# Patient Record
Sex: MALE | Race: WHITE | NOT HISPANIC OR LATINO | Employment: OTHER | URBAN - METROPOLITAN AREA
[De-identification: names, ages, dates, MRNs, and addresses within clinical notes are randomized per-mention and may not be internally consistent; named-entity substitution may affect disease eponyms.]

---

## 2017-02-10 ENCOUNTER — COMMUNICATION - HEALTHEAST (OUTPATIENT)
Dept: FAMILY MEDICINE | Facility: CLINIC | Age: 67
End: 2017-02-10

## 2017-02-10 DIAGNOSIS — I10 HYPERTENSION: ICD-10-CM

## 2017-02-10 DIAGNOSIS — I10 ESSENTIAL HYPERTENSION, MALIGNANT: ICD-10-CM

## 2017-02-16 ENCOUNTER — RECORDS - HEALTHEAST (OUTPATIENT)
Dept: ADMINISTRATIVE | Facility: OTHER | Age: 67
End: 2017-02-16

## 2017-02-17 ENCOUNTER — RECORDS - HEALTHEAST (OUTPATIENT)
Dept: ADMINISTRATIVE | Facility: OTHER | Age: 67
End: 2017-02-17

## 2017-05-18 ENCOUNTER — OFFICE VISIT - HEALTHEAST (OUTPATIENT)
Dept: FAMILY MEDICINE | Facility: CLINIC | Age: 67
End: 2017-05-18

## 2017-05-18 DIAGNOSIS — R41.3 MEMORY DIFFICULTIES: ICD-10-CM

## 2017-05-18 DIAGNOSIS — K75.81 STEATOHEPATITIS: ICD-10-CM

## 2017-05-18 DIAGNOSIS — I10 ESSENTIAL HYPERTENSION: ICD-10-CM

## 2017-05-18 DIAGNOSIS — J30.9 ALLERGIC RHINITIS: ICD-10-CM

## 2017-05-18 DIAGNOSIS — I10 ESSENTIAL HYPERTENSION WITH GOAL BLOOD PRESSURE LESS THAN 140/90: ICD-10-CM

## 2017-05-18 DIAGNOSIS — I10 ESSENTIAL HYPERTENSION, MALIGNANT: ICD-10-CM

## 2017-05-18 DIAGNOSIS — E78.00 PURE HYPERCHOLESTEROLEMIA: ICD-10-CM

## 2017-05-18 DIAGNOSIS — M54.9 BACKACHE: ICD-10-CM

## 2017-05-18 LAB
HBA1C MFR BLD: 5.7 % (ref 3.5–6)
LDLC SERPL CALC-MCNC: 87 MG/DL

## 2017-05-18 ASSESSMENT — MIFFLIN-ST. JEOR: SCORE: 1802.82

## 2017-05-19 ENCOUNTER — COMMUNICATION - HEALTHEAST (OUTPATIENT)
Dept: FAMILY MEDICINE | Facility: CLINIC | Age: 67
End: 2017-05-19

## 2017-06-06 DIAGNOSIS — L40.8 SEBOPSORIASIS: ICD-10-CM

## 2017-06-06 RX ORDER — KETOCONAZOLE 20 MG/ML
SHAMPOO TOPICAL
Qty: 240 ML | Refills: 11 | Status: SHIPPED | OUTPATIENT
Start: 2017-06-06 | End: 2019-01-22

## 2017-06-06 NOTE — TELEPHONE ENCOUNTER
Last seen 5/9/17.      1. Sebopsoriasis. Doing well with current treatment regimen.  Plan to continue regimen.      Desonide ointment to eyebrows.     Ketoconazole shampoo three times weekly.      Clobetasol solution to scalp and elbow as needed.     Follow-up in 1 year, earlier for new or changing lesions.

## 2017-06-29 ENCOUNTER — COMMUNICATION - HEALTHEAST (OUTPATIENT)
Dept: FAMILY MEDICINE | Facility: CLINIC | Age: 67
End: 2017-06-29

## 2017-06-29 ENCOUNTER — AMBULATORY - HEALTHEAST (OUTPATIENT)
Dept: FAMILY MEDICINE | Facility: CLINIC | Age: 67
End: 2017-06-29

## 2017-06-29 DIAGNOSIS — E78.00 PURE HYPERCHOLESTEROLEMIA: ICD-10-CM

## 2017-07-06 ENCOUNTER — AMBULATORY - HEALTHEAST (OUTPATIENT)
Dept: FAMILY MEDICINE | Facility: CLINIC | Age: 67
End: 2017-07-06

## 2017-07-06 ENCOUNTER — COMMUNICATION - HEALTHEAST (OUTPATIENT)
Dept: FAMILY MEDICINE | Facility: CLINIC | Age: 67
End: 2017-07-06

## 2017-07-06 ENCOUNTER — AMBULATORY - HEALTHEAST (OUTPATIENT)
Dept: LAB | Facility: CLINIC | Age: 67
End: 2017-07-06

## 2017-07-06 DIAGNOSIS — E78.00 PURE HYPERCHOLESTEROLEMIA: ICD-10-CM

## 2017-08-07 ENCOUNTER — OFFICE VISIT - HEALTHEAST (OUTPATIENT)
Dept: FAMILY MEDICINE | Facility: CLINIC | Age: 67
End: 2017-08-07

## 2017-08-07 ENCOUNTER — COMMUNICATION - HEALTHEAST (OUTPATIENT)
Dept: FAMILY MEDICINE | Facility: CLINIC | Age: 67
End: 2017-08-07

## 2017-08-07 DIAGNOSIS — M54.9 BACKACHE: ICD-10-CM

## 2017-08-07 DIAGNOSIS — E78.00 PURE HYPERCHOLESTEROLEMIA: ICD-10-CM

## 2017-08-07 DIAGNOSIS — R41.3 MEMORY DIFFICULTIES: ICD-10-CM

## 2017-08-07 LAB — LDLC SERPL CALC-MCNC: 86 MG/DL

## 2017-08-09 ENCOUNTER — COMMUNICATION - HEALTHEAST (OUTPATIENT)
Dept: FAMILY MEDICINE | Facility: CLINIC | Age: 67
End: 2017-08-09

## 2017-08-09 ENCOUNTER — HOSPITAL ENCOUNTER (OUTPATIENT)
Dept: CT IMAGING | Facility: CLINIC | Age: 67
Discharge: HOME | End: 2017-08-09
Attending: FAMILY MEDICINE

## 2017-08-09 DIAGNOSIS — R41.3 MEMORY DIFFICULTIES: ICD-10-CM

## 2017-09-09 ENCOUNTER — COMMUNICATION - HEALTHEAST (OUTPATIENT)
Dept: FAMILY MEDICINE | Facility: CLINIC | Age: 67
End: 2017-09-09

## 2017-10-09 ENCOUNTER — TELEPHONE (OUTPATIENT)
Dept: UROLOGY | Facility: CLINIC | Age: 67
End: 2017-10-09

## 2017-10-09 ENCOUNTER — COMMUNICATION - HEALTHEAST (OUTPATIENT)
Dept: FAMILY MEDICINE | Facility: CLINIC | Age: 67
End: 2017-10-09

## 2017-10-09 NOTE — TELEPHONE ENCOUNTER
----- Message from Leah Lewis sent at 10/9/2017 12:31 PM CDT -----  Regarding: Pt wanting sooner or consolidated appts  Contact: [phone]  Pts wife Leia trying to coordinate appts or get a sooner one. Pt needs to see Dr. Cooley annually just to get his viagra rx refilled. Pts wife was asking if they could be fit in on 11/15 as they will be at the clinic that day anyway.  If not, she is just asking that he be seen sooner than January, which was Dr. Cooley's first available.  Leia would like a call back and can be reached at [phone] with her options.    Thank you!  ~Leah    Please DO NOT send this message and/or reply back to sender. Call Center Representatives DO NOT respond to messages.

## 2017-10-09 NOTE — TELEPHONE ENCOUNTER
Not needed dr teran states for him to follow up with his PMD for refills in the future. Kathryn Castellano LPN Staff Nurse

## 2017-10-30 ENCOUNTER — COMMUNICATION - HEALTHEAST (OUTPATIENT)
Dept: FAMILY MEDICINE | Facility: CLINIC | Age: 67
End: 2017-10-30

## 2017-10-30 DIAGNOSIS — I10 HYPERTENSION: ICD-10-CM

## 2017-11-15 ENCOUNTER — COMMUNICATION - HEALTHEAST (OUTPATIENT)
Dept: FAMILY MEDICINE | Facility: CLINIC | Age: 67
End: 2017-11-15

## 2018-01-04 ENCOUNTER — COMMUNICATION - HEALTHEAST (OUTPATIENT)
Dept: FAMILY MEDICINE | Facility: CLINIC | Age: 68
End: 2018-01-04

## 2018-01-04 ENCOUNTER — OFFICE VISIT - HEALTHEAST (OUTPATIENT)
Dept: FAMILY MEDICINE | Facility: CLINIC | Age: 68
End: 2018-01-04

## 2018-01-04 DIAGNOSIS — N40.0 BPH (BENIGN PROSTATIC HYPERPLASIA): ICD-10-CM

## 2018-01-04 DIAGNOSIS — E78.00 PURE HYPERCHOLESTEROLEMIA: ICD-10-CM

## 2018-01-04 DIAGNOSIS — I10 ESSENTIAL HYPERTENSION: ICD-10-CM

## 2018-01-04 DIAGNOSIS — L21.9 SEBORRHEA: ICD-10-CM

## 2018-01-04 DIAGNOSIS — Z00.00 ROUTINE GENERAL MEDICAL EXAMINATION AT A HEALTH CARE FACILITY: ICD-10-CM

## 2018-01-04 DIAGNOSIS — R41.3 MEMORY DEFICIT: ICD-10-CM

## 2018-01-04 DIAGNOSIS — L30.9 ECZEMA: ICD-10-CM

## 2018-01-04 LAB
CK SERPL-CCNC: 214 U/L (ref 30–190)
LDLC SERPL CALC-MCNC: 104 MG/DL
PSA SERPL-MCNC: 2.2 NG/ML (ref 0–4.5)

## 2018-01-04 ASSESSMENT — MIFFLIN-ST. JEOR: SCORE: 1808.49

## 2018-01-11 ENCOUNTER — COMMUNICATION - HEALTHEAST (OUTPATIENT)
Dept: FAMILY MEDICINE | Facility: CLINIC | Age: 68
End: 2018-01-11

## 2018-01-30 ENCOUNTER — TELEPHONE (OUTPATIENT)
Dept: UROLOGY | Facility: CLINIC | Age: 68
End: 2018-01-30

## 2018-01-30 NOTE — TELEPHONE ENCOUNTER
Patient requesting refill on flomax needs appointment to get more refills. Kathryn Castellano LPN Staff Nurse

## 2018-01-31 ENCOUNTER — COMMUNICATION - HEALTHEAST (OUTPATIENT)
Dept: FAMILY MEDICINE | Facility: CLINIC | Age: 68
End: 2018-01-31

## 2018-03-19 ENCOUNTER — AMBULATORY - HEALTHEAST (OUTPATIENT)
Dept: NEUROLOGY | Facility: CLINIC | Age: 68
End: 2018-03-19

## 2018-03-19 ENCOUNTER — HOSPITAL ENCOUNTER (OUTPATIENT)
Dept: NEUROLOGY | Facility: CLINIC | Age: 68
Setting detail: THERAPIES SERIES
Discharge: STILL ADMITTED OR ADMINISTRATIVE | End: 2018-03-19
Attending: PSYCHIATRY & NEUROLOGY

## 2018-03-19 ENCOUNTER — HOSPITAL ENCOUNTER (OUTPATIENT)
Dept: NEUROLOGY | Facility: CLINIC | Age: 68
Setting detail: THERAPIES SERIES
Discharge: STILL ADMITTED OR ADMINISTRATIVE | End: 2018-03-19
Attending: SOCIAL WORKER

## 2018-03-19 ENCOUNTER — TRANSFERRED RECORDS (OUTPATIENT)
Dept: HEALTH INFORMATION MANAGEMENT | Facility: CLINIC | Age: 68
End: 2018-03-19

## 2018-03-19 DIAGNOSIS — F03.90 MAJOR NEUROCOGNITIVE DISORDER (H): ICD-10-CM

## 2018-03-19 DIAGNOSIS — G31.84 MCI (MILD COGNITIVE IMPAIRMENT): ICD-10-CM

## 2018-03-24 ENCOUNTER — COMMUNICATION - HEALTHEAST (OUTPATIENT)
Dept: FAMILY MEDICINE | Facility: CLINIC | Age: 68
End: 2018-03-24

## 2018-04-03 ENCOUNTER — HOSPITAL ENCOUNTER (OUTPATIENT)
Dept: MRI IMAGING | Facility: CLINIC | Age: 68
Discharge: HOME | End: 2018-04-03
Attending: PSYCHIATRY & NEUROLOGY

## 2018-04-03 ENCOUNTER — RECORDS - HEALTHEAST (OUTPATIENT)
Dept: GENERAL RADIOLOGY | Facility: CLINIC | Age: 68
End: 2018-04-03

## 2018-04-03 ENCOUNTER — TRANSFERRED RECORDS (OUTPATIENT)
Dept: HEALTH INFORMATION MANAGEMENT | Facility: CLINIC | Age: 68
End: 2018-04-03

## 2018-04-03 ENCOUNTER — OFFICE VISIT - HEALTHEAST (OUTPATIENT)
Dept: FAMILY MEDICINE | Facility: CLINIC | Age: 68
End: 2018-04-03

## 2018-04-03 DIAGNOSIS — G31.84 MCI (MILD COGNITIVE IMPAIRMENT): ICD-10-CM

## 2018-04-03 DIAGNOSIS — M54.50 LOW BACK PAIN: ICD-10-CM

## 2018-04-03 DIAGNOSIS — M54.50 ACUTE LEFT-SIDED LOW BACK PAIN WITHOUT SCIATICA: ICD-10-CM

## 2018-04-07 ENCOUNTER — COMMUNICATION - HEALTHEAST (OUTPATIENT)
Dept: NEUROLOGY | Facility: CLINIC | Age: 68
End: 2018-04-07

## 2018-04-09 ENCOUNTER — COMMUNICATION - HEALTHEAST (OUTPATIENT)
Dept: FAMILY MEDICINE | Facility: CLINIC | Age: 68
End: 2018-04-09

## 2018-04-09 ENCOUNTER — AMBULATORY - HEALTHEAST (OUTPATIENT)
Dept: NEUROLOGY | Facility: CLINIC | Age: 68
End: 2018-04-09

## 2018-04-09 DIAGNOSIS — R93.0 ABNORMAL FINDINGS ON DIAGNOSTIC IMAGING OF SKULL AND HEAD, NOT ELSEWHERE CLASSIFIED (CODE): ICD-10-CM

## 2018-04-09 DIAGNOSIS — R41.89 COGNITIVE IMPAIRMENT: ICD-10-CM

## 2018-04-17 ENCOUNTER — HOSPITAL ENCOUNTER (OUTPATIENT)
Dept: MRI IMAGING | Facility: CLINIC | Age: 68
Discharge: HOME | End: 2018-04-17
Attending: PSYCHIATRY & NEUROLOGY

## 2018-04-17 ENCOUNTER — TRANSFERRED RECORDS (OUTPATIENT)
Dept: HEALTH INFORMATION MANAGEMENT | Facility: CLINIC | Age: 68
End: 2018-04-17

## 2018-04-17 DIAGNOSIS — R41.89 COGNITIVE IMPAIRMENT: ICD-10-CM

## 2018-04-17 DIAGNOSIS — R93.0 ABNORMAL FINDINGS ON DIAGNOSTIC IMAGING OF SKULL AND HEAD, NOT ELSEWHERE CLASSIFIED (CODE): ICD-10-CM

## 2018-04-25 ENCOUNTER — COMMUNICATION - HEALTHEAST (OUTPATIENT)
Dept: NEUROLOGY | Facility: CLINIC | Age: 68
End: 2018-04-25

## 2018-04-25 ENCOUNTER — COMMUNICATION - HEALTHEAST (OUTPATIENT)
Dept: FAMILY MEDICINE | Facility: CLINIC | Age: 68
End: 2018-04-25

## 2018-04-25 DIAGNOSIS — L30.9 ECZEMA: ICD-10-CM

## 2018-04-27 ENCOUNTER — MEDICAL CORRESPONDENCE (OUTPATIENT)
Dept: HEALTH INFORMATION MANAGEMENT | Facility: CLINIC | Age: 68
End: 2018-04-27

## 2018-04-27 ENCOUNTER — AMBULATORY - HEALTHEAST (OUTPATIENT)
Dept: NEUROLOGY | Facility: CLINIC | Age: 68
End: 2018-04-27

## 2018-04-27 ENCOUNTER — TRANSFERRED RECORDS (OUTPATIENT)
Dept: HEALTH INFORMATION MANAGEMENT | Facility: CLINIC | Age: 68
End: 2018-04-27

## 2018-04-27 DIAGNOSIS — I72.9 ANEURYSM (H): ICD-10-CM

## 2018-04-30 ENCOUNTER — HOSPITAL ENCOUNTER (OUTPATIENT)
Dept: NEUROLOGY | Facility: CLINIC | Age: 68
Setting detail: THERAPIES SERIES
Discharge: STILL ADMITTED OR ADMINISTRATIVE | End: 2018-04-30
Attending: PSYCHIATRY & NEUROLOGY

## 2018-04-30 DIAGNOSIS — I67.1 CEREBRAL ANEURYSM, NONRUPTURED: ICD-10-CM

## 2018-04-30 DIAGNOSIS — F03.90 MAJOR NEUROCOGNITIVE DISORDER (H): ICD-10-CM

## 2018-04-30 DIAGNOSIS — N48.6 PEYRONIE'S DISEASE: ICD-10-CM

## 2018-04-30 DIAGNOSIS — E78.00 HIGH CHOLESTEROL: ICD-10-CM

## 2018-04-30 DIAGNOSIS — I10 ESSENTIAL HYPERTENSION: ICD-10-CM

## 2018-04-30 DIAGNOSIS — I10 HYPERTENSION: ICD-10-CM

## 2018-05-15 ENCOUNTER — HOSPITAL ENCOUNTER (OUTPATIENT)
Dept: NEUROLOGY | Facility: CLINIC | Age: 68
Setting detail: THERAPIES SERIES
Discharge: STILL ADMITTED OR ADMINISTRATIVE | End: 2018-05-15
Attending: PSYCHIATRY & NEUROLOGY

## 2018-05-15 DIAGNOSIS — M54.9 BACKACHE: ICD-10-CM

## 2018-05-15 DIAGNOSIS — K75.81 STEATOHEPATITIS: ICD-10-CM

## 2018-05-15 DIAGNOSIS — R74.8 ABNORMAL LEVELS OF OTHER SERUM ENZYMES: ICD-10-CM

## 2018-05-15 DIAGNOSIS — L30.9 ECZEMA: ICD-10-CM

## 2018-05-15 DIAGNOSIS — F03.90 DEMENTIA WITHOUT BEHAVIORAL DISTURBANCE (H): ICD-10-CM

## 2018-05-15 DIAGNOSIS — I10 ESSENTIAL HYPERTENSION: ICD-10-CM

## 2018-05-15 DIAGNOSIS — L21.9 SEBORRHEA: ICD-10-CM

## 2018-05-15 LAB
ALBUMIN SERPL-MCNC: 4.1 G/DL (ref 3.5–5)
ALP SERPL-CCNC: 45 U/L (ref 45–120)
ALT SERPL W P-5'-P-CCNC: 38 U/L (ref 0–45)
ANION GAP SERPL CALCULATED.3IONS-SCNC: 12 MMOL/L (ref 5–18)
AST SERPL W P-5'-P-CCNC: 36 U/L (ref 0–40)
BASOPHILS # BLD AUTO: 0.1 THOU/UL (ref 0–0.2)
BASOPHILS NFR BLD AUTO: 1 % (ref 0–2)
BILIRUB SERPL-MCNC: 0.9 MG/DL (ref 0–1)
BUN SERPL-MCNC: 22 MG/DL (ref 8–22)
CALCIUM SERPL-MCNC: 10 MG/DL (ref 8.5–10.5)
CHLORIDE BLD-SCNC: 102 MMOL/L (ref 98–107)
CO2 SERPL-SCNC: 24 MMOL/L (ref 22–31)
CREAT SERPL-MCNC: 1.05 MG/DL (ref 0.7–1.3)
EOSINOPHIL # BLD AUTO: 0.4 THOU/UL (ref 0–0.4)
EOSINOPHIL NFR BLD AUTO: 5 % (ref 0–6)
ERYTHROCYTE [DISTWIDTH] IN BLOOD BY AUTOMATED COUNT: 12.4 % (ref 11–14.5)
GFR SERPL CREATININE-BSD FRML MDRD: >60 ML/MIN/1.73M2
GLUCOSE BLD-MCNC: 104 MG/DL (ref 70–125)
HCT VFR BLD AUTO: 42 % (ref 40–54)
HGB BLD-MCNC: 14.8 G/DL (ref 14–18)
LYMPHOCYTES # BLD AUTO: 1.4 THOU/UL (ref 0.8–4.4)
LYMPHOCYTES NFR BLD AUTO: 17 % (ref 20–40)
MCH RBC QN AUTO: 32 PG (ref 27–34)
MCHC RBC AUTO-ENTMCNC: 35.2 G/DL (ref 32–36)
MCV RBC AUTO: 91 FL (ref 80–100)
MONOCYTES # BLD AUTO: 0.7 THOU/UL (ref 0–0.9)
MONOCYTES NFR BLD AUTO: 9 % (ref 2–10)
NEUTROPHILS # BLD AUTO: 5.5 THOU/UL (ref 2–7.7)
NEUTROPHILS NFR BLD AUTO: 69 % (ref 50–70)
PLATELET # BLD AUTO: 334 THOU/UL (ref 140–440)
PMV BLD AUTO: 8.9 FL (ref 8.5–12.5)
POTASSIUM BLD-SCNC: 4.3 MMOL/L (ref 3.5–5)
PROT SERPL-MCNC: 7.9 G/DL (ref 6–8)
RBC # BLD AUTO: 4.63 MILL/UL (ref 4.4–6.2)
SODIUM SERPL-SCNC: 138 MMOL/L (ref 136–145)
TSH SERPL DL<=0.005 MIU/L-ACNC: 1.08 UIU/ML (ref 0.3–5)
VIT B12 SERPL-MCNC: 793 PG/ML (ref 213–816)
WBC: 8 THOU/UL (ref 4–11)

## 2018-05-17 ENCOUNTER — HOSPITAL ENCOUNTER (OUTPATIENT)
Dept: PET IMAGING | Facility: HOSPITAL | Age: 68
Discharge: HOME | End: 2018-05-17
Attending: PSYCHIATRY & NEUROLOGY

## 2018-05-17 DIAGNOSIS — F03.90 DEMENTIA WITHOUT BEHAVIORAL DISTURBANCE (H): ICD-10-CM

## 2018-05-17 LAB — GLUCOSE BLDC GLUCOMTR-MCNC: 93 MG/DL

## 2018-05-17 ASSESSMENT — MIFFLIN-ST. JEOR: SCORE: 1777.3

## 2018-05-22 ENCOUNTER — OFFICE VISIT (OUTPATIENT)
Dept: NEUROSURGERY | Facility: CLINIC | Age: 68
End: 2018-05-22
Payer: MEDICARE

## 2018-05-22 ENCOUNTER — RECORDS - HEALTHEAST (OUTPATIENT)
Dept: ADMINISTRATIVE | Facility: OTHER | Age: 68
End: 2018-05-22

## 2018-05-22 VITALS
BODY MASS INDEX: 26.62 KG/M2 | HEART RATE: 65 BPM | DIASTOLIC BLOOD PRESSURE: 78 MMHG | WEIGHT: 213 LBS | SYSTOLIC BLOOD PRESSURE: 151 MMHG

## 2018-05-22 DIAGNOSIS — I67.1 CEREBRAL ANEURYSM, NONRUPTURED: Primary | ICD-10-CM

## 2018-05-22 ASSESSMENT — ENCOUNTER SYMPTOMS
DECREASED CONCENTRATION: 1
FEVER: 0
NUMBNESS: 0
POLYPHAGIA: 0
SEIZURES: 0
DECREASED APPETITE: 0
ALTERED TEMPERATURE REGULATION: 0
WHEEZING: 0
DYSPNEA ON EXERTION: 0
HALLUCINATIONS: 0
JOINT SWELLING: 0
INCREASED ENERGY: 1
WEAKNESS: 0
DIZZINESS: 0
COUGH: 1
COUGH DISTURBING SLEEP: 0
MUSCLE CRAMPS: 1
PARALYSIS: 0
SNORES LOUDLY: 1
NERVOUS/ANXIOUS: 1
MEMORY LOSS: 1
MUSCLE WEAKNESS: 0
WEIGHT GAIN: 0
SHORTNESS OF BREATH: 0
POLYDIPSIA: 0
SPUTUM PRODUCTION: 1
SPEECH CHANGE: 0
BACK PAIN: 1
WEIGHT LOSS: 0
TINGLING: 0
ARTHRALGIAS: 1
POSTURAL DYSPNEA: 0
INSOMNIA: 0
HEMOPTYSIS: 0
NIGHT SWEATS: 1
HEADACHES: 0
MYALGIAS: 1
FATIGUE: 0
PANIC: 0
TREMORS: 0
STIFFNESS: 1
NECK PAIN: 0
DISTURBANCES IN COORDINATION: 0
DEPRESSION: 1
CHILLS: 1
LOSS OF CONSCIOUSNESS: 0

## 2018-05-22 ASSESSMENT — PAIN SCALES - GENERAL: PAINLEVEL: NO PAIN (0)

## 2018-05-22 NOTE — NURSING NOTE
Informed patient and wife, Leia: Scheduling will contact you to make arrangements for your angiogram. You will need a  home and someone that can stay with you through the night. Do not eat for 8 hours prior to your procedure. You may drink clear liquids (includes water, Jell-O, clear broth, apple juice or any non-carbonated beverage that you can see through) until 2 hours prior to your procedure. You may take your medications with a sip of water. You will check in at the Gold waiting room at the [facility] 1.5 hours prior to your procedure. You will receive written instructions and a map to the hospital after your procedure has been scheduled.

## 2018-05-22 NOTE — NURSING NOTE
Informed patient: Scheduling will contact you to make arrangements for your angiogram. You will need a  home and someone that can stay with you through the night. Do not eat for 8 hours prior to your procedure. You may drink clear liquids (includes water, Jell-O, clear broth, apple juice or any non-carbonated beverage that you can see through) until 2 hours prior to your procedure. You may take your medications with a sip of water. You will check in at the Gold waiting room at the [facility] 1.5 hours prior to your procedure. You will receive written instructions and a map to the hospital after your procedure has been scheduled.

## 2018-05-22 NOTE — PROGRESS NOTES
Service Date: 2018      I had the pleasure to see Mr. Coronado and his wife today in the Neurosurgery Clinic.  This is a 68-year-old gentleman with a history of hypertension, hypercholesterolemia, on aspirin, who was being worked up by Dr. Edouard for short-term memory loss over the past year.  An MRA head was obtained which revealed an incidental small ACOM aneurysm. He was referred here for surgical consultation.  The patient otherwise denies any new weakness, numbness, tingling or other deficits.      On examination, the patient is awake, alert and oriented x3.  His cranial nerves are grossly intact.  He has 5/5 in upper and lower extremities bilaterally.  Sensation is intact to light touch.  His walking is stable.     Imaging of the brain (MRA 2018) was reviewed.  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        Overall, I spent about 35 minutes with the patient and his wife, with the majority of the time spent in consultation and performing his treatment plan.  I explained the natural history of aneurysms and the options of management including observation versus treatment (coiling or clipping).  I discussed that to have a better understanding of the anatomy of the aneurysm it would be reasonable to obtain a cerebral angiogram.  We discussed risks and benefits of the angiogram.  These risks include injury to the femoral artery, strokes, bleeding, and infection.  The angiogram would allow us also to understand what would be the best way to tackle this aneurysm if we were to treat it.  The patient and his wife agreed to proceed.  We will schedule this in the next few weeks.         AHMET REECE MD       As dictated by AGUSTIN ESPINO MD            D: 2018   T: 2018   MT: JOSÉ      Name:     POLLO CORONADO   MRN:      [id number]        Account:      [id number]   :      1950           Service Date: 2018      Document: [id number]

## 2018-05-22 NOTE — NURSING NOTE
Chief Complaint   Patient presents with     Consult     aneurysm     Weight 96.6 kg (213 lb).    Yonatan Villagomez LPN

## 2018-05-22 NOTE — PATIENT INSTRUCTIONS
Scheduling will contact you to make arrangements for your angiogram. You will need a  home and someone that can stay with you through the night. Do not eat for 8 hours prior to your procedure. You may drink clear liquids (includes water, Jell-O, clear broth, apple juice or any non-carbonated beverage that you can see through) until 2 hours prior to your procedure. You may take your medications with a sip of water. You will check in at the [facility] waiting room at the [facility] 1.5 hours prior to your procedure. You will receive written instructions and a map to the hospital after your procedure has been scheduled.      Directions for a Cerebral Angiogram     What to Expect:    You will be scheduled for an angiogram, which is a procedure that uses x-rays to view the arteries (blood vessels that carry blood from the heart out to the body).     After you arrive, the nursing staff will take a short history and assessment before the procedure begins. Your physician will explain the procedure to you and your family, including the possible risks and side effects.  Be sure to ask questions and address any concerns you may have. You will then be asked to sign a consent form for the procedure.     During the procedure a small tube or catheter will be placed into one of your arteries (usually the artery at the top of the leg; less commonly the artery on the inside of your upper arm) and maneuvered to the specific artery being studied.  Contrast medium (x-ray dye) will be injected into the blood vessel and x-rays will be taken of the area.    Once the procedure is completed the catheter will be removed and pressure held at the puncture site for 20 to 30 minutes to make sure the puncture site seals.    During the exam, you are routinely monitored by a heart monitor and an oxygen saturation monitor that lets us know how well you are breathing.  A blood pressure cuff will be on your arm.  An IV is started  to provide you with medications and IV fluids during the procedure.    After the exam you will need to be on complete bed rest for 4 to 6 hours.  The nurse will monitor your vital signs, puncture site, pulses and temperature of the area or leg that was punctured.     After you are discharged do not drive until the next morning and an adult must be with you until then. You may resume your normal activities the day after the procedure.  Do not do any strenuous exercise or lifting for 48 hours after the procedure.       Preparation:   Laboratory tests will be obtained by your doctor on the day of the exam (Basic Metabolic Panel, CBCP, PTT and INR).  Someone will need to drive you to and from the hospital.  Be sure to have someone who can stay with you through the night.   If you are allergic to x-ray contrast or iodine, contact your doctor or the nurse coordinator prior to the exam day for instructions.  If you are or may be pregnant contact your doctor or nurse coordinator prior to the day of the exam.  If you have diabetes, check with your doctor or the nurse coordinator to see if your insulin should be adjusted for the exam.  If you are taking a medication called Glucophage, Glucovance, or Metformin; these medications need to be held the day of the exam and for approximately 48 hours following the procedure.   If you are taking Coumadin (to thin your blood) please contact the nurse coordinator at least 7 days before the exam for special instructions.  You should not have received barium (x-ray contrast) within 48 hours of this procedure.   Do not smoke for 24 hours prior to the procedure.  Do not eat for 8 hours prior to the procedure. You may drink clear liquids (water, ginger ale, etc) until 2 hours prior to the procedure.  You may brush your teeth and take your medications as directed with a sip of water.    If you have any questions please contact me at [phone], option 3.    Amaury Church RN, CNRN  Stroke &  Endovascular Care Coordinator

## 2018-05-22 NOTE — MR AVS SNAPSHOT
After Visit Summary   5/22/2018    Catalino Coronado    MRN: [id number]           Patient Information     Date Of Birth          1950        Visit Information        Provider Department      5/22/2018 10:00 AM Chevy Duncan MD [facility] Neurosurgery        Today's Diagnoses     Cerebral aneurysm, nonruptured    -  1      Care Instructions    Scheduling will contact you to make arrangements for your angiogram. You will need a  home and someone that can stay with you through the night. Do not eat for 8 hours prior to your procedure. You may drink clear liquids (includes water, Jell-O, clear broth, apple juice or any non-carbonated beverage that you can see through) until 2 hours prior to your procedure. You may take your medications with a sip of water. You will check in at the Gold waiting room at the [facility] 1.5 hours prior to your procedure. You will receive written instructions and a map to the hospital after your procedure has been scheduled.      Directions for a Cerebral Angiogram     What to Expect:    You will be scheduled for an angiogram, which is a procedure that uses x-rays to view the arteries (blood vessels that carry blood from the heart out to the body).     After you arrive, the nursing staff will take a short history and assessment before the procedure begins. Your physician will explain the procedure to you and your family, including the possible risks and side effects.  Be sure to ask questions and address any concerns you may have. You will then be asked to sign a consent form for the procedure.     During the procedure a small tube or catheter will be placed into one of your arteries (usually the artery at the top of the leg; less commonly the artery on the inside of your upper arm) and maneuvered to the specific artery being studied.  Contrast medium (x-ray dye) will be injected into the blood vessel and x-rays will be taken of the  area.    Once the procedure is completed the catheter will be removed and pressure held at the puncture site for 20 to 30 minutes to make sure the puncture site seals.    During the exam, you are routinely monitored by a heart monitor and an oxygen saturation monitor that lets us know how well you are breathing.  A blood pressure cuff will be on your arm.  An IV is started to provide you with medications and IV fluids during the procedure.    After the exam you will need to be on complete bed rest for 4 to 6 hours.  The nurse will monitor your vital signs, puncture site, pulses and temperature of the area or leg that was punctured.     After you are discharged do not drive until the next morning and an adult must be with you until then. You may resume your normal activities the day after the procedure.  Do not do any strenuous exercise or lifting for 48 hours after the procedure.       Preparation:   Laboratory tests will be obtained by your doctor on the day of the exam (Basic Metabolic Panel, CBCP, PTT and INR).  Someone will need to drive you to and from the hospital.  Be sure to have someone who can stay with you through the night.   If you are allergic to x-ray contrast or iodine, contact your doctor or the nurse coordinator prior to the exam day for instructions.  If you are or may be pregnant contact your doctor or nurse coordinator prior to the day of the exam.  If you have diabetes, check with your doctor or the nurse coordinator to see if your insulin should be adjusted for the exam.  If you are taking a medication called Glucophage, Glucovance, or Metformin; these medications need to be held the day of the exam and for approximately 48 hours following the procedure.   If you are taking Coumadin (to thin your blood) please contact the nurse coordinator at least 7 days before the exam for special instructions.  You should not have received barium (x-ray contrast) within 48 hours of this procedure.   Do not  smoke for 24 hours prior to the procedure.  Do not eat for 8 hours prior to the procedure. You may drink clear liquids (water, ginger ale, etc) until 2 hours prior to the procedure.  You may brush your teeth and take your medications as directed with a sip of water.    If you have any questions please contact me at [phone], option 3.    Amaury Church RN, CNRN  Stroke & Endovascular Care Coordinator                Follow-ups after your visit        Future tests that were ordered for you today     Open Future Orders        Priority Expected Expires Ordered    IR Carotid Cerebral Angiogram Bilateral Routine  2019            Who to contact     Please call your clinic at [phone] to:    Ask questions about your health    Make or cancel appointments    Discuss your medicines    Learn about your test results    Speak to your doctor            Additional Information About Your Visit        [employer] Information     [employer] is an electronic gateway that provides easy, online access to your medical records. With [employer], you can request a clinic appointment, read your test results, renew a prescription or communicate with your care team.     To sign up for [employer] visit the website at [email or web address]   You will be asked to enter the access code listed below, as well as some personal information. Please follow the directions to create your username and password.     Your access code is: [email or web address]  Expires: 2018  6:30 AM     Your access code will  in 90 days. If you need help or a new code, please contact your [facility] or call [phone] for assistance.        Care EveryWhere ID     This is your Care EveryWhere ID. This could be used by other organizations to access your [facility] medical records  [id number]        Your Vitals Were     Pulse BMI (Body Mass Index)                65 26.62 kg/m2           Blood Pressure from Last 3 Encounters:    05/22/18 151/78   01/21/16 141/81   12/01/14 137/67    Weight from Last 3 Encounters:   05/22/18 96.6 kg (213 lb)   01/21/16 99.1 kg (218 lb 8 oz)   12/01/14 100.3 kg (221 lb 3.2 oz)              We Performed the Following     Carmen-Operative Worksheet, Single Procedure        Primary Care Provider Office Phone # Fax #    Jamari Sloan [phone]       [address] [address]        Equal Access to Services     [facility]: Hadii aad ku hadasho Soomaali, waaxda luqadaha, qaybta kaalmada adeirenayaheather, lukas chin . So [facility] [phone].    ATENCIÓN: Si habla español, tiene a johns disposición servicios gratuitos de asistencia lingüística. [facility] [phone].    We comply with applicable federal civil rights laws and [place] laws. We do not discriminate on the basis of race, color, national origin, age, disability, sex, sexual orientation, or gender identity.            Thank you!     Thank you for choosing [facility]  for your care. Our goal is always to provide you with excellent care. Hearing back from our patients is one way we can continue to improve our services. Please take a few minutes to complete the written survey that you may receive in the mail after your visit with us. Thank you!             Your Updated Medication List - Protect others around you: Learn how to safely use, store and throw away your medicines at www.disposemymeds.org.          This list is accurate as of 5/22/18 11:01 AM.  Always use your most recent med list.                   Brand Name Dispense Instructions for use Diagnosis    ASPIRIN      daily. 81 mg        ATENOLOL      50 mg daily.        clobetasol 0.05 % external solution    TEMOVATE    60 mL    Apply topically 2 times daily To scaly and itchy areas on scalp until no rash nor itch. Hold until patient requests.    Sebopsoriasis       desonide 0.05 % ointment    DESOWEN    60 g    Apply topically 2  times daily To rash at eyebrows and on face as needed until clear. Hold until patient requests.    Sebopsoriasis       ketoconazole 2 % shampoo    NIZORAL    240 mL    To entire wet scalp and then wash off after 5 minutes three times a week. Hold until patient requests.    Sebopsoriasis       LIPITOR PO      Take 40 mg by mouth daily. PRN        sildenafil 50 MG tablet    VIAGRA    30 tablet    Take 1 tablet (50 mg) by mouth as needed for erectile dysfunction    Erectile dysfunction due to arterial insufficiency       tamsulosin 0.4 MG capsule    FLOMAX    90 capsule    Take 1 capsule (0.4 mg) by mouth daily    Benign non-nodular prostatic hyperplasia with lower urinary tract symptoms

## 2018-06-07 ENCOUNTER — COMMUNICATION - HEALTHEAST (OUTPATIENT)
Dept: FAMILY MEDICINE | Facility: CLINIC | Age: 68
End: 2018-06-07

## 2018-06-07 DIAGNOSIS — I10 ESSENTIAL HYPERTENSION, MALIGNANT: ICD-10-CM

## 2018-06-08 ENCOUNTER — AMBULATORY - HEALTHEAST (OUTPATIENT)
Dept: NEUROLOGY | Facility: CLINIC | Age: 68
End: 2018-06-08

## 2018-06-08 ENCOUNTER — HOSPITAL ENCOUNTER (OUTPATIENT)
Dept: NEUROLOGY | Facility: CLINIC | Age: 68
Setting detail: THERAPIES SERIES
Discharge: STILL ADMITTED OR ADMINISTRATIVE | End: 2018-06-08
Attending: PSYCHIATRY & NEUROLOGY

## 2018-06-08 DIAGNOSIS — F02.80 ALZHEIMER DISEASE (H): ICD-10-CM

## 2018-06-08 DIAGNOSIS — G30.9 ALZHEIMER DISEASE (H): ICD-10-CM

## 2018-06-19 ENCOUNTER — HOSPITAL ENCOUNTER (OUTPATIENT)
Dept: OCCUPATIONAL THERAPY | Age: 68
Setting detail: THERAPIES SERIES
Discharge: STILL ADMITTED OR ADMINISTRATIVE | End: 2018-06-19
Attending: PSYCHIATRY & NEUROLOGY

## 2018-06-19 DIAGNOSIS — R41.89 COGNITIVE IMPAIRMENT: ICD-10-CM

## 2018-06-27 ENCOUNTER — CARE COORDINATION (OUTPATIENT)
Dept: NEUROLOGY | Facility: CLINIC | Age: 68
End: 2018-06-27

## 2018-06-27 NOTE — PROGRESS NOTES
Patient scheduled for a cerebral angiogram on 7/6/18 at 11:00 AM. Informed patient 5/22: Someone will need to drive you home from the hospital. Be sure to have someone that can stay with you through the night. Do not eat after 3:00 AM; you may drink clear liquids (includes water, Jell-O, clear broth, apple juice or any non-carbonated beverage that you can see through) until 9:00 AM. You may take your medications with a sip of water the morning of the procedure. Please go to the Gold waiting area at the [facility] to check in at 9:30 AM. Patient verbalized understanding. Map and instructions sent to patient.

## 2018-06-27 NOTE — PATIENT INSTRUCTIONS
You are scheduled for a cerebral angiogram on 7/6/18 at 11:00 AM. Someone will need to drive you home from the hospital. Be sure to have someone that can stay with you through the night. Do not eat after 3:00 AM; you may drink clear liquids (includes water, Jell-O, clear broth, apple juice or any non-carbonated beverage that you can see through) until 9:00 AM. You may take your medications with a sip of water the morning of the procedure. Please go to the [facility] waiting area at the [facility] to check in at 9:30 AM.    If you have questions regarding your procedure, please contact me at [phone], option 3.    If you need to cancel, reschedule or have procedure scheduling related questions, please call [phone].    Thank you,  Amaury Church, RN, CNRN  Stroke & Endovascular Care Coordinator

## 2018-07-02 ENCOUNTER — COMMUNICATION - HEALTHEAST (OUTPATIENT)
Dept: FAMILY MEDICINE | Facility: CLINIC | Age: 68
End: 2018-07-02

## 2018-07-02 ENCOUNTER — HOSPITAL ENCOUNTER (OUTPATIENT)
Dept: NEUROLOGY | Facility: CLINIC | Age: 68
Setting detail: THERAPIES SERIES
Discharge: STILL ADMITTED OR ADMINISTRATIVE | End: 2018-07-02
Attending: PSYCHIATRY & NEUROLOGY

## 2018-07-02 DIAGNOSIS — E78.00 PURE HYPERCHOLESTEROLEMIA: ICD-10-CM

## 2018-07-02 DIAGNOSIS — G30.9 ALZHEIMER DISEASE (H): ICD-10-CM

## 2018-07-02 DIAGNOSIS — F02.80 ALZHEIMER DISEASE (H): ICD-10-CM

## 2018-07-03 ENCOUNTER — TELEPHONE (OUTPATIENT)
Dept: NEUROLOGY | Facility: CLINIC | Age: 68
End: 2018-07-03

## 2018-07-03 DIAGNOSIS — I67.1 CEREBRAL ANEURYSM, NONRUPTURED: Primary | ICD-10-CM

## 2018-07-03 NOTE — TELEPHONE ENCOUNTER
Patient has diagnosis of Lewy body dementia and alzheimers. Patients wife, Leia, is very concerned about using fentanyl and versed with these diagnoses for angio on Friday. She states she has been doing research and these medications in particular can cause full blown episodes of confusion. May do procedure without sedation. Leia states patient will need some sedation as he is apprehensive. Suggested PAC visit to evaluate and advise. Patient scheduled for 7/5/18 at 10:00 AM. Leia is aware and in agreement with this plan.

## 2018-07-03 NOTE — TELEPHONE ENCOUNTER
----- Message from [facility] sent at 7/3/2018 10:40 AM CDT -----  Regarding: Needs call back re: meds for Fri  ( Tues [id number]) Leia called on behalf of Vahid regarding what kind of sedation will be used for angio on Fri as he is unable to take certain kinds. Pls call asap

## 2018-07-05 ENCOUNTER — APPOINTMENT (OUTPATIENT)
Dept: SURGERY | Facility: CLINIC | Age: 68
End: 2018-07-05
Payer: MEDICARE

## 2018-07-05 ENCOUNTER — OFFICE VISIT (OUTPATIENT)
Dept: SURGERY | Facility: CLINIC | Age: 68
End: 2018-07-05
Payer: MEDICARE

## 2018-07-05 VITALS
HEIGHT: 75 IN | SYSTOLIC BLOOD PRESSURE: 156 MMHG | DIASTOLIC BLOOD PRESSURE: 78 MMHG | OXYGEN SATURATION: 96 % | HEART RATE: 65 BPM | TEMPERATURE: 98.1 F | BODY MASS INDEX: 26.24 KG/M2 | WEIGHT: 211 LBS

## 2018-07-05 DIAGNOSIS — Z01.818 PREOP EXAMINATION: Primary | ICD-10-CM

## 2018-07-05 RX ORDER — HYDROCHLOROTHIAZIDE 12.5 MG/1
CAPSULE ORAL
COMMUNITY
Start: 2018-06-07 | End: 2021-12-16

## 2018-07-05 RX ORDER — DONEPEZIL HYDROCHLORIDE 10 MG/1
5 TABLET, FILM COATED ORAL 2 TIMES DAILY PRN
COMMUNITY
Start: 2018-07-02 | End: 2021-05-14 | Stop reason: SINTOL

## 2018-07-05 RX ORDER — SIMVASTATIN 40 MG
TABLET ORAL
COMMUNITY
Start: 2018-07-03 | End: 2021-10-26

## 2018-07-05 NOTE — H&P
Pre-Operative H & P     CC:  Preoperative exam to assess for increased cardiopulmonary risk while undergoing surgery and anesthesia.    Date of Encounter: 7/5/2018  Primary Care Physician:  Jamari Sloan  Reason for visit: Cerebral aneurysm, nonruptured [I67.1]  - Primary   HPI  Catalino Coronado is a 68 year old male who presents for pre-operative H & P in preparation for IR angiogram with Dr. Duncan on 7/6/18 at [facility]. History is obtained from the patient and family.    Patient who was being evaluated by Dr. Edouard for short term memory loss over the past year. An MRA of the head was obtained which revealed an incidental, small ACOM aneurysm. He was referred to Dr. Duncan and counseled for above procedure with conscious sedation.   His history is otherwise significant for HLD and HTN. Due to his recent issues with memory, the patient and family are very concerned about sedative medications and requested consultation with anesthesia.           Past Medical History  Past Medical History:   Diagnosis Date     BPH (benign prostatic hyperplasia)      Cerebral aneurysm, nonruptured      Dementia      Hypercholesterolemia      Hypertension      Hypertension        Past Surgical History  Past Surgical History:   Procedure Laterality Date     APPENDECTOMY       BIOPSY OF SKIN LESION       EXCISE MASS BACK  2/8/2013    Procedure: EXCISE MASS BACK;  Excision of Sebaceous Back Cyst ;  Surgeon: Sean Randhawa MD;  Location: [facility] OR     pilonidal cyst removal          Hx of Blood transfusions/reactions: Denies.      Hx of abnormal bleeding or anti-platelet use: ASA 81 mg daily    Menstrual history: No LMP for male patient.    Steroid use in the last year: Denies.     Personal or FH with difficulty with Anesthesia:  Denies.     Prior to Admission Medications  Current Outpatient Prescriptions   Medication Sig Dispense Refill     ASPIRIN daily. 81 mg       ATENOLOL 50 mg daily.        Atorvastatin Calcium (LIPITOR PO) Take 40 mg by mouth daily. PRN       clobetasol (TEMOVATE) 0.05 % external solution Apply topically 2 times daily To scaly and itchy areas on scalp until no rash nor itch. Hold until patient requests. 60 mL 11     desonide (DESOWEN) 0.05 % ointment Apply topically 2 times daily To rash at eyebrows and on face as needed until clear. Hold until patient requests. 60 g 11     ketoconazole (NIZORAL) 2 % shampoo To entire wet scalp and then wash off after 5 minutes three times a week. Hold until patient requests. 240 mL 11     sildenafil (VIAGRA) 50 MG tablet Take 1 tablet (50 mg) by mouth as needed for erectile dysfunction 30 tablet 3     tamsulosin (FLOMAX) 0.4 MG 24 hr capsule Take 1 capsule (0.4 mg) by mouth daily 90 capsule 3       Allergies  No Known Allergies    Social History  Social History     Social History     Marital status:      Spouse name: N/A     Number of children: N/A     Years of education: N/A     Occupational History     Not on file.     Social History Main Topics     Smoking status: Former Smoker     Types: Cigarettes     Quit date: 1/1/2011     Smokeless tobacco: Never Used     Alcohol use Yes      Comment: socially     Drug use: No     Sexual activity: Not on file     Other Topics Concern     Not on file     Social History Narrative       Family History  Family History   Problem Relation Age of Onset     HEART DISEASE Father      Dementia Mother      Cancer No family hx of      no skin cancer       Review of Systems  ROS/MED HX  The complete review of systems is negative other than noted in the HPI or here.   ENT/Pulmonary:     (+)tobacco use, Past use , . .    Neurologic:     (+)dementia, other neuro AOCM aneurysm    Cardiovascular:     (+) Dyslipidemia, hypertension----. Taking blood thinners : . . . :. .       METS/Exercise Tolerance:  4 - Raking leaves, gardening   Hematologic:  - neg hematologic  ROS       Musculoskeletal:  - neg musculoskeletal  ROS       GI/Hepatic:  - neg GI/hepatic ROS       Renal/Genitourinary:  - ROS Renal section negative       Endo:  - neg endo ROS       Psychiatric:  - neg psychiatric ROS       Infectious Disease:  - neg infectious disease ROS       Malignancy:      - no malignancy   Other:    (+) No chance of pregnancy C-spine cleared: N/A, no H/O Chronic Pain,no other significant disability      Physical Exam      Airway   Mallampati: II  TM distance: >3 FB  Neck ROM: full                          0 lbs 0 oz  Data Unavailable   There is no height or weight on file to calculate BMI.       Physical Exam  Constitutional: Awake, alert, cooperative, no apparent distress, and appears stated age. Accompanied by wife.   Eyes: Pupils equal, round and reactive to light, extra ocular muscles intact, sclera clear, conjunctiva normal.  HENT: Normocephalic, oral pharynx with moist mucus membranes, good dentition. No goiter appreciated.   Respiratory: Clear to auscultation bilaterally, no crackles or wheezing. No cough or obvious dyspnea.  Cardiovascular: Regular rate and rhythm, normal S1 and S2, and no murmur noted.  Carotids +2, no bruits. No edema. Palpable pulses to radial  DP and PT arteries.   GI: Normal bowel sounds, soft, non-distended, non-tender, no masses palpated, no hepatosplenomegaly.  Surgical scars: ***  Lymph/Hematologic: No cervical lymphadenopathy and no supraclavicular lymphadenopathy.  Genitourinary:  Deferred.  Skin: Warm and dry.  No rashes at anticipated surgical site.   Musculoskeletal: Full ROM of neck. There is no redness, warmth, or swelling of the joints. Gross motor strength is normal.    Neurologic: Awake, alert, oriented to name, place and time. Cranial nerves II-XII are grossly intact. Gait is normal.   Neuropsychiatric: Calm, cooperative. Normal affect.     Labs: (personally reviewed)  EKG: Personally reviewed but formal cardiology read pending: ***  Cardiac echo: ***  Stress test: ***  PFT's: ***  NM PET  Metabolic Brain 5/17/18   CONCLUSION:  Pattern of temporoparietal brain hypometabolism is most suggestive of dementia with Lewy bodies given relative sparing of the posterior cingulate, although atypical Alzheimer dementia remains a consideration. No evidence of a frontal type dementia.  MRA Head 4/17/18  CONCLUSION:  1.  Anterior communicating artery aneurysm, as detailed above.  2.  No additional aneurysm.  Imaging reviewed by this provider      Outside records reviewed from: Care Everywhere    ASSESSMENT and PLAN  Catalino Coronado is a 68 year old male scheduled to undergo IR angiogram with Dr. Duncan on 7/6/18. He has the following specific operative considerations:   - RCRI : No serious cardiac risks.   - Anesthesia considerations:  Refer to PAC assessment in anesthesia records  - VTE risk: 1.8%  - RICCO # of risks 3/8 = Intermediate risk  - Risk of PONV score = 2.  If > 2, anti-emetic intervention recommended.     --AOCM aneurysm incidentally found during work up for short term memory loss. Above procedure now recommended by Dr. Duncan with conscious sedation in IR.   --Patient/family concern for delirium and possible worsening dementia with sedation and have requested to speak to anesthesia. Was counseled by Dr. Hightower today.    --HLD. Atorvastatin. HYPERTENSION. Will take Atenolol on DOS. ASA 81? Testing activity   --Former smoker. ? Denies pulmonary symptoms. ?ricco    ?confirmed instructions by IR?        Patient was discussed with Dr Hightower.    FADIA Kim CNS  Preoperative Assessment Center  [facility]  Clinic and [facility] Center  Phone: [phone]  Fax: [phone]

## 2018-07-06 ENCOUNTER — APPOINTMENT (OUTPATIENT)
Dept: MEDSURG UNIT | Facility: CLINIC | Age: 68
End: 2018-07-06
Attending: NEUROLOGICAL SURGERY
Payer: MEDICARE

## 2018-07-06 ENCOUNTER — APPOINTMENT (OUTPATIENT)
Dept: INTERVENTIONAL RADIOLOGY/VASCULAR | Facility: CLINIC | Age: 68
End: 2018-07-06
Attending: NEUROLOGICAL SURGERY
Payer: MEDICARE

## 2018-07-06 ENCOUNTER — HOSPITAL ENCOUNTER (OUTPATIENT)
Facility: CLINIC | Age: 68
Discharge: HOME | End: 2018-07-06
Attending: NEUROLOGICAL SURGERY | Admitting: NEUROLOGICAL SURGERY
Payer: MEDICARE

## 2018-07-06 VITALS
SYSTOLIC BLOOD PRESSURE: 127 MMHG | DIASTOLIC BLOOD PRESSURE: 65 MMHG | RESPIRATION RATE: 14 BRPM | OXYGEN SATURATION: 95 % | HEART RATE: 65 BPM | TEMPERATURE: 97.8 F

## 2018-07-06 DIAGNOSIS — I67.1 CEREBRAL ANEURYSM, NONRUPTURED: ICD-10-CM

## 2018-07-06 LAB
APTT PPP: 30 SEC (ref 22–37)
CREAT SERPL-MCNC: 1.03 MG/DL (ref 0.66–1.25)
ERYTHROCYTE [DISTWIDTH] IN BLOOD BY AUTOMATED COUNT: 12.8 % (ref 10–15)
GFR SERPL CREATININE-BSD FRML MDRD: 72 ML/MIN/1.7M2
HCT VFR BLD AUTO: 42.9 % (ref 40–53)
HGB BLD-MCNC: 15.1 G/DL (ref 13.3–17.7)
INR PPP: 1.08 (ref 0.86–1.14)
MCH RBC QN AUTO: 32.5 PG (ref 26.5–33)
MCHC RBC AUTO-ENTMCNC: 35.2 G/DL (ref 31.5–36.5)
MCV RBC AUTO: 93 FL (ref 78–100)
PLATELET # BLD AUTO: 246 10E9/L (ref 150–450)
RBC # BLD AUTO: 4.64 10E12/L (ref 4.4–5.9)
WBC # BLD AUTO: 7.6 10E9/L (ref 4–11)

## 2018-07-06 PROCEDURE — C1760 CLOSURE DEV, VASC: HCPCS

## 2018-07-06 PROCEDURE — 99152 MOD SED SAME PHYS/QHP 5/>YRS: CPT

## 2018-07-06 PROCEDURE — 27210905 ZZH KIT CR7

## 2018-07-06 PROCEDURE — 25000128 H RX IP 250 OP 636: Performed by: NEUROLOGICAL SURGERY

## 2018-07-06 PROCEDURE — 27210732 ZZH ACCESSORY CR1

## 2018-07-06 PROCEDURE — 85610 PROTHROMBIN TIME: CPT | Performed by: STUDENT IN AN ORGANIZED HEALTH CARE EDUCATION/TRAINING PROGRAM

## 2018-07-06 PROCEDURE — 85730 THROMBOPLASTIN TIME PARTIAL: CPT | Performed by: STUDENT IN AN ORGANIZED HEALTH CARE EDUCATION/TRAINING PROGRAM

## 2018-07-06 PROCEDURE — 82565 ASSAY OF CREATININE: CPT | Performed by: STUDENT IN AN ORGANIZED HEALTH CARE EDUCATION/TRAINING PROGRAM

## 2018-07-06 PROCEDURE — 27210802 ZZH SHEATH CR1

## 2018-07-06 PROCEDURE — 25000125 ZZHC RX 250: Performed by: NEUROLOGICAL SURGERY

## 2018-07-06 PROCEDURE — 36224 PLACE CATH CAROTD ART: CPT

## 2018-07-06 PROCEDURE — 27210889 ZZH ACCESSORY CR8

## 2018-07-06 PROCEDURE — 25000128 H RX IP 250 OP 636: Performed by: STUDENT IN AN ORGANIZED HEALTH CARE EDUCATION/TRAINING PROGRAM

## 2018-07-06 PROCEDURE — C1887 CATHETER, GUIDING: HCPCS

## 2018-07-06 PROCEDURE — 40000167 ZZH STATISTIC PP CARE STAGE 2

## 2018-07-06 PROCEDURE — 85027 COMPLETE CBC AUTOMATED: CPT | Performed by: STUDENT IN AN ORGANIZED HEALTH CARE EDUCATION/TRAINING PROGRAM

## 2018-07-06 PROCEDURE — 27210908 ZZH NEEDLE CR4

## 2018-07-06 PROCEDURE — 27210888 ZZH ACCESSORY CR7

## 2018-07-06 PROCEDURE — C1769 GUIDE WIRE: HCPCS

## 2018-07-06 RX ORDER — SODIUM CHLORIDE 9 MG/ML
INJECTION, SOLUTION INTRAVENOUS CONTINUOUS
Status: DISCONTINUED | OUTPATIENT
Start: 2018-07-06 | End: 2018-07-06 | Stop reason: HOSPADM

## 2018-07-06 RX ORDER — LIDOCAINE 40 MG/G
CREAM TOPICAL
Status: DISCONTINUED | OUTPATIENT
Start: 2018-07-06 | End: 2018-07-06 | Stop reason: HOSPADM

## 2018-07-06 RX ORDER — ONDANSETRON 2 MG/ML
4 INJECTION INTRAMUSCULAR; INTRAVENOUS EVERY 6 HOURS PRN
Status: DISCONTINUED | OUTPATIENT
Start: 2018-07-06 | End: 2018-07-06 | Stop reason: HOSPADM

## 2018-07-06 RX ORDER — IODIXANOL 320 MG/ML
150 INJECTION, SOLUTION INTRAVASCULAR ONCE
Status: COMPLETED | OUTPATIENT
Start: 2018-07-06 | End: 2018-07-06

## 2018-07-06 RX ORDER — ONDANSETRON 4 MG/1
4 TABLET, ORALLY DISINTEGRATING ORAL EVERY 6 HOURS PRN
Status: DISCONTINUED | OUTPATIENT
Start: 2018-07-06 | End: 2018-07-06 | Stop reason: HOSPADM

## 2018-07-06 RX ORDER — NICOTINE POLACRILEX 4 MG
15-30 LOZENGE BUCCAL
Status: DISCONTINUED | OUTPATIENT
Start: 2018-07-06 | End: 2018-07-06 | Stop reason: HOSPADM

## 2018-07-06 RX ORDER — NALOXONE HYDROCHLORIDE 0.4 MG/ML
.1-.4 INJECTION, SOLUTION INTRAMUSCULAR; INTRAVENOUS; SUBCUTANEOUS
Status: DISCONTINUED | OUTPATIENT
Start: 2018-07-06 | End: 2018-07-06 | Stop reason: HOSPADM

## 2018-07-06 RX ORDER — MULTIVITAMIN WITH IRON
1 TABLET ORAL AT BEDTIME
COMMUNITY

## 2018-07-06 RX ORDER — FENTANYL CITRATE 50 UG/ML
25-50 INJECTION, SOLUTION INTRAMUSCULAR; INTRAVENOUS EVERY 5 MIN PRN
Status: DISCONTINUED | OUTPATIENT
Start: 2018-07-06 | End: 2018-07-06 | Stop reason: HOSPADM

## 2018-07-06 RX ORDER — DEXTROSE MONOHYDRATE 25 G/50ML
25-50 INJECTION, SOLUTION INTRAVENOUS
Status: DISCONTINUED | OUTPATIENT
Start: 2018-07-06 | End: 2018-07-06 | Stop reason: HOSPADM

## 2018-07-06 RX ADMIN — FENTANYL CITRATE 100 MCG: 50 INJECTION, SOLUTION INTRAMUSCULAR; INTRAVENOUS at 12:39

## 2018-07-06 RX ADMIN — HEPARIN SODIUM 5000 UNITS: 1000 INJECTION, SOLUTION INTRAVENOUS; SUBCUTANEOUS at 12:40

## 2018-07-06 RX ADMIN — SODIUM CHLORIDE: 9 INJECTION, SOLUTION INTRAVENOUS at 10:04

## 2018-07-06 RX ADMIN — IODIXANOL 50 ML: 320 INJECTION, SOLUTION INTRAVASCULAR at 12:41

## 2018-07-06 NOTE — PROGRESS NOTES
1010 Pt on 2a prepped and ready for cerebral angiogram. PIV placed and labs sent. Family at bedside. Pt ready for consent.

## 2018-07-06 NOTE — PROCEDURES
[facility], [place]     Endovascular Surgical Neuroradiology Post-Procedure Note    Pre-Procedure Diagnosis:  Anterior communicating artery aneurysm   Post-Procedure Diagnosis:  Anterior communicating artery aneurysm     Procedure(s):   Diagnostic cervicocerebral angiography    Findings:  Anterior communicating artery aneurysm well visualized 4.7mmx3.6mm    Plan:  Will discuss management with colleagues re: intervention of this aneurysm. Patient to f/u in clinic on 8/1/2018 to discuss merit of intervention.       Primary Surgeon:  Dr. Chevy Duncan  Fellow:  Lenard  Additional Assistants:      Prior to the start of the procedure and with procedural staff participation, I verbally confirmed: the patient s identity using two indicators, relevant allergies, that the procedure was appropriate and matched the consent or emergent situation, and that the correct equipment/implants were available. Immediately prior to starting the procedure I conducted the Time Out with the procedural staff and re-confirmed the patient s name, procedure, and site/side. (The Joint Commission universal protocol was followed.)  Yes    PRU value: Not applicable    Anesthesia:  Conscious Sedation  Medications:  Fentanyl  Puncture site:  Right Femoral Artery    Fluoroscopy time (minutes):  8.2  Radiation dose (mGy):  3.14    Contrast amount (mL):  50     Estimated blood loss (mL):  minimal    Closure:  Device - Angioseal     Disposition:  Home after recovery.        Sedation Post-Procedure Summary    Sedatives: Fentanyl    Vital signs and pulse oximetry were monitored and remained stable throughout the procedure, and sedation was maintained until the procedure was complete.  The patient was monitored by staff until sedation discharge criteria were met.    Patient tolerance:  Patient tolerated the procedure well with no immediate complications.    Time of sedation in minutes:  60 minutes from beginning to end of  physician one to one monitoring.    Jackson Weinberg  Pager:  [phone]

## 2018-07-06 NOTE — PROGRESS NOTES
1255 Pt arrived on 2a post cerebral angiogram. VSS RA. Neuros unchanged. Right groin f/d/i. No pain. 1300 Pt eating and drinking. Family at bedside. 1430 Pt tolerated oral intake. Pt tolerated ambulation. Voided. Right groin remains f/d/i. DC instructions reviewed, copy given to pt. PIv dc'd.. 1500 Pt dc'd home accompanied by wife.

## 2018-07-06 NOTE — DISCHARGE INSTRUCTIONS
[facility]         Interventional Radiology  Discharge Instructions Post Angiogram (NEURO)    AFTER YOU GO HOME  ? DO NOT drive or operate machinery at home or at work for at least 24 hours  ? If you were given sedation; we recommend that an adult stay with you for the first 24 hours  ? DO relax and take it easy for 24 hours  ? DO drink plenty of fluids  ? DO resume your regular diet, unless otherwise instructed by your Primary Physician  ? DO NOT SMOKE FOR AT LEAST 24 HOURS, if you have been given any medications that were to help you relax or sedate you during your procedure  ? DO NOT drink alcoholic beverages the day of your procedure  ? DO NOT do any strenuous exercise or lifting for at least 2 days following your procedure  ? DO NOT take a bath or shower for at least 12 hours following your procedure  ? DO NOT scrub the procedure site vigorously for 5 days  ? DO NOT make any important or legal decisions for 24 hours following your procedure if you were given sedation    CALL THE PHYSICIAN IF:  ? You start bleeding from the procedure site.  If you do start to bleed from that site, lie down flat and hold pressure on the site.  Your physician will tell you if you need to return to the hospital.  It is common to have a small bruise or lump at the site  ? You have numbness, coolness or change in color of the right leg  ? You experience changes in your vision, hearing, balance, coordination, speech, thinking or memory  ? You experience weakness in one or more extremity  ? You experience pain or redness at the puncture site  ? You develop nausea or vomiting  ? You develop hives or a rash or unexplained itching  ? You develop a temperature of 101 degrees F or greater    ADDITIONAL INSTRUCTIONS  ? Support the puncture site for coughing, sneezing, or moving your bowels for the first 48 hours  ? No tub bath, hot tubs, or swimming for 5 days  ? No lotion or powder to the puncture site for 3  days  ? Instruction booklet given: Angioseal Device    [facility] INTERVENTIONAL RADIOLOGY DEPARTMENT  Procedure Physician:         Dr. Duncan/Dr Weinberg Date of procedure: July 6, 2018  Telephone Numbers: [phone] Monday-Friday 8:00 am to 4:30 pm  [phone] After 4:30 pm Monday-Friday, Weekends & Holidays.   Ask for the Neuro-Radiologist on call.  Someone is on call 24 hrs/day  [facility] toll free number: [phone] Monday-Friday 8:00 am to 4:30 pm  [facility] Emergency Dept: [phone]

## 2018-07-06 NOTE — PROGRESS NOTES
Patient Name: Catalino Coronado  Medical Record Number: [id number]  Today's Date: 7/6/2018    Procedure: diagnostic cerebral angiogram wit possible femoral artery closure device  Proceduralist: Jason Duncan and Lenard    Sedation start time: 1149  Sedation end time: 1240  Sedation medications administered: fentanyl 100 mcg.  Total sedation time: 51 minutes    Procedure start time: 1157  Puncture time: 1200  Procedure end time: 1240    Report given to: Michelle Garcia Notes: Pt arrived to IR room 3  from  . Pt denies any questions or concerns regarding procedure. Pt positioned  Supine  and monitored per protocol. Pt tolerated procedure without any noted complications. Pt transferred back to 2A.

## 2018-07-06 NOTE — IP AVS SNAPSHOT
[address] [address]                                       After Visit Summary   7/6/2018    Catalino Coronado    MRN: [id number]           After Visit Summary Signature Page     I have received my discharge instructions, and my questions have been answered. I have discussed any challenges I see with this plan with the nurse or doctor.    ..........................................................................................................................................  Patient/Patient Representative Signature      ..........................................................................................................................................  Patient Representative Print Name and Relationship to Patient    ..................................................               ................................................  Date                                            Time    ..........................................................................................................................................  Reviewed by Signature/Title    ...................................................              ..............................................  Date                                                            Time

## 2018-07-06 NOTE — IP AVS SNAPSHOT
MRN:[id number]                      After Visit Summary   7/6/2018    Catalino Coronado    MRN: [id number]           Visit Information        Department      7/6/2018  9:19 AM [address] [facility]          Review of your medicines      UNREVIEWED medicines. Ask your doctor about these medicines        Dose / Directions    ASPIRIN        Dose:  81 mg   Take 81 mg by mouth daily   Refills:  0       ATENOLOL        Dose:  50 mg   Take 50 mg by mouth daily   Refills:  0       clobetasol 0.05 % external solution   Commonly known as:  TEMOVATE   Used for:  Sebopsoriasis        Apply topically 2 times daily To scaly and itchy areas on scalp until no rash nor itch. Hold until patient requests.   Quantity:  60 mL   Refills:  11       desonide 0.05 % ointment   Commonly known as:  DESOWEN   Used for:  Sebopsoriasis        Apply topically 2 times daily To rash at eyebrows and on face as needed until clear. Hold until patient requests.   Quantity:  60 g   Refills:  11       donepezil 10 MG tablet   Commonly known as:  ARICEPT        Dose:  10 mg   Take 10 mg by mouth daily   Refills:  0       hydrochlorothiazide 12.5 MG capsule   Commonly known as:  MICROZIDE        TAKE ONE CAPSULE BY MOUTH EVERY MORNING.   Refills:  0       ketoconazole 2 % shampoo   Commonly known as:  NIZORAL   Used for:  Sebopsoriasis        To entire wet scalp and then wash off after 5 minutes three times a week. Hold until patient requests.   Quantity:  240 mL   Refills:  11       magnesium 250 MG tablet        Dose:  1 tablet   Take 1 tablet by mouth daily   Refills:  0       sildenafil 50 MG tablet   Commonly known as:  VIAGRA   Used for:  Erectile dysfunction due to arterial insufficiency        Dose:  50 mg   Take 1 tablet (50 mg) by mouth as needed for erectile dysfunction   Quantity:  30 tablet   Refills:  3       simvastatin 40 MG tablet   Commonly known as:  ZOCOR        TAKE 1 TABLET BY MOUTH NIGHTLY AT BEDTIME.   Refills:  0        tamsulosin 0.4 MG capsule   Commonly known as:  FLOMAX   Used for:  Benign non-nodular prostatic hyperplasia with lower urinary tract symptoms        Dose:  0.4 mg   Take 1 capsule (0.4 mg) by mouth daily   Quantity:  90 capsule   Refills:  3                Protect others around you: Learn how to safely use, store and throw away your medicines at www.disposemymeds.org.         Follow-ups after your visit        Your next 10 appointments already scheduled     Jul 23, 2018  1:45 PM CDT   New Patient Visit with Luther Augustin MD   [facility]UR[facility] ([facility])    [address]  [address]  [place] [address]   [phone]               Care Instructions        Further instructions from your care team       [facility]         Interventional Radiology  Discharge Instructions Post Angiogram (NEURO)    AFTER YOU GO HOME  ? DO NOT drive or operate machinery at home or at work for at least 24 hours  ? If you were given sedation; we recommend that an adult stay with you for the first 24 hours  ? DO relax and take it easy for 24 hours  ? DO drink plenty of fluids  ? DO resume your regular diet, unless otherwise instructed by your Primary Physician  ? DO NOT SMOKE FOR AT LEAST 24 HOURS, if you have been given any medications that were to help you relax or sedate you during your procedure  ? DO NOT drink alcoholic beverages the day of your procedure  ? DO NOT do any strenuous exercise or lifting for at least 2 days following your procedure  ? DO NOT take a bath or shower for at least 12 hours following your procedure  ? DO NOT scrub the procedure site vigorously for 5 days  ? DO NOT make any important or legal decisions for 24 hours following your procedure if you were given sedation    CALL THE PHYSICIAN IF:  ? You start bleeding from the procedure site.  If you do start to bleed from that site, lie down flat and hold pressure on the site.  Your physician will tell  you if you need to return to the hospital.  It is common to have a small bruise or lump at the site  ? You have numbness, coolness or change in color of the right leg  ? You experience changes in your vision, hearing, balance, coordination, speech, thinking or memory  ? You experience weakness in one or more extremity  ? You experience pain or redness at the puncture site  ? You develop nausea or vomiting  ? You develop hives or a rash or unexplained itching  ? You develop a temperature of 101 degrees F or greater    ADDITIONAL INSTRUCTIONS  ? Support the puncture site for coughing, sneezing, or moving your bowels for the first 48 hours  ? No tub bath, hot tubs, or swimming for 5 days  ? No lotion or powder to the puncture site for 3 days  ? Instruction booklet given: Angioseal Device    [facility] INTERVENTIONAL RADIOLOGY DEPARTMENT  Procedure Physician:         Dr. Duncan/Dr Weinberg Date of procedure: July 6, 2018  Telephone Numbers: [phone] Monday-Friday 8:00 am to 4:30 pm  [phone] After 4:30 pm Monday-Friday, Weekends & Holidays.   Ask for the Neuro-Radiologist on call.  Someone is on call 24 hrs/day  [facility] toll free number: [phone] Monday-Friday 8:00 am to 4:30 pm  [facility] Emergency Dept: [phone]         Additional Information About Your Visit        Care EveryWhere ID     This is your Care EveryWhere ID. This could be used by other organizations to access your [facility] medical records  [id number]        Your Vitals Were     Blood Pressure Pulse Temperature Respirations Pulse Oximetry       133/81 61 97.8  F (36.6  C) (Oral) 12 95%        Primary Care Provider Office Phone # Fax #    Jamari Sloan [phone]      Equal Access to Services     [facility] AH: Hadii ralph jj [place], waaxda luqadaha, qaybta kaalmada adedanielada, lukas garcia. So [facility] [phone].    ATENCIÓN: Si habla español, tiene a johns disposición servicios gratuitos de asistencia  lingüísticaKevin High al [phone].    We comply with applicable federal civil rights laws and [place] laws. We do not discriminate on the basis of race, color, national origin, age, disability, sex, sexual orientation, or gender identity.            Thank you!     Thank you for choosing [facility] for your care. Our goal is always to provide you with excellent care. Hearing back from our patients is one way we can continue to improve our services. Please take a few minutes to complete the written survey that you may receive in the mail after you visit with us. Thank you!             Medication List: This is a list of all your medications and when to take them. Check marks below indicate your daily home schedule. Keep this list as a reference.      Medications           Morning Afternoon Evening Bedtime As Needed    ASPIRIN   Take 81 mg by mouth daily                                ATENOLOL   Take 50 mg by mouth daily                                clobetasol 0.05 % external solution   Commonly known as:  TEMOVATE   Apply topically 2 times daily To scaly and itchy areas on scalp until no rash nor itch. Hold until patient requests.                                desonide 0.05 % ointment   Commonly known as:  DESOWEN   Apply topically 2 times daily To rash at eyebrows and on face as needed until clear. Hold until patient requests.                                donepezil 10 MG tablet   Commonly known as:  ARICEPT   Take 10 mg by mouth daily                                hydrochlorothiazide 12.5 MG capsule   Commonly known as:  MICROZIDE   TAKE ONE CAPSULE BY MOUTH EVERY MORNING.                                ketoconazole 2 % shampoo   Commonly known as:  NIZORAL   To entire wet scalp and then wash off after 5 minutes three times a week. Hold until patient requests.                                magnesium 250 MG tablet   Take 1 tablet by mouth daily                                sildenafil 50 MG tablet   Commonly  known as:  VIAGRA   Take 1 tablet (50 mg) by mouth as needed for erectile dysfunction                                simvastatin 40 MG tablet   Commonly known as:  ZOCOR   TAKE 1 TABLET BY MOUTH NIGHTLY AT BEDTIME.                                tamsulosin 0.4 MG capsule   Commonly known as:  FLOMAX   Take 1 capsule (0.4 mg) by mouth daily

## 2018-07-09 ENCOUNTER — CARE COORDINATION (OUTPATIENT)
Dept: NEUROLOGY | Facility: CLINIC | Age: 68
End: 2018-07-09

## 2018-07-09 NOTE — PROGRESS NOTES
Neuro-Interventional Discharge Coordination Note     Responsible Attending physician: Dr. Duncan     Operation performed: Diagnostic cerebral angiogram to assess anterior communicating artery aneurysm      Findings:  Anterior communicating artery aneurysm well visualized 4.7mmx3.6mm     Date of Discharge: 7/6/18    Discharge to: Home    Current Contact number: [phone]    Current Status: MM for patient to return call x 2. Received call from patients wife, Leia, stating patient is doing well. Denies bleeding, drainage, pain, swelling, redness at groin puncture site. No concerns. Leia is aware of appointment with Dr. Duncan and plans to attend. Leia has my contact information and was encouraged to call with questions/concerns.     Plan: Will discuss management with colleagues re: intervention of this aneurysm. Patient to f/u in clinic on 8/1/2018 to discuss merit of intervention. Message sent to scheduling to contact patient to make appointment.     Appointment with Dr. Duncan 8/1/18 at 2:20.

## 2018-07-10 ENCOUNTER — COMMUNICATION - HEALTHEAST (OUTPATIENT)
Dept: FAMILY MEDICINE | Facility: CLINIC | Age: 68
End: 2018-07-10

## 2018-07-10 DIAGNOSIS — I10 ESSENTIAL HYPERTENSION: ICD-10-CM

## 2018-07-10 DIAGNOSIS — L40.8 SEBOPSORIASIS: ICD-10-CM

## 2018-07-13 RX ORDER — KETOCONAZOLE 20 MG/ML
SHAMPOO TOPICAL
Qty: 240 ML | Refills: 4 | OUTPATIENT
Start: 2018-07-13

## 2018-07-13 NOTE — TELEPHONE ENCOUNTER
ketoconazole (NIZORAL) 2 % shampoo  Last Written Prescription Date:  6/6/17  Last Fill Quantity: 240ml,   # refills: 11  Last Office Visit : 5/9/16  Future Office visit:  None      Past due for annual appt. Refused.

## 2018-07-16 NOTE — ADDENDUM NOTE
Encounter addended by: Chevy Duncan MD on: 7/16/2018  7:56 AM<BR>     Actions taken: Cosign clinical note with attestation

## 2018-07-23 ENCOUNTER — OFFICE VISIT (OUTPATIENT)
Dept: NEUROLOGY | Facility: CLINIC | Age: 68
End: 2018-07-23
Payer: MEDICARE

## 2018-07-23 ENCOUNTER — RECORDS - HEALTHEAST (OUTPATIENT)
Dept: ADMINISTRATIVE | Facility: OTHER | Age: 68
End: 2018-07-23

## 2018-07-23 VITALS
WEIGHT: 210.2 LBS | BODY MASS INDEX: 26.14 KG/M2 | DIASTOLIC BLOOD PRESSURE: 72 MMHG | TEMPERATURE: 96.7 F | HEART RATE: 70 BPM | SYSTOLIC BLOOD PRESSURE: 132 MMHG | HEIGHT: 75 IN

## 2018-07-23 DIAGNOSIS — G30.1 LATE ONSET ALZHEIMER'S DISEASE WITHOUT BEHAVIORAL DISTURBANCE (H): Primary | ICD-10-CM

## 2018-07-23 DIAGNOSIS — F02.80 LATE ONSET ALZHEIMER'S DISEASE WITHOUT BEHAVIORAL DISTURBANCE (H): Primary | ICD-10-CM

## 2018-07-23 RX ORDER — MULTIVIT WITH MINERALS/LUTEIN
1000 TABLET ORAL AT BEDTIME
COMMUNITY
End: 2022-01-01

## 2018-07-23 ASSESSMENT — PAIN SCALES - GENERAL: PAINLEVEL: NO PAIN (0)

## 2018-07-23 NOTE — PATIENT INSTRUCTIONS
Preventive Care:    Colorectal Cancer Screening: During our visit today, we discussed that it is recommended you receive colorectal cancer screening. Please call or make an appointment with your primary care provider to discuss this. You may also call the [facility] scheduling line ([phone]) to set up a colonoscopy appointment.

## 2018-07-23 NOTE — PROGRESS NOTES
"Service Date: 07/23/2018      REASON FOR VISIT:  This patient is a 68-year-old left-handed man seen on self-referral for a second opinion concerning cognitive impairment and dementia.        HISTORY OF PRESENT ILLNESS:  He is here with his wife, Leia.  They report that his symptoms began a couple of years ago.  He always does the work in the house including plumbing and electrical.  His wife [place] a [place] having a difficult time figuring out about a ceiling fan.  This was not like him.  About a year ago, the patient himself became aware that he was having trouble getting his words out.  He used to work as an .  He retired 2 years ago.  There were no concerns on his job about his cognitive status.  He has short-term memory loss.  He repeats conversations.  He cannot keep track of the schedule.  He will keep asking her questions like, \"What are we doing this week?\" or \"What are we doing today?\".  He has a difficult time telling time on a clock.  He typically gets up at 6:00 a.m.  He is on the treadmill about 3 times a week.  He does not exercise on a regular basis otherwise.  They have a lake home in [place] and they spend half their time there.  He is trying to refinish the garage so it has a work space.  She does all the driving.  Dr. Edouard at [place] told him he should stop driving.  He has not been getting lost while driving.  She handles the money and always has.  He does not play cards or games.  His mood is good.  He does not have hallucinations.  He has no problem with headache, vision, speech or swallowing.  He may have some impairment of hearing.  He has no problem with bowel or bladder control.      PAST MEDICAL HISTORY:  Significant for high blood pressure and elevated cholesterol.  He has not had pertinent surgery or trauma to the head or neck.  He does have a recently discovered aneurysm and is currently being evaluated by Dr. Duncan at " the [facility] for this.  His sleep is good.  His diet is adequate.  He does not really drink and he does not smoke.      SOCIAL HISTORY:  He has 3 grown children.      FAMILY HISTORY:  Positive for his mother having had dementia.      PHYSICAL EXAMINATION:   GENERAL:  The patient is cooperative and in no distress.   VITAL SIGNS:  His blood pressure is 132/72.     There are no carotid bruits.  Auscultation of the heart shows S1, S2, without murmur, rub or gallop.  Chest is clear to auscultation.     NEUROLOGIC:   The patient is alert, oriented and lucid.  He scored a 29/33 on a bedside cognitive evaluation.  He had problems spelling the word world backwards.  He could recall 2 of 3 words at 3 minutes and later on could recall all 3.  His reproduction of a clock face was unsatisfactory.  Cranial nerve testing shows full visual fields to confrontation.  Funduscopic exam was difficult because he has small pupils and it appears that he has cataracts bilaterally.  Eye movements are complete and conjugate without nystagmus.  Pupils react to light.  Facies are symmetric, and tongue protrudes in the midline.  Motor evaluation shows no pronator drift, normal finger tapping, finger-nose-finger and heel-knee-shin.  He has good strength in the arms, hands and legs.  Muscle stretch reflexes are absent throughout.  Toes are downgoing.  Sensory exam shows preserved vibration and temperature in the hands with absent vibration in the toes.  Romberg sign is absent.  He can walk on his heels and toes and tandem forwards but is clumsy performing tandem backwards.      He did have an MRI scan of the brain performed in April.  It showed moderate atrophy.  He had an MR angiogram of the head performed and there is an anterior communicating artery aneurysm that is 5.1 mm in maximum dimension.  He then had a cerebral angiogram.  He had a PET scan which showed a temporal parietal brain hypometabolism suggestive of dementia with Lewy bodies.   A vitamin B12 level was 700 and TSH normal.  He also had neuropsychometric testing performed.  It showed a mixed pattern.  There was a question of whether or not he may has also had a coexisting delirium because he did not seem to be able to keep track of things going on in the test.  There was a variability in his profile.  There was no evidence of dementia.  The question was raised whether or not the profile would fit with Lewy body dementia.  The patient has not had any symptoms to suggest Parkinsonism.      ASSESSMENT:   1.  Dementia, likely of the Alzheimer type.   2.  Asymptomatic anterior communicating artery aneurysm.      DISCUSSION:  The patient is seen with the above problem list.  I had a long talk with the patient and his wife about his presenting symptoms and also about his testing results.  The overall picture is most suggestive of dementia of the Alzheimer type.  I see no evidence of delirium on his exam today.  He is on donepezil.  He thinks it is helping a little bit, but it is upsetting his stomach.  I told him that is a choice that he would have to make about continuing the medicine.  Stomach upset is one of the side effects.  I would not propose any additional testing or treatment at this time.  His neurologic status should be monitored.  He had questions about the driving.  I advised him that first he should see if he has cataracts that need to be treated.  Once that problem is resolved, then he could consider a formal driving assessment.  He will be considering the matter.  He is going to follow up in this clinic on an as-needed basis.        MD DANNY Michel MD             D: 2018   T: 2018   MT: BOBBY      Name:     POLLO MILTON   MRN:      [id number]        Account:      [id number]   :      1950           Service Date: 2018      Document: [id number]

## 2018-07-23 NOTE — LETTER
"7/23/2018       RE: Catalino Coronado  [address] [place] [address]     Dear Colleague,    Thank you for referring your patient, Catalino Coronado, to the [facility] NEUROSPECIALTIES at [facility]. Please see a copy of my visit note below.    Service Date: 07/23/2018      REASON FOR VISIT:  This patient is a 68-year-old left-handed man seen on self-referral for a second opinion concerning cognitive impairment and dementia.        HISTORY OF PRESENT ILLNESS:  He is here with his wife, Leia.  They report that his symptoms began a couple of years ago.  He always does the work in the house including plumbing and electrical.  His wife noticed a couple [place] was having a difficult time figuring out about a ceiling fan.  This was not like him.  About a year ago, the patient himself became aware that he was having trouble getting his words out.  He used to work as an .  He retired 2 years ago.  There were no concerns on his job about his cognitive status.  He has short-term memory loss.  He repeats conversations.  He cannot keep track of the schedule.  He will keep asking her questions like, \"What are we doing this week?\" or \"What are we doing today?\".  He has a difficult time telling time on a clock.  He typically gets up at 6:00 a.m.  He is on the treadmill about 3 times a week.  He does not exercise on a regular basis otherwise.  They have a [place] home in [place] and they spend half their time there.  He is trying to refinish the garage so it has a work space.  She does all the driving.  Dr. Edouard at [place] told him he should stop driving.  He has not been getting lost while driving.  She handles the money and always has.  He does not play cards or games.  His mood is good.  He does not have hallucinations.  He has no problem with headache, vision, speech or swallowing.  He may have some impairment of hearing.  He has no problem " with bowel or bladder control.      PAST MEDICAL HISTORY:  Significant for high blood pressure and elevated cholesterol.  He has not had pertinent surgery or trauma to the head or neck.  He does have a recently discovered aneurysm and is currently being evaluated by Dr. Duncan at the [facility] for this.  His sleep is good.  His diet is adequate.  He does not really drink and he does not smoke.      SOCIAL HISTORY:  He has 3 grown children.      FAMILY HISTORY:  Positive for his mother having had dementia.      PHYSICAL EXAMINATION:   GENERAL:  The patient is cooperative and in no distress.   VITAL SIGNS:  His blood pressure is 132/72.     There are no carotid bruits.  Auscultation of the heart shows S1, S2, without murmur, rub or gallop.  Chest is clear to auscultation.     NEUROLOGIC:   The patient is alert, oriented and lucid.  He scored a 29/33 on a bedside cognitive evaluation.  He had problems spelling the word world backwards.  He could recall 2 of 3 words at 3 minutes and later on could recall all 3.  His reproduction of a clock face was unsatisfactory.  Cranial nerve testing shows full visual fields to confrontation.  Funduscopic exam was difficult because he has small pupils and it appears that he has cataracts bilaterally.  Eye movements are complete and conjugate without nystagmus.  Pupils react to light.  Facies are symmetric, and tongue protrudes in the midline.  Motor evaluation shows no pronator drift, normal finger tapping, finger-nose-finger and heel-knee-shin.  He has good strength in the arms, hands and legs.  Muscle stretch reflexes are absent throughout.  Toes are downgoing.  Sensory exam shows preserved vibration and temperature in the hands with absent vibration in the toes.  Romberg sign is absent.  He can walk on his heels and toes and tandem forwards but is clumsy performing tandem backwards.      He did have an MRI scan of the brain performed in April.  It showed moderate atrophy.  He had  an MR angiogram of the head performed and there is an anterior communicating artery aneurysm that is 5.1 mm in maximum dimension.  He then had a cerebral angiogram.  He had a PET scan which showed a temporal parietal brain hypometabolism suggestive of dementia with Lewy bodies.  A vitamin B12 level was 700 and TSH normal.  He also had neuropsychometric testing performed.  It showed a mixed pattern.  There was a question of whether or not he may has also had a coexisting delirium because he did not seem to be able to keep track of things going on in the test.  There was a variability in his profile.  There was no evidence of dementia.  The question was raised whether or not the profile would fit with Lewy body dementia.  The patient has not had any symptoms to suggest Parkinsonism.      ASSESSMENT:   1.  Dementia, likely of the Alzheimer type.   2.  Asymptomatic anterior communicating artery aneurysm.      DISCUSSION:  The patient is seen with the above problem list.  I had a long talk with the patient and his wife about his presenting symptoms and also about his testing results.  The overall picture is most suggestive of dementia of the Alzheimer type.  I see no evidence of delirium on his exam today.  He is on donepezil.  He thinks it is helping a little bit, but it is upsetting his stomach.  I told him that is a choice that he would have to make about continuing the medicine.  Stomach upset is one of the side effects.  I would not propose any additional testing or treatment at this time.  His neurologic status should be monitored.  He had questions about the driving.  I advised him that first he should see if he has cataracts that need to be treated.  Once that problem is resolved, then he could consider a formal driving assessment.  He will be considering the matter.  He is going to follow up in this clinic on an as-needed basis.        Luther Augustin MD       D: 07/23/2018   T: 07/23/2018   MT: AKA      Name:      POLLO MILTON   MRN:      [id number]        Account:      [id number]   :      1950           Service Date: 2018      Document: [id number]

## 2018-07-23 NOTE — MR AVS SNAPSHOT
"              After Visit Summary   7/23/2018    Catalino Coronado    MRN: [id number]           Patient Information     Date Of Birth          1950        Visit Information        Provider Department      7/23/2018 1:45 PM Luther Augustin MD [facility] NEUROSPECIALTIES        Today's Diagnoses     Late onset Alzheimer's disease without behavioral disturbance    -  1      Care Instructions    Preventive Care:    Colorectal Cancer Screening: During our visit today, we discussed that it is recommended you receive colorectal cancer screening. Please call or make an appointment with your primary care provider to discuss this. You may also call the [facility] scheduling line ([phone]) to set up a colonoscopy appointment.            Follow-ups after your visit        Follow-up notes from your care team     Return if symptoms worsen or fail to improve.      Your next 10 appointments already scheduled     Aug 01, 2018  2:20 PM CDT   (Arrive by 2:05 PM)   Return Visit with Chevy Duncan MD   [facility] Neurosurgery ([facility])    [address] [place] [id number] [phone]              Who to contact     Please call your clinic at [phone] to:    Ask questions about your health    Make or cancel appointments    Discuss your medicines    Learn about your test results    Speak to your doctor            Additional Information About Your Visit        Care EveryWhere ID     This is your Care EveryWhere ID. This could be used by other organizations to access your [place] medical records  [id number]        Your Vitals Were     Pulse Temperature Height BMI (Body Mass Index)          70 96.7  F (35.9  C) (Temporal) 6' 3\" (190.5 cm) 26.27 kg/m2         Blood Pressure from Last 3 Encounters:   07/23/18 132/72   07/06/18 (P) 131/62   07/05/18 156/78    Weight from Last 3 Encounters:   07/23/18 210 lb 3.2 oz (95.3 kg)   07/05/18 211 lb (95.7 kg)   05/22/18 213 lb " (96.6 kg)              Today, you had the following     No orders found for display       Primary Care Provider Office Phone # Fax #    Jamari Sloan -[phone]       [place] [address]        Equal Access to Services     NICOLE RANCHO : Hadii aad ku hadbebeo Soomaali, waaxda luqadaha, qaybta kaalmada adeegyada, lukas roca haysvenn osmar young lapattiyeiim garcia. So [facility] [phone].    ATENCIÓN: Si habla español, tiene a johns disposición servicios gratuitos de asistencia lingüística. Llame al [phone].    We comply with applicable federal civil rights laws and [place] laws. We do not discriminate on the basis of race, color, national origin, age, disability, sex, sexual orientation, or gender identity.            Thank you!     Thank you for choosing [facility] NEUROSPECIALTIES  for your care. Our goal is always to provide you with excellent care. Hearing back from our patients is one way we can continue to improve our services. Please take a few minutes to complete the written survey that you may receive in the mail after your visit with us. Thank you!             Your Updated Medication List - Protect others around you: Learn how to safely use, store and throw away your medicines at www.disposemymeds.org.          This list is accurate as of 7/23/18  2:28 PM.  Always use your most recent med list.                   Brand Name Dispense Instructions for use Diagnosis    ASPIRIN      Take 81 mg by mouth daily        ATENOLOL      Take 50 mg by mouth daily        clobetasol 0.05 % external solution    TEMOVATE    60 mL    Apply topically 2 times daily To scaly and itchy areas on scalp until no rash nor itch. Hold until patient requests.    Sebopsoriasis       desonide 0.05 % ointment    DESOWEN    60 g    Apply topically 2 times daily To rash at eyebrows and on face as needed until clear. Hold until patient requests.    Sebopsoriasis       donepezil 10 MG tablet    ARICEPT     Take 10  mg by mouth daily        hydrochlorothiazide 12.5 MG capsule    MICROZIDE     TAKE ONE CAPSULE BY MOUTH EVERY MORNING.        ketoconazole 2 % shampoo    NIZORAL    240 mL    To entire wet scalp and then wash off after 5 minutes three times a week. Hold until patient requests.    Sebopsoriasis       magnesium 250 MG tablet      Take 1 tablet by mouth daily        sildenafil 50 MG tablet    VIAGRA    30 tablet    Take 1 tablet (50 mg) by mouth as needed for erectile dysfunction    Erectile dysfunction due to arterial insufficiency       simvastatin 40 MG tablet    ZOCOR     TAKE 1 TABLET BY MOUTH NIGHTLY AT BEDTIME.        tamsulosin 0.4 MG capsule    FLOMAX    90 capsule    Take 1 capsule (0.4 mg) by mouth daily    Benign non-nodular prostatic hyperplasia with lower urinary tract symptoms       vitamin E 1000 UNIT capsule    TOCOPHEROL     Take 1,000 Units by mouth daily

## 2018-08-01 ENCOUNTER — OFFICE VISIT (OUTPATIENT)
Dept: NEUROSURGERY | Facility: CLINIC | Age: 68
End: 2018-08-01
Payer: MEDICARE

## 2018-08-01 ENCOUNTER — RECORDS - HEALTHEAST (OUTPATIENT)
Dept: ADMINISTRATIVE | Facility: OTHER | Age: 68
End: 2018-08-01

## 2018-08-01 VITALS
HEIGHT: 75 IN | HEART RATE: 60 BPM | SYSTOLIC BLOOD PRESSURE: 142 MMHG | BODY MASS INDEX: 26.36 KG/M2 | DIASTOLIC BLOOD PRESSURE: 73 MMHG | WEIGHT: 212 LBS

## 2018-08-01 DIAGNOSIS — I67.1 CEREBRAL ANEURYSM, NONRUPTURED: Primary | ICD-10-CM

## 2018-08-01 NOTE — MR AVS SNAPSHOT
After Visit Summary   8/1/2018    Catalino Coronado    MRN: [id number]           Patient Information     Date Of Birth          1950        Visit Information        Provider Department      8/1/2018 2:20 PM Chevy Duncan MD [facility] Neurosurgery        Today's Diagnoses     Cerebral aneurysm, nonruptured    -  1      Care Instructions    1.  MRA Brain with and without contrast in 6 months.  2. F/U with Dr. Duncan in 6 months.  3.  Call [facility] Care Coordinator for questions/concerns  [phone].    Thank you for using [facility] for your healthcare needs.              Follow-ups after your visit        Follow-up notes from your care team     Return in about 6 months (around 2/1/2019).      Your next 10 appointments already scheduled     Aug 07, 2018  8:15 AM CDT   NEW GENERAL with Elio Manzo MD   Eye Clinic ([facility])    [address]  [facility] [address]   [phone]            Feb 11, 2019  1:45 PM CST   MRA BRAIN ([place]) WWO CONTRAST with [facility] MRI ([facility])    [address] [address] [phone]           Take your medicines as usual, unless your doctor tells you not to. Bring a list of your current medicines to your exam (including vitamins, minerals and over-the-counter drugs).  You may or may not receive intravenous (IV) contrast for this exam pending the discretion of the Radiologist.  You do not need to do anything special to prepare.  The MRI machine uses a strong magnet. Please wear clothes without metal (snaps, zippers). A sweatsuit works well, or we may give you a hospital gown.  Please remove any body piercings and hair extensions before you arrive. You will also remove watches, jewelry, hairpins, wallets, dentures, partial dental plates and hearing aids. You may wear contact lenses, and you may be able  to wear your rings. We have a safe place to keep your personal items, but it is safer to leave them at home.  **IMPORTANT** THE INSTRUCTIONS BELOW ARE ONLY FOR THOSE PATIENTS WHO HAVE BEEN PRESCRIBED SEDATION OR GENERAL ANESTHESIA DURING THEIR MRI PROCEDURE:  IF YOUR DOCTOR PRESCRIBED ORAL SEDATION (take medicine to help you relax during your exam):   You must get the medicine from your doctor (oral medication) before you arrive. Bring the medicine to the exam. Do not take it at home. You ll be told when to take it upon arriving for your exam.   Arrive one hour early. Bring someone who can take you home after the test. Your medicine will make you sleepy. After the exam, you may not drive, take a bus or take a taxi by yourself.  IF YOUR DOCTOR PRESCRIBED IV SEDATION:   Arrive one hour early. Bring someone who can take you home after the test. Your medicine will make you sleepy. After the exam, you may not drive, take a bus or take a taxi by yourself.   No eating 6 hours before your exam. You may have clear liquids up until 4 hours before your exam. (Clear liquids include water, clear tea, black coffee and fruit juice without pulp.)  IF YOUR DOCTOR PRESCRIBED ANESTHESIA (be asleep for your exam):   Arrive 1 1/2 hours early. Bring someone who can take you home after the test. You may not drive, take a bus or take a taxi by yourself.   No eating 8 hours before your exam. You may have clear liquids up until 4 hours before your exam. (Clear liquids include water, clear tea, black coffee and fruit juice without pulp.)   You will spend four to five hours in the recovery room.  Please call the Imaging Department at your exam site with any questions.            Feb 11, 2019  3:00 PM CST   (Arrive by 2:45 PM)   Return Visit with Chevy Duncan MD   [facility] Neurosurgery ([facility] [place])    [address] [address] [phone]              Future tests that were  "ordered for you today     Open Future Orders        Priority Expected Expires Ordered    MRA Brain ([place]) wo & w Contrast Routine  8/1/2019 8/1/2018            Who to contact     Please call your clinic at [phone] to:    Ask questions about your health    Make or cancel appointments    Discuss your medicines    Learn about your test results    Speak to your doctor            Additional Information About Your Visit        Care EveryWhere ID     This is your Care EveryWhere ID. This could be used by other organizations to access your [place] medical records  [id number]        Your Vitals Were     Pulse Height BMI (Body Mass Index)             60 1.905 m (6' 3\") 26.5 kg/m2          Blood Pressure from Last 3 Encounters:   08/01/18 142/73   07/23/18 132/72   07/06/18 (P) 131/62    Weight from Last 3 Encounters:   08/01/18 96.2 kg (212 lb)   07/23/18 95.3 kg (210 lb 3.2 oz)   07/05/18 95.7 kg (211 lb)               Primary Care Provider Office Phone # Fax #    Jamari Sloan [phone]       [place] [address]        Equal Access to Services     [facility] : Hadii ralph gonzalez hadasho Socarlyleali, waaxda luqadaha, qaybta kaalmada quocda, lukas garcia. So [facility] [phone].    ATENCIÓN: Si habla español, tiene a johns disposición servicios gratuitos de asistencia lingüística. Llame al [phone].    We comply with applicable federal civil rights laws and [place] laws. We do not discriminate on the basis of race, color, national origin, age, disability, sex, sexual orientation, or gender identity.            Thank you!     Thank you for choosing [facility] NEUROSURGERY  for your care. Our goal is always to provide you with excellent care. Hearing back from our patients is one way we can continue to improve our services. Please take a few minutes to complete the written survey that you may receive in the mail after your visit with us. " Thank you!             Your Updated Medication List - Protect others around you: Learn how to safely use, store and throw away your medicines at www.disposemymeds.org.          This list is accurate as of 8/1/18  3:51 PM.  Always use your most recent med list.                   Brand Name Dispense Instructions for use Diagnosis    ASPIRIN      Take 81 mg by mouth daily        ATENOLOL      Take 50 mg by mouth daily        clobetasol 0.05 % external solution    TEMOVATE    60 mL    Apply topically 2 times daily To scaly and itchy areas on scalp until no rash nor itch. Hold until patient requests.    Sebopsoriasis       desonide 0.05 % ointment    DESOWEN    60 g    Apply topically 2 times daily To rash at eyebrows and on face as needed until clear. Hold until patient requests.    Sebopsoriasis       donepezil 10 MG tablet    ARICEPT     Take 10 mg by mouth daily        hydrochlorothiazide 12.5 MG capsule    MICROZIDE     TAKE ONE CAPSULE BY MOUTH EVERY MORNING.        ketoconazole 2 % shampoo    NIZORAL    240 mL    To entire wet scalp and then wash off after 5 minutes three times a week. Hold until patient requests.    Sebopsoriasis       magnesium 250 MG tablet      Take 1 tablet by mouth daily        sildenafil 50 MG tablet    VIAGRA    30 tablet    Take 1 tablet (50 mg) by mouth as needed for erectile dysfunction    Erectile dysfunction due to arterial insufficiency       simvastatin 40 MG tablet    ZOCOR     TAKE 1 TABLET BY MOUTH NIGHTLY AT BEDTIME.        tamsulosin 0.4 MG capsule    FLOMAX    90 capsule    Take 1 capsule (0.4 mg) by mouth daily    Benign non-nodular prostatic hyperplasia with lower urinary tract symptoms       vitamin E 1000 UNIT capsule    TOCOPHEROL     Take 1,000 Units by mouth daily

## 2018-08-01 NOTE — LETTER
8/1/2018     RE: Catalino Coronado  [address] [place] [address]     Dear Colleague,    Thank you for referring your patient, Catalino Coronado, to the [facility] NEUROSURGERY at [facility]. Please see a copy of my visit note below.    Service Date: 08/01/2018      Jamari Sloan MD   [address] [address]      RE:  Catalino Linnlin      Dear Dr. Sloan:      I had the pleasure to see Vahid today in my Neurosurgical Clinic for followup evaluation of an ACom aneurysm.      Briefly, Vahid is a 68-year-old gentleman who does have a new diagnosis of mild dementia who had gotten an MRI that incidentally showed ACom aneurysm.  He was referred to me for further evaluation.      Recently, we performed a cerebral angiogram on Vahid to better appreciate the size and shape of the aneurysm.  This demonstrated a 4.5 mm ACom aneurysm arising primarily from the left A1-A2 junction.  This aneurysm points straight up as well.      I reviewed this aneurysm with our cerebrovascular case conference, and the consensus there was that this should be observed and not intervened on at this point in time.  I would be in agreement with this based on the size of the aneurysm.      Today, I spent almost all of the time talking to Vahid and his wife about the aneurysm and treatment options including observation, surgery and endovascular treatment.  Again, the group's recommendation was observation, and I am again in agreement with this.  I did talk about surgical intervention and endovascular intervention, but again I would hold off on this right now.  He is in complete agreement with this and is very comfortable with the plan to proceed with observation.  He does appreciate that there is still the potential that the aneurysm could rupture, even though it is small, but he would prefer to observe this right now rather than intervention.  If intervention was ever needed, I would  probably choose an endovascular approach given the anatomy of the aneurysm pointing straight up.      We are going to plan to repeat an MR angiogram in 6 months, and I will see him in clinic at that point.         AHMET REECE MD        D: 2018   T: 2018   MT: CHRISTINA    Name:     POLLO MILTON   MRN:      [id number]        Account:      [id number]   :      1950           Service Date: 2018    Document: [id number]

## 2018-08-01 NOTE — PATIENT INSTRUCTIONS
1.  MRA Brain with and without contrast in 6 months.  2. F/U with Dr. Duncan in 6 months.  3.  Call [facility] Care Coordinator for questions/concerns  [phone].    Thank you for using [employer] for your healthcare needs.

## 2018-08-01 NOTE — PROGRESS NOTES
Service Date: 08/01/2018      Jamari Sloan MD   [address] [address]      RE:  Catalino Coronado      Dear Dr. Solan:      I had the pleasure to see Vahid today in my Neurosurgical Clinic for followup evaluation of an ACom aneurysm.      Briefly, aVhid is a 68-year-old gentleman who does have a new diagnosis of mild dementia who had gotten an MRI that incidentally showed ACom aneurysm.  He was referred to me for further evaluation.      Recently, we performed a cerebral angiogram on Vahid to better appreciate the size and shape of the aneurysm.  This demonstrated a 4.5 mm ACom aneurysm arising primarily from the left A1-A2 junction.  This aneurysm points straight up as well.      I reviewed this aneurysm with our cerebrovascular case conference, and the consensus there was that this should be observed and not intervened on at this point in time.  I would be in agreement with this based on the size of the aneurysm.      Today, I spent almost all of the time talking to Vahid and his wife about the aneurysm and treatment options including observation, surgery and endovascular treatment.  Again, the group's recommendation was observation, and I am again in agreement with this.  I did talk about surgical intervention and endovascular intervention, but again I would hold off on this right now.  He is in complete agreement with this and is very comfortable with the plan to proceed with observation.  He does appreciate that there is still the potential that the aneurysm could rupture, even though it is small, but he would prefer to observe this right now rather than intervention.  If intervention was ever needed, I would probably choose an endovascular approach given the anatomy of the aneurysm pointing straight up.      We are going to plan to repeat an MR angiogram in 6 months, and I will see him in clinic at that point.         AHMET REECE MD             D: 08/01/2018   T: 08/01/2018   MT:  KL      Name:     POLLO MILTON   MRN:      [id number]        Account:      [id number]   :      1950           Service Date: 2018      Document: [id number]

## 2018-08-06 DIAGNOSIS — H26.9 NUCLEAR CATARACT, NONSENILE: Primary | ICD-10-CM

## 2018-08-07 ENCOUNTER — HOSPITAL ENCOUNTER (OUTPATIENT)
Facility: AMBULATORY SURGERY CENTER | Age: 68
End: 2018-08-07
Attending: OPHTHALMOLOGY
Payer: MEDICARE

## 2018-08-07 ENCOUNTER — OFFICE VISIT (OUTPATIENT)
Dept: OPHTHALMOLOGY | Facility: CLINIC | Age: 68
End: 2018-08-07
Attending: OPHTHALMOLOGY
Payer: MEDICARE

## 2018-08-07 DIAGNOSIS — F02.80 LATE ONSET ALZHEIMER'S DISEASE WITHOUT BEHAVIORAL DISTURBANCE (H): ICD-10-CM

## 2018-08-07 DIAGNOSIS — G30.1 LATE ONSET ALZHEIMER'S DISEASE WITHOUT BEHAVIORAL DISTURBANCE (H): ICD-10-CM

## 2018-08-07 DIAGNOSIS — H25.13 NUCLEAR SCLEROTIC CATARACT OF BOTH EYES: Primary | ICD-10-CM

## 2018-08-07 PROCEDURE — 92015 DETERMINE REFRACTIVE STATE: CPT | Mod: ZF

## 2018-08-07 PROCEDURE — G0463 HOSPITAL OUTPT CLINIC VISIT: HCPCS | Mod: ZF

## 2018-08-07 PROCEDURE — 76516 ECHO EXAM OF EYE: CPT | Mod: ZF | Performed by: OPHTHALMOLOGY

## 2018-08-07 ASSESSMENT — TONOMETRY
IOP_METHOD: TONOPEN
OD_IOP_MMHG: 17
OS_IOP_MMHG: 14

## 2018-08-07 ASSESSMENT — EXTERNAL EXAM - LEFT EYE: OS_EXAM: NORMAL

## 2018-08-07 ASSESSMENT — REFRACTION_MANIFEST
OD_CYLINDER: +0.50
OS_SPHERE: +0.75
OD_SPHERE: +2.75
OS_ADD: +2.50
OD_AXIS: 010
OD_ADD: +2.50
OS_CYLINDER: SPHERE

## 2018-08-07 ASSESSMENT — CONF VISUAL FIELD
OS_NORMAL: 1
OD_NORMAL: 1

## 2018-08-07 ASSESSMENT — VISUAL ACUITY
OD_SC: 20/50
METHOD: SNELLEN - LINEAR
OS_SC+: -2
OS_SC: 20/50
OD_PH_SC: 20/40
OS_PH_SC: 20/30

## 2018-08-07 ASSESSMENT — EXTERNAL EXAM - RIGHT EYE: OD_EXAM: NORMAL

## 2018-08-07 ASSESSMENT — CUP TO DISC RATIO
OD_RATIO: 0.5
OS_RATIO: 0.5

## 2018-08-07 ASSESSMENT — SLIT LAMP EXAM - LIDS
COMMENTS: NORMAL
COMMENTS: NORMAL

## 2018-08-07 NOTE — MR AVS SNAPSHOT
After Visit Summary   8/7/2018    Catalino Coronado    MRN: [id number]           Patient Information     Date Of Birth          1950        Visit Information        Provider Department      8/7/2018 8:15 AM Elio Manzo MD Eye Clinic        Today's Diagnoses     Nuclear sclerotic cataract of both eyes    -  1       Follow-ups after your visit        Your next 10 appointments already scheduled     Oct 01, 2018 12:15 PM CDT   (Arrive by 12:00 PM)   Post-Op with Elio Manzo MD   [facility]hthalmology [facility])    [address] [place] [address]   [phone]            Oct 16, 2018 10:00 AM CDT   Post-Op with Elio Manzo MD   Eye Clinic ([facility])    [address] [address]   [phone]            Oct 22, 2018 11:45 AM CDT   (Arrive by 11:30 AM)   Post-Op with Elio Manzo MD   [facility] Ophthalmology ([facility])    [address] [place] [address]   [phone]            Nov 06, 2018  9:30 AM CST   Post-Op with Elio Manzo MD   Eye Clinic ([facility])    [address] [address]   [phone]            Feb 11, 2019  1:45 PM CST   MRA BRAIN ([place]) WWO CONTRAST with [address] [facility] [place] [facility] ([facility])    [address] [place] [address]   [phone]           Take your medicines as usual, unless your doctor tells you not to. Bring a list of your current medicines to your exam (including vitamins, minerals and over-the-counter drugs).  You may or may not receive intravenous (IV) contrast for this exam pending the discretion of the Radiologist.  You do not need to do anything special to prepare.  The MRI machine uses a strong  magnet. Please wear clothes without metal (snaps, zippers). A sweatsuit works well, or we may give you a hospital gown.  Please remove any body piercings and hair extensions before you arrive. You will also remove watches, jewelry, hairpins, wallets, dentures, partial dental plates and hearing aids. You may wear contact lenses, and you may be able to wear your rings. We have a safe place to keep your personal items, but it is safer to leave them at home.  **IMPORTANT** THE INSTRUCTIONS BELOW ARE ONLY FOR THOSE PATIENTS WHO HAVE BEEN PRESCRIBED SEDATION OR GENERAL ANESTHESIA DURING THEIR MRI PROCEDURE:  IF YOUR DOCTOR PRESCRIBED ORAL SEDATION (take medicine to help you relax during your exam):   You must get the medicine from your doctor (oral medication) before you arrive. Bring the medicine to the exam. Do not take it at home. You ll be told when to take it upon arriving for your exam.   Arrive one hour early. Bring someone who can take you home after the test. Your medicine will make you sleepy. After the exam, you may not drive, take a bus or take a taxi by yourself.  IF YOUR DOCTOR PRESCRIBED IV SEDATION:   Arrive one hour early. Bring someone who can take you home after the test. Your medicine will make you sleepy. After the exam, you may not drive, take a bus or take a taxi by yourself.   No eating 6 hours before your exam. You may have clear liquids up until 4 hours before your exam. (Clear liquids include water, clear tea, black coffee and fruit juice without pulp.)  IF YOUR DOCTOR PRESCRIBED ANESTHESIA (be asleep for your exam):   Arrive 1 1/2 hours early. Bring someone who can take you home after the test. You may not drive, take a bus or take a taxi by yourself.   No eating 8 hours before your exam. You may have clear liquids up until 4 hours before your exam. (Clear liquids include water, clear tea, black coffee and fruit juice without pulp.)   You will spend four to five hours in the recovery room.   Please call the Imaging Department at your exam site with any questions.            Feb 11, 2019  3:00 PM CST   (Arrive by 2:45 PM)   Return Visit with Chevy Duncan MD   [facility] Neurosurgery ([facility] [place])    [address] [address] [phone]              Future tests that were ordered for you today     Open Future Orders        Priority Expected Expires Ordered    IOL Biometry w/ IOL calc OU (both eye) Routine  2/6/2020 8/6/2018    Corneal Topography OU (both eyes) Routine  2/6/2020 8/6/2018            Who to contact     Please call your clinic at [phone] to:    Ask questions about your health    Make or cancel appointments    Discuss your medicines    Learn about your test results    Speak to your doctor            Additional Information About Your Visit        Care EveryWhere ID     This is your Care EveryWhere ID. This could be used by other organizations to access your [place] medical records  [id number]         Blood Pressure from Last 3 Encounters:   08/01/18 142/73   07/23/18 132/72   07/06/18 (P) 131/62    Weight from Last 3 Encounters:   08/01/18 96.2 kg (212 lb)   07/23/18 95.3 kg (210 lb 3.2 oz)   07/05/18 95.7 kg (211 lb)              We Performed the Following     [place]-Operative Worksheet        Primary Care Provider Office Phone # Fax #    Jamari Sloan [phone]       [address] [address]        Equal Access to Services     NICOLE VICKERS AH: Hadii ralph ku hadasho Soomaali, waaxda luqadaha, qaybta kaalmada lukas haq . So [facility] [phone].    ATENCIÓN: Si habla mack, tiene a johns disposición servicios gratuitos de asistencia lingüística. Llame al [phone].    We comply with applicable federal civil rights laws and [place] laws. We do not discriminate on the basis of race, color, national origin, age, disability, sex, sexual  orientation, or gender identity.            Thank you!     Thank you for choosing EYE CLINIC  for your care. Our goal is always to provide you with excellent care. Hearing back from our patients is one way we can continue to improve our services. Please take a few minutes to complete the written survey that you may receive in the mail after your visit with us. Thank you!             Your Updated Medication List - Protect others around you: Learn how to safely use, store and throw away your medicines at www.disposemymeds.org.          This list is accurate as of 8/7/18 11:16 AM.  Always use your most recent med list.                   Brand Name Dispense Instructions for use Diagnosis    ASPIRIN      Take 81 mg by mouth daily        ATENOLOL      Take 50 mg by mouth daily        clobetasol 0.05 % external solution    TEMOVATE    60 mL    Apply topically 2 times daily To scaly and itchy areas on scalp until no rash nor itch. Hold until patient requests.    Sebopsoriasis       desonide 0.05 % ointment    DESOWEN    60 g    Apply topically 2 times daily To rash at eyebrows and on face as needed until clear. Hold until patient requests.    Sebopsoriasis       donepezil 10 MG tablet    ARICEPT     Take 10 mg by mouth daily        hydrochlorothiazide 12.5 MG capsule    MICROZIDE     TAKE ONE CAPSULE BY MOUTH EVERY MORNING.        ketoconazole 2 % shampoo    NIZORAL    240 mL    To entire wet scalp and then wash off after 5 minutes three times a week. Hold until patient requests.    Sebopsoriasis       magnesium 250 MG tablet      Take 1 tablet by mouth daily        sildenafil 50 MG tablet    VIAGRA    30 tablet    Take 1 tablet (50 mg) by mouth as needed for erectile dysfunction    Erectile dysfunction due to arterial insufficiency       simvastatin 40 MG tablet    ZOCOR     TAKE 1 TABLET BY MOUTH NIGHTLY AT BEDTIME.        tamsulosin 0.4 MG capsule    FLOMAX    90 capsule    Take 1 capsule (0.4 mg) by mouth daily     Benign non-nodular prostatic hyperplasia with lower urinary tract symptoms       vitamin E 1000 UNIT capsule    TOCOPHEROL     Take 1,000 Units by mouth daily

## 2018-08-07 NOTE — NURSING NOTE
Chief Complaints and History of Present Illnesses   Patient presents with     New Patient     Nuclear cataract, nonsenile (Primary Dx)     HPI    Affected eye(s):  Both   Symptoms:     No blurred vision   No decreased vision   Glare   No halos   No starbursts   Tearing   Dryness      Frequency:  Constant       Do you have eye pain now?:  No      Comments:  Pt. stated decreased vision over the last several years each eye.  Using no drops  Ej Smith  8:47 AM August 7, 2018

## 2018-08-08 NOTE — PROGRESS NOTES
Assessment & Plan      Catalino Coronado is a 68 year old male with the following diagnoses:   1. Nuclear sclerotic cataract of both eyes    2. Late onset Alzheimer's disease without behavioral disturbance       New patient referred by [facility] for evaluation of cataract.    Cataract, left eye > right eye   Visually significant both eyes with blur.  Best corrected visual acuity 20/30//20/40  Risks, benefits and alternatives to cataract extraction/IOL implantation discussed; consent obtained.  Will schedule surgery today    Special equipment/needs:    Anesthesia:Topical  Dilation:Moderate  Iris expansion:IFIS  Pseudoexfoliation: No pseudoexfoliation  Trypan Blue: No  Left eye first  Goal emmetropia            Attending Physician Attestation:  Complete documentation of historical and exam elements from today's encounter can be found in the full encounter summary report (not reduplicated in this progress note).  I personally obtained the chief complaint(s) and history of present illness.  I confirmed and edited as necessary the review of systems, past medical/surgical history, family history, social history, and examination findings as documented by others; and I examined the patient myself.  I personally reviewed the relevant tests, images, and reports as documented above.  I formulated and edited as necessary the assessment and plan and discussed the findings and management plan with the patient and family. . - Elio Manzo MD

## 2018-08-29 ENCOUNTER — COMMUNICATION - HEALTHEAST (OUTPATIENT)
Dept: FAMILY MEDICINE | Facility: CLINIC | Age: 68
End: 2018-08-29

## 2018-09-13 DIAGNOSIS — L40.8 SEBOPSORIASIS: ICD-10-CM

## 2018-09-17 RX ORDER — KETOCONAZOLE 20 MG/ML
SHAMPOO TOPICAL
Qty: 240 ML | Refills: 4 | OUTPATIENT
Start: 2018-09-17

## 2018-09-20 DIAGNOSIS — L40.8 SEBOPSORIASIS: ICD-10-CM

## 2018-09-23 RX ORDER — KETOCONAZOLE 20 MG/ML
SHAMPOO TOPICAL
Qty: 240 ML | Refills: 4 | OUTPATIENT
Start: 2018-09-23

## 2018-09-23 NOTE — TELEPHONE ENCOUNTER
ketoconazole (NIZORAL) 2 % shampoo     Last Written Prescription Date:  6/6/17  Last Fill Quantity: 240ml,   # refills: 11  Last Office Visit :5/9/16  Future Office visit:  None    Scheduling has been notified to contact the pt for appointment.  Refused. per protocol  lv > 12 mths

## 2018-09-24 ENCOUNTER — APPOINTMENT (OUTPATIENT)
Dept: SURGERY | Facility: CLINIC | Age: 68
End: 2018-09-24
Payer: MEDICARE

## 2018-09-24 ENCOUNTER — RECORDS - HEALTHEAST (OUTPATIENT)
Dept: ADMINISTRATIVE | Facility: OTHER | Age: 68
End: 2018-09-24

## 2018-09-24 ENCOUNTER — TELEPHONE (OUTPATIENT)
Dept: DERMATOLOGY | Facility: CLINIC | Age: 68
End: 2018-09-24

## 2018-09-24 ENCOUNTER — ANESTHESIA EVENT (OUTPATIENT)
Dept: SURGERY | Facility: AMBULATORY SURGERY CENTER | Age: 68
End: 2018-09-24

## 2018-09-24 ENCOUNTER — OFFICE VISIT (OUTPATIENT)
Dept: SURGERY | Facility: CLINIC | Age: 68
End: 2018-09-24
Payer: MEDICARE

## 2018-09-24 VITALS
DIASTOLIC BLOOD PRESSURE: 83 MMHG | RESPIRATION RATE: 16 BRPM | SYSTOLIC BLOOD PRESSURE: 146 MMHG | WEIGHT: 204.8 LBS | BODY MASS INDEX: 25.47 KG/M2 | HEIGHT: 75 IN | OXYGEN SATURATION: 98 % | TEMPERATURE: 98.1 F | HEART RATE: 66 BPM

## 2018-09-24 DIAGNOSIS — Z01.818 PREOP EXAMINATION: Primary | ICD-10-CM

## 2018-09-24 ASSESSMENT — LIFESTYLE VARIABLES: TOBACCO_USE: 1

## 2018-09-24 NOTE — PATIENT INSTRUCTIONS
Preparing for Your Surgery      Name:  Catalino Coronado   MRN:  [id number]   :  1950   Today's Date:  2018     Arriving for surgery:  Surgery date:  10/1/18  Arrival time:  08:30 am  Please come to:     [facility]  [address] [place] [id number]     Parking is available in front of the Clinics and Surgery Center building from 5:30AM to 8:00PM.  -  Proceed to the 5th floor to check into the Ambulatory Surgery Center.              >> There will be patient concierges on the 1st and 5th floor, for assistance or an escort, if you would like.              >> Please call [phone] with any questions.    What can I eat or drink?  -  You may have solid food or milk products until 8 hours prior to your surgery.  -  You may have water, apple juice or 7up/Sprite until 2 hours prior to your surgery.    Which medicines can I take?  Stop Aspirin, vitamins (vitamin E) and supplements one week prior to surgery.  Hold Ibuprofen and Naproxen for 24 hours prior to surgery.   -  Do NOT take these medications in the morning, the day of surgery:    Hydrochlorothiazide if normally taken in the morning, hold viagra 24 hours before surgery    -  Please take these medications the day of surgery:      How do I prepare myself?  -  Take two showers: one the night before surgery; and one the morning of surgery.         Use Scrubcare or Hibiclens to wash from neck down.  You may use your own     shampoo and conditioner. No other hair products.   -  Do NOT use lotion, powder, deodorant, or antiperspirant the day of your surgery.  -  Do NOT wear any jewelry.    - Do not bring your own medications to the hospital, except for inhalers and eye   drops.  -  Bring your ID and insurance card.    Questions or Concerns:  -If you have questions or concerns regarding the day of surgery, please call [phone].     -If you are scheduled at the Ambulatory Surgery Center please call  [phone].    -For questions after surgery please call your surgeons office.

## 2018-09-24 NOTE — TELEPHONE ENCOUNTER
----- Message from Elyssa Cerda RN sent at 9/23/2018 11:08 AM CDT -----  Pt  Catalino Coronado [[id number]]  Sussy Lopez MD     Please call to schedule. If still a pt at [facility]    I do not need any follow up on the scheduling of this appointment unless the patient will no longer be receiving care in our clinic.

## 2018-09-24 NOTE — TELEPHONE ENCOUNTER
phillipm to schedule f/u with Ashtyn if patient is still an [facility] pt. Per med refill team. Provided call center number as call back.

## 2018-09-24 NOTE — H&P
Pre-Operative H & P     CC:  Preoperative exam to assess for increased cardiopulmonary risk while undergoing surgery and anesthesia.    Date of Encounter: 9/24/2018  Primary Care Physician:  Jamari Sloan  Reason for visit: Nuclear sclerotic cataract of both eyes [H25.13]  - Primary   HPI  Catalino Coronado is a 68 year old male who presents for pre-operative H & P in preparation for left phacoemulsification with standard  intraocular lens implant on 10/1/18, followed by right phacoemulsification with standard  intraocular lens implant on 10/22/18 with Dr. Manzo at [facility]. History is obtained from the patient and wife.     Patient who was recently evaluated by Dr. Manzo for bilateral cataracts, left eye worse than right, causing significant visual impairment. He was counseled for above procedure.   His history is otherwise significant for late onset Alzheimer's disease, HLD, HTN, and BPH.  Past Medical History  Past Medical History:   Diagnosis Date     BPH (benign prostatic hyperplasia)      Cataracts, bilateral      Cerebral aneurysm, nonruptured      Dementia      Hypercholesterolemia      Hypertension        Past Surgical History  Past Surgical History:   Procedure Laterality Date     APPENDECTOMY       BIOPSY OF SKIN LESION       EXCISE MASS BACK  2/8/2013    Procedure: EXCISE MASS BACK;  Excision of Sebaceous Back Cyst ;  Surgeon: Sean Randhawa MD;  Location: [facility] OR     pilonidal cyst removal          Hx of Blood transfusions/reactions: Denies.      Hx of abnormal bleeding or anti-platelet use: ASA 81 mg daily    Menstrual history: No LMP for male patient.    Steroid use in the last year: Denies.     Personal or FH with difficulty with Anesthesia:  Denies.     Prior to Admission Medications  Current Outpatient Prescriptions   Medication Sig Dispense Refill     ASPIRIN Take 81 mg by mouth every morning        ATENOLOL Take 100 mg by mouth every morning        clobetasol (TEMOVATE)  0.05 % external solution Apply topically 2 times daily To scaly and itchy areas on scalp until no rash nor itch. Hold until patient requests. 60 mL 11     desonide (DESOWEN) 0.05 % ointment Apply topically 2 times daily To rash at eyebrows and on face as needed until clear. Hold until patient requests. 60 g 11     donepezil (ARICEPT) 10 MG tablet Take 10 mg by mouth every morning        hydrochlorothiazide (MICROZIDE) 12.5 MG capsule TAKE ONE CAPSULE BY MOUTH EVERY MORNING.       ketoconazole (NIZORAL) 2 % shampoo To entire wet scalp and then wash off after 5 minutes three times a week. Hold until patient requests. 240 mL 11     magnesium 250 MG tablet Take 1 tablet by mouth At Bedtime        simvastatin (ZOCOR) 40 MG tablet TAKE 1 TABLET BY MOUTH NIGHTLY AT BEDTIME.       tamsulosin (FLOMAX) 0.4 MG 24 hr capsule Take 1 capsule (0.4 mg) by mouth daily (Patient taking differently: Take 0.4 mg by mouth every morning ) 90 capsule 3     vitamin E (TOCOPHEROL) 1000 UNIT capsule Take 1,000 Units by mouth At Bedtime        sildenafil (VIAGRA) 50 MG tablet Take 1 tablet (50 mg) by mouth as needed for erectile dysfunction 30 tablet 3       Allergies  No Known Allergies    Social History  Social History     Social History     Marital status:      Spouse name: N/A     Number of children: N/A     Years of education: N/A     Occupational History     Not on file.     Social History Main Topics     Smoking status: Former Smoker     Types: Cigarettes     Quit date: 1/1/2011     Smokeless tobacco: Never Used     Alcohol use Yes      Comment: socially     Drug use: No     Sexual activity: Not on file     Other Topics Concern     Not on file     Social History Narrative       Family History  Family History   Problem Relation Age of Onset     HEART DISEASE Father      Dementia Mother      Cancer No family hx of      no skin cancer       Review of Systems    ROS/MED HX  The complete review of systems is negative other than noted  "in the HPI or here.   ENT/Pulmonary:     (+)tobacco use, Past use , . .    Neurologic:     (+)dementia, other neuro AOCM aneurysm    Cardiovascular:     (+) Dyslipidemia, hypertension----. Taking blood thinners Pt has received instructions: Instructions Given to patient: Will remain on. . . :. . Previous cardiac testing date:results:date: results:ECG reviewed date:2013 results:SR, incomplete RBBB date: results:          METS/Exercise Tolerance:  3 - Able to walk 1-2 blocks without stopping   Hematologic:  - neg hematologic  ROS       Musculoskeletal:  - neg musculoskeletal ROS       GI/Hepatic:  - neg GI/hepatic ROS       Renal/Genitourinary:     (+) BPH,       Endo:  - neg endo ROS       Psychiatric:  - neg psychiatric ROS       Infectious Disease:  - neg infectious disease ROS       Malignancy:      - no malignancy   Other:    (+) No chance of pregnancy C-spine cleared: N/A, no H/O Chronic Pain,no other significant disability        Physical Exam      Airway   Mallampati: II  TM distance: >3 FB  Neck ROM: limited    Temp: 98.1  F (36.7  C) Temp src: Oral BP: 146/83 Pulse: 66   Resp: 16 SpO2: 98 %         204 lbs 12.8 oz  6' 3\"   Body mass index is 25.6 kg/(m^2).       Physical Exam  Constitutional: Awake, alert, cooperative, no apparent distress, and appears stated age. Accompanied by wife.   Eyes: Pupils equal, round and reactive to light, extra ocular muscles intact, sclera clear, conjunctiva normal.  HENT: Normocephalic, oral pharynx with moist mucus membranes, good dentition. No goiter appreciated.   Respiratory: Clear to auscultation bilaterally, no crackles or wheezing. No cough or obvious dyspnea.  Cardiovascular: Regular rate and rhythm, normal S1 and S2, and no murmur noted.  Carotids +2, no bruits. No edema. Palpable pulses to radial  DP and PT arteries.   GI: Normal bowel sounds, soft, non-distended, non-tender, no masses palpated, no hepatosplenomegaly.   Lymph/Hematologic: No cervical lymphadenopathy " and no supraclavicular lymphadenopathy.  Genitourinary: Deferred.   Skin: Warm and dry.  No rashes at anticipated surgical site.   Musculoskeletal: Limited ROM of neck. There is no redness, warmth, or swelling of the joints. Gross motor strength is normal.    Neurologic: Awake, alert, oriented to name, place and time. Good historian. Cranial nerves II-XII are grossly intact. Gait is normal.   Neuropsychiatric: Calm, cooperative. Normal affect.     Labs: (personally reviewed)  Lab Results   Component Value Date    WBC 7.6 07/06/2018     Lab Results   Component Value Date    RBC 4.64 07/06/2018     Lab Results   Component Value Date    HGB 15.1 07/06/2018     Lab Results   Component Value Date    HCT 42.9 07/06/2018     Lab Results   Component Value Date    MCV 93 07/06/2018     Lab Results   Component Value Date    MCH 32.5 07/06/2018     Lab Results   Component Value Date    MCHC 35.2 07/06/2018     Lab Results   Component Value Date    RDW 12.8 07/06/2018     Lab Results   Component Value Date     07/06/2018     Last Comprehensive Metabolic Panel:  Creatinine   Date Value Ref Range Status   07/06/2018 1.03 0.66 - 1.25 mg/dL Final     GFR Estimate   Date Value Ref Range Status   07/06/2018 72 >60 mL/min/1.7m2 Final     Comment:     Non  GFR Calc     EKG: Personally reviewed 2013 Sinus rhythm, incomplete RBBB  IR Carotid cerebral angiogram 7/6/18   Impression:  1.  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 No evidence of a frontal type dementia.  Imaging reviewed by this provider    Outside records reviewed from: Care Everywhere    ASSESSMENT and PLAN  Catalino Coronado is a 68 year old male scheduled to undergo left  phacoemulsification with standard  intraocular lens implant on 10/1/18, followed by right phacoemulsification with standard  intraocular lens implant on 10/22/18 with Dr. Manzo. He has the following specific operative considerations:   - RCRI : No serious cardiac risks.   - Anesthesia considerations:  Refer to PAC assessment in anesthesia records  - VTE risk: 1.8%  - CORNEL # of risks 3/8 = Intermediate risk  - Risk of PONV score = 1.  If > 2, anti-emetic intervention recommended.     --Bilateral cataracts, impairing vision, left worse than right. Above procedures planned with MAC and topical. Patient/family would like to avoid Versed due to patient's dementia diagnosis. At his last procedure for carotid angiogram, he was given Fentanyl which worked very well. Final counseling and decisions by Anesthesia on DOS.   --HLD. Simvastatin. HTN. Will take Atenolol on DOS. Will hold HCTZ. No other cardiac history symptoms or meds. Will remain on ASA. Good exercise tolerance.    --Former smoker, quit in 2011. Denies pulmonary symptoms. Able to lay flat.    --Late onset Alzheimer's disease. Will take Aricept on DOS.   Arrival time, NPO, shower and medication instructions provided by nursing staff today. Preparing For Your Surgery handout given.  Patient was discussed with Dr Hightower.    FADIA Kim CNS  Preoperative Assessment Center  [facility]  Clinic and Surgery Center  Phone: [phone]  Fax: [phone]

## 2018-09-24 NOTE — ANESTHESIA PREPROCEDURE EVALUATION
Anesthesia Evaluation     . Pt has had prior anesthetic. Type: General and MAC    No history of anesthetic complications          ROS/MED HX    ENT/Pulmonary:     (+)tobacco use, Past use , . .    Neurologic:     (+)dementia, other neuro AOCM aneurysm    Cardiovascular:     (+) Dyslipidemia, hypertension----. Taking blood thinners Pt has received instructions: Instructions Given to patient: Will remain on. . . :. . Previous cardiac testing date:results:date: results:ECG reviewed date:2013 results:SR, incomplete RBBB date: results:          METS/Exercise Tolerance:  4 - Raking leaves, gardening   Hematologic:  - neg hematologic  ROS       Musculoskeletal:  - neg musculoskeletal ROS       GI/Hepatic:  - neg GI/hepatic ROS       Renal/Genitourinary:     (+) BPH,       Endo:  - neg endo ROS       Psychiatric:  - neg psychiatric ROS       Infectious Disease:  - neg infectious disease ROS       Malignancy:      - no malignancy   Other:    (+) No chance of pregnancy C-spine cleared: N/A, no H/O Chronic Pain,no other significant disability                    Physical Exam  Normal systems: dental    Airway   Mallampati: II  TM distance: >3 FB  Neck ROM: limited    Dental     Cardiovascular   Rhythm and rate: regular and normal      Pulmonary    breath sounds clear to auscultation    Other findings: For further details of assessment, testing, and physical exam please see H and P completed on same date.           PAC Discussion and Assessment    ASA Classification: 2  Case is suitable for: ASC  Anesthetic techniques and relevant risks discussed: MAC with GA as backup  Invasive monitoring and risk discussed: No  Types:   Possibility and Risk of blood transfusion discussed: No  NPO instructions given:   Additional anesthetic preparation and risks discussed:   Needs early admission to pre-op area:   Other:     PAC Resident/NP Anesthesia Assessment:  Catalino Coronado is a 68 year old male scheduled to undergo left  phacoemulsification with standard  intraocular lens implant on 10/1/18, followed by right phacoemulsification with standard  intraocular lens implant on 10/22/18 with Dr. Manzo. He has the following specific operative considerations:   - RCRI : No serious cardiac risks.   - VTE risk: 1.8%  - CORNEL # of risks 3/8 = Intermediate risk  - Risk of PONV score = 1.  If > 2, anti-emetic intervention recommended.    No history of problems with anesthesia.       --Bilateral cataracts, impairing vision, left worse than right. Above procedures planned with MAC and topical. Patient/family would like to avoid Versed due to patient's dementia diagnosis. At his last procedure for carotid angiogram, he was given Fentanyl which worked very well. Final counseling and decisions by Anesthesia on DOS.   --HLD. Simvastatin. HTN. Will take Atenolol on DOS. Will hold HCTZ. No other cardiac history symptoms or meds. Will remain on ASA. Good exercise tolerance.    --Former smoker, quit in 2011. Denies pulmonary symptoms. Able to lay flat.    --Late onset Alzheimer's disease. Will take Aricept on DOS.     Patient was discussed with Dr Hightower.        Reviewed and Signed by PAC Mid-Level Provider/Resident  Mid-Level Provider/Resident: FADIA Grant, DEANNA  Date: 9/24/18  Time: 8:55am    Attending Anesthesiologist Anesthesia Assessment:  68 year old for cataract procedures. He is early stage dementia, and concerned about delirium and further cognitive decline. He does not want benzo, fentanyl seems OK for him.     Reviewed and Signed by PAC Anesthesiologist  Anesthesiologist: mario  Date: 9/24/2018  Time:   Pass/Fail: Pass  Disposition:     PAC Pharmacist Assessment:        Pharmacist:   Date:   Time:      Anesthesia Plan      History & Physical Review  History and physical reviewed and following examination; no interval change.    ASA Status:  2 .        Plan for MAC with Intravenous induction. Maintenance will be TIVA.  Reason for MAC:  Procedure  to face, neck, head or breast  PONV prophylaxis:  Ondansetron (or other 5HT-3)       Postoperative Care  Postoperative pain management:  IV analgesics.      Consents  Anesthetic plan, risks, benefits and alternatives discussed with:  Patient..                          .

## 2018-09-24 NOTE — MR AVS SNAPSHOT
After Visit Summary   2018    Catalino Coronado    MRN: [id number]           Patient Information     Date Of Birth          1950        Visit Information        Provider Department      2018 9:00 AM Nelsy William APRN CNS [facility]        Today's Diagnoses     Preop examination    -  1      Care Instructions    Preparing for Your Surgery      Name:  Catalino Coronado   MRN:  [id number]   :  1950   Today's Date:  2018     Arriving for surgery:  Surgery date:  10/1/18  Arrival time:  08:30 am  Please come to:     [facility]  [address] [place] [id number]     Parking is available in front of the [facility] building from 5:30AM to 8:00PM.  -  Proceed to the 5th floor to check into the Ambulatory Surgery Center.              >> There will be patient concierges on the 1st and 5th floor, for assistance or an escort, if you would like.              >> Please call [phone] with any questions.    What can I eat or drink?  -  You may have solid food or milk products until 8 hours prior to your surgery.  -  You may have water, apple juice or 7up/Sprite until 2 hours prior to your surgery.    Which medicines can I take?  Stop Aspirin, vitamins (vitamin E) and supplements one week prior to surgery.  Hold Ibuprofen and Naproxen for 24 hours prior to surgery.   -  Do NOT take these medications in the morning, the day of surgery:    Hydrochlorothiazide if normally taken in the morning, hold viagra 24 hours before surgery    -  Please take these medications the day of surgery:      How do I prepare myself?  -  Take two showers: one the night before surgery; and one the morning of surgery.         Use Scrubcare or Hibiclens to wash from neck down.  You may use your own     shampoo and conditioner. No other hair products.   -  Do NOT use lotion, powder, deodorant, or antiperspirant the day of your  surgery.  -  Do NOT wear any jewelry.    - Do not bring your own medications to the hospital, except for inhalers and eye   drops.  -  Bring your ID and insurance card.    Questions or Concerns:  -If you have questions or concerns regarding the day of surgery, please call [phone].     -If you are scheduled at the Ambulatory Surgery Center please call [phone].    -For questions after surgery please call your surgeons office.                     Follow-ups after your visit        Your next 10 appointments already scheduled     Sep 24, 2018 10:00 AM CDT   (Arrive by 9:45 AM)   PAC RN ASSESSMENT with  Pac Rn   [facility] ([facility])    [address] [place] [address]  4th [facility] [id number] [phone]            Sep 24, 2018 10:30 AM CDT   (Arrive by 10:15 AM)   PAC Anesthesia Consult with  Pac Anesthesiologist   [facility] ([facility])    [address] [place] [address]  4th [facility] [id number] [phone]            Oct 01, 2018   Procedure with Elio Manzo MD   [facility] ([facility])    [address] [place] [address]  5th [facility] [id number] [phone]           Located in the [facility] at [address] [place] [address].   parking is very convenient and highly recommended.  is a $6 flat rate fee.  Both  and self parkers should enter the main arrival plaza from [facility]; parking attendants will direct you based on your parking preference.            Oct 01, 2018 12:30 PM CDT   (Arrive by 12:15 PM)   Post-Op with Elio Manzo MD   [facility] Ophthalmology ([facility])    [address] [place] [address]  4th [facility] [id number] [phone]            Oct 18, 2018 12:45 PM CDT   Post-Op with Elio Manzo MD   Eye Clinic ([facility])    [address] [address]   [phone]            Oct 22, 2018   Procedure with Elio Manzo MD   [facility] ([facility])    [address] [place] [address]  5th [facility] [address]   [phone]           Located in the Clinics and Surgery Center at [address] [facility] [place] [address].   parking is very convenient and highly recommended.  is a $6 flat rate fee.  Both  and self parkers should enter the main arrival plaza from [facility]; parking attendants will direct you based on your parking preference.            Oct 22, 2018 11:45 AM CDT   (Arrive by 11:30 AM)   Post-Op with Elio Manzo MD   [facility] Ophthalmology ([facility])    [address] [place] [address]  4th [facility] [address]   [phone]            Nov 06, 2018  9:30 AM CST   Post-Op with Elio Manzo MD   Eye Clinic ([facility])    [address] [address]   [phone]            Feb 11, 2019  1:45 PM CST   MRA BRAIN ([place] [facility] WWO CONTRAST with [id number]   [facility] [place] [facility] ([facility])    [address] [place] [address]  1st [facility] [address]   [phone]           How do I prepare for my exam? (Food and drink instructions) **If you will be receiving sedation or general anesthesia, please see special notes below.**  How do I prepare for my exam? (Other instructions) Take your medicines as usual, unless your doctor tells you not to. You may or may not receive intravenous (IV) contrast for this exam pending the discretion of the Radiologist.  You do not need to do anything special to prepare.  **If you will be receiving sedation or general anesthesia, please see special notes below.**  What should I wear: The MRI machine  uses a strong magnet. Please wear clothes without metal (snaps, zippers). A sweatsuit works well, or we may give you a hospital gown. Please remove any body piercings and hair extensions before you arrive. You will also remove watches, jewelry, hairpins, wallets, dentures, partial dental plates and hearing aids. You may wear contact lenses, and you may be able to wear your rings. We have a safe place to keep your personal items, but it is safer to leave them at home.  How long does the exam take: Most tests take 30 to 60 minutes.  HOWEVER, IF YOUR DOCTOR PRESCRIBES ANESTHESIA please plan on spending four to five hours in the recovery room.  What should I bring:  Bring a list of your current medicines to your exam (including vitamins, minerals and over-the-counter drugs).  Do I need a :  **If you will be receiving sedation or general anesthesia, please see special notes below.**  What should I do after the exam: No Restrictions, You may resume normal activities.  What is this test: MRI (magnetic resonance imaging) uses a strong magnet and radio waves to look inside the body. An MRA (magnetic resonance angiogram) does the same thing, but it lets us look at your blood vessels. A computer turns the radio waves into pictures showing cross sections of the body, much like slices of bread. This helps us see any problems more clearly. You may receive fluid (called  contrast ) before or during your scan. The fluid helps us see the pictures better. We give the fluid through an IV (small needle in your arm).  Who should I call with questions:  Please call the Imaging Department at your exam site with any questions. Directions, parking instructions, and other information is available on our website, [email or web address].  How do I prepare if I m having sedation or anesthesia? **IMPORTANT** THE INSTRUCTIONS BELOW ARE ONLY FOR THOSE PATIENTS WHO HAVE BEEN TOLD THEY WILL RECEIVE SEDATION OR GENERAL ANESTHESIA DURING THEIR MRI  "PROCEDURE:  IF YOU WILL RECEIVE SEDATION (take medicine to help you relax during your exam): You must get the medicine from your doctor before you arrive. Bring the medicine to the exam. Do not take it at home. Arrive one hour early. Bring someone who can take you home after the test. Your medicine will make you sleepy. After the exam, you may not drive, take a bus or take a taxi by yourself. No eating 8 hours before your exam. You may have clear liquids up until 4 hours before your exam. (Clear liquids include water, clear tea, black coffee and fruit juice without pulp.)  IF YOU WILL RECEIVE ANESTHESIA (be asleep for your exam): Arrive 1 1/2 hours early. Bring someone who can take you home after the test. You may not drive, take a bus or take a taxi by yourself. No eating 8 hours before your exam. You may have clear liquids up until 4 hours before your exam. (Clear liquids include water, clear tea, black coffee and fruit juice without pulp.)            Feb 11, 2019  3:00 PM CST   (Arrive by 2:45 PM)   Return Visit with Chevy Duncan MD   [facility] ([facility])    [address] [place] [address] [phone]              Who to contact     Please call your clinic at [phone] to:    Ask questions about your health    Make or cancel appointments    Discuss your medicines    Learn about your test results    Speak to your doctor            Additional Information About Your Visit        Care EveryWhere ID     This is your Care EveryWhere ID. This could be used by other organizations to access your [facility] medical records  [id number]        Your Vitals Were     Pulse Temperature Respirations Height Pulse Oximetry BMI (Body Mass Index)    66 98.1  F (36.7  C) (Oral) 16 1.905 m (6' 3\") 98% 25.6 kg/m2       Blood Pressure from Last 3 Encounters:   09/24/18 146/83   08/01/18 142/73   07/23/18 132/72    Weight from Last 3 Encounters:   09/24/18 " 92.9 kg (204 lb 12.8 oz)   08/01/18 96.2 kg (212 lb)   07/23/18 95.3 kg (210 lb 3.2 oz)              Today, you had the following     No orders found for display         Today's Medication Changes          These changes are accurate as of 9/24/18  9:15 AM.  If you have any questions, ask your nurse or doctor.               These medicines have changed or have updated prescriptions.        Dose/Directions    tamsulosin 0.4 MG capsule   Commonly known as:  FLOMAX   This may have changed:  when to take this   Used for:  Benign non-nodular prostatic hyperplasia with lower urinary tract symptoms        Dose:  0.4 mg   Take 1 capsule (0.4 mg) by mouth daily   Quantity:  90 capsule   Refills:  3                Primary Care Provider Office Phone # Fax #    Jamari Sloan [phone]       [place] [address]        Equal Access to Services     [facility] : Haja Milton, vanita manning, phong haq, lukas chin . So [facility] [phone].    ATENCIÓN: Si habla español, tiene a johns disposición servicios gratuitos de asistencia lingüística. Anila al [phone].    We comply with applicable federal civil rights laws and [place] laws. We do not discriminate on the basis of race, color, national origin, age, disability, sex, sexual orientation, or gender identity.            Thank you!     Thank you for choosing [facility]  for your care. Our goal is always to provide you with excellent care. Hearing back from our patients is one way we can continue to improve our services. Please take a few minutes to complete the written survey that you may receive in the mail after your visit with us. Thank you!             Your Updated Medication List - Protect others around you: Learn how to safely use, store and throw away your medicines at www.disposemymeds.org.          This list is accurate as of 9/24/18   9:15 AM.  Always use your most recent med list.                   Brand Name Dispense Instructions for use Diagnosis    ASPIRIN      Take 81 mg by mouth every morning        ATENOLOL      Take 100 mg by mouth every morning        clobetasol 0.05 % external solution    TEMOVATE    60 mL    Apply topically 2 times daily To scaly and itchy areas on scalp until no rash nor itch. Hold until patient requests.    Sebopsoriasis       desonide 0.05 % ointment    DESOWEN    60 g    Apply topically 2 times daily To rash at eyebrows and on face as needed until clear. Hold until patient requests.    Sebopsoriasis       donepezil 10 MG tablet    ARICEPT     Take 10 mg by mouth every morning        hydrochlorothiazide 12.5 MG capsule    MICROZIDE     TAKE ONE CAPSULE BY MOUTH EVERY MORNING.        ketoconazole 2 % shampoo    NIZORAL    240 mL    To entire wet scalp and then wash off after 5 minutes three times a week. Hold until patient requests.    Sebopsoriasis       magnesium 250 MG tablet      Take 1 tablet by mouth At Bedtime        sildenafil 50 MG tablet    VIAGRA    30 tablet    Take 1 tablet (50 mg) by mouth as needed for erectile dysfunction    Erectile dysfunction due to arterial insufficiency       simvastatin 40 MG tablet    ZOCOR     TAKE 1 TABLET BY MOUTH NIGHTLY AT BEDTIME.        tamsulosin 0.4 MG capsule    FLOMAX    90 capsule    Take 1 capsule (0.4 mg) by mouth daily    Benign non-nodular prostatic hyperplasia with lower urinary tract symptoms       vitamin E 1000 UNIT capsule    TOCOPHEROL     Take 1,000 Units by mouth At Bedtime

## 2018-09-25 ENCOUNTER — TELEPHONE (OUTPATIENT)
Dept: DERMATOLOGY | Facility: CLINIC | Age: 68
End: 2018-09-25

## 2018-09-25 NOTE — TELEPHONE ENCOUNTER
----- Message from Elyssa Cerda RN sent at 9/23/2018 11:08 AM CDT -----  Pt  Catalino Coronado [[id number]]  Sussy Lopez MD     Please call to schedule. If still a pt at [facility]    I do not need any follow up on the scheduling of this appointment unless the patient will no longer be receiving care in our clinic.

## 2018-09-26 RX ORDER — CYCLOPENTOLATE HYDROCHLORIDE 10 MG/ML
1 SOLUTION/ DROPS OPHTHALMIC
Status: CANCELLED | OUTPATIENT
Start: 2018-09-26

## 2018-09-26 RX ORDER — TROPICAMIDE 10 MG/ML
1 SOLUTION/ DROPS OPHTHALMIC
Status: CANCELLED | OUTPATIENT
Start: 2018-09-26

## 2018-09-26 RX ORDER — PHENYLEPHRINE HYDROCHLORIDE 25 MG/ML
1 SOLUTION/ DROPS OPHTHALMIC
Status: CANCELLED | OUTPATIENT
Start: 2018-09-26

## 2018-09-26 RX ORDER — PROPARACAINE HYDROCHLORIDE 5 MG/ML
1 SOLUTION/ DROPS OPHTHALMIC ONCE
Status: CANCELLED | OUTPATIENT
Start: 2018-09-26 | End: 2018-09-26

## 2018-09-27 ENCOUNTER — TELEPHONE (OUTPATIENT)
Dept: DERMATOLOGY | Facility: CLINIC | Age: 68
End: 2018-09-27

## 2018-09-27 NOTE — TELEPHONE ENCOUNTER
----- Message from Elyssa Cerda RN sent at 9/23/2018 11:08 AM CDT -----  Pt  Catalino Coronado [[id number]]  Sussy Lopez MD     Please call to schedule. If still a pt at [facility]    I do not need any follow up on the scheduling of this appointment unless the patient will no longer be receiving care in our clinic.

## 2018-09-28 RX ORDER — OXYCODONE HYDROCHLORIDE 5 MG/1
5 TABLET ORAL EVERY 4 HOURS PRN
Status: CANCELLED | OUTPATIENT
Start: 2018-09-28

## 2018-09-28 RX ORDER — MEPERIDINE HYDROCHLORIDE 25 MG/ML
12.5 INJECTION INTRAMUSCULAR; INTRAVENOUS; SUBCUTANEOUS
Status: CANCELLED | OUTPATIENT
Start: 2018-09-28

## 2018-09-28 RX ORDER — ONDANSETRON 2 MG/ML
4 INJECTION INTRAMUSCULAR; INTRAVENOUS EVERY 30 MIN PRN
Status: CANCELLED | OUTPATIENT
Start: 2018-09-28

## 2018-09-28 RX ORDER — FENTANYL CITRATE 50 UG/ML
25-50 INJECTION, SOLUTION INTRAMUSCULAR; INTRAVENOUS
Status: CANCELLED | OUTPATIENT
Start: 2018-09-28

## 2018-09-28 RX ORDER — SODIUM CHLORIDE, SODIUM LACTATE, POTASSIUM CHLORIDE, CALCIUM CHLORIDE 600; 310; 30; 20 MG/100ML; MG/100ML; MG/100ML; MG/100ML
INJECTION, SOLUTION INTRAVENOUS CONTINUOUS
Status: CANCELLED | OUTPATIENT
Start: 2018-09-28

## 2018-09-28 RX ORDER — ACETAMINOPHEN 325 MG/1
975 TABLET ORAL ONCE
Status: CANCELLED | OUTPATIENT
Start: 2018-09-28 | End: 2018-09-28

## 2018-09-28 RX ORDER — ONDANSETRON 4 MG/1
4 TABLET, ORALLY DISINTEGRATING ORAL EVERY 30 MIN PRN
Status: CANCELLED | OUTPATIENT
Start: 2018-09-28

## 2018-09-28 RX ORDER — NALOXONE HYDROCHLORIDE 0.4 MG/ML
.1-.4 INJECTION, SOLUTION INTRAMUSCULAR; INTRAVENOUS; SUBCUTANEOUS
Status: CANCELLED | OUTPATIENT
Start: 2018-09-28 | End: 2018-09-29

## 2018-09-29 ENCOUNTER — COMMUNICATION - HEALTHEAST (OUTPATIENT)
Dept: FAMILY MEDICINE | Facility: CLINIC | Age: 68
End: 2018-09-29

## 2018-10-01 ENCOUNTER — ANESTHESIA (OUTPATIENT)
Dept: SURGERY | Facility: AMBULATORY SURGERY CENTER | Age: 68
End: 2018-10-01

## 2018-10-08 ENCOUNTER — HOSPITAL ENCOUNTER (OUTPATIENT)
Facility: AMBULATORY SURGERY CENTER | Age: 68
End: 2018-10-08
Attending: OPHTHALMOLOGY
Payer: MEDICARE

## 2018-10-08 ENCOUNTER — SURGERY (OUTPATIENT)
Age: 68
End: 2018-10-08

## 2018-10-08 VITALS
HEART RATE: 62 BPM | SYSTOLIC BLOOD PRESSURE: 136 MMHG | TEMPERATURE: 98.2 F | BODY MASS INDEX: 27.73 KG/M2 | HEIGHT: 75 IN | DIASTOLIC BLOOD PRESSURE: 75 MMHG | RESPIRATION RATE: 17 BRPM | OXYGEN SATURATION: 95 % | WEIGHT: 223 LBS

## 2018-10-08 DIAGNOSIS — H25.13 NUCLEAR SENILE CATARACT OF BOTH EYES: Primary | ICD-10-CM

## 2018-10-08 DEVICE — EYE IMP IOL AMO PCL TECNIS ZCB00 23.0: Type: IMPLANTABLE DEVICE | Site: EYE | Status: FUNCTIONAL

## 2018-10-08 RX ORDER — TIMOLOL MALEATE 5 MG/ML
SOLUTION/ DROPS OPHTHALMIC PRN
Status: DISCONTINUED | OUTPATIENT
Start: 2018-10-08 | End: 2018-10-08 | Stop reason: HOSPADM

## 2018-10-08 RX ORDER — FENTANYL CITRATE 50 UG/ML
INJECTION, SOLUTION INTRAMUSCULAR; INTRAVENOUS PRN
Status: DISCONTINUED | OUTPATIENT
Start: 2018-10-08 | End: 2018-10-08

## 2018-10-08 RX ORDER — BALANCED SALT SOLUTION 6.4; .75; .48; .3; 3.9; 1.7 MG/ML; MG/ML; MG/ML; MG/ML; MG/ML; MG/ML
SOLUTION OPHTHALMIC PRN
Status: DISCONTINUED | OUTPATIENT
Start: 2018-10-08 | End: 2018-10-08 | Stop reason: HOSPADM

## 2018-10-08 RX ORDER — OFLOXACIN 3 MG/ML
1 SOLUTION/ DROPS OPHTHALMIC 3 TIMES DAILY
Qty: 5 ML | Refills: 0 | Status: SHIPPED | OUTPATIENT
Start: 2018-10-08 | End: 2018-11-06

## 2018-10-08 RX ORDER — ONDANSETRON 4 MG/1
4 TABLET, ORALLY DISINTEGRATING ORAL EVERY 30 MIN PRN
Status: DISCONTINUED | OUTPATIENT
Start: 2018-10-08 | End: 2018-10-09 | Stop reason: HOSPADM

## 2018-10-08 RX ORDER — SODIUM CHLORIDE, SODIUM LACTATE, POTASSIUM CHLORIDE, CALCIUM CHLORIDE 600; 310; 30; 20 MG/100ML; MG/100ML; MG/100ML; MG/100ML
INJECTION, SOLUTION INTRAVENOUS CONTINUOUS
Status: DISCONTINUED | OUTPATIENT
Start: 2018-10-08 | End: 2018-10-09 | Stop reason: HOSPADM

## 2018-10-08 RX ORDER — PREDNISOLONE ACETATE 10 MG/ML
1 SUSPENSION/ DROPS OPHTHALMIC 4 TIMES DAILY
Qty: 5 ML | Refills: 0 | Status: SHIPPED | OUTPATIENT
Start: 2018-10-08 | End: 2018-11-06

## 2018-10-08 RX ORDER — PHENYLEPHRINE HYDROCHLORIDE 25 MG/ML
1 SOLUTION/ DROPS OPHTHALMIC
Status: COMPLETED | OUTPATIENT
Start: 2018-10-08 | End: 2018-10-08

## 2018-10-08 RX ORDER — OXYCODONE HYDROCHLORIDE 5 MG/1
5 TABLET ORAL EVERY 4 HOURS PRN
Status: DISCONTINUED | OUTPATIENT
Start: 2018-10-08 | End: 2018-10-09 | Stop reason: HOSPADM

## 2018-10-08 RX ORDER — PROPARACAINE HYDROCHLORIDE 5 MG/ML
1 SOLUTION/ DROPS OPHTHALMIC ONCE
Status: COMPLETED | OUTPATIENT
Start: 2018-10-08 | End: 2018-10-08

## 2018-10-08 RX ORDER — ONDANSETRON 2 MG/ML
4 INJECTION INTRAMUSCULAR; INTRAVENOUS EVERY 30 MIN PRN
Status: DISCONTINUED | OUTPATIENT
Start: 2018-10-08 | End: 2018-10-09 | Stop reason: HOSPADM

## 2018-10-08 RX ORDER — CYCLOPENTOLATE HYDROCHLORIDE 10 MG/ML
1 SOLUTION/ DROPS OPHTHALMIC
Status: COMPLETED | OUTPATIENT
Start: 2018-10-08 | End: 2018-10-08

## 2018-10-08 RX ORDER — TETRACAINE HYDROCHLORIDE 5 MG/ML
SOLUTION OPHTHALMIC PRN
Status: DISCONTINUED | OUTPATIENT
Start: 2018-10-08 | End: 2018-10-08 | Stop reason: HOSPADM

## 2018-10-08 RX ORDER — TROPICAMIDE 10 MG/ML
1 SOLUTION/ DROPS OPHTHALMIC
Status: COMPLETED | OUTPATIENT
Start: 2018-10-08 | End: 2018-10-08

## 2018-10-08 RX ORDER — SODIUM CHLORIDE, SODIUM LACTATE, POTASSIUM CHLORIDE, CALCIUM CHLORIDE 600; 310; 30; 20 MG/100ML; MG/100ML; MG/100ML; MG/100ML
500 INJECTION, SOLUTION INTRAVENOUS CONTINUOUS
Status: DISCONTINUED | OUTPATIENT
Start: 2018-10-08 | End: 2018-10-08 | Stop reason: HOSPADM

## 2018-10-08 RX ORDER — MEPERIDINE HYDROCHLORIDE 25 MG/ML
12.5 INJECTION INTRAMUSCULAR; INTRAVENOUS; SUBCUTANEOUS
Status: DISCONTINUED | OUTPATIENT
Start: 2018-10-08 | End: 2018-10-09 | Stop reason: HOSPADM

## 2018-10-08 RX ORDER — NALOXONE HYDROCHLORIDE 0.4 MG/ML
.1-.4 INJECTION, SOLUTION INTRAMUSCULAR; INTRAVENOUS; SUBCUTANEOUS
Status: DISCONTINUED | OUTPATIENT
Start: 2018-10-08 | End: 2018-10-09 | Stop reason: HOSPADM

## 2018-10-08 RX ORDER — LIDOCAINE 40 MG/G
CREAM TOPICAL
Status: DISCONTINUED | OUTPATIENT
Start: 2018-10-08 | End: 2018-10-08 | Stop reason: HOSPADM

## 2018-10-08 RX ORDER — FENTANYL CITRATE 50 UG/ML
25-50 INJECTION, SOLUTION INTRAMUSCULAR; INTRAVENOUS
Status: DISCONTINUED | OUTPATIENT
Start: 2018-10-08 | End: 2018-10-08 | Stop reason: HOSPADM

## 2018-10-08 RX ORDER — LIDOCAINE HYDROCHLORIDE 10 MG/ML
INJECTION, SOLUTION EPIDURAL; INFILTRATION; INTRACAUDAL; PERINEURAL PRN
Status: DISCONTINUED | OUTPATIENT
Start: 2018-10-08 | End: 2018-10-08 | Stop reason: HOSPADM

## 2018-10-08 RX ADMIN — TETRACAINE HYDROCHLORIDE 2 DROP: 5 SOLUTION OPHTHALMIC at 09:54

## 2018-10-08 RX ADMIN — BALANCED SALT SOLUTION 15 ML: 6.4; .75; .48; .3; 3.9; 1.7 SOLUTION OPHTHALMIC at 09:52

## 2018-10-08 RX ADMIN — PHENYLEPHRINE HYDROCHLORIDE 1 DROP: 25 SOLUTION/ DROPS OPHTHALMIC at 08:36

## 2018-10-08 RX ADMIN — FENTANYL CITRATE 25 MCG: 50 INJECTION, SOLUTION INTRAMUSCULAR; INTRAVENOUS at 09:51

## 2018-10-08 RX ADMIN — PHENYLEPHRINE HYDROCHLORIDE 1 DROP: 25 SOLUTION/ DROPS OPHTHALMIC at 08:26

## 2018-10-08 RX ADMIN — CYCLOPENTOLATE HYDROCHLORIDE 1 DROP: 10 SOLUTION/ DROPS OPHTHALMIC at 08:25

## 2018-10-08 RX ADMIN — TROPICAMIDE 1 DROP: 10 SOLUTION/ DROPS OPHTHALMIC at 08:23

## 2018-10-08 RX ADMIN — CYCLOPENTOLATE HYDROCHLORIDE 1 DROP: 10 SOLUTION/ DROPS OPHTHALMIC at 08:35

## 2018-10-08 RX ADMIN — CYCLOPENTOLATE HYDROCHLORIDE 1 DROP: 10 SOLUTION/ DROPS OPHTHALMIC at 08:20

## 2018-10-08 RX ADMIN — TROPICAMIDE 1 DROP: 10 SOLUTION/ DROPS OPHTHALMIC at 08:36

## 2018-10-08 RX ADMIN — SODIUM CHLORIDE, SODIUM LACTATE, POTASSIUM CHLORIDE, CALCIUM CHLORIDE: 600; 310; 30; 20 INJECTION, SOLUTION INTRAVENOUS at 09:39

## 2018-10-08 RX ADMIN — LIDOCAINE HYDROCHLORIDE 1 ML: 10 INJECTION, SOLUTION EPIDURAL; INFILTRATION; INTRACAUDAL; PERINEURAL at 09:53

## 2018-10-08 RX ADMIN — TIMOLOL MALEATE 1 DROP: 5 SOLUTION/ DROPS OPHTHALMIC at 10:16

## 2018-10-08 RX ADMIN — Medication 500 ML: at 09:52

## 2018-10-08 RX ADMIN — PROPARACAINE HYDROCHLORIDE 1 DROP: 5 SOLUTION/ DROPS OPHTHALMIC at 08:13

## 2018-10-08 RX ADMIN — TROPICAMIDE 1 DROP: 10 SOLUTION/ DROPS OPHTHALMIC at 08:25

## 2018-10-08 RX ADMIN — FENTANYL CITRATE 50 MCG: 50 INJECTION, SOLUTION INTRAMUSCULAR; INTRAVENOUS at 09:44

## 2018-10-08 RX ADMIN — PHENYLEPHRINE HYDROCHLORIDE 1 DROP: 25 SOLUTION/ DROPS OPHTHALMIC at 08:23

## 2018-10-08 NOTE — ANESTHESIA POSTPROCEDURE EVALUATION
Patient: Catalino Coronado    Procedure(s):  Left Eye Phacoemulsification with Intraocular Lens - Wound Class: I-Clean    Diagnosis:Cataracts  Diagnosis Additional Information: No value filed.    Anesthesia Type:  MAC    Note:  Anesthesia Post Evaluation    Patient location during evaluation: bedside  Patient participation: Able to fully participate in evaluation  Level of consciousness: awake  Pain management: adequate  Airway patency: patent  Cardiovascular status: acceptable  Respiratory status: acceptable  Hydration status: acceptable  PONV: controlled     Anesthetic complications: None          Last vitals:  Vitals:    10/08/18 0758 10/08/18 1030 10/08/18 1045   BP: 152/79 146/66 136/75   Pulse: 69 63 62   Resp: 16 16 17   Temp: 37.1  C (98.7  F) 36.4  C (97.5  F) 36.8  C (98.2  F)   SpO2: 99% 99% 95%         Electronically Signed By: Kareem Hamilton MD, MD  October 8, 2018  11:04 AM

## 2018-10-08 NOTE — OP NOTE
PREOPERATIVE DIAGNOSIS: Visually significant cataract, Left eye   POSTOPERATIVE DIAGNOSIS: Same   PROCEDURES:   1. Complex cataract extraction with intraocular lens implant Left eye.  SURGEON: Elio Manzo M.D.  Assistant: Kevin Zhou MD    INDICATIONS: The patient Catalino Coronado presented to the eye clinic with decreased vision secondary to cataract in the Left eye. The risks, benefits and alternatives to cataract extraction were discussed. The patient elected to proceed. All questions were answered to the patient's satisfaction.   DESCRIPTION OF PROCEDURE:   Prior to the procedure, appropriate cardiac and respiratory monitors were applied to the patient.  In the pre-operative holding area, a drop of topical tetracaine followed by lidocaine gel followed by povidone iodine.  The patient was brought to the operating room where a surgical pause was carried out to identify with all members of the surgical team the correct surgical site.  With adequate anesthesia, the Left eye was prepped and draped in the usual sterile fashion. A lid speculum was placed, and the operating microscope was rotated into position. A paracentesis was created.  Through this limbal paracentesis, the anterior chamber was filled with preservative-free lidocaine followed by viscoelastic.  A temporal wound was created at the limbus using a 2.6 mm blade. A capsulorrhexis was initiated using a bent 25-gauge needle and was completed in continuous and circular fashion using the capsulorrhexis forceps. The lens nucleus was hydrodissected using balanced salt solution.  During hydrodissection the pupil was progressively more miotic and began to prolapse to the main and paracentesis wounds.  Additional epinepherine was given intracamerally and the wounds were swept.  Iris hooks were placed at cardinal positions to dilate the miotic pupil and prevent further iris prolapse.  The lens nucleus was rotated and removed using phacoemulsification in a stop  and chop technique.  Residual cortical material was removed using irrigation-aspiration.  The capsular bag was reinflated to its maximal extent with cohesive viscoelastic.  A 23.0 diopter ZCBOO inserted into the capsular bag.  The lens power selected was reviewed using the intraocular lens power measurements that were obtained preoperatively to confirm that the correct lens was selected for the desired post-operative refractive state. The main wound was sealed and a second paracentesis created.  The residual viscoelastic was removed in its entirety with bimanual technique.  Miochol was injected into the anterior chamber.  The wounds were hydrated and found to be self-sealing.  Tactile pressure was confirmed to be in a normal range.  The lid speculum was removed, Maxitrol ointment followed by a patch and shield were applied.  The patient tolerated the procedure well, and there were no complications.    PLAN: The patient will be discharged to home and will follow up tomorrow morning in the eye clinic.  EBL:  None  Complications:  Floppy iris syndrome, iris prolapse    Implant Name Type Inv. Item Serial No.  Lot No. LRB No. Used   EYE IMP IOL PAM PCL TECNIS [id number] 23.0 Lens/Eye Implant EYE IMP IOL PAM PCL TECNIS ZCB00 23.0 [id number] [employer]   Left 1       Attending Physician Procedure Attestation: I was present for the entire procedure     Elio Manzo MD  , Comprehensive Ophthalmology  Department of Ophthalmology and Visual Neurosciences  [facility]

## 2018-10-08 NOTE — IP AVS SNAPSHOT
[facility]    [address] [place] [address]    Phone:  [phone]    Fax:  [phone]                                       After Visit Summary   10/8/2018    Catalino Coronado    MRN: [id number]           After Visit Summary Signature Page     I have received my discharge instructions, and my questions have been answered. I have discussed any challenges I see with this plan with the nurse or doctor.    ..........................................................................................................................................  Patient/Patient Representative Signature      ..........................................................................................................................................  Patient Representative Print Name and Relationship to Patient    ..................................................               ................................................  Date                                   Time    ..........................................................................................................................................  Reviewed by Signature/Title    ...................................................              ..............................................  Date                                               Time          22EPIC Rev 08/18

## 2018-10-08 NOTE — ANESTHESIA CARE TRANSFER NOTE
Patient: Catalino Coronado    Procedure(s):  Left Eye Phacoemulsification with Intraocular Lens - Wound Class: I-Clean    Diagnosis: Cataracts  Diagnosis Additional Information: No value filed.    Anesthesia Type:   MAC     Note:  Airway :Room Air  Patient transferred to:Phase II  Comments: Report to RN    97.5, 12, 61, 146/66, 99%Handoff Report: Identifed the Patient, Identified the Reponsible Provider, Reviewed the pertinent medical history, Discussed the surgical course, Reviewed Intra-OP anesthesia mangement and issues during anesthesia, Set expectations for post-procedure period and Allowed opportunity for questions and acknowledgement of understanding      Vitals: (Last set prior to Anesthesia Care Transfer)    CRNA VITALS  10/8/2018 0953 - 10/8/2018 1027      10/8/2018             Pulse: 70    Ht Rate: 75    SpO2: 96 %    Resp Rate (set): 10                Electronically Signed By: FADIA Castellanos CRNA  October 8, 2018  10:27 AM

## 2018-10-08 NOTE — DISCHARGE INSTRUCTIONS
[facility]     Home Care Following Cataract Surgery    If you have a gauze eye patch on, please do not remove it until it is removed by your doctor at your first appointment after your surgery.  You will start your eye drops the next day.    OR    If you only have a clear eye shield on, you may remove the eye shield when you get home and begin eye drops today as directed by your doctor.      Wear the clear eye shield for protection when sleeping for the next 5 days.      Do not rub the eye that had the operation.      Your eye might be sensitive to light.  Wearing sunglasses may be more comfortable for you.      You may have some discomfort and irritation.  Acetaminophen (Tylenol) or Ibuprofen (Advil) may be taken for discomfort. If pain persists please call your doctor s office.      Keep the eye that had the surgery dry. You may wash your hair, bathe or shower, but keep your eye closed while doing so.       Avoid bending over, strenuous activity or heavy lifting until this activity has been approved by your doctor.      You have a follow-up appointment with your doctor tomorrow at the [facility] ([phone]).  Bring all your prescribed eye drops with you to this follow-up appointment.        If you take glaucoma medications, bring them with you to your follow-up appointment tomorrow.      Use medication exactly as prescribed by your doctor. You may restart your regular home medications.       Call your doctor s office at [phone] if any of the following should occur:    - Any sudden vision changes, including decreased vision  - Nausea or severe headache  - Increase in pain that is not controlled with Acetaminophen (Tylenol) or Ibuprofen (Advil)  - Signs of infection (pus, increasing redness or tenderness)  - Severe sensitivity to light  - An increase in floaters (black spots in front of your vision)      [facility]  Home Care Following Anesthesia  For 24 hours after surgery:  1. Get plenty of rest.  A responsible adult must stay with you for at least 24 hours after you leave the surgery center.  2. Do not drive or use heavy equipment.  If you have weakness or tingling, don't drive or use heavy equipment until this feeling goes away.   3. Do not drink alcohol.   4. Avoid strenuous or risky activities.  Ask for help when climbing stairs.  5. You may feel lightheaded.  IF so, sit for a few minutes before standing.  Have someone help you get up.   6. If you have nausea (feel sick to your stomach): Drink only clear liquids such as apple juice, ginger ale, broth or 7-Up.  Rest may also help.  Be sure to drink enough fluids.  Move to a regular diet as you feel able.   7. You may have a slight fever.  Call the doctor if your fever is over 100 F (37.7 C) (taken under the tongue) or lasts longer than 24 hours.  8. You may have a dry mouth, a sore throat, muscle aches or trouble sleeping. These should go away after 24 hours.  9. Do not make important or legal decisions.               Tips for taking pain medications  To get the best pain relief possible, remember these points:    Take pain medications as directed, before pain becomes severe.    Pain medication can upset your stomach: taking it with food may help.    Constipation is a common side effect of pain medication. Drink plenty of  fluids.    Eat foods high in fiber. Take a stool softener if recommended by your doctor or pharmacist.    Do not drink alcohol, drive or operate machinery while taking pain medications.    Ask about other ways to control pain, such as with heat, ice or relaxation.    Tylenol/Acetaminophen Consumption  To help encourage the safe use of acetaminophen, the makers of TYLENOL  have lowered the maximum daily dose for single-ingredient Extra Strength TYLENOL  (acetaminophen) products sold in the [place] from 8 pills per day (4,000 mg) to 6 pills per day  (3,000 mg). The dosing interval has also changed from 2 pills every 4-6 hours to 2 pills every 6 hours.    If you feel your pain relief is insufficient, you may take Tylenol/Acetaminophen in addition to your narcotic pain medication.     Be careful not to exceed 3,000 mg of Tylenol/Acetaminophen in a 24 hour period from all sources.    If you are taking extra strength Tylenol/acetaminophen (500 mg), the maximum dose is 6 tablets in 24 hours.    If you are taking regular strength acetaminophen (325 mg), the maximum dose is 9 tablets in 24 hours.    Call a doctor for any of the followin. Signs of infection (fever, growing tenderness at the surgery site, a large amount of drainage or bleeding, severe pain, foul-smelling drainage, redness, swelling).  2. It has been over 8 to 10 hours since surgery and you are still not able to urinate (pass water).  3. Headache for over 24 hours.  4. Numbness, tingling or weakness the day after surgery (if you had spinal anesthesia).  Your doctor is:       Dr. Elio Manzo, Ophthalmology: [phone]               Or dial [phone] and ask for the resident on call for:  Ophthalmology  For emergency care, call the:  [facility]:  [phone] (TTY for hearing impaired: [phone])

## 2018-10-08 NOTE — IP AVS SNAPSHOT
MRN:[id number]                      After Visit Summary   10/8/2018    Catalino Coronado    MRN: [id number]           Thank you!     Thank you for choosing [facility] for your care. Our goal is always to provide you with excellent care. Hearing back from our patients is one way we can continue to improve our services. Please take a few minutes to complete the written survey that you may receive in the mail after you visit with us. Thank you!        Patient Information     Date Of Birth          1950        About your hospital stay     You were admitted on:  October 8, 2018 You last received care in the:  [facility]    You were discharged on:  October 8, 2018       Who to Call     For medical emergencies, please call 911.  For non-urgent questions about your medical care, please call your primary care provider or clinic, [phone]  For questions related to your surgery, please call your surgery clinic        Attending Provider     Provider Specialty    Elio Manzo MD Ophthalmology       Primary Care Provider Office Phone # Fax #    Jamari Sloan [phone]      After Care Instructions     Activity       Avoid strenuous activities the next several days.                  Your next 10 appointments already scheduled     Oct 22, 2018   Procedure with Elio Manzo MD   [facility] ([facility])    [address] [place] [address]   [phone]           Located in the Clinics and Surgery Center at [address] [place] [address].   parking is very convenient and highly recommended.  is a $6 flat rate fee.  Both  and self parkers should enter the main arrival plaza from [facility]; parking attendants will direct you based on your parking preference.            Oct 22, 2018 11:45 AM CDT   (Arrive by 11:30 AM)   Post-Op with Elio Willard  MD Anais   [facility] ([facility] [place])    [address]  4th Floor  [facility] [address]   [phone]            Nov 06, 2018  9:30 AM CST   Post-Op with Elio Manzo MD   Eye Clinic ([facility])    [address]  9th [address] Clin 9a  [facility] [address]   [phone]            Feb 11, 2019  1:45 PM CST   MRA BRAIN ([place] [facility]) WWO CONTRAST with [address] MRI ([facility] [place])    [address]  1st Floor  [facility] [address]   [phone]           How do I prepare for my exam? (Food and drink instructions) **If you will be receiving sedation or general anesthesia, please see special notes below.**  How do I prepare for my exam? (Other instructions) Take your medicines as usual, unless your doctor tells you not to. You may or may not receive intravenous (IV) contrast for this exam pending the discretion of the Radiologist.  You do not need to do anything special to prepare.  **If you will be receiving sedation or general anesthesia, please see special notes below.**  What should I wear: The MRI machine uses a strong magnet. Please wear clothes without metal (snaps, zippers). A sweatsuit works well, or we may give you a hospital gown. Please remove any body piercings and hair extensions before you arrive. You will also remove watches, jewelry, hairpins, wallets, dentures, partial dental plates and hearing aids. You may wear contact lenses, and you may be able to wear your rings. We have a safe place to keep your personal items, but it is safer to leave them at home.  How long does the exam take: Most tests take 30 to 60 minutes.  HOWEVER, IF YOUR DOCTOR PRESCRIBES ANESTHESIA please plan on spending four to five hours in the recovery room.  What should I bring:  Bring a list of your current medicines to your exam (including vitamins, minerals and  over-the-counter drugs).  Do I need a :  **If you will be receiving sedation or general anesthesia, please see special notes below.**  What should I do after the exam: No Restrictions, You may resume normal activities.  What is this test: MRI (magnetic resonance imaging) uses a strong magnet and radio waves to look inside the body. An MRA (magnetic resonance angiogram) does the same thing, but it lets us look at your blood vessels. A computer turns the radio waves into pictures showing cross sections of the body, much like slices of bread. This helps us see any problems more clearly. You may receive fluid (called  contrast ) before or during your scan. The fluid helps us see the pictures better. We give the fluid through an IV (small needle in your arm).  Who should I call with questions:  Please call the Imaging Department at your exam site with any questions. Directions, parking instructions, and other information is available on our website, [email or web address].  How do I prepare if I m having sedation or anesthesia? **IMPORTANT** THE INSTRUCTIONS BELOW ARE ONLY FOR THOSE PATIENTS WHO HAVE BEEN TOLD THEY WILL RECEIVE SEDATION OR GENERAL ANESTHESIA DURING THEIR MRI PROCEDURE:  IF YOU WILL RECEIVE SEDATION (take medicine to help you relax during your exam): You must get the medicine from your doctor before you arrive. Bring the medicine to the exam. Do not take it at home. Arrive one hour early. Bring someone who can take you home after the test. Your medicine will make you sleepy. After the exam, you may not drive, take a bus or take a taxi by yourself. No eating 8 hours before your exam. You may have clear liquids up until 4 hours before your exam. (Clear liquids include water, clear tea, black coffee and fruit juice without pulp.)  IF YOU WILL RECEIVE ANESTHESIA (be asleep for your exam): Arrive 1 1/2 hours early. Bring someone who can take you home after the test. You may not drive, take a bus or take a  taxi by yourself. No eating 8 hours before your exam. You may have clear liquids up until 4 hours before your exam. (Clear liquids include water, clear tea, black coffee and fruit juice without pulp.)            Feb 11, 2019  3:00 PM CST   (Arrive by 2:45 PM)   Return Visit with Chevy Duncan MD   [facility] ([facility])    [address] [address] [phone]              Further instructions from your care team       [facility]     Home Care Following Cataract Surgery    If you have a gauze eye patch on, please do not remove it until it is removed by your doctor at your first appointment after your surgery.  You will start your eye drops the next day.    OR    If you only have a clear eye shield on, you may remove the eye shield when you get home and begin eye drops today as directed by your doctor.      Wear the clear eye shield for protection when sleeping for the next 5 days.      Do not rub the eye that had the operation.      Your eye might be sensitive to light.  Wearing sunglasses may be more comfortable for you.      You may have some discomfort and irritation.  Acetaminophen (Tylenol) or Ibuprofen (Advil) may be taken for discomfort. If pain persists please call your doctor s office.      Keep the eye that had the surgery dry. You may wash your hair, bathe or shower, but keep your eye closed while doing so.       Avoid bending over, strenuous activity or heavy lifting until this activity has been approved by your doctor.      You have a follow-up appointment with your doctor tomorrow at the [facility] ([phone]).  Bring all your prescribed eye drops with you to this follow-up appointment.        If you take glaucoma medications, bring them with you to your follow-up appointment tomorrow.      Use medication exactly as prescribed by your doctor. You may restart  your regular home medications.       Call your doctor s office at [phone] if any of the following should occur:    - Any sudden vision changes, including decreased vision  - Nausea or severe headache  - Increase in pain that is not controlled with Acetaminophen (Tylenol) or Ibuprofen (Advil)  - Signs of infection (pus, increasing redness or tenderness)  - Severe sensitivity to light  - An increase in floaters (black spots in front of your vision)      [facility]  Home Care Following Anesthesia  For 24 hours after surgery:  1. Get plenty of rest.  A responsible adult must stay with you for at least 24 hours after you leave the surgery center.  2. Do not drive or use heavy equipment.  If you have weakness or tingling, don't drive or use heavy equipment until this feeling goes away.   3. Do not drink alcohol.   4. Avoid strenuous or risky activities.  Ask for help when climbing stairs.  5. You may feel lightheaded.  IF so, sit for a few minutes before standing.  Have someone help you get up.   6. If you have nausea (feel sick to your stomach): Drink only clear liquids such as apple juice, ginger ale, broth or 7-Up.  Rest may also help.  Be sure to drink enough fluids.  Move to a regular diet as you feel able.   7. You may have a slight fever.  Call the doctor if your fever is over 100 F (37.7 C) (taken under the tongue) or lasts longer than 24 hours.  8. You may have a dry mouth, a sore throat, muscle aches or trouble sleeping. These should go away after 24 hours.  9. Do not make important or legal decisions.               Tips for taking pain medications  To get the best pain relief possible, remember these points:    Take pain medications as directed, before pain becomes severe.    Pain medication can upset your stomach: taking it with food may help.    Constipation is a common side effect of pain medication. Drink plenty of  fluids.    Eat foods high in fiber. Take a stool  softener if recommended by your doctor or pharmacist.    Do not drink alcohol, drive or operate machinery while taking pain medications.    Ask about other ways to control pain, such as with heat, ice or relaxation.    Tylenol/Acetaminophen Consumption  To help encourage the safe use of acetaminophen, the makers of TYLENOL  have lowered the maximum daily dose for single-ingredient Extra Strength TYLENOL  (acetaminophen) products sold in the [place] from 8 pills per day (4,000 mg) to 6 pills per day (3,000 mg). The dosing interval has also changed from 2 pills every 4-6 hours to 2 pills every 6 hours.    If you feel your pain relief is insufficient, you may take Tylenol/Acetaminophen in addition to your narcotic pain medication.     Be careful not to exceed 3,000 mg of Tylenol/Acetaminophen in a 24 hour period from all sources.    If you are taking extra strength Tylenol/acetaminophen (500 mg), the maximum dose is 6 tablets in 24 hours.    If you are taking regular strength acetaminophen (325 mg), the maximum dose is 9 tablets in 24 hours.    Call a doctor for any of the followin. Signs of infection (fever, growing tenderness at the surgery site, a large amount of drainage or bleeding, severe pain, foul-smelling drainage, redness, swelling).  2. It has been over 8 to 10 hours since surgery and you are still not able to urinate (pass water).  3. Headache for over 24 hours.  4. Numbness, tingling or weakness the day after surgery (if you had spinal anesthesia).  Your doctor is:       Dr. Elio Manzo, Ophthalmology: [phone]               Or dial [phone] and ask for the resident on call for:  Ophthalmology  For emergency care, call the:  [facility]:  [phone] (TTY for hearing impaired: [phone])                Pending Results     No orders found from 10/6/2018 to 10/9/2018.            Admission Information     Date & Time Provider Department Dept. Phone    10/8/2018 Elio Manzo  "MD [facility] [phone]      Your Vitals Were     Blood Pressure Pulse Temperature Respirations Height Weight    152/79 69 98.7  F (37.1  C) (Oral) 16 1.905 m (6' 3\") 101.2 kg (223 lb)    Pulse Oximetry BMI (Body Mass Index)                99% 27.87 kg/m2          [employer] Information     [employer] is an electronic gateway that provides easy, online access to your medical records. With [employer], you can request a clinic appointment, read your test results, renew a prescription or communicate with your care team.     To sign up for [employer] visit the website at [email or web address]   You will be asked to enter the access code listed below, as well as some personal information. Please follow the directions to create your username and password.     Your access code is: [email or web address]  Expires: 2019  6:30 AM     Your access code will  in 90 days. If you need help or a new code, please contact your [facility] or call [phone] for assistance.        Care EveryWhere ID     This is your Care EveryWhere ID. This could be used by other organizations to access your [facility] medical records  [id number]        Equal Access to Services     NICOLE VICKERS AH: Haderic calixtoo Socarlyleali, waaxda luqadaha, qaybta kaalmada adeegyada, lukas garcia. So [facility] [phone].    ATENCIÓN: Si habla español, tiene a johns disposición servicios gratuitos de asistencia lingüística. Llame al [phone].    We comply with applicable federal civil rights laws and [place] laws. We do not discriminate on the basis of race, color, national origin, age, disability, sex, sexual orientation, or gender identity.               Review of your medicines      START taking        Dose / Directions    ofloxacin 0.3 % ophthalmic solution   Commonly known as:  OCUFLOX   Used for:  Nuclear senile cataract of both eyes        Dose:  1 drop   Apply 1 " drop to eye 3 times daily Instill into operative eye(s) per physician instructions.   Quantity:  5 mL   Refills:  0       prednisoLONE acetate 1 % ophthalmic susp   Commonly known as:  PRED FORTE   Used for:  Nuclear senile cataract of both eyes        Dose:  1 drop   Apply 1 drop to eye 4 times daily Instill into operative eye(s) per physician instructions.   Quantity:  5 mL   Refills:  0         CONTINUE these medicines which may have CHANGED, or have new prescriptions. If we are uncertain of the size of tablets/capsules you have at home, strength may be listed as something that might have changed.        Dose / Directions    tamsulosin 0.4 MG capsule   Commonly known as:  FLOMAX   This may have changed:  when to take this   Used for:  Benign non-nodular prostatic hyperplasia with lower urinary tract symptoms        Dose:  0.4 mg   Take 1 capsule (0.4 mg) by mouth daily   Quantity:  90 capsule   Refills:  3         CONTINUE these medicines which have NOT CHANGED        Dose / Directions    ASPIRIN        Dose:  81 mg   Take 81 mg by mouth every morning   Refills:  0       ATENOLOL        Dose:  100 mg   Take 100 mg by mouth every morning   Refills:  0       clobetasol 0.05 % external solution   Commonly known as:  TEMOVATE   Used for:  Sebopsoriasis        Apply topically 2 times daily To scaly and itchy areas on scalp until no rash nor itch. Hold until patient requests.   Quantity:  60 mL   Refills:  11       desonide 0.05 % ointment   Commonly known as:  DESOWEN   Used for:  Sebopsoriasis        Apply topically 2 times daily To rash at eyebrows and on face as needed until clear. Hold until patient requests.   Quantity:  60 g   Refills:  11       donepezil 10 MG tablet   Commonly known as:  ARICEPT        Dose:  10 mg   Take 10 mg by mouth every morning   Refills:  0       hydrochlorothiazide 12.5 MG capsule   Commonly known as:  MICROZIDE        TAKE ONE CAPSULE BY MOUTH EVERY MORNING.   Refills:  0        ketoconazole 2 % shampoo   Commonly known as:  NIZORAL   Used for:  Sebopsoriasis        To entire wet scalp and then wash off after 5 minutes three times a week. Hold until patient requests.   Quantity:  240 mL   Refills:  11       magnesium 250 MG tablet        Dose:  1 tablet   Take 1 tablet by mouth At Bedtime   Refills:  0       sildenafil 50 MG tablet   Commonly known as:  VIAGRA   Used for:  Erectile dysfunction due to arterial insufficiency        Dose:  50 mg   Take 1 tablet (50 mg) by mouth as needed for erectile dysfunction   Quantity:  30 tablet   Refills:  3       simvastatin 40 MG tablet   Commonly known as:  ZOCOR        TAKE 1 TABLET BY MOUTH NIGHTLY AT BEDTIME.   Refills:  0       vitamin E 1000 UNIT capsule   Commonly known as:  TOCOPHEROL        Dose:  1000 Units   Take 1,000 Units by mouth At Bedtime   Refills:  0            Where to get your medicines      These medications were sent to [address] [address]    Hours:  TRANSPLANT PHONE NUMBER [phone] Phone:  [phone]     ofloxacin 0.3 % ophthalmic solution    prednisoLONE acetate 1 % ophthalmic susp                Protect others around you: Learn how to safely use, store and throw away your medicines at www.disposemymeds.org.             Medication List: This is a list of all your medications and when to take them. Check marks below indicate your daily home schedule. Keep this list as a reference.      Medications           Morning Afternoon Evening Bedtime As Needed    ASPIRIN   Take 81 mg by mouth every morning                                ATENOLOL   Take 100 mg by mouth every morning                                clobetasol 0.05 % external solution   Commonly known as:  TEMOVATE   Apply topically 2 times daily To scaly and itchy areas on scalp until no rash nor itch. Hold until patient requests.                                 desonide 0.05 % ointment   Commonly known as:  DESOWEN   Apply topically 2 times daily To rash at eyebrows and on face as needed until clear. Hold until patient requests.                                donepezil 10 MG tablet   Commonly known as:  ARICEPT   Take 10 mg by mouth every morning                                hydrochlorothiazide 12.5 MG capsule   Commonly known as:  MICROZIDE   TAKE ONE CAPSULE BY MOUTH EVERY MORNING.                                ketoconazole 2 % shampoo   Commonly known as:  NIZORAL   To entire wet scalp and then wash off after 5 minutes three times a week. Hold until patient requests.                                magnesium 250 MG tablet   Take 1 tablet by mouth At Bedtime                                ofloxacin 0.3 % ophthalmic solution   Commonly known as:  OCUFLOX   Apply 1 drop to eye 3 times daily Instill into operative eye(s) per physician instructions.                                prednisoLONE acetate 1 % ophthalmic susp   Commonly known as:  PRED FORTE   Apply 1 drop to eye 4 times daily Instill into operative eye(s) per physician instructions.                                sildenafil 50 MG tablet   Commonly known as:  VIAGRA   Take 1 tablet (50 mg) by mouth as needed for erectile dysfunction                                simvastatin 40 MG tablet   Commonly known as:  ZOCOR   TAKE 1 TABLET BY MOUTH NIGHTLY AT BEDTIME.                                tamsulosin 0.4 MG capsule   Commonly known as:  FLOMAX   Take 1 capsule (0.4 mg) by mouth daily                                vitamin E 1000 UNIT capsule   Commonly known as:  TOCOPHEROL   Take 1,000 Units by mouth At Bedtime

## 2018-10-09 ENCOUNTER — OFFICE VISIT (OUTPATIENT)
Dept: OPHTHALMOLOGY | Facility: CLINIC | Age: 68
End: 2018-10-09
Attending: OPHTHALMOLOGY
Payer: MEDICARE

## 2018-10-09 DIAGNOSIS — Z96.1 PSEUDOPHAKIA: ICD-10-CM

## 2018-10-09 DIAGNOSIS — Z96.1 PSEUDOPHAKIA: Primary | ICD-10-CM

## 2018-10-09 PROCEDURE — G0463 HOSPITAL OUTPT CLINIC VISIT: HCPCS | Mod: ZF

## 2018-10-09 ASSESSMENT — VISUAL ACUITY
OS_PH_SC: 20/25
OS_SC: 20/60
METHOD: SNELLEN - LINEAR
OD_PH_SC: 20/40
OD_SC: 20/50

## 2018-10-09 ASSESSMENT — SLIT LAMP EXAM - LIDS
COMMENTS: NORMAL
COMMENTS: NORMAL

## 2018-10-09 ASSESSMENT — EXTERNAL EXAM - RIGHT EYE: OD_EXAM: NORMAL

## 2018-10-09 ASSESSMENT — TONOMETRY
IOP_METHOD: TONOPEN
OD_IOP_MMHG: 13
OS_IOP_MMHG: 13

## 2018-10-09 ASSESSMENT — EXTERNAL EXAM - LEFT EYE: OS_EXAM: NORMAL

## 2018-10-09 ASSESSMENT — CONF VISUAL FIELD
OS_NORMAL: 1
METHOD: COUNTING FINGERS
OD_NORMAL: 1

## 2018-10-09 NOTE — PROGRESS NOTES
Assessment & Plan      Catalion Coronado is a 68 year old male with the following diagnoses:   1. Pseudophakia - Left Eye    2. Pseudophakia         left eye, day 1    Significant floppy iris syndrome and difficulty with fixation during surgery  Plan for Block and Malyugin ring for right eye     Doing well  Keep patch in place at night for 5 days  Start post-operative drops and taper according to instructions  Post-operative do's and don'ts reviewed, questions answered    Right eye as scheduled             Attending Physician Attestation:  I have seen and examined this patient.  I have confirmed and edited as necessary the chief complaint(s), history of present illness, review of systems, relevant history, and examination findings as documented by others.  I have personally reviewed the relevant tests, images, and reports as documented above.  I have confirmed and edited as necessary the assessment and plan and agree with this note.  - Elio Manzo MD 11:58 AM 10/9/2018

## 2018-10-09 NOTE — NURSING NOTE
Chief Complaints and History of Present Illnesses   Patient presents with     Post Op (Ophthalmology) Left Eye     ceiol     HPI    Affected eye(s):  Left   Symptoms:        Frequency:  Constant       Do you have eye pain now?:  No      Comments:  va is good  Had pain last night and did not sleep well  No h/a   Kelle Puentes COT 10:36 AM October 9, 2018

## 2018-10-09 NOTE — MR AVS SNAPSHOT
After Visit Summary   10/9/2018    Catalino Coronado    MRN: [id number]           Patient Information     Date Of Birth          1950        Visit Information        Provider Department      10/9/2018 10:15 AM Elio Manzo MD [facility]         Follow-ups after your visit        Your next 10 appointments already scheduled     Oct 22, 2018   Procedure with Elio Manzo MD   [facility] ([facility])    [address] [place] [address]  5th Floor  [facility] [address]   [phone]           Located in the Clinics and Surgery Center at [address] [place] [address].   parking is very convenient and highly recommended.  is a $6 flat rate fee.  Both  and self parkers should enter the main arrival plaza from [facility]; parking attendants will direct you based on your parking preference.            Oct 22, 2018 11:45 AM CDT   (Arrive by 11:30 AM)   Post-Op with Elio Manzo MD   [facility] ([facility])    [address] [place] [address]  4th Floor  [facility] [address]   [phone]            Nov 06, 2018  9:30 AM CST   Post-Op with Elio Manzo MD   [facility] ([facility])    [address]  [facility] [address]   [phone]            Feb 11, 2019  1:45 PM CST   MRA BRAIN ([facility]) WWO CONTRAST with [address] [facility] ([facility])    [address] [place] [address]  1st Floor  [facility] [address]   [phone]           How do I prepare for my exam? (Food and drink instructions) **If you will be receiving sedation or general anesthesia, please see special notes below.**  How do I prepare for my exam? (Other instructions) Take your medicines as usual, unless your doctor tells you not to. You may or may not receive intravenous (IV) contrast for this exam pending  the discretion of the Radiologist.  You do not need to do anything special to prepare.  **If you will be receiving sedation or general anesthesia, please see special notes below.**  What should I wear: The MRI machine uses a strong magnet. Please wear clothes without metal (snaps, zippers). A sweatsuit works well, or we may give you a hospital gown. Please remove any body piercings and hair extensions before you arrive. You will also remove watches, jewelry, hairpins, wallets, dentures, partial dental plates and hearing aids. You may wear contact lenses, and you may be able to wear your rings. We have a safe place to keep your personal items, but it is safer to leave them at home.  How long does the exam take: Most tests take 30 to 60 minutes.  HOWEVER, IF YOUR DOCTOR PRESCRIBES ANESTHESIA please plan on spending four to five hours in the recovery room.  What should I bring:  Bring a list of your current medicines to your exam (including vitamins, minerals and over-the-counter drugs).  Do I need a :  **If you will be receiving sedation or general anesthesia, please see special notes below.**  What should I do after the exam: No Restrictions, You may resume normal activities.  What is this test: MRI (magnetic resonance imaging) uses a strong magnet and radio waves to look inside the body. An MRA (magnetic resonance angiogram) does the same thing, but it lets us look at your blood vessels. A computer turns the radio waves into pictures showing cross sections of the body, much like slices of bread. This helps us see any problems more clearly. You may receive fluid (called  contrast ) before or during your scan. The fluid helps us see the pictures better. We give the fluid through an IV (small needle in your arm).  Who should I call with questions:  Please call the Imaging Department at your exam site with any questions. Directions, parking instructions, and other information is available on our website,  [email or web address].  How do I prepare if I m having sedation or anesthesia? **IMPORTANT** THE INSTRUCTIONS BELOW ARE ONLY FOR THOSE PATIENTS WHO HAVE BEEN TOLD THEY WILL RECEIVE SEDATION OR GENERAL ANESTHESIA DURING THEIR MRI PROCEDURE:  IF YOU WILL RECEIVE SEDATION (take medicine to help you relax during your exam): You must get the medicine from your doctor before you arrive. Bring the medicine to the exam. Do not take it at home. Arrive one hour early. Bring someone who can take you home after the test. Your medicine will make you sleepy. After the exam, you may not drive, take a bus or take a taxi by yourself. No eating 8 hours before your exam. You may have clear liquids up until 4 hours before your exam. (Clear liquids include water, clear tea, black coffee and fruit juice without pulp.)  IF YOU WILL RECEIVE ANESTHESIA (be asleep for your exam): Arrive 1 1/2 hours early. Bring someone who can take you home after the test. You may not drive, take a bus or take a taxi by yourself. No eating 8 hours before your exam. You may have clear liquids up until 4 hours before your exam. (Clear liquids include water, clear tea, black coffee and fruit juice without pulp.)            Feb 11, 2019  3:00 PM CST   (Arrive by 2:45 PM)   Return Visit with Chevy Duncan MD   [facility] ([facility])    [address] [place] [address] [phone]              Who to contact     Please call your clinic at [phone] to:    Ask questions about your health    Make or cancel appointments    Discuss your medicines    Learn about your test results    Speak to your doctor            Additional Information About Your Visit        WebMDhart Information     [facility] is an electronic gateway that provides easy, online access to your medical records. With [facility], you can request a clinic appointment, read your test results, renew a prescription or communicate with your  care team.     To sign up for [employer] visit the website at [email or web address]   You will be asked to enter the access code listed below, as well as some personal information. Please follow the directions to create your username and password.     Your access code is: [email or web address]  Expires: 2019  6:30 AM     Your access code will  in 90 days. If you need help or a new code, please contact your [facility] or call [phone] for assistance.        Care EveryWhere ID     This is your Care EveryWhere ID. This could be used by other organizations to access your [facility] medical records  [id number]         Blood Pressure from Last 3 Encounters:   10/08/18 136/75   18 146/83   18 142/73    Weight from Last 3 Encounters:   10/08/18 101.2 kg (223 lb)   18 92.9 kg (204 lb 12.8 oz)   18 96.2 kg (212 lb)              Today, you had the following     No orders found for display         Today's Medication Changes          These changes are accurate as of 10/9/18 11:40 AM.  If you have any questions, ask your nurse or doctor.               These medicines have changed or have updated prescriptions.        Dose/Directions    tamsulosin 0.4 MG capsule   Commonly known as:  FLOMAX   This may have changed:  when to take this   Used for:  Benign non-nodular prostatic hyperplasia with lower urinary tract symptoms        Dose:  0.4 mg   Take 1 capsule (0.4 mg) by mouth daily   Quantity:  90 capsule   Refills:  3                Primary Care Provider Office Phone # Fax #    Jamari Sloan [phone]       [place] [address]        Equal Access to Services     ALIZA VICKERS : Hjaa Milton, vanita manning, phong ferroallukas carranza. So [facility] [phone].    ATENCIÓN: Si habla español, tiene a johns disposición servicios gratuitos de asistencia lingüística. Llame al  [phone].    We comply with applicable federal civil rights laws and [place] laws. We do not discriminate on the basis of race, color, national origin, age, disability, sex, sexual orientation, or gender identity.            Thank you!     Thank you for choosing [facility]  for your care. Our goal is always to provide you with excellent care. Hearing back from our patients is one way we can continue to improve our services. Please take a few minutes to complete the written survey that you may receive in the mail after your visit with us. Thank you!             Your Updated Medication List - Protect others around you: Learn how to safely use, store and throw away your medicines at www.disposemymeds.org.          This list is accurate as of 10/9/18 11:40 AM.  Always use your most recent med list.                   Brand Name Dispense Instructions for use Diagnosis    ASPIRIN      Take 81 mg by mouth every morning        ATENOLOL      Take 100 mg by mouth every morning        clobetasol 0.05 % external solution    TEMOVATE    60 mL    Apply topically 2 times daily To scaly and itchy areas on scalp until no rash nor itch. Hold until patient requests.    Sebopsoriasis       desonide 0.05 % ointment    DESOWEN    60 g    Apply topically 2 times daily To rash at eyebrows and on face as needed until clear. Hold until patient requests.    Sebopsoriasis       donepezil 10 MG tablet    ARICEPT     Take 10 mg by mouth every morning        hydrochlorothiazide 12.5 MG capsule    MICROZIDE     TAKE ONE CAPSULE BY MOUTH EVERY MORNING.        ketoconazole 2 % shampoo    NIZORAL    240 mL    To entire wet scalp and then wash off after 5 minutes three times a week. Hold until patient requests.    Sebopsoriasis       magnesium 250 MG tablet      Take 1 tablet by mouth At Bedtime        ofloxacin 0.3 % ophthalmic solution    OCUFLOX    5 mL    Apply 1 drop to eye 3 times daily Instill into operative eye(s) per physician  instructions.    Nuclear senile cataract of both eyes       prednisoLONE acetate 1 % ophthalmic susp    PRED FORTE    5 mL    Apply 1 drop to eye 4 times daily Instill into operative eye(s) per physician instructions.    Nuclear senile cataract of both eyes       sildenafil 50 MG tablet    VIAGRA    30 tablet    Take 1 tablet (50 mg) by mouth as needed for erectile dysfunction    Erectile dysfunction due to arterial insufficiency       simvastatin 40 MG tablet    ZOCOR     TAKE 1 TABLET BY MOUTH NIGHTLY AT BEDTIME.        tamsulosin 0.4 MG capsule    FLOMAX    90 capsule    Take 1 capsule (0.4 mg) by mouth daily    Benign non-nodular prostatic hyperplasia with lower urinary tract symptoms       vitamin E 1000 UNIT capsule    TOCOPHEROL     Take 1,000 Units by mouth At Bedtime

## 2018-10-17 ENCOUNTER — COMMUNICATION - HEALTHEAST (OUTPATIENT)
Dept: NEUROLOGY | Facility: CLINIC | Age: 68
End: 2018-10-17

## 2018-10-17 DIAGNOSIS — F32.A DEPRESSION: ICD-10-CM

## 2018-10-19 ENCOUNTER — COMMUNICATION - HEALTHEAST (OUTPATIENT)
Dept: NEUROLOGY | Facility: CLINIC | Age: 68
End: 2018-10-19

## 2018-10-19 ENCOUNTER — ANESTHESIA EVENT (OUTPATIENT)
Dept: SURGERY | Facility: AMBULATORY SURGERY CENTER | Age: 68
End: 2018-10-19

## 2018-10-22 ENCOUNTER — HOSPITAL ENCOUNTER (OUTPATIENT)
Facility: AMBULATORY SURGERY CENTER | Age: 68
End: 2018-10-22
Attending: OPHTHALMOLOGY
Payer: MEDICARE

## 2018-10-22 ENCOUNTER — ANESTHESIA (OUTPATIENT)
Dept: SURGERY | Facility: AMBULATORY SURGERY CENTER | Age: 68
End: 2018-10-22

## 2018-10-22 ENCOUNTER — SURGERY (OUTPATIENT)
Age: 68
End: 2018-10-22

## 2018-10-22 VITALS
SYSTOLIC BLOOD PRESSURE: 131 MMHG | DIASTOLIC BLOOD PRESSURE: 70 MMHG | WEIGHT: 223 LBS | TEMPERATURE: 97.5 F | BODY MASS INDEX: 27.73 KG/M2 | OXYGEN SATURATION: 95 % | HEIGHT: 75 IN | RESPIRATION RATE: 16 BRPM

## 2018-10-22 DIAGNOSIS — H21.81 FLOPPY IRIS SYNDROME: ICD-10-CM

## 2018-10-22 DIAGNOSIS — H26.9 NUCLEAR CATARACT OF RIGHT EYE, NONSENILE: Primary | ICD-10-CM

## 2018-10-22 DEVICE — EYE IMP IOL AMO PCL TECNIS ZCB00 23.5: Type: IMPLANTABLE DEVICE | Site: EYE | Status: FUNCTIONAL

## 2018-10-22 RX ORDER — BALANCED SALT SOLUTION 6.4; .75; .48; .3; 3.9; 1.7 MG/ML; MG/ML; MG/ML; MG/ML; MG/ML; MG/ML
SOLUTION OPHTHALMIC PRN
Status: DISCONTINUED | OUTPATIENT
Start: 2018-10-22 | End: 2018-10-22 | Stop reason: HOSPADM

## 2018-10-22 RX ORDER — CYCLOPENTOLATE HYDROCHLORIDE 10 MG/ML
1 SOLUTION/ DROPS OPHTHALMIC
Status: COMPLETED | OUTPATIENT
Start: 2018-10-22 | End: 2018-10-22

## 2018-10-22 RX ORDER — PROPARACAINE HYDROCHLORIDE 5 MG/ML
1 SOLUTION/ DROPS OPHTHALMIC ONCE
Status: COMPLETED | OUTPATIENT
Start: 2018-10-22 | End: 2018-10-22

## 2018-10-22 RX ORDER — ACETAMINOPHEN 325 MG/1
975 TABLET ORAL ONCE
Status: COMPLETED | OUTPATIENT
Start: 2018-10-22 | End: 2018-10-22

## 2018-10-22 RX ORDER — FENTANYL CITRATE 50 UG/ML
INJECTION, SOLUTION INTRAMUSCULAR; INTRAVENOUS PRN
Status: DISCONTINUED | OUTPATIENT
Start: 2018-10-22 | End: 2018-10-22

## 2018-10-22 RX ORDER — TETRACAINE HYDROCHLORIDE 5 MG/ML
SOLUTION OPHTHALMIC PRN
Status: DISCONTINUED | OUTPATIENT
Start: 2018-10-22 | End: 2018-10-22 | Stop reason: HOSPADM

## 2018-10-22 RX ORDER — SODIUM CHLORIDE, SODIUM LACTATE, POTASSIUM CHLORIDE, CALCIUM CHLORIDE 600; 310; 30; 20 MG/100ML; MG/100ML; MG/100ML; MG/100ML
INJECTION, SOLUTION INTRAVENOUS CONTINUOUS
Status: DISCONTINUED | OUTPATIENT
Start: 2018-10-22 | End: 2018-10-23 | Stop reason: HOSPADM

## 2018-10-22 RX ORDER — ONDANSETRON 2 MG/ML
4 INJECTION INTRAMUSCULAR; INTRAVENOUS EVERY 30 MIN PRN
Status: DISCONTINUED | OUTPATIENT
Start: 2018-10-22 | End: 2018-10-23 | Stop reason: HOSPADM

## 2018-10-22 RX ORDER — PROPOFOL 10 MG/ML
INJECTION, EMULSION INTRAVENOUS PRN
Status: DISCONTINUED | OUTPATIENT
Start: 2018-10-22 | End: 2018-10-22

## 2018-10-22 RX ORDER — PHENYLEPHRINE HYDROCHLORIDE 25 MG/ML
1 SOLUTION/ DROPS OPHTHALMIC
Status: COMPLETED | OUTPATIENT
Start: 2018-10-22 | End: 2018-10-22

## 2018-10-22 RX ORDER — TIMOLOL MALEATE 5 MG/ML
SOLUTION/ DROPS OPHTHALMIC PRN
Status: DISCONTINUED | OUTPATIENT
Start: 2018-10-22 | End: 2018-10-22 | Stop reason: HOSPADM

## 2018-10-22 RX ORDER — NALOXONE HYDROCHLORIDE 0.4 MG/ML
.1-.4 INJECTION, SOLUTION INTRAMUSCULAR; INTRAVENOUS; SUBCUTANEOUS
Status: DISCONTINUED | OUTPATIENT
Start: 2018-10-22 | End: 2018-10-23 | Stop reason: HOSPADM

## 2018-10-22 RX ORDER — LIDOCAINE 40 MG/G
CREAM TOPICAL
Status: DISCONTINUED | OUTPATIENT
Start: 2018-10-22 | End: 2018-10-23 | Stop reason: HOSPADM

## 2018-10-22 RX ORDER — TROPICAMIDE 10 MG/ML
1 SOLUTION/ DROPS OPHTHALMIC
Status: COMPLETED | OUTPATIENT
Start: 2018-10-22 | End: 2018-10-22

## 2018-10-22 RX ORDER — FENTANYL CITRATE 50 UG/ML
25-50 INJECTION, SOLUTION INTRAMUSCULAR; INTRAVENOUS
Status: DISCONTINUED | OUTPATIENT
Start: 2018-10-22 | End: 2018-10-23 | Stop reason: HOSPADM

## 2018-10-22 RX ORDER — PREDNISOLONE ACETATE 10 MG/ML
1 SUSPENSION/ DROPS OPHTHALMIC 4 TIMES DAILY
Qty: 1 BOTTLE | Refills: 1 | Status: SHIPPED | OUTPATIENT
Start: 2018-10-22 | End: 2018-11-06

## 2018-10-22 RX ORDER — MEPERIDINE HYDROCHLORIDE 25 MG/ML
12.5 INJECTION INTRAMUSCULAR; INTRAVENOUS; SUBCUTANEOUS
Status: DISCONTINUED | OUTPATIENT
Start: 2018-10-22 | End: 2018-10-23 | Stop reason: HOSPADM

## 2018-10-22 RX ORDER — ONDANSETRON 4 MG/1
4 TABLET, ORALLY DISINTEGRATING ORAL EVERY 30 MIN PRN
Status: DISCONTINUED | OUTPATIENT
Start: 2018-10-22 | End: 2018-10-23 | Stop reason: HOSPADM

## 2018-10-22 RX ADMIN — Medication 500 ML: at 08:33

## 2018-10-22 RX ADMIN — FENTANYL CITRATE 50 MCG: 50 INJECTION, SOLUTION INTRAMUSCULAR; INTRAVENOUS at 08:21

## 2018-10-22 RX ADMIN — ACETAMINOPHEN 975 MG: 325 TABLET ORAL at 07:17

## 2018-10-22 RX ADMIN — PROPARACAINE HYDROCHLORIDE 1 DROP: 5 SOLUTION/ DROPS OPHTHALMIC at 07:18

## 2018-10-22 RX ADMIN — TROPICAMIDE 1 DROP: 10 SOLUTION/ DROPS OPHTHALMIC at 07:28

## 2018-10-22 RX ADMIN — TETRACAINE HYDROCHLORIDE 2 DROP: 5 SOLUTION OPHTHALMIC at 08:34

## 2018-10-22 RX ADMIN — CYCLOPENTOLATE HYDROCHLORIDE 1 DROP: 10 SOLUTION/ DROPS OPHTHALMIC at 07:23

## 2018-10-22 RX ADMIN — PHENYLEPHRINE HYDROCHLORIDE 1 DROP: 25 SOLUTION/ DROPS OPHTHALMIC at 07:35

## 2018-10-22 RX ADMIN — PHENYLEPHRINE HYDROCHLORIDE 1 DROP: 25 SOLUTION/ DROPS OPHTHALMIC at 07:28

## 2018-10-22 RX ADMIN — TROPICAMIDE 1 DROP: 10 SOLUTION/ DROPS OPHTHALMIC at 07:35

## 2018-10-22 RX ADMIN — FENTANYL CITRATE 50 MCG: 50 INJECTION, SOLUTION INTRAMUSCULAR; INTRAVENOUS at 08:17

## 2018-10-22 RX ADMIN — SODIUM CHLORIDE, SODIUM LACTATE, POTASSIUM CHLORIDE, CALCIUM CHLORIDE: 600; 310; 30; 20 INJECTION, SOLUTION INTRAVENOUS at 07:20

## 2018-10-22 RX ADMIN — PHENYLEPHRINE HYDROCHLORIDE 1 DROP: 25 SOLUTION/ DROPS OPHTHALMIC at 07:23

## 2018-10-22 RX ADMIN — PROPOFOL 15 MG: 10 INJECTION, EMULSION INTRAVENOUS at 08:20

## 2018-10-22 RX ADMIN — TROPICAMIDE 1 DROP: 10 SOLUTION/ DROPS OPHTHALMIC at 07:23

## 2018-10-22 RX ADMIN — CYCLOPENTOLATE HYDROCHLORIDE 1 DROP: 10 SOLUTION/ DROPS OPHTHALMIC at 07:28

## 2018-10-22 RX ADMIN — CYCLOPENTOLATE HYDROCHLORIDE 1 DROP: 10 SOLUTION/ DROPS OPHTHALMIC at 07:35

## 2018-10-22 RX ADMIN — BALANCED SALT SOLUTION 15 ML: 6.4; .75; .48; .3; 3.9; 1.7 SOLUTION OPHTHALMIC at 08:33

## 2018-10-22 RX ADMIN — PROPOFOL 35 MG: 10 INJECTION, EMULSION INTRAVENOUS at 08:23

## 2018-10-22 RX ADMIN — Medication 1 DROP: at 08:34

## 2018-10-22 RX ADMIN — TIMOLOL MALEATE 1 DROP: 5 SOLUTION/ DROPS OPHTHALMIC at 08:35

## 2018-10-22 ASSESSMENT — LIFESTYLE VARIABLES: TOBACCO_USE: 1

## 2018-10-22 NOTE — IP AVS SNAPSHOT
[facility]    [address] [place] [address]    Phone:  [phone]    Fax:  [phone]                                       After Visit Summary   10/22/2018    Catalino Coronado    MRN: [id number]           After Visit Summary Signature Page     I have received my discharge instructions, and my questions have been answered. I have discussed any challenges I see with this plan with the nurse or doctor.    ..........................................................................................................................................  Patient/Patient Representative Signature      ..........................................................................................................................................  Patient Representative Print Name and Relationship to Patient    ..................................................               ................................................  Date                                   Time    ..........................................................................................................................................  Reviewed by Signature/Title    ...................................................              ..............................................  Date                                               Time          22EPIC Rev 08/18

## 2018-10-22 NOTE — OP NOTE
PREOPERATIVE DIAGNOSIS: Visually significant cataract, Right eye   POSTOPERATIVE DIAGNOSIS: Same   PROCEDURES:   1. Cataract extraction with intraocular lens implant Right eye.  SURGEON: Elio Manzo M.D.  Assistant: Kevin Zhou MD    INDICATIONS: The patient Catalino Coronado presented to the eye clinic with decreased vision secondary to cataract in the Right eye. The risks, benefits and alternatives to cataract extraction were discussed. The patient elected to proceed. All questions were answered to the patient's satisfaction.   DESCRIPTION OF PROCEDURE:Prior to the procedure, appropriate cardiac and respiratory monitors were applied to the patient.  In the pre-operative holding area, under monitored anesthesia care, a retrobulbar injection of 2% lidocaine with hyaluronidase was given.  The patient was brought to the operating room where a surgical pause was carried out to identify with all members of the surgical team the correct surgical site.  With adequate anesthesia and akinesia, the Right eye was prepped and draped in the usual sterile fashion. A lid speculum was placed, and the operating microscope was rotated into position. A paracentesis was created.  Through this limbal paracentesis, the anterior chamber was inflated with dispersive viscoelastic. A temporal wound was created at the limbus using a 2.5 mm blade. A capsulorrhexis was initiated using a bent 25-gauge needle and was completed in continuous and circular fashion using the capsulorrhexis forceps. The lens nucleus was hydrodissected using balanced salt solution.  The lens nucleus was rotated and removed using phacoemulsification in a stop and chop technique.  Residual cortical material was removed using irrigation-aspiration.  The capsular bag was reinflated to its maximal extent with cohesive viscoelastic.  A 23.5 diopter ZCBOO inserted into the capsular bag.  The lens power selected was reviewed using the intraocular lens power measurements  that were obtained preoperatively to confirm that the correct lens was selected for the desired post-operative refractive state. The residual viscoelastic was removed in its entirety, the wound were hydrated and found to be self-sealing.  Tactile pressure was confirmed to be in a normal range.  The lid speculum was removed, Maxitrol ointment followed by a patch and shield were applied.  The patient tolerated the procedure well, and there were no complications.   PLAN: The patient will be discharged to home and will follow up tomorrow morning in the eye clinic.  EBL:  None  Complications:  None    Implant Name Type Inv. Item Serial No.  Lot No. LRB No. Used   EYE IMP IOL PAM PCL TECNIS ZCB00 23.5 Lens/Eye Implant EYE IMP IOL PAM PCL TECNIS ZCB00 23.5 [id number] [employer]   Right 1       Attending Physician Procedure Attestation: I was present for the entire procedure       Elio Manzo MD  , Comprehensive Ophthalmology  Department of Ophthalmology and Visual Neurosciences  [facility]

## 2018-10-22 NOTE — IP AVS SNAPSHOT
MRN:[id number]                      After Visit Summary   10/22/2018    Catalino Coronado    MRN: [id number]           Thank you!     Thank you for choosing [facility] for your care. Our goal is always to provide you with excellent care. Hearing back from our patients is one way we can continue to improve our services. Please take a few minutes to complete the written survey that you may receive in the mail after you visit with us. Thank you!        Patient Information     Date Of Birth          1950        About your hospital stay     You were admitted on:  October 22, 2018 You last received care in the:  [facility]    You were discharged on:  October 22, 2018       Who to Call     For medical emergencies, please call 911.  For non-urgent questions about your medical care, please call your primary care provider or clinic, [phone]  For questions related to your surgery, please call your surgery clinic        Attending Provider     Provider Specialty    Elio Manzo MD Ophthalmology       Primary Care Provider Office Phone # Fax #    Jamari Sloan [phone]      Your next 10 appointments already scheduled     Oct 23, 2018  3:30 PM CDT   Post-Op with Elio Manzo MD   Eye Clinic ([facility])    [address] [facility] [address] [place] [id number]   [phone]            Nov 06, 2018  9:30 AM CST   Post-Op with Elio Manzo MD   Eye Clinic ([facility])    [address] [address]   [phone]            Feb 11, 2019  1:45 PM CST   MRA BRAIN ([place]) WWO CONTRAST with [address] [facility] MRI ([facility])    [address]  1st Floor  [facility] [id number] [phone]           How do I prepare for my exam? (Food and drink instructions) **If you will  be receiving sedation or general anesthesia, please see special notes below.**  How do I prepare for my exam? (Other instructions) Take your medicines as usual, unless your doctor tells you not to. You may or may not receive intravenous (IV) contrast for this exam pending the discretion of the Radiologist.  You do not need to do anything special to prepare.  **If you will be receiving sedation or general anesthesia, please see special notes below.**  What should I wear: The MRI machine uses a strong magnet. Please wear clothes without metal (snaps, zippers). A sweatsuit works well, or we may give you a hospital gown. Please remove any body piercings and hair extensions before you arrive. You will also remove watches, jewelry, hairpins, wallets, dentures, partial dental plates and hearing aids. You may wear contact lenses, and you may be able to wear your rings. We have a safe place to keep your personal items, but it is safer to leave them at home.  How long does the exam take: Most tests take 30 to 60 minutes.  HOWEVER, IF YOUR DOCTOR PRESCRIBES ANESTHESIA please plan on spending four to five hours in the recovery room.  What should I bring:  Bring a list of your current medicines to your exam (including vitamins, minerals and over-the-counter drugs).  Do I need a :  **If you will be receiving sedation or general anesthesia, please see special notes below.**  What should I do after the exam: No Restrictions, You may resume normal activities.  What is this test: MRI (magnetic resonance imaging) uses a strong magnet and radio waves to look inside the body. An MRA (magnetic resonance angiogram) does the same thing, but it lets us look at your blood vessels. A computer turns the radio waves into pictures showing cross sections of the body, much like slices of bread. This helps us see any problems more clearly. You may receive fluid (called  contrast ) before or during your scan. The fluid helps us see the  pictures better. We give the fluid through an IV (small needle in your arm).  Who should I call with questions:  Please call the Imaging Department at your exam site with any questions. Directions, parking instructions, and other information is available on our website, [email or web address].  How do I prepare if I m having sedation or anesthesia? **IMPORTANT** THE INSTRUCTIONS BELOW ARE ONLY FOR THOSE PATIENTS WHO HAVE BEEN TOLD THEY WILL RECEIVE SEDATION OR GENERAL ANESTHESIA DURING THEIR MRI PROCEDURE:  IF YOU WILL RECEIVE SEDATION (take medicine to help you relax during your exam): You must get the medicine from your doctor before you arrive. Bring the medicine to the exam. Do not take it at home. Arrive one hour early. Bring someone who can take you home after the test. Your medicine will make you sleepy. After the exam, you may not drive, take a bus or take a taxi by yourself. No eating 8 hours before your exam. You may have clear liquids up until 4 hours before your exam. (Clear liquids include water, clear tea, black coffee and fruit juice without pulp.)  IF YOU WILL RECEIVE ANESTHESIA (be asleep for your exam): Arrive 1 1/2 hours early. Bring someone who can take you home after the test. You may not drive, take a bus or take a taxi by yourself. No eating 8 hours before your exam. You may have clear liquids up until 4 hours before your exam. (Clear liquids include water, clear tea, black coffee and fruit juice without pulp.)            Feb 11, 2019  3:00 PM CST   (Arrive by 2:45 PM)   Return Visit with Chevy Duncan MD   [facility] ([facility])    [address] [place] [id number] [phone]              Further instructions from your care team       [facility]  Home Care Following Anesthesia  For 24 hours after surgery:  1. Get plenty of rest.  A responsible adult must stay with you for at least 24  hours after you leave the surgery center.  2. Do not drive or use heavy equipment.  If you have weakness or tingling, don't drive or use heavy equipment until this feeling goes away.   3. Do not drink alcohol.   4. Avoid strenuous or risky activities.  Ask for help when climbing stairs.  5. You may feel lightheaded.  IF so, sit for a few minutes before standing.  Have someone help you get up.   6. If you have nausea (feel sick to your stomach): Drink only clear liquids such as apple juice, ginger ale, broth or 7-Up.  Rest may also help.  Be sure to drink enough fluids.  Move to a regular diet as you feel able.   7. You may have a slight fever.  Call the doctor if your fever is over 100 F (37.7 C) (taken under the tongue) or lasts longer than 24 hours.  8. You may have a dry mouth, a sore throat, muscle aches or trouble sleeping. These should go away after 24 hours.  9. Do not make important or legal decisions.               Tips for taking pain medications  To get the best pain relief possible, remember these points:    Take pain medications as directed, before pain becomes severe.    Pain medication can upset your stomach: taking it with food may help.    Constipation is a common side effect of pain medication. Drink plenty of  fluids.    Eat foods high in fiber. Take a stool softener if recommended by your doctor or pharmacist.    Do not drink alcohol, drive or operate machinery while taking pain medications.    Ask about other ways to control pain, such as with heat, ice or relaxation.    Tylenol/Acetaminophen Consumption  To help encourage the safe use of acetaminophen, the makers of TYLENOL  have lowered the maximum daily dose for single-ingredient Extra Strength TYLENOL  (acetaminophen) products sold in the [place] from 8 pills per day (4,000 mg) to 6 pills per day (3,000 mg). The dosing interval has also changed from 2 pills every 4-6 hours to 2 pills every 6 hours.    If you feel your pain relief is  insufficient, you may take Tylenol/Acetaminophen in addition to your narcotic pain medication.     Be careful not to exceed 3,000 mg of Tylenol/Acetaminophen in a 24 hour period from all sources.    If you are taking extra strength Tylenol/acetaminophen (500 mg), the maximum dose is 6 tablets in 24 hours.    If you are taking regular strength acetaminophen (325 mg), the maximum dose is 9 tablets in 24 hours.    Call a doctor for any of the followin. Signs of infection (fever, growing tenderness at the surgery site, a large amount of drainage or bleeding, severe pain, foul-smelling drainage, redness, swelling).  2. It has been over 8 to 10 hours since surgery and you are still not able to urinate (pass water).  3. Headache for over 24 hours.  4. Numbness, tingling or weakness the day after surgery (if you had spinal anesthesia).  Your doctor is:       Dr. Elio Manzo, Ophthalmology: [phone]               Or dial [phone] and ask for the resident on call for:  Ophthalmology  For emergency care, call the:  [facility]:  [phone] (TTY for hearing impaired: [phone])  [facility]     Home Care Following Cataract Surgery    If you have a gauze eye patch on, please do not remove it until it is removed by your doctor at your first appointment after your surgery.  You will start your eye drops the next day.    OR    If you only have a clear eye shield on, you may remove the eye shield when you get home and begin eye drops today as directed by your doctor.      Wear the clear eye shield for protection when sleeping for the next 5 days.      Do not rub the eye that had the operation.      Your eye might be sensitive to light.  Wearing sunglasses may be more comfortable for you.      You may have some discomfort and irritation.  Acetaminophen (Tylenol) or Ibuprofen (Advil) may be taken for discomfort. If pain persists please call your doctor s  "office.      Keep the eye that had the surgery dry. You may wash your hair, bathe or shower, but keep your eye closed while doing so.       Avoid bending over, strenuous activity or heavy lifting until this activity has been approved by your doctor.      You have a follow-up appointment with your doctor tomorrow at the [facility] ([phone]).  Bring all your prescribed eye drops with you to this follow-up appointment.        If you take glaucoma medications, bring them with you to your follow-up appointment tomorrow.      Use medication exactly as prescribed by your doctor. You may restart your regular home medications.       Call your doctor s office at [phone] if any of the following should occur:    - Any sudden vision changes, including decreased vision  - Nausea or severe headache  - Increase in pain that is not controlled with Acetaminophen (Tylenol) or Ibuprofen (Advil)  - Signs of infection (pus, increasing redness or tenderness)  - Severe sensitivity to light  - An increase in floaters (black spots in front of your vision)                Pending Results     No orders found from 10/20/2018 to 10/23/2018.            Admission Information     Date & Time Provider Department Dept. Phone    10/22/2018 Elio Manzo MD [facility] [phone]      Your Vitals Were     Blood Pressure Temperature Respirations Height Weight Pulse Oximetry    150/98 98.1  F (36.7  C) (Oral) 16 1.905 m (6' 3\") 101.2 kg (223 lb) 98%    BMI (Body Mass Index)                   27.87 kg/m2           [employer] Information     [employer] is an electronic gateway that provides easy, online access to your medical records. With [employer], you can request a clinic appointment, read your test results, renew a prescription or communicate with your care team.     To sign up for [employer] visit the website at [email or web address]   You will be asked to enter the access code listed " below, as well as some personal information. Please follow the directions to create your username and password.     Your access code is: [email or web address]  Expires: 2019  6:30 AM     Your access code will  in 90 days. If you need help or a new code, please contact your [facility] or call [phone] for assistance.        Care EveryWhere ID     This is your Care EveryWhere ID. This could be used by other organizations to access your [facility] medical records  [id number]        Equal Access to Services     [facility] Hospital[facility]AILYN : Hadii ralph gonzalez hadasho [place], waaxda luqadaha, qaybta kaalmada adeirenayaheather, lukas chin . So [place] -[phone].    ATENCIÓN: Si habla español, tiene a johns disposición servicios gratuitos de asistencia lingüística. Llame al [phone].    We comply with applicable federal civil rights laws and [place] laws. We do not discriminate on the basis of race, color, national origin, age, disability, sex, sexual orientation, or gender identity.               Review of your medicines      UNREVIEWED medicines. Ask your doctor about these medicines        Dose / Directions    ASPIRIN        Dose:  81 mg   Take 81 mg by mouth every morning   Refills:  0       ATENOLOL        Dose:  100 mg   Take 100 mg by mouth every morning   Refills:  0       clobetasol 0.05 % external solution   Commonly known as:  TEMOVATE   Used for:  Sebopsoriasis        Apply topically 2 times daily To scaly and itchy areas on scalp until no rash nor itch. Hold until patient requests.   Quantity:  60 mL   Refills:  11       desonide 0.05 % ointment   Commonly known as:  DESOWEN   Used for:  Sebopsoriasis        Apply topically 2 times daily To rash at eyebrows and on face as needed until clear. Hold until patient requests.   Quantity:  60 g   Refills:  11       donepezil 10 MG tablet   Commonly known as:  ARICEPT        Dose:  10 mg   Take 10 mg by mouth every morning    Refills:  0       hydrochlorothiazide 12.5 MG capsule   Commonly known as:  MICROZIDE        TAKE ONE CAPSULE BY MOUTH EVERY MORNING.   Refills:  0       ketoconazole 2 % shampoo   Commonly known as:  NIZORAL   Used for:  Sebopsoriasis        To entire wet scalp and then wash off after 5 minutes three times a week. Hold until patient requests.   Quantity:  240 mL   Refills:  11       magnesium 250 MG tablet        Dose:  1 tablet   Take 1 tablet by mouth At Bedtime   Refills:  0       ofloxacin 0.3 % ophthalmic solution   Commonly known as:  OCUFLOX   Used for:  Nuclear senile cataract of both eyes        Dose:  1 drop   Apply 1 drop to eye 3 times daily Instill into operative eye(s) per physician instructions.   Quantity:  5 mL   Refills:  0       prednisoLONE acetate 1 % ophthalmic susp   Commonly known as:  PRED FORTE   Used for:  Nuclear senile cataract of both eyes        Dose:  1 drop   Apply 1 drop to eye 4 times daily Instill into operative eye(s) per physician instructions.   Quantity:  5 mL   Refills:  0       sildenafil 50 MG tablet   Commonly known as:  VIAGRA   Used for:  Erectile dysfunction due to arterial insufficiency        Dose:  50 mg   Take 1 tablet (50 mg) by mouth as needed for erectile dysfunction   Quantity:  30 tablet   Refills:  3       simvastatin 40 MG tablet   Commonly known as:  ZOCOR        TAKE 1 TABLET BY MOUTH NIGHTLY AT BEDTIME.   Refills:  0       tamsulosin 0.4 MG capsule   Commonly known as:  FLOMAX   Used for:  Benign non-nodular prostatic hyperplasia with lower urinary tract symptoms        Dose:  0.4 mg   Take 1 capsule (0.4 mg) by mouth daily   Quantity:  90 capsule   Refills:  3       vitamin E 1000 UNIT capsule   Commonly known as:  TOCOPHEROL        Dose:  1000 Units   Take 1,000 Units by mouth At Bedtime   Refills:  0                Protect others around you: Learn how to safely use, store and throw away your medicines at www.disposemymeds.org.             Medication  List: This is a list of all your medications and when to take them. Check marks below indicate your daily home schedule. Keep this list as a reference.      Medications           Morning Afternoon Evening Bedtime As Needed    ASPIRIN   Take 81 mg by mouth every morning                                ATENOLOL   Take 100 mg by mouth every morning                                clobetasol 0.05 % external solution   Commonly known as:  TEMOVATE   Apply topically 2 times daily To scaly and itchy areas on scalp until no rash nor itch. Hold until patient requests.                                desonide 0.05 % ointment   Commonly known as:  DESOWEN   Apply topically 2 times daily To rash at eyebrows and on face as needed until clear. Hold until patient requests.                                donepezil 10 MG tablet   Commonly known as:  ARICEPT   Take 10 mg by mouth every morning                                hydrochlorothiazide 12.5 MG capsule   Commonly known as:  MICROZIDE   TAKE ONE CAPSULE BY MOUTH EVERY MORNING.                                ketoconazole 2 % shampoo   Commonly known as:  NIZORAL   To entire wet scalp and then wash off after 5 minutes three times a week. Hold until patient requests.                                magnesium 250 MG tablet   Take 1 tablet by mouth At Bedtime                                ofloxacin 0.3 % ophthalmic solution   Commonly known as:  OCUFLOX   Apply 1 drop to eye 3 times daily Instill into operative eye(s) per physician instructions.                                prednisoLONE acetate 1 % ophthalmic susp   Commonly known as:  PRED FORTE   Apply 1 drop to eye 4 times daily Instill into operative eye(s) per physician instructions.                                sildenafil 50 MG tablet   Commonly known as:  VIAGRA   Take 1 tablet (50 mg) by mouth as needed for erectile dysfunction                                simvastatin 40 MG tablet   Commonly known as:  ZOCOR   TAKE 1  TABLET BY MOUTH NIGHTLY AT BEDTIME.                                tamsulosin 0.4 MG capsule   Commonly known as:  FLOMAX   Take 1 capsule (0.4 mg) by mouth daily                                vitamin E 1000 UNIT capsule   Commonly known as:  TOCOPHEROL   Take 1,000 Units by mouth At Bedtime

## 2018-10-22 NOTE — ANESTHESIA CARE TRANSFER NOTE
Patient: Catalino Coronado    Procedure(s):  Right Eye Phacoemulsification with Standard Intraocular Lens Placement    Diagnosis: Right Eye Cataract   Diagnosis Additional Information: No value filed.    Anesthesia Type:   MAC     Note:  Airway :Room Air  Patient transferred to:Phase II  Handoff Report: Identifed the Patient, Identified the Reponsible Provider, Reviewed the pertinent medical history, Discussed the surgical course, Reviewed Intra-OP anesthesia mangement and issues during anesthesia, Set expectations for post-procedure period and Allowed opportunity for questions and acknowledgement of understanding      Vitals: (Last set prior to Anesthesia Care Transfer)    CRNA VITALS  10/22/2018 0816 - 10/22/2018 0901      10/22/2018             Resp Rate (observed): (!)  1    Resp Rate (set): 10                Electronically Signed By: FADIA Walsh CRNA  October 22, 2018  9:01 AM

## 2018-10-22 NOTE — ANESTHESIA PREPROCEDURE EVALUATION
Anesthesia Evaluation     . Pt has had prior anesthetic. Type: General and MAC    No history of anesthetic complications          ROS/MED HX    ENT/Pulmonary:     (+)tobacco use, Past use , . .    Neurologic:     (+)dementia, other neuro AOCM aneurysm    Cardiovascular:     (+) Dyslipidemia, hypertension----. Taking blood thinners Pt has received instructions: Instructions Given to patient: Will remain on. . . :. . Previous cardiac testing date:results:date: results:ECG reviewed date:2013 results:SR, incomplete RBBB date: results:          METS/Exercise Tolerance:  4 - Raking leaves, gardening   Hematologic:  - neg hematologic  ROS       Musculoskeletal:  - neg musculoskeletal ROS       GI/Hepatic:  - neg GI/hepatic ROS       Renal/Genitourinary:     (+) BPH,       Endo:  - neg endo ROS       Psychiatric:  - neg psychiatric ROS       Infectious Disease:  - neg infectious disease ROS       Malignancy:      - no malignancy   Other:    (+) No chance of pregnancy C-spine cleared: N/A, no H/O Chronic Pain,no other significant disability                  Procedure: Procedure(s):  Right Eye Phacoemulsification with Standard Intraocular Lens Placement    HPI: Catalino Coronado is a 68 year old male with PMH as noted above presented for stated procedure.    PMHx/PSHx:  Past Medical History:   Diagnosis Date     BPH (benign prostatic hyperplasia)      Cataracts, bilateral      Cerebral aneurysm, nonruptured      Dementia      Hypercholesterolemia      Hypertension        Past Surgical History:   Procedure Laterality Date     APPENDECTOMY       BIOPSY OF SKIN LESION       EXCISE MASS BACK  2/8/2013    Procedure: EXCISE MASS BACK;  Excision of Sebaceous Back Cyst ;  Surgeon: Sean Randhawa MD;  Location: [facility] OR     PHACOEMULSIFICATION WITH STANDARD INTRAOCULAR LENS IMPLANT Left 10/8/2018    Procedure: PHACOEMULSIFICATION WITH STANDARD INTRAOCULAR LENS IMPLANT;  Left Eye Phacoemulsification with Intraocular Lens;  Surgeon:  Elio Manzo MD;  Location: [facility] OR     pilonidal cyst removal            Current Outpatient Prescriptions on File Prior to Encounter:  ASPIRIN Take 81 mg by mouth every morning    ATENOLOL Take 100 mg by mouth every morning    donepezil (ARICEPT) 10 MG tablet Take 10 mg by mouth every morning    hydrochlorothiazide (MICROZIDE) 12.5 MG capsule TAKE ONE CAPSULE BY MOUTH EVERY MORNING.   simvastatin (ZOCOR) 40 MG tablet TAKE 1 TABLET BY MOUTH NIGHTLY AT BEDTIME.   tamsulosin (FLOMAX) 0.4 MG 24 hr capsule Take 1 capsule (0.4 mg) by mouth daily (Patient taking differently: Take 0.4 mg by mouth every morning )   clobetasol (TEMOVATE) 0.05 % external solution Apply topically 2 times daily To scaly and itchy areas on scalp until no rash nor itch. Hold until patient requests.   desonide (DESOWEN) 0.05 % ointment Apply topically 2 times daily To rash at eyebrows and on face as needed until clear. Hold until patient requests.   ketoconazole (NIZORAL) 2 % shampoo To entire wet scalp and then wash off after 5 minutes three times a week. Hold until patient requests.   magnesium 250 MG tablet Take 1 tablet by mouth At Bedtime    sildenafil (VIAGRA) 50 MG tablet Take 1 tablet (50 mg) by mouth as needed for erectile dysfunction   vitamin E (TOCOPHEROL) 1000 UNIT capsule Take 1,000 Units by mouth At Bedtime      No current facility-administered medications on file prior to encounter.     Social Hx:   Social History   Substance Use Topics     Smoking status: Former Smoker     Types: Cigarettes     Quit date: 1/1/2011     Smokeless tobacco: Never Used     Alcohol use Yes      Comment: daily mixed        Allergies: No Known Allergies      NPO Status: Per ASA Guidelines    Labs:    Blood Bank:  No results found for: ABO, RH, AS  BMP:  Recent Labs   Lab Test  07/06/18 0945   CR  1.03     CBC:   Recent Labs   Lab Test  07/06/18 0945   WBC  7.6   RBC  4.64   HGB  15.1   HCT  42.9   MCV  93   MCH  32.5   MCHC  35.2   RDW  12.8    PLT  246     Coags:  Recent Labs   Lab Test  07/06/18   0945   INR  1.08   PTT  30     Physical Exam  Normal systems: pulmonary and dental    Airway   Mallampati: II  TM distance: >3 FB  Neck ROM: limited    Dental     Cardiovascular   Rhythm and rate: regular and normal      Pulmonary                     Anesthesia Plan      History & Physical Review  History and physical reviewed and following examination; no interval change.    ASA Status:  3 .        Plan for MAC with Intravenous induction. Maintenance will be TIVA.  Reason for MAC:  Procedure to face, neck, head or breast  PONV prophylaxis:  Dexamethasone or Solumedrol and Ondansetron (or other 5HT-3)    - See previous notation from 10/8 PAC visit/procedure. Pt notes no concerns with last visit.   - Hold benzodiazepines due to PMH for dementia     - Standard ASA monitors  - Abx per procedural teams    Final anesthetic plan per staff anesthesiology      Postoperative Care  Postoperative pain management:  Oral pain medications and Multi-modal analgesia.      Consents  Anesthetic plan, risks, benefits and alternatives discussed with:  Patient..          Derek Rivera MD  Anesthesiology Resident CA3, PGY4                  .

## 2018-10-22 NOTE — DISCHARGE INSTRUCTIONS
[facility]  Home Care Following Anesthesia  For 24 hours after surgery:  1. Get plenty of rest.  A responsible adult must stay with you for at least 24 hours after you leave the surgery center.  2. Do not drive or use heavy equipment.  If you have weakness or tingling, don't drive or use heavy equipment until this feeling goes away.   3. Do not drink alcohol.   4. Avoid strenuous or risky activities.  Ask for help when climbing stairs.  5. You may feel lightheaded.  IF so, sit for a few minutes before standing.  Have someone help you get up.   6. If you have nausea (feel sick to your stomach): Drink only clear liquids such as apple juice, ginger ale, broth or 7-Up.  Rest may also help.  Be sure to drink enough fluids.  Move to a regular diet as you feel able.   7. You may have a slight fever.  Call the doctor if your fever is over 100 F (37.7 C) (taken under the tongue) or lasts longer than 24 hours.  8. You may have a dry mouth, a sore throat, muscle aches or trouble sleeping. These should go away after 24 hours.  9. Do not make important or legal decisions.               Tips for taking pain medications  To get the best pain relief possible, remember these points:    Take pain medications as directed, before pain becomes severe.    Pain medication can upset your stomach: taking it with food may help.    Constipation is a common side effect of pain medication. Drink plenty of  fluids.    Eat foods high in fiber. Take a stool softener if recommended by your doctor or pharmacist.    Do not drink alcohol, drive or operate machinery while taking pain medications.    Ask about other ways to control pain, such as with heat, ice or relaxation.    Tylenol/Acetaminophen Consumption  To help encourage the safe use of acetaminophen, the makers of TYLENOL  have lowered the maximum daily dose for single-ingredient Extra Strength TYLENOL  (acetaminophen) products sold in the [place] from 8  pills per day (4,000 mg) to 6 pills per day (3,000 mg). The dosing interval has also changed from 2 pills every 4-6 hours to 2 pills every 6 hours.    If you feel your pain relief is insufficient, you may take Tylenol/Acetaminophen in addition to your narcotic pain medication.     Be careful not to exceed 3,000 mg of Tylenol/Acetaminophen in a 24 hour period from all sources.    If you are taking extra strength Tylenol/acetaminophen (500 mg), the maximum dose is 6 tablets in 24 hours.    If you are taking regular strength acetaminophen (325 mg), the maximum dose is 9 tablets in 24 hours.    Call a doctor for any of the followin. Signs of infection (fever, growing tenderness at the surgery site, a large amount of drainage or bleeding, severe pain, foul-smelling drainage, redness, swelling).  2. It has been over 8 to 10 hours since surgery and you are still not able to urinate (pass water).  3. Headache for over 24 hours.  4. Numbness, tingling or weakness the day after surgery (if you had spinal anesthesia).  Your doctor is:       Dr. Elio Manzo, Ophthalmology: [phone]               Or dial [phone] and ask for the resident on call for:  Ophthalmology  For emergency care, call the:  [facility]:  [phone] (TTY for hearing impaired: [phone])  [facility]     Home Care Following Cataract Surgery    If you have a gauze eye patch on, please do not remove it until it is removed by your doctor at your first appointment after your surgery.  You will start your eye drops the next day.    OR    If you only have a clear eye shield on, you may remove the eye shield when you get home and begin eye drops today as directed by your doctor.      Wear the clear eye shield for protection when sleeping for the next 5 days.      Do not rub the eye that had the operation.      Your eye might be sensitive to light.  Wearing sunglasses may be more comfortable  for you.      You may have some discomfort and irritation.  Acetaminophen (Tylenol) or Ibuprofen (Advil) may be taken for discomfort. If pain persists please call your doctor s office.      Keep the eye that had the surgery dry. You may wash your hair, bathe or shower, but keep your eye closed while doing so.       Avoid bending over, strenuous activity or heavy lifting until this activity has been approved by your doctor.      You have a follow-up appointment with your doctor tomorrow at the [facility] ([phone]).  Bring all your prescribed eye drops with you to this follow-up appointment.        If you take glaucoma medications, bring them with you to your follow-up appointment tomorrow.      Use medication exactly as prescribed by your doctor. You may restart your regular home medications.       Call your doctor s office at [phone] if any of the following should occur:    - Any sudden vision changes, including decreased vision  - Nausea or severe headache  - Increase in pain that is not controlled with Acetaminophen (Tylenol) or Ibuprofen (Advil)  - Signs of infection (pus, increasing redness or tenderness)  - Severe sensitivity to light  - An increase in floaters (black spots in front of your vision)

## 2018-10-22 NOTE — ANESTHESIA POSTPROCEDURE EVALUATION
Anesthesia POST Procedure Evaluation    Patient: Catalino Coronado   MRN:     [id number] Gender:   male   Age:    68 year old :      1950        Preoperative Diagnosis: Right Eye Cataract    Procedure(s):  Right Eye Phacoemulsification with Standard Intraocular Lens Placement   Postop Comments: No value filed.       Anesthesia Type:  Not documented    Reportable Event: NO     PAIN: Uncomplicated   Sign Out status: Comfortable, Well controlled pain     PONV: No PONV   Sign Out status:  No Nausea or Vomiting     Neuro/Psych: Uneventful perioperative course   Sign Out Status: Preoperative baseline; Age appropriate mentation     Airway/Resp.: Uneventful perioperative course   Sign Out Status: Non labored breathing, age appropriate RR; Resp. Status within EXPECTED Parameters     CV: Uneventful perioperative course   Sign Out status: Appropriate BP and perfusion indices; Appropriate HR/Rhythm     Disposition:   Sign Out in:  PACU  Disposition:  Phase II; Home  Recovery Course: Uneventful  Follow-Up: Not required           Last Anesthesia Record Vitals:  CRNA VITALS  10/22/2018 0816 - 10/22/2018 0916      10/22/2018             Resp Rate (observed): (!)  1    Resp Rate (set): 10          Last PACU/Preop Vitals:  Vitals:    10/22/18 0704 10/22/18 0850 10/22/18 0900   BP: (!) 150/98 139/81 131/70   Resp: 16 16 16   Temp: 36.7  C (98.1  F) 36.2  C (97.2  F) 36.4  C (97.5  F)   SpO2: 98% 96% 95%         Electronically Signed By: Kareem Hamilton MD, MD, 2018, 10:08 AM

## 2018-10-23 ENCOUNTER — OFFICE VISIT (OUTPATIENT)
Dept: OPHTHALMOLOGY | Facility: CLINIC | Age: 68
End: 2018-10-23
Attending: OPHTHALMOLOGY
Payer: MEDICARE

## 2018-10-23 ENCOUNTER — COMMUNICATION - HEALTHEAST (OUTPATIENT)
Dept: NEUROLOGY | Facility: CLINIC | Age: 68
End: 2018-10-23

## 2018-10-23 DIAGNOSIS — Z98.890 POSTOPERATIVE EYE STATE: Primary | ICD-10-CM

## 2018-10-23 PROCEDURE — G0463 HOSPITAL OUTPT CLINIC VISIT: HCPCS | Mod: ZF

## 2018-10-23 ASSESSMENT — EXTERNAL EXAM - RIGHT EYE: OD_EXAM: NORMAL

## 2018-10-23 ASSESSMENT — TONOMETRY
OD_IOP_MMHG: 10
OS_IOP_MMHG: 16
IOP_METHOD: TONOPEN

## 2018-10-23 ASSESSMENT — SLIT LAMP EXAM - LIDS
COMMENTS: NORMAL
COMMENTS: NORMAL

## 2018-10-23 ASSESSMENT — VISUAL ACUITY
OS_PH_SC: 20/40
METHOD: SNELLEN - LINEAR
OD_SC: 20/20
OS_SC+: -1
OS_SC: 20/50

## 2018-10-23 ASSESSMENT — EXTERNAL EXAM - LEFT EYE: OS_EXAM: NORMAL

## 2018-10-23 NOTE — NURSING NOTE
Chief Complaints and History of Present Illnesses   Patient presents with     Post Op (Ophthalmology) Right Eye      1 day after cataract surgery     HPI    Last Eye Exam:  10/9/18   Affected eye(s):  Right   Symptoms:        Duration:  1 day   Frequency:  Constant       Do you have eye pain now?:  No      Comments:  Catalino is here 1 day post op after cataract surgery RE. He says he slept well last night and has no discomfort in his eyes.     Pete Erickson COT 3:41 PM October 23, 2018

## 2018-10-23 NOTE — PROGRESS NOTES
#1 Pseudophakia, right eye, day 1    Doing well w/ excellent 20/20 VA right eye. No pain. Had diplopia yesterday 2/2 block; now resolved. 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  Keep patch in place at night for 5 days  Start post-operative drops and taper according to instructions  Post-operative do's and don'ts reviewed, questions answered    Left eye epiphora and photosensitivity since surgery on 10/8/18.  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  Increase PF to QID left eye as well.  AT QID also for left eye (spaced 5 min apart from PF)    Recheck 2-3 weeks with refraction    Kevin Zhou MD on 10/23/2018 at 4:56 PM  Opthalmology Resident, PGY-2  Pager: [phone]      Attending Physician Attestation:  Complete documentation of historical and exam elements from today's encounter can be found in the full encounter summary report (not reduplicated in this progress note).  I personally obtained the chief complaint(s) and history of present illness.  I confirmed and edited as necessary the review of systems, past medical/surgical history, family history, social history, and examination findings as documented by others; and I examined the patient myself.  I personally reviewed the relevant tests, images, and reports as documented above.  I formulated and edited as necessary the assessment and plan and discussed the findings and management plan with the patient and family. . - Elio Manzo MD

## 2018-10-23 NOTE — MR AVS SNAPSHOT
After Visit Summary   10/23/2018    Catalino Cornoado    MRN: [id number]           Patient Information     Date Of Birth          1950        Visit Information        Provider Department      10/23/2018 3:30 PM Elio Manzo MD [facility]        Today's Diagnoses     Postoperative eye state    -  1       Follow-ups after your visit        Follow-up notes from your care team     Return in 2 weeks (on 11/6/2018) for VT refraction, dilated.      Your next 10 appointments already scheduled     Nov 06, 2018  9:30 AM CST   Post-Op with Elio Manzo MD   Eye Clinic ([facility])    [address]  9th [address] Clin 9a  [facility] [id number] [phone]            Feb 11, 2019  1:45 PM CST   MRA BRAIN ([place] WWO CONTRAST with [address] [facility] ([facility])    [address]  1st Floor  [facility] [address] [phone]           How do I prepare for my exam? (Food and drink instructions) **If you will be receiving sedation or general anesthesia, please see special notes below.**  How do I prepare for my exam? (Other instructions) Take your medicines as usual, unless your doctor tells you not to. You may or may not receive intravenous (IV) contrast for this exam pending the discretion of the Radiologist.  You do not need to do anything special to prepare.  **If you will be receiving sedation or general anesthesia, please see special notes below.**  What should I wear: The MRI machine uses a strong magnet. Please wear clothes without metal (snaps, zippers). A sweatsuit works well, or we may give you a hospital gown. Please remove any body piercings and hair extensions before you arrive. You will also remove watches, jewelry, hairpins, wallets, dentures, partial dental plates and hearing aids. You may wear contact lenses, and you may be able to wear your rings. We have a safe place to keep  your personal items, but it is safer to leave them at home.  How long does the exam take: Most tests take 30 to 60 minutes.  HOWEVER, IF YOUR DOCTOR PRESCRIBES ANESTHESIA please plan on spending four to five hours in the recovery room.  What should I bring:  Bring a list of your current medicines to your exam (including vitamins, minerals and over-the-counter drugs).  Do I need a :  **If you will be receiving sedation or general anesthesia, please see special notes below.**  What should I do after the exam: No Restrictions, You may resume normal activities.  What is this test: MRI (magnetic resonance imaging) uses a strong magnet and radio waves to look inside the body. An MRA (magnetic resonance angiogram) does the same thing, but it lets us look at your blood vessels. A computer turns the radio waves into pictures showing cross sections of the body, much like slices of bread. This helps us see any problems more clearly. You may receive fluid (called  contrast ) before or during your scan. The fluid helps us see the pictures better. We give the fluid through an IV (small needle in your arm).  Who should I call with questions:  Please call the Imaging Department at your exam site with any questions. Directions, parking instructions, and other information is available on our website, [email or web address].  How do I prepare if I m having sedation or anesthesia? **IMPORTANT** THE INSTRUCTIONS BELOW ARE ONLY FOR THOSE PATIENTS WHO HAVE BEEN TOLD THEY WILL RECEIVE SEDATION OR GENERAL ANESTHESIA DURING THEIR MRI PROCEDURE:  IF YOU WILL RECEIVE SEDATION (take medicine to help you relax during your exam): You must get the medicine from your doctor before you arrive. Bring the medicine to the exam. Do not take it at home. Arrive one hour early. Bring someone who can take you home after the test. Your medicine will make you sleepy. After the exam, you may not drive, take a bus or take a taxi by yourself. No eating 8  hours before your exam. You may have clear liquids up until 4 hours before your exam. (Clear liquids include water, clear tea, black coffee and fruit juice without pulp.)  IF YOU WILL RECEIVE ANESTHESIA (be asleep for your exam): Arrive 1 1/2 hours early. Bring someone who can take you home after the test. You may not drive, take a bus or take a taxi by yourself. No eating 8 hours before your exam. You may have clear liquids up until 4 hours before your exam. (Clear liquids include water, clear tea, black coffee and fruit juice without pulp.)            2019  3:00 PM CST   (Arrive by 2:45 PM)   Return Visit with Chevy Duncan MD   [facility] ([facility] [place])    [address] [address] [phone]              Who to contact     Please call your clinic at [phone] to:    Ask questions about your health    Make or cancel appointments    Discuss your medicines    Learn about your test results    Speak to your doctor            Additional Information About Your Visit        Enforta Information     Enforta is an electronic gateway that provides easy, online access to your medical records. With Enforta, you can request a clinic appointment, read your test results, renew a prescription or communicate with your care team.     To sign up for Enforta visit the website at [email or web address]   You will be asked to enter the access code listed below, as well as some personal information. Please follow the directions to create your username and password.     Your access code is: [email or web address]  Expires: 2019  6:30 AM     Your access code will  in 90 days. If you need help or a new code, please contact your [facility] or call [phone] for assistance.        Care EveryWhere ID     This is your Care EveryWhere ID. This could be used by other organizations to access your [facility]  records  [id number]         Blood Pressure from Last 3 Encounters:   10/22/18 131/70   10/08/18 136/75   09/24/18 146/83    Weight from Last 3 Encounters:   10/22/18 101.2 kg (223 lb)   10/08/18 101.2 kg (223 lb)   09/24/18 92.9 kg (204 lb 12.8 oz)              Today, you had the following     No orders found for display         Today's Medication Changes          These changes are accurate as of 10/23/18  5:41 PM.  If you have any questions, ask your nurse or doctor.               These medicines have changed or have updated prescriptions.        Dose/Directions    tamsulosin 0.4 MG capsule   Commonly known as:  FLOMAX   This may have changed:  when to take this   Used for:  Benign non-nodular prostatic hyperplasia with lower urinary tract symptoms        Dose:  0.4 mg   Take 1 capsule (0.4 mg) by mouth daily   Quantity:  90 capsule   Refills:  3                Primary Care Provider Office Phone # Fax #    Jamari Sloan [phone]       [place] [address]        Equal Access to Services     [facility]: Haderic Milton, wamarquita manning, qaybjacek ferroalhossein haq, lukas garcia. So [facility] [phone].    ATENCIÓN: Si habla español, tiene a johns disposición servicios gratuitos de asistencia lingüística. [facility] [phone].    We comply with applicable federal civil rights laws and [place] laws. We do not discriminate on the basis of race, color, national origin, age, disability, sex, sexual orientation, or gender identity.            Thank you!     Thank you for choosing EYE CLINIC  for your care. Our goal is always to provide you with excellent care. Hearing back from our patients is one way we can continue to improve our services. Please take a few minutes to complete the written survey that you may receive in the mail after your visit with us. Thank you!             Your Updated Medication List - Protect others around you:  Learn how to safely use, store and throw away your medicines at www.disposemymeds.org.          This list is accurate as of 10/23/18  5:41 PM.  Always use your most recent med list.                   Brand Name Dispense Instructions for use Diagnosis    ASPIRIN      Take 81 mg by mouth every morning        ATENOLOL      Take 100 mg by mouth every morning        clobetasol 0.05 % external solution    TEMOVATE    60 mL    Apply topically 2 times daily To scaly and itchy areas on scalp until no rash nor itch. Hold until patient requests.    Sebopsoriasis       desonide 0.05 % ointment    DESOWEN    60 g    Apply topically 2 times daily To rash at eyebrows and on face as needed until clear. Hold until patient requests.    Sebopsoriasis       donepezil 10 MG tablet    ARICEPT     Take 10 mg by mouth every morning        hydrochlorothiazide 12.5 MG capsule    MICROZIDE     TAKE ONE CAPSULE BY MOUTH EVERY MORNING.        ketoconazole 2 % shampoo    NIZORAL    240 mL    To entire wet scalp and then wash off after 5 minutes three times a week. Hold until patient requests.    Sebopsoriasis       magnesium 250 MG tablet      Take 1 tablet by mouth At Bedtime        ofloxacin 0.3 % ophthalmic solution    OCUFLOX    5 mL    Apply 1 drop to eye 3 times daily Instill into operative eye(s) per physician instructions.    Nuclear senile cataract of both eyes       * prednisoLONE acetate 1 % ophthalmic susp    PRED FORTE    5 mL    Apply 1 drop to eye 4 times daily Instill into operative eye(s) per physician instructions.    Nuclear senile cataract of both eyes       * prednisoLONE acetate 1 % ophthalmic susp    PRED FORTE    1 Bottle    Apply 1 drop to eye 4 times daily    Nuclear cataract of right eye, nonsenile       sildenafil 50 MG tablet    VIAGRA    30 tablet    Take 1 tablet (50 mg) by mouth as needed for erectile dysfunction    Erectile dysfunction due to arterial insufficiency       simvastatin 40 MG tablet    ZOCOR     TAKE  1 TABLET BY MOUTH NIGHTLY AT BEDTIME.        tamsulosin 0.4 MG capsule    FLOMAX    90 capsule    Take 1 capsule (0.4 mg) by mouth daily    Benign non-nodular prostatic hyperplasia with lower urinary tract symptoms       vitamin E 1000 UNIT capsule    TOCOPHEROL     Take 1,000 Units by mouth At Bedtime        * Notice:  This list has 2 medication(s) that are the same as other medications prescribed for you. Read the directions carefully, and ask your doctor or other care provider to review them with you.

## 2018-10-27 ENCOUNTER — COMMUNICATION - HEALTHEAST (OUTPATIENT)
Dept: NEUROLOGY | Facility: CLINIC | Age: 68
End: 2018-10-27

## 2018-10-29 ENCOUNTER — COMMUNICATION - HEALTHEAST (OUTPATIENT)
Dept: NEUROLOGY | Facility: CLINIC | Age: 68
End: 2018-10-29

## 2018-11-06 ENCOUNTER — COMMUNICATION - HEALTHEAST (OUTPATIENT)
Dept: NEUROLOGY | Facility: CLINIC | Age: 68
End: 2018-11-06

## 2018-11-06 ENCOUNTER — OFFICE VISIT (OUTPATIENT)
Dept: OPHTHALMOLOGY | Facility: CLINIC | Age: 68
End: 2018-11-06
Attending: OPHTHALMOLOGY
Payer: MEDICARE

## 2018-11-06 ENCOUNTER — RECORDS - HEALTHEAST (OUTPATIENT)
Dept: ADMINISTRATIVE | Facility: OTHER | Age: 68
End: 2018-11-06

## 2018-11-06 DIAGNOSIS — Z98.890 POSTOPERATIVE EYE STATE: Primary | ICD-10-CM

## 2018-11-06 DIAGNOSIS — Z96.1 PSEUDOPHAKIA: Primary | ICD-10-CM

## 2018-11-06 DIAGNOSIS — H04.202 EPIPHORA, LEFT: ICD-10-CM

## 2018-11-06 DIAGNOSIS — F32.A DEPRESSION: ICD-10-CM

## 2018-11-06 PROCEDURE — G0463 HOSPITAL OUTPT CLINIC VISIT: HCPCS | Mod: ZF

## 2018-11-06 PROCEDURE — 92015 DETERMINE REFRACTIVE STATE: CPT | Mod: ZF

## 2018-11-06 RX ORDER — CARBOXYMETHYLCELLULOSE SODIUM 5 MG/ML
1 SOLUTION/ DROPS OPHTHALMIC PRN
COMMUNITY
End: 2021-05-14

## 2018-11-06 RX ORDER — PREDNISOLONE ACETATE 10 MG/ML
1 SUSPENSION/ DROPS OPHTHALMIC 4 TIMES DAILY
Qty: 1 BOTTLE | Refills: 1 | Status: SHIPPED | OUTPATIENT
Start: 2018-11-06 | End: 2019-01-03

## 2018-11-06 RX ORDER — PREDNISOLONE ACETATE 10 MG/ML
1-2 SUSPENSION/ DROPS OPHTHALMIC 2 TIMES DAILY
COMMUNITY
End: 2019-01-03

## 2018-11-06 ASSESSMENT — EXTERNAL EXAM - RIGHT EYE: OD_EXAM: NORMAL

## 2018-11-06 ASSESSMENT — REFRACTION_MANIFEST
OD_CYLINDER: +0.50
OS_ADD: +2.50
OD_AXIS: 110
OS_AXIS: 180
OS_CYLINDER: +0.25
OD_SPHERE: -0.50
OD_ADD: +2.50
OS_SPHERE: -0.50

## 2018-11-06 ASSESSMENT — TONOMETRY
OD_IOP_MMHG: 16
IOP_METHOD: TONOPEN
OS_IOP_MMHG: 14

## 2018-11-06 ASSESSMENT — VISUAL ACUITY
OD_SC: 20/20
OS_SC: 20/20
METHOD: SNELLEN - LINEAR
OS_SC+: -1

## 2018-11-06 ASSESSMENT — CUP TO DISC RATIO
OS_RATIO: 0.5
OD_RATIO: 0.5

## 2018-11-06 ASSESSMENT — SLIT LAMP EXAM - LIDS: COMMENTS: DERMATOCHALASIS

## 2018-11-06 ASSESSMENT — EXTERNAL EXAM - LEFT EYE: OS_EXAM: NORMAL

## 2018-11-06 NOTE — NURSING NOTE
Chief Complaints and History of Present Illnesses   Patient presents with     Post Op (Ophthalmology) Both Eyes     HPI    Symptoms:           Do you have eye pain now?:  No      Comments:  POP both eyes  CE/IOL.  JOSE DE JESUS Moreira 9:37 AM 11/06/2018

## 2018-11-06 NOTE — MR AVS SNAPSHOT
After Visit Summary   11/6/2018    Catalino Coronado    MRN: [id number]           Patient Information     Date Of Birth          1950        Visit Information        Provider Department      11/6/2018 10:30 AM [facility]        Today's Diagnoses     Pseudophakia - Both Eyes    -  1       Follow-ups after your visit        Your next 10 appointments already scheduled     Feb 11, 2019  1:45 PM CST   MRA BRAIN ([place] [facility] [place]) WWO CONTRAST with [address] MRI ([facility] [place])    [address] [place] [address] [phone]           How do I prepare for my exam? (Food and drink instructions) **If you will be receiving sedation or general anesthesia, please see special notes below.**  How do I prepare for my exam? (Other instructions) Take your medicines as usual, unless your doctor tells you not to. You may or may not receive intravenous (IV) contrast for this exam pending the discretion of the Radiologist.  You do not need to do anything special to prepare.  **If you will be receiving sedation or general anesthesia, please see special notes below.**  What should I wear: The MRI machine uses a strong magnet. Please wear clothes without metal (snaps, zippers). A sweatsuit works well, or we may give you a hospital gown. Please remove any body piercings and hair extensions before you arrive. You will also remove watches, jewelry, hairpins, wallets, dentures, partial dental plates and hearing aids. You may wear contact lenses, and you may be able to wear your rings. We have a safe place to keep your personal items, but it is safer to leave them at home.  How long does the exam take: Most tests take 30 to 60 minutes.  HOWEVER, IF YOUR DOCTOR PRESCRIBES ANESTHESIA please plan on spending four to five hours in the recovery room.  What should I bring:  Bring a list of your current medicines to your exam (including vitamins,  minerals and over-the-counter drugs).  Do I need a :  **If you will be receiving sedation or general anesthesia, please see special notes below.**  What should I do after the exam: No Restrictions, You may resume normal activities.  What is this test: MRI (magnetic resonance imaging) uses a strong magnet and radio waves to look inside the body. An MRA (magnetic resonance angiogram) does the same thing, but it lets us look at your blood vessels. A computer turns the radio waves into pictures showing cross sections of the body, much like slices of bread. This helps us see any problems more clearly. You may receive fluid (called  contrast ) before or during your scan. The fluid helps us see the pictures better. We give the fluid through an IV (small needle in your arm).  Who should I call with questions:  Please call the Imaging Department at your exam site with any questions. Directions, parking instructions, and other information is available on our website, [email or web address].  How do I prepare if I m having sedation or anesthesia? **IMPORTANT** THE INSTRUCTIONS BELOW ARE ONLY FOR THOSE PATIENTS WHO HAVE BEEN TOLD THEY WILL RECEIVE SEDATION OR GENERAL ANESTHESIA DURING THEIR MRI PROCEDURE:  IF YOU WILL RECEIVE SEDATION (take medicine to help you relax during your exam): You must get the medicine from your doctor before you arrive. Bring the medicine to the exam. Do not take it at home. Arrive one hour early. Bring someone who can take you home after the test. Your medicine will make you sleepy. After the exam, you may not drive, take a bus or take a taxi by yourself. No eating 8 hours before your exam. You may have clear liquids up until 4 hours before your exam. (Clear liquids include water, clear tea, black coffee and fruit juice without pulp.)  IF YOU WILL RECEIVE ANESTHESIA (be asleep for your exam): Arrive 1 1/2 hours early. Bring someone who can take you home after the test. You may not drive, take a  bus or take a taxi by yourself. No eating 8 hours before your exam. You may have clear liquids up until 4 hours before your exam. (Clear liquids include water, clear tea, black coffee and fruit juice without pulp.)            2019  3:00 PM CST   (Arrive by 2:45 PM)   Return Visit with Chevy Duncan MD   [facility] ([facility] [place])    [address]  3rd [facility] [address] [phone]              Who to contact     Please call your clinic at [phone] to:    Ask questions about your health    Make or cancel appointments    Discuss your medicines    Learn about your test results    Speak to your doctor            Additional Information About Your Visit        MyCharT-Quad 22 Information     Singly is an electronic gateway that provides easy, online access to your medical records. With Singly, you can request a clinic appointment, read your test results, renew a prescription or communicate with your care team.     To sign up for IFTTTt visit the website at [email or web address]   You will be asked to enter the access code listed below, as well as some personal information. Please follow the directions to create your username and password.     Your access code is: [email or web address]  Expires: 2019  5:30 AM     Your access code will  in 90 days. If you need help or a new code, please contact your [facility] or call [phone] for assistance.        Care EveryWhere ID     This is your Care EveryWhere ID. This could be used by other organizations to access your [place] medical records  [id number]         Blood Pressure from Last 3 Encounters:   10/22/18 131/70   10/08/18 136/75   18 146/83    Weight from Last 3 Encounters:   10/22/18 101.2 kg (223 lb)   10/08/18 101.2 kg (223 lb)   18 92.9 kg (204 lb 12.8 oz)              Today, you had the following     No orders found for display         Today's  Medication Changes          These changes are accurate as of 11/6/18 11:59 PM.  If you have any questions, ask your nurse or doctor.               These medicines have changed or have updated prescriptions.        Dose/Directions    tamsulosin 0.4 MG capsule   Commonly known as:  FLOMAX   This may have changed:  when to take this   Used for:  Benign non-nodular prostatic hyperplasia with lower urinary tract symptoms        Dose:  0.4 mg   Take 1 capsule (0.4 mg) by mouth daily   Quantity:  90 capsule   Refills:  3            Where to get your medicines      These medications were sent to [place] [address] [place] [address] [address]     Phone:  [phone]     prednisoLONE acetate 1 % ophthalmic susp                Primary Care Provider Office Phone # Fax #    Jamari Sloan [phone]       [address] [address]        Equal Access to Services     NICOLE VICKERS : Haja jj [place], waaxda nigel, qaybta kasiealhossein haq, lukas chin . So [facility] [phone].    ATENCIÓN: Si habla español, tiene a johns disposición servicios gratuitos de asistencia lingüística. Llame al [phone].    We comply with applicable federal civil rights laws and [place] laws. We do not discriminate on the basis of race, color, national origin, age, disability, sex, sexual orientation, or gender identity.            Thank you!     Thank you for choosing EYE CLINIC  for your care. Our goal is always to provide you with excellent care. Hearing back from our patients is one way we can continue to improve our services. Please take a few minutes to complete the written survey that you may receive in the mail after your visit with us. Thank you!             Your Updated Medication List - Protect others around you: Learn how to safely use, store and throw away your medicines at www.disposemymeds.org.          This  list is accurate as of 11/6/18 11:59 PM.  Always use your most recent med list.                   Brand Name Dispense Instructions for use Diagnosis    ASPIRIN      Take 81 mg by mouth every morning        ATENOLOL      Take 100 mg by mouth every morning        Carboxymethylcellulose Sod PF 0.5 % Soln ophthalmic solution    REFRESH PLUS     Place 1 drop into both eyes as needed        clobetasol 0.05 % external solution    TEMOVATE    60 mL    Apply topically 2 times daily To scaly and itchy areas on scalp until no rash nor itch. Hold until patient requests.    Sebopsoriasis       desonide 0.05 % ointment    DESOWEN    60 g    Apply topically 2 times daily To rash at eyebrows and on face as needed until clear. Hold until patient requests.    Sebopsoriasis       donepezil 10 MG tablet    ARICEPT     Take 10 mg by mouth every morning        hydrochlorothiazide 12.5 MG capsule    MICROZIDE     TAKE ONE CAPSULE BY MOUTH EVERY MORNING.        ketoconazole 2 % shampoo    NIZORAL    240 mL    To entire wet scalp and then wash off after 5 minutes three times a week. Hold until patient requests.    Sebopsoriasis       magnesium 250 MG tablet      Take 1 tablet by mouth At Bedtime        * prednisoLONE acetate 1 % ophthalmic susp    PRED FORTE     Place 1-2 drops into both eyes 2 times daily        * prednisoLONE acetate 1 % ophthalmic susp    PRED FORTE    1 Bottle    Apply 1 drop to eye 4 times daily        sildenafil 50 MG tablet    VIAGRA    30 tablet    Take 1 tablet (50 mg) by mouth as needed for erectile dysfunction    Erectile dysfunction due to arterial insufficiency       simvastatin 40 MG tablet    ZOCOR     TAKE 1 TABLET BY MOUTH NIGHTLY AT BEDTIME.        tamsulosin 0.4 MG capsule    FLOMAX    90 capsule    Take 1 capsule (0.4 mg) by mouth daily    Benign non-nodular prostatic hyperplasia with lower urinary tract symptoms       vitamin E 1000 UNIT capsule    TOCOPHEROL     Take 1,000 Units by mouth At Bedtime         * Notice:  This list has 2 medication(s) that are the same as other medications prescribed for you. Read the directions carefully, and ask your doctor or other care provider to review them with you.

## 2018-11-06 NOTE — MR AVS SNAPSHOT
After Visit Summary   11/6/2018    Catalino Coronado    MRN: [id number]           Patient Information     Date Of Birth          1950        Visit Information        Provider Department      11/6/2018 9:30 AM [facility]        Today's Diagnoses     Postoperative eye state - Both Eyes    -  1    Epiphora, left           Follow-ups after your visit        Follow-up notes from your care team     Return in about 1 year (around 11/6/2019) for DFE.      Your next 10 appointments already scheduled     Feb 11, 2019  1:45 PM CST   MRA BRAIN ([place] [facility]) WWO CONTRAST with [address] [facility] ([facility])    [address] [address] [phone]           How do I prepare for my exam? (Food and drink instructions) **If you will be receiving sedation or general anesthesia, please see special notes below.**  How do I prepare for my exam? (Other instructions) Take your medicines as usual, unless your doctor tells you not to. You may or may not receive intravenous (IV) contrast for this exam pending the discretion of the Radiologist.  You do not need to do anything special to prepare.  **If you will be receiving sedation or general anesthesia, please see special notes below.**  What should I wear: The MRI machine uses a strong magnet. Please wear clothes without metal (snaps, zippers). A sweatsuit works well, or we may give you a hospital gown. Please remove any body piercings and hair extensions before you arrive. You will also remove watches, jewelry, hairpins, wallets, dentures, partial dental plates and hearing aids. You may wear contact lenses, and you may be able to wear your rings. We have a safe place to keep your personal items, but it is safer to leave them at home.  How long does the exam take: Most tests take 30 to 60 minutes.  HOWEVER, IF YOUR DOCTOR PRESCRIBES ANESTHESIA please plan on spending  four to five hours in the recovery room.  What should I bring:  Bring a list of your current medicines to your exam (including vitamins, minerals and over-the-counter drugs).  Do I need a :  **If you will be receiving sedation or general anesthesia, please see special notes below.**  What should I do after the exam: No Restrictions, You may resume normal activities.  What is this test: MRI (magnetic resonance imaging) uses a strong magnet and radio waves to look inside the body. An MRA (magnetic resonance angiogram) does the same thing, but it lets us look at your blood vessels. A computer turns the radio waves into pictures showing cross sections of the body, much like slices of bread. This helps us see any problems more clearly. You may receive fluid (called  contrast ) before or during your scan. The fluid helps us see the pictures better. We give the fluid through an IV (small needle in your arm).  Who should I call with questions:  Please call the Imaging Department at your exam site with any questions. Directions, parking instructions, and other information is available on our website, [email or web address].  How do I prepare if I m having sedation or anesthesia? **IMPORTANT** THE INSTRUCTIONS BELOW ARE ONLY FOR THOSE PATIENTS WHO HAVE BEEN TOLD THEY WILL RECEIVE SEDATION OR GENERAL ANESTHESIA DURING THEIR MRI PROCEDURE:  IF YOU WILL RECEIVE SEDATION (take medicine to help you relax during your exam): You must get the medicine from your doctor before you arrive. Bring the medicine to the exam. Do not take it at home. Arrive one hour early. Bring someone who can take you home after the test. Your medicine will make you sleepy. After the exam, you may not drive, take a bus or take a taxi by yourself. No eating 8 hours before your exam. You may have clear liquids up until 4 hours before your exam. (Clear liquids include water, clear tea, black coffee and fruit juice without pulp.)  IF YOU WILL RECEIVE  ANESTHESIA (be asleep for your exam): Arrive 1 1/2 hours early. Bring someone who can take you home after the test. You may not drive, take a bus or take a taxi by yourself. No eating 8 hours before your exam. You may have clear liquids up until 4 hours before your exam. (Clear liquids include water, clear tea, black coffee and fruit juice without pulp.)            2019  3:00 PM CST   (Arrive by 2:45 PM)   Return Visit with Chevy Duncan MD   [facility] ([facility] [place])    [address] [address] [phone]              Who to contact     Please call your clinic at [phone] to:    Ask questions about your health    Make or cancel appointments    Discuss your medicines    Learn about your test results    Speak to your doctor            Additional Information About Your Visit        MyChart Information     [employer] is an electronic gateway that provides easy, online access to your medical records. With [employer], you can request a clinic appointment, read your test results, renew a prescription or communicate with your care team.     To sign up for [employer] visit the website at [email or web address]   You will be asked to enter the access code listed below, as well as some personal information. Please follow the directions to create your username and password.     Your access code is: [email or web address]  Expires: 2019  5:30 AM     Your access code will  in 90 days. If you need help or a new code, please contact your [facility] or call [phone] for assistance.        Care EveryWhere ID     This is your Care EveryWhere ID. This could be used by other organizations to access your [place] medical records  [id number]         Blood Pressure from Last 3 Encounters:   10/22/18 131/70   10/08/18 136/75   18 146/83    Weight from Last 3 Encounters:   10/22/18 101.2 kg (223 lb)   10/08/18  101.2 kg (223 lb)   09/24/18 92.9 kg (204 lb 12.8 oz)              Today, you had the following     No orders found for display         Today's Medication Changes          These changes are accurate as of 11/6/18  6:05 PM.  If you have any questions, ask your nurse or doctor.               These medicines have changed or have updated prescriptions.        Dose/Directions    tamsulosin 0.4 MG capsule   Commonly known as:  FLOMAX   This may have changed:  when to take this   Used for:  Benign non-nodular prostatic hyperplasia with lower urinary tract symptoms        Dose:  0.4 mg   Take 1 capsule (0.4 mg) by mouth daily   Quantity:  90 capsule   Refills:  3            Where to get your medicines      These medications were sent to [address] [address]     Phone:  [phone]     prednisoLONE acetate 1 % ophthalmic susp                Primary Care Provider Office Phone # Fax #    Jamari Sloan [phone]       [address] [address]        Equal Access to Services     NICOLE VICKERS AH: Hadii ralph gonzalez hadasho Soomaali, waaxda luqadaha, qaybta kaalmada adeegyaheather, lukas chin . So [facility] [phone].    ATENCIÓN: Si habla español, tiene a johns disposición servicios gratuitos de asistencia lingüística. Llame al [phone].    We comply with applicable federal civil rights laws and [place] laws. We do not discriminate on the basis of race, color, national origin, age, disability, sex, sexual orientation, or gender identity.            Thank you!     Thank you for choosing [facility]  for your care. Our goal is always to provide you with excellent care. Hearing back from our patients is one way we can continue to improve our services. Please take a few minutes to complete the written survey that you may receive in the mail after your visit with us. Thank you!             Your Updated  Medication List - Protect others around you: Learn how to safely use, store and throw away your medicines at www.disposemymeds.org.          This list is accurate as of 11/6/18  6:05 PM.  Always use your most recent med list.                   Brand Name Dispense Instructions for use Diagnosis    ASPIRIN      Take 81 mg by mouth every morning        ATENOLOL      Take 100 mg by mouth every morning        Carboxymethylcellulose Sod PF 0.5 % Soln ophthalmic solution    REFRESH PLUS     Place 1 drop into both eyes as needed        clobetasol 0.05 % external solution    TEMOVATE    60 mL    Apply topically 2 times daily To scaly and itchy areas on scalp until no rash nor itch. Hold until patient requests.    Sebopsoriasis       desonide 0.05 % ointment    DESOWEN    60 g    Apply topically 2 times daily To rash at eyebrows and on face as needed until clear. Hold until patient requests.    Sebopsoriasis       donepezil 10 MG tablet    ARICEPT     Take 10 mg by mouth every morning        hydrochlorothiazide 12.5 MG capsule    MICROZIDE     TAKE ONE CAPSULE BY MOUTH EVERY MORNING.        ketoconazole 2 % shampoo    NIZORAL    240 mL    To entire wet scalp and then wash off after 5 minutes three times a week. Hold until patient requests.    Sebopsoriasis       magnesium 250 MG tablet      Take 1 tablet by mouth At Bedtime        * prednisoLONE acetate 1 % ophthalmic susp    PRED FORTE     Place 1-2 drops into both eyes 2 times daily        * prednisoLONE acetate 1 % ophthalmic susp    PRED FORTE    1 Bottle    Apply 1 drop to eye 4 times daily        sildenafil 50 MG tablet    VIAGRA    30 tablet    Take 1 tablet (50 mg) by mouth as needed for erectile dysfunction    Erectile dysfunction due to arterial insufficiency       simvastatin 40 MG tablet    ZOCOR     TAKE 1 TABLET BY MOUTH NIGHTLY AT BEDTIME.        tamsulosin 0.4 MG capsule    FLOMAX    90 capsule    Take 1 capsule (0.4 mg) by mouth daily    Benign non-nodular  prostatic hyperplasia with lower urinary tract symptoms       vitamin E 1000 UNIT capsule    TOCOPHEROL     Take 1,000 Units by mouth At Bedtime        * Notice:  This list has 2 medication(s) that are the same as other medications prescribed for you. Read the directions carefully, and ask your doctor or other care provider to review them with you.

## 2018-11-07 ENCOUNTER — COMMUNICATION - HEALTHEAST (OUTPATIENT)
Dept: NEUROLOGY | Facility: CLINIC | Age: 68
End: 2018-11-07

## 2018-11-07 DIAGNOSIS — F32.A DEPRESSION: ICD-10-CM

## 2018-11-07 NOTE — PROGRESS NOTES
Assessment & Plan      Catalino Coronado is a 68 year old male with the following diagnoses:   1. Postoperative eye state - Both Eyes    2. Epiphora, left         Doing well  Ok to resume normal activities  Taper Predforte as directed  Artificial tears for epiphora.  If not improved, could consider punctoplasty  Glasses prescription updated  Artificial tears as needed        Patient disposition:   Return in about 1 year (around 11/6/2019) for DFE.          Attending Physician Attestation:  Complete documentation of historical and exam elements from today's encounter can be found in the full encounter summary report (not reduplicated in this progress note).  I personally obtained the chief complaint(s) and history of present illness.  I confirmed and edited as necessary the review of systems, past medical/surgical history, family history, social history, and examination findings as documented by others; and I examined the patient myself.  I personally reviewed the relevant tests, images, and reports as documented above.  I formulated and edited as necessary the assessment and plan and discussed the findings and management plan with the patient and family. . - Elio Manzo MD

## 2018-11-08 NOTE — PROGRESS NOTES
Tech visit for aliza Manzo MD  , Comprehensive Ophthalmology  Department of Ophthalmology and Visual Neurosciences  [facility]

## 2018-11-19 ENCOUNTER — COMMUNICATION - HEALTHEAST (OUTPATIENT)
Dept: FAMILY MEDICINE | Facility: CLINIC | Age: 68
End: 2018-11-19

## 2018-11-19 ENCOUNTER — HOSPITAL ENCOUNTER (OUTPATIENT)
Dept: NEUROLOGY | Facility: CLINIC | Age: 68
Setting detail: THERAPIES SERIES
Discharge: STILL ADMITTED OR ADMINISTRATIVE | End: 2018-11-19
Attending: PSYCHIATRY & NEUROLOGY

## 2018-12-30 ENCOUNTER — COMMUNICATION - HEALTHEAST (OUTPATIENT)
Dept: FAMILY MEDICINE | Facility: CLINIC | Age: 68
End: 2018-12-30

## 2018-12-30 DIAGNOSIS — N40.0 BPH (BENIGN PROSTATIC HYPERPLASIA): ICD-10-CM

## 2019-01-03 ENCOUNTER — OFFICE VISIT (OUTPATIENT)
Dept: OPHTHALMOLOGY | Facility: CLINIC | Age: 69
End: 2019-01-03
Attending: OPHTHALMOLOGY
Payer: MEDICARE

## 2019-01-03 ENCOUNTER — MYC MEDICAL ADVICE (OUTPATIENT)
Dept: OPHTHALMOLOGY | Facility: CLINIC | Age: 69
End: 2019-01-03

## 2019-01-03 DIAGNOSIS — H01.02B SQUAMOUS BLEPHARITIS OF UPPER AND LOWER EYELIDS OF BOTH EYES: ICD-10-CM

## 2019-01-03 DIAGNOSIS — H01.02B SQUAMOUS BLEPHARITIS OF UPPER AND LOWER EYELIDS OF BOTH EYES: Primary | ICD-10-CM

## 2019-01-03 DIAGNOSIS — H01.02A SQUAMOUS BLEPHARITIS OF UPPER AND LOWER EYELIDS OF BOTH EYES: ICD-10-CM

## 2019-01-03 DIAGNOSIS — H01.02A SQUAMOUS BLEPHARITIS OF UPPER AND LOWER EYELIDS OF BOTH EYES: Primary | ICD-10-CM

## 2019-01-03 PROCEDURE — G0463 HOSPITAL OUTPT CLINIC VISIT: HCPCS | Mod: ZF

## 2019-01-03 RX ORDER — NEOMYCIN SULFATE, POLYMYXIN B SULFATE, AND DEXAMETHASONE 3.5; 10000; 1 MG/G; [USP'U]/G; MG/G
0.5 OINTMENT OPHTHALMIC AT BEDTIME
Qty: 2 TUBE | Refills: 3 | Status: SHIPPED | OUTPATIENT
Start: 2019-01-03 | End: 2019-02-02

## 2019-01-03 ASSESSMENT — TONOMETRY
OS_IOP_MMHG: 16
OD_IOP_MMHG: 15
IOP_METHOD: TONOPEN

## 2019-01-03 ASSESSMENT — EXTERNAL EXAM - LEFT EYE: OS_EXAM: NORMAL

## 2019-01-03 ASSESSMENT — EXTERNAL EXAM - RIGHT EYE: OD_EXAM: NORMAL

## 2019-01-03 ASSESSMENT — VISUAL ACUITY
METHOD: SNELLEN - LINEAR
OD_SC: 20/20
OS_SC: 20/20
OS_SC+: -2

## 2019-01-03 ASSESSMENT — CONF VISUAL FIELD
OS_NORMAL: 1
OD_NORMAL: 1
METHOD: COUNTING FINGERS

## 2019-01-03 ASSESSMENT — SLIT LAMP EXAM - LIDS: COMMENTS: DERMATOCHALASIS, MGD

## 2019-01-03 NOTE — TELEPHONE ENCOUNTER
Note to Dr. Manzo to review request for alternate med-- maxitrol-- that was prexribed today  Not in stock per pt ant 52 dollars per message    John Rodriguez RN 5:08 PM 01/03/19    mycMowdot message below   The eye medication is $52.  It wasn't in stock so I told them not to fill until I contact you for a different script.  Let me know if you can do that today or tomorrow

## 2019-01-03 NOTE — NURSING NOTE
Chief Complaints and History of Present Illnesses   Patient presents with     Blurred Vision Follow-Up

## 2019-01-03 NOTE — PATIENT INSTRUCTIONS
Start maxitrol ointment to eyelids (paint on eyelashes) at bedtime both eyes for 1 month  Warm compresses to eyelids twice a day for 5 minutes  Wash eyelids daily with baby shampoo  Artificial tears as needed

## 2019-01-03 NOTE — PROGRESS NOTES
Chief Complaint(s) and History of Present Illness(es)     Blurred Vision Follow-Up     Laterality: left eye    Onset: unknown    Onset: 3 months ago    Quality: blurred vision    Severity: moderate    Frequency: intermittently    Timing: at random times    Context: watching TV    Course: stable    Treatments tried: artificial tears    Pain scale: 0/10              Comments     Blurry VA LE following cataract surgery while indoors. Light from TV   seems intense and heat bothers LE. Seems to clear up when outdoors.   Wonders if psoriasis granules are causing irritation and cloudiness. No   improvement since last visit.   Using standard bottled Systane at night and up to two more times during   the day.  Sara Hoffman COT 8:36 AM 01/03/2019              Review of systems for the eyes was negative other than the pertinent positives/negatives listed in the HPI.      Assessment & Plan      Catalino Coronado is a 68 year old male with the following diagnoses:   1. Squamous blepharitis of upper and lower eyelids of both eyes         Mild blepharitis left lower lid   Meibomian gland dysfunction both eyes   Recommend more consistent artificial tears 3-4x daily   Warm compresses twice a day   Lid hygiene daily   Maxitrol tia at bedtime for 1 mo      Patient disposition:   Return in about 4 weeks (around 1/31/2019) for VT only.  As needed          Attending Physician Attestation:  Complete documentation of historical and exam elements from today's encounter can be found in the full encounter summary report (not reduplicated in this progress note).  I personally obtained the chief complaint(s) and history of present illness.  I confirmed and edited as necessary the review of systems, past medical/surgical history, family history, social history, and examination findings as documented by others; and I examined the patient myself.  I personally reviewed the relevant tests, images, and reports as documented above.  I formulated and  edited as necessary the assessment and plan and discussed the findings and management plan with the patient and family. . - Elio Manzo MD

## 2019-01-03 NOTE — TELEPHONE ENCOUNTER
Ok to try tobredex per dr. Anais Rodriguez RN 5:28 PM 01/03/19    Rx sent  Updated pt  John Rodriguez RN 5:30 PM 01/03/19

## 2019-01-04 ENCOUNTER — COMMUNICATION - HEALTHEAST (OUTPATIENT)
Dept: NEUROLOGY | Facility: CLINIC | Age: 69
End: 2019-01-04

## 2019-01-04 ENCOUNTER — HOSPITAL ENCOUNTER (OUTPATIENT)
Dept: NEUROLOGY | Facility: CLINIC | Age: 69
Setting detail: THERAPIES SERIES
Discharge: STILL ADMITTED OR ADMINISTRATIVE | End: 2019-01-04
Attending: PSYCHIATRY & NEUROLOGY

## 2019-01-04 DIAGNOSIS — F02.80 LATE ONSET ALZHEIMER'S DISEASE WITHOUT BEHAVIORAL DISTURBANCE (H): ICD-10-CM

## 2019-01-04 DIAGNOSIS — G30.1 LATE ONSET ALZHEIMER'S DISEASE WITHOUT BEHAVIORAL DISTURBANCE (H): ICD-10-CM

## 2019-01-08 ENCOUNTER — COMMUNICATION - HEALTHEAST (OUTPATIENT)
Dept: NEUROLOGY | Facility: CLINIC | Age: 69
End: 2019-01-08

## 2019-01-10 ENCOUNTER — COMMUNICATION - HEALTHEAST (OUTPATIENT)
Dept: NEUROLOGY | Facility: CLINIC | Age: 69
End: 2019-01-10

## 2019-01-10 DIAGNOSIS — F02.80 LATE ONSET ALZHEIMER'S DISEASE WITHOUT BEHAVIORAL DISTURBANCE (H): ICD-10-CM

## 2019-01-10 DIAGNOSIS — G30.1 LATE ONSET ALZHEIMER'S DISEASE WITHOUT BEHAVIORAL DISTURBANCE (H): ICD-10-CM

## 2019-01-16 ENCOUNTER — COMMUNICATION - HEALTHEAST (OUTPATIENT)
Dept: NEUROLOGY | Facility: CLINIC | Age: 69
End: 2019-01-16

## 2019-01-16 DIAGNOSIS — F02.80 LATE ONSET ALZHEIMER'S DISEASE WITHOUT BEHAVIORAL DISTURBANCE (H): ICD-10-CM

## 2019-01-16 DIAGNOSIS — G30.1 LATE ONSET ALZHEIMER'S DISEASE WITHOUT BEHAVIORAL DISTURBANCE (H): ICD-10-CM

## 2019-01-21 ENCOUNTER — RECORDS - HEALTHEAST (OUTPATIENT)
Dept: ADMINISTRATIVE | Facility: OTHER | Age: 69
End: 2019-01-21

## 2019-01-21 ENCOUNTER — COMMUNICATION - HEALTHEAST (OUTPATIENT)
Dept: NEUROLOGY | Facility: CLINIC | Age: 69
End: 2019-01-21

## 2019-01-22 ENCOUNTER — COMMUNICATION - HEALTHEAST (OUTPATIENT)
Dept: NEUROLOGY | Facility: CLINIC | Age: 69
End: 2019-01-22

## 2019-01-22 ENCOUNTER — OFFICE VISIT (OUTPATIENT)
Dept: DERMATOLOGY | Facility: CLINIC | Age: 69
End: 2019-01-22
Payer: MEDICARE

## 2019-01-22 ENCOUNTER — RECORDS - HEALTHEAST (OUTPATIENT)
Dept: ADMINISTRATIVE | Facility: OTHER | Age: 69
End: 2019-01-22

## 2019-01-22 DIAGNOSIS — L82.1 SK (SEBORRHEIC KERATOSIS): ICD-10-CM

## 2019-01-22 DIAGNOSIS — L72.0 EIC (EPIDERMAL INCLUSION CYST): Primary | ICD-10-CM

## 2019-01-22 DIAGNOSIS — L40.8 SEBOPSORIASIS: ICD-10-CM

## 2019-01-22 RX ORDER — CLOBETASOL PROPIONATE 0.5 MG/ML
SOLUTION TOPICAL 2 TIMES DAILY
Qty: 60 ML | Refills: 11 | Status: SHIPPED | OUTPATIENT
Start: 2019-01-22 | End: 2021-02-08

## 2019-01-22 RX ORDER — DESONIDE 0.5 MG/G
OINTMENT TOPICAL 2 TIMES DAILY
Qty: 60 G | Refills: 11 | Status: SHIPPED | OUTPATIENT
Start: 2019-01-22 | End: 2021-08-03

## 2019-01-22 RX ORDER — KETOCONAZOLE 20 MG/ML
SHAMPOO TOPICAL
Qty: 240 ML | Refills: 11 | Status: SHIPPED | OUTPATIENT
Start: 2019-01-22 | End: 2021-10-20

## 2019-01-22 ASSESSMENT — PAIN SCALES - GENERAL: PAINLEVEL: NO PAIN (0)

## 2019-01-22 NOTE — PROGRESS NOTES
"[facility] Dermatology Note      Dermatology Problem List:  1. Sebopsoriasis    -Current tx: Desonide 0.05% solution, ketoconazole 2% shampoo, clobetasol 0.05% solution  2. Seborrheic dermatitis     Encounter Date: Jan 22, 2019    CC:  Chief Complaint   Patient presents with     Skin Check     Catalino is here today for a skin check- Catalino states \"I have some noticable spots on my scalp, face, and neck\".          History of Present Illness:  Mr. Catalino Coronado is a 68 year old male who presents for a skin check. He was last seen 5/9/16 by Sussy Chamorro MD, when he continued his treatment regimen including desonide ointment, ketoconazole shampoo, and clobetasol solution. He states that when he uses the prescribed treatment regimen, he notices improvement for a about a week, but the flaking then returns and persists. He also notes a lesion of concern on his suprapubic region that he would like evaluated. He also notes a scar on his back that is intermittently itchy. The patient voices no other concerns.      Past Medical History:   Patient Active Problem List   Diagnosis     Sebaceous cyst     Late onset Alzheimer's disease without behavioral disturbance     Past Medical History:   Diagnosis Date     BPH (benign prostatic hyperplasia)      Cataracts, bilateral      Cerebral aneurysm, nonruptured      Dementia      Hypercholesterolemia      Hypertension      Past Surgical History:   Procedure Laterality Date     APPENDECTOMY       BIOPSY OF SKIN LESION       EXCISE MASS BACK  2/8/2013    Procedure: EXCISE MASS BACK;  Excision of Sebaceous Back Cyst ;  Surgeon: Sean Randhawa MD;  Location: [facility] OR     PHACOEMULSIFICATION CLEAR CORNEA WITH STANDARD INTRAOCULAR LENS IMPLANT Right 10/22/2018    Procedure: Right Eye Phacoemulsification with Standard Intraocular Lens Placement;  Surgeon: Elio Manzo MD;  Location: [facility] OR     PHACOEMULSIFICATION WITH STANDARD INTRAOCULAR LENS IMPLANT " Left 10/8/2018    Procedure: PHACOEMULSIFICATION WITH STANDARD INTRAOCULAR LENS IMPLANT;  Left Eye Phacoemulsification with Intraocular Lens;  Surgeon: Elio Manzo MD;  Location: [facility] OR     pilonidal cyst removal          Social History:  Patient reports that he quit smoking about 8 years ago. His smoking use included cigarettes. he has never used smokeless tobacco. He reports that he drinks alcohol. He reports that he does not use drugs.    Family History:  Family History   Problem Relation Age of Onset     Heart Disease Father      Dementia Mother      Cancer No family hx of         no skin cancer       Medications:  Current Outpatient Medications   Medication Sig Dispense Refill     ASPIRIN Take 81 mg by mouth every morning        ATENOLOL Take 100 mg by mouth every morning        Carboxymethylcellulose Sod PF (REFRESH PLUS) 0.5 % SOLN ophthalmic solution Place 1 drop into both eyes as needed       clobetasol (TEMOVATE) 0.05 % external solution Apply topically 2 times daily To scaly and itchy areas on scalp until no rash nor itch. Hold until patient requests. 60 mL 11     desonide (DESOWEN) 0.05 % ointment Apply topically 2 times daily To rash at eyebrows and on face as needed until clear. Hold until patient requests. 60 g 11     donepezil (ARICEPT) 10 MG tablet Take 5 mg by mouth 2 times daily as needed       hydrochlorothiazide (MICROZIDE) 12.5 MG capsule TAKE ONE CAPSULE BY MOUTH EVERY MORNING.       ketoconazole (NIZORAL) 2 % shampoo To entire wet scalp and then wash off after 5 minutes three times a week. Hold until patient requests. 240 mL 11     magnesium 250 MG tablet Take 1 tablet by mouth At Bedtime        neomycin-polymyxin-dexamethasone (MAXITROL) 3.5-71894-0.1 ophthalmic ointment Place 0.5 inches into both eyes At Bedtime 2 Tube 3     sildenafil (VIAGRA) 50 MG tablet Take 1 tablet (50 mg) by mouth as needed for erectile dysfunction 30 tablet 3     simvastatin (ZOCOR) 40 MG tablet TAKE 1  TABLET BY MOUTH NIGHTLY AT BEDTIME.       tamsulosin (FLOMAX) 0.4 MG 24 hr capsule Take 1 capsule (0.4 mg) by mouth daily (Patient taking differently: Take 0.4 mg by mouth every morning ) 90 capsule 3     tobramycin-dexamethasone (TOBRADEX) 0.3-0.1 % ophthalmic ointment 0.5 inch strip each eye at night 1 Tube 1     vitamin E (TOCOPHEROL) 1000 UNIT capsule Take 1,000 Units by mouth At Bedtime          No Known Allergies    Review of Systems:  -As per HPI  -Constitutional: Otherwise feeling well today, in usual state of health.  -HEENT: Patient denies nonhealing oral sores.  -Skin: As above in HPI. No additional skin concerns.    Physical exam:  Vitals: There were no vitals taken for this visit.  GEN: This is a well developed, well-nourished male in no acute distress, in a pleasant mood.    SKIN: Total skin excluding the undergarment areas was performed. The exam included the head/face, neck, both arms, chest, back, abdomen, both legs, digits and/or nails.   -Semicircular keloid scar on the central back  -Thin eczematous appearing plaques on ear canals and conchal bowels bilaterally  -well demarcated erythematous plaque with micaceous scale on the R occiput  -There are waxy stuck on tan to brown papules on the back  -2.5 cm pigment plaque with numerous pseudo cystic openings on the mons   -There is a raised dome shaped 5.5x4.4cm nodule with a central punctum located on chest.  -No other lesions of concern on areas examined.       Impression/Plan:  1. Sebopsoriasis - improved    Continue Desonide 0.05% solution, ketoconazole 2% shampoo, clobetasol 0.05% solution    Advised the use of OTC T-gel    2. Cyst and Seborrheic keratosis, non irritated    No further intervention required. Patient to report changes.       Follow-up in 1 year, earlier for new or changing lesions.       Staff Involved:  Scribe/Staff    Scribe Disclosure  JOAQUIN, Dominic Najjar, am serving as a scribe to document services personally performed by   Wilbur Matute MD, based on data collection and the provider's statements to me.     Staff attestation:  The documentation recorded by the scribe accurately reflects the services I personally performed and the decisions I personally made. I have made edits where needed.    Wilbur Matute MD  Staff Dermatologist and Dermatopathologist  , Department of Dermatology

## 2019-01-22 NOTE — NURSING NOTE
"Dermatology Rooming Note    Catalino Coronado's goals for this visit include:   Chief Complaint   Patient presents with     Skin Check     Catalino is here today for a skin check- Catalino states \"I have some noticable spots on my scalp, face, and neck\".      Melissa Sharma, RMVIC     "

## 2019-01-22 NOTE — LETTER
"1/22/2019       RE: Catalino Coronado  [address] [place] [address]     Dear Colleague,    Thank you for referring your patient, Catalino Coronado, to the [facility] DERMATOLOGY at [facility]. Please see a copy of my visit note below.    [facility] Dermatology Note      Dermatology Problem List:  1. Sebopsoriasis    -Current tx: Desonide 0.05% solution, ketoconazole 2% shampoo, clobetasol 0.05% solution  2. Seborrheic dermatitis     Encounter Date: Jan 22, 2019    CC:  Chief Complaint   Patient presents with     Skin Check     Catalino is here today for a skin check- Catalino states \"I have some noticable spots on my scalp, face, and neck\".          History of Present Illness:  Mr. Catalino Coronado is a 68 year old male who presents for a skin check. He was last seen 5/9/16 by Sussy Chamorro MD, when he continued his treatment regimen including desonide ointment, ketoconazole shampoo, and clobetasol solution. He states that when he uses the prescribed treatment regimen, he notices improvement for a about a week, but the flaking then returns and persists. He also notes a lesion of concern on his suprapubic region that he would like evaluated. He also notes a scar on his back that is intermittently itchy. The patient voices no other concerns.      Past Medical History:   Patient Active Problem List   Diagnosis     Sebaceous cyst     Late onset Alzheimer's disease without behavioral disturbance     Past Medical History:   Diagnosis Date     BPH (benign prostatic hyperplasia)      Cataracts, bilateral      Cerebral aneurysm, nonruptured      Dementia      Hypercholesterolemia      Hypertension      Past Surgical History:   Procedure Laterality Date     APPENDECTOMY       BIOPSY OF SKIN LESION       EXCISE MASS BACK  2/8/2013    Procedure: EXCISE MASS BACK;  Excision of Sebaceous Back Cyst ;  Surgeon: Sean Randhawa MD;  Location:  OR     " PHACOEMULSIFICATION CLEAR CORNEA WITH STANDARD INTRAOCULAR LENS IMPLANT Right 10/22/2018    Procedure: Right Eye Phacoemulsification with Standard Intraocular Lens Placement;  Surgeon: Elio Manzo MD;  Location: [facility] OR     PHACOEMULSIFICATION WITH STANDARD INTRAOCULAR LENS IMPLANT Left 10/8/2018    Procedure: PHACOEMULSIFICATION WITH STANDARD INTRAOCULAR LENS IMPLANT;  Left Eye Phacoemulsification with Intraocular Lens;  Surgeon: Elio Manzo MD;  Location: [facility] OR     pilonidal cyst removal          Social History:  Patient reports that he quit smoking about 8 years ago. His smoking use included cigarettes. he has never used smokeless tobacco. He reports that he drinks alcohol. He reports that he does not use drugs.    Family History:  Family History   Problem Relation Age of Onset     Heart Disease Father      Dementia Mother      Cancer No family hx of         no skin cancer       Medications:  Current Outpatient Medications   Medication Sig Dispense Refill     ASPIRIN Take 81 mg by mouth every morning        ATENOLOL Take 100 mg by mouth every morning        Carboxymethylcellulose Sod PF (REFRESH PLUS) 0.5 % SOLN ophthalmic solution Place 1 drop into both eyes as needed       clobetasol (TEMOVATE) 0.05 % external solution Apply topically 2 times daily To scaly and itchy areas on scalp until no rash nor itch. Hold until patient requests. 60 mL 11     desonide (DESOWEN) 0.05 % ointment Apply topically 2 times daily To rash at eyebrows and on face as needed until clear. Hold until patient requests. 60 g 11     donepezil (ARICEPT) 10 MG tablet Take 5 mg by mouth 2 times daily as needed       hydrochlorothiazide (MICROZIDE) 12.5 MG capsule TAKE ONE CAPSULE BY MOUTH EVERY MORNING.       ketoconazole (NIZORAL) 2 % shampoo To entire wet scalp and then wash off after 5 minutes three times a week. Hold until patient requests. 240 mL 11     magnesium 250 MG tablet Take 1 tablet by mouth At Bedtime         neomycin-polymyxin-dexamethasone (MAXITROL) 3.5-17612-4.1 ophthalmic ointment Place 0.5 inches into both eyes At Bedtime 2 Tube 3     sildenafil (VIAGRA) 50 MG tablet Take 1 tablet (50 mg) by mouth as needed for erectile dysfunction 30 tablet 3     simvastatin (ZOCOR) 40 MG tablet TAKE 1 TABLET BY MOUTH NIGHTLY AT BEDTIME.       tamsulosin (FLOMAX) 0.4 MG 24 hr capsule Take 1 capsule (0.4 mg) by mouth daily (Patient taking differently: Take 0.4 mg by mouth every morning ) 90 capsule 3     tobramycin-dexamethasone (TOBRADEX) 0.3-0.1 % ophthalmic ointment 0.5 inch strip each eye at night 1 Tube 1     vitamin E (TOCOPHEROL) 1000 UNIT capsule Take 1,000 Units by mouth At Bedtime          No Known Allergies    Review of Systems:  -As per HPI  -Constitutional: Otherwise feeling well today, in usual state of health.  -HEENT: Patient denies nonhealing oral sores.  -Skin: As above in HPI. No additional skin concerns.    Physical exam:  Vitals: There were no vitals taken for this visit.  GEN: This is a well developed, well-nourished male in no acute distress, in a pleasant mood.    SKIN: Total skin excluding the undergarment areas was performed. The exam included the head/face, neck, both arms, chest, back, abdomen, both legs, digits and/or nails.   -Semicircular keloid scar on the central back  -Thin eczematous appearing plaques on ear canals and conchal bowels bilaterally  -well demarcated erythematous plaque with micaceous scale on the R occiput  -There are waxy stuck on tan to brown papules on the back  -2.5 cm pigment plaque with numerous pseudo cystic openings on the mons   -There is a raised dome shaped 5.5x4.4cm nodule with a central punctum located on chest.  -No other lesions of concern on areas examined.       Impression/Plan:  1. Sebopsoriasis - improved    Continue Desonide 0.05% solution, ketoconazole 2% shampoo, clobetasol 0.05% solution    Advised the use of OTC T-gel    2. Cyst and Seborrheic keratosis, non  irritated    No further intervention required. Patient to report changes.       Follow-up in 1 year, earlier for new or changing lesions.       Staff Involved:  Scribe/Staff    Scribe Disclosure  I, Dominic Najjar, am serving as a scribe to document services personally performed by Dr. Wilbur Matute MD, based on data collection and the provider's statements to me.     Staff attestation:  The documentation recorded by the scribe accurately reflects the services I personally performed and the decisions I personally made. I have made edits where needed.      Wilbur Matute MD

## 2019-01-23 ENCOUNTER — COMMUNICATION - HEALTHEAST (OUTPATIENT)
Dept: NEUROLOGY | Facility: CLINIC | Age: 69
End: 2019-01-23

## 2019-01-23 DIAGNOSIS — F41.1 ANXIETY STATE: ICD-10-CM

## 2019-02-11 ENCOUNTER — OFFICE VISIT (OUTPATIENT)
Dept: NEUROSURGERY | Facility: CLINIC | Age: 69
End: 2019-02-11
Payer: MEDICARE

## 2019-02-11 ENCOUNTER — RECORDS - HEALTHEAST (OUTPATIENT)
Dept: ADMINISTRATIVE | Facility: OTHER | Age: 69
End: 2019-02-11

## 2019-02-11 ENCOUNTER — ANCILLARY PROCEDURE (OUTPATIENT)
Dept: MRI IMAGING | Facility: CLINIC | Age: 69
End: 2019-02-11
Attending: NEUROLOGICAL SURGERY
Payer: MEDICARE

## 2019-02-11 ENCOUNTER — COMMUNICATION - HEALTHEAST (OUTPATIENT)
Dept: NEUROLOGY | Facility: CLINIC | Age: 69
End: 2019-02-11

## 2019-02-11 VITALS — HEART RATE: 66 BPM | SYSTOLIC BLOOD PRESSURE: 148 MMHG | OXYGEN SATURATION: 97 % | DIASTOLIC BLOOD PRESSURE: 67 MMHG

## 2019-02-11 DIAGNOSIS — I67.1 CEREBRAL ANEURYSM, NONRUPTURED: ICD-10-CM

## 2019-02-11 DIAGNOSIS — I67.1 CEREBRAL ANEURYSM, NONRUPTURED: Primary | ICD-10-CM

## 2019-02-11 RX ORDER — MEMANTINE HYDROCHLORIDE 10 MG/1
TABLET ORAL
COMMUNITY
Start: 2019-01-07 | End: 2021-07-08

## 2019-02-11 ASSESSMENT — ENCOUNTER SYMPTOMS
EYE WATERING: 0
DISTURBANCES IN COORDINATION: 0
WEAKNESS: 0
POOR WOUND HEALING: 0
SEIZURES: 0
LOSS OF CONSCIOUSNESS: 0
DIZZINESS: 0
SKIN CHANGES: 0
EYE IRRITATION: 1
MEMORY LOSS: 1
EYE REDNESS: 0
HEADACHES: 0
NAIL CHANGES: 0
PARALYSIS: 0
TREMORS: 0
EYE PAIN: 0
NUMBNESS: 0
DOUBLE VISION: 0
TINGLING: 0
SPEECH CHANGE: 0

## 2019-02-11 ASSESSMENT — PAIN SCALES - GENERAL: PAINLEVEL: NO PAIN (0)

## 2019-02-11 NOTE — PATIENT INSTRUCTIONS
Thank you for choosing [facility] for your care.      F/u with Dr. Duncan in 1 year with repeat MRA of the [place].     Please contact Dasha Parks RN at [phone] with any questions/concerns.     Thank you for trusting us with your care.

## 2019-02-11 NOTE — PROGRESS NOTES
Service Date: 2019      I had the pleasure to see Vahid today in my Neurosurgical Clinic for followup evaluation of an unruptured ACom aneurysm.      Catalino is a 68-year-old gentleman who had presented with an incidental ACom aneurysm.  The aneurysm of the time measured about 4.0 mm.  We performed a cerebral angiogram and then discussed this case at our multidisciplinary cerebrovascular conference.  Ultimately, the recommendation was to observe with serial imaging.  Vahid comes back now 6 months later for a repeat MR angiogram.      On review of the MR angiogram, the aneurysm is stable in size.      At this point in time, we will continue to observe and we will repeat the MR angiogram in 1 year.      Overall, I spent approximately 20 minutes with Vahid, with the majority of this time spent in consultation and developing a treatment plan.         AHMET REECE MD             D: 2019   T: 2019   MT: RENEE      Name:     CATALINO MILTON   MRN:      [id number]        Account:      [id number]   :      1950           Service Date: 2019      Document: [id number]

## 2019-02-11 NOTE — LETTER
2019       RE: Catalino Milton  [address] [place] [address]     Dear Colleague,    Thank you for referring your patient, Catalino Milton, to the [facility] NEUROSURGERY at [facility]. Please see a copy of my visit note below.    Service Date: 2019      I had the pleasure to see Vahid today in my Neurosurgical Clinic for followup evaluation of an unruptured ACom aneurysm.      Catalino is a 68-year-old gentleman who had presented with an incidental ACom aneurysm.  The aneurysm of the time measured about 4.0 mm.  We performed a cerebral angiogram and then discussed this case at our multidisciplinary cerebrovascular conference.  Ultimately, the recommendation was to observe with serial imaging.  Vahid comes back now 6 months later for a repeat MR angiogram.      On review of the MR angiogram, the aneurysm is stable in size.      At this point in time, we will continue to observe and we will repeat the MR angiogram in 1 year.      Overall, I spent approximately 20 minutes with Vahid, with the majority of this time spent in consultation and developing a treatment plan.      AHMET REECE MD       D: 2019   T: 2019   MT: RENEE      Name:     CATALINO MILTON   MRN:      [id number]        Account:      [id number]   :      1950           Service Date: 2019      Document: [id number]

## 2019-02-13 ENCOUNTER — COMMUNICATION - HEALTHEAST (OUTPATIENT)
Dept: NEUROLOGY | Facility: CLINIC | Age: 69
End: 2019-02-13

## 2019-02-14 ENCOUNTER — COMMUNICATION - HEALTHEAST (OUTPATIENT)
Dept: FAMILY MEDICINE | Facility: CLINIC | Age: 69
End: 2019-02-14

## 2019-02-14 DIAGNOSIS — I10 ESSENTIAL HYPERTENSION, MALIGNANT: ICD-10-CM

## 2019-03-30 ENCOUNTER — COMMUNICATION - HEALTHEAST (OUTPATIENT)
Dept: FAMILY MEDICINE | Facility: CLINIC | Age: 69
End: 2019-03-30

## 2019-03-30 DIAGNOSIS — N40.0 BPH (BENIGN PROSTATIC HYPERPLASIA): ICD-10-CM

## 2019-03-30 DIAGNOSIS — I10 ESSENTIAL HYPERTENSION: ICD-10-CM

## 2019-04-15 ENCOUNTER — COMMUNICATION - HEALTHEAST (OUTPATIENT)
Dept: NEUROLOGY | Facility: CLINIC | Age: 69
End: 2019-04-15

## 2019-04-15 DIAGNOSIS — F32.1 MAJOR DEPRESSIVE DISORDER, SINGLE EPISODE, MODERATE (H): ICD-10-CM

## 2019-04-15 DIAGNOSIS — F02.80 LATE ONSET ALZHEIMER'S DISEASE WITHOUT BEHAVIORAL DISTURBANCE (H): ICD-10-CM

## 2019-04-15 DIAGNOSIS — G30.1 LATE ONSET ALZHEIMER'S DISEASE WITHOUT BEHAVIORAL DISTURBANCE (H): ICD-10-CM

## 2019-05-15 ENCOUNTER — COMMUNICATION - HEALTHEAST (OUTPATIENT)
Dept: FAMILY MEDICINE | Facility: CLINIC | Age: 69
End: 2019-05-15

## 2019-05-15 DIAGNOSIS — I10 ESSENTIAL HYPERTENSION, MALIGNANT: ICD-10-CM

## 2019-07-10 ENCOUNTER — HOSPITAL ENCOUNTER (OUTPATIENT)
Dept: NEUROLOGY | Facility: CLINIC | Age: 69
Setting detail: THERAPIES SERIES
Discharge: STILL ADMITTED OR ADMINISTRATIVE | End: 2019-07-10
Attending: PSYCHIATRY & NEUROLOGY

## 2019-07-10 DIAGNOSIS — F02.80 LATE ONSET ALZHEIMER'S DISEASE WITHOUT BEHAVIORAL DISTURBANCE (H): ICD-10-CM

## 2019-07-10 DIAGNOSIS — G30.1 LATE ONSET ALZHEIMER'S DISEASE WITHOUT BEHAVIORAL DISTURBANCE (H): ICD-10-CM

## 2019-08-06 ENCOUNTER — COMMUNICATION - HEALTHEAST (OUTPATIENT)
Dept: NEUROLOGY | Facility: CLINIC | Age: 69
End: 2019-08-06

## 2019-08-06 DIAGNOSIS — G30.1 LATE ONSET ALZHEIMER'S DISEASE WITHOUT BEHAVIORAL DISTURBANCE (H): ICD-10-CM

## 2019-08-06 DIAGNOSIS — F02.80 LATE ONSET ALZHEIMER'S DISEASE WITHOUT BEHAVIORAL DISTURBANCE (H): ICD-10-CM

## 2019-08-11 ENCOUNTER — COMMUNICATION - HEALTHEAST (OUTPATIENT)
Dept: FAMILY MEDICINE | Facility: CLINIC | Age: 69
End: 2019-08-11

## 2019-08-11 DIAGNOSIS — I10 ESSENTIAL HYPERTENSION, MALIGNANT: ICD-10-CM

## 2019-08-12 ENCOUNTER — COMMUNICATION - HEALTHEAST (OUTPATIENT)
Dept: FAMILY MEDICINE | Facility: CLINIC | Age: 69
End: 2019-08-12

## 2019-08-12 DIAGNOSIS — E78.00 PURE HYPERCHOLESTEROLEMIA: ICD-10-CM

## 2019-09-03 ENCOUNTER — COMMUNICATION - HEALTHEAST (OUTPATIENT)
Dept: FAMILY MEDICINE | Facility: CLINIC | Age: 69
End: 2019-09-03

## 2019-09-04 ENCOUNTER — AMBULATORY - HEALTHEAST (OUTPATIENT)
Dept: FAMILY MEDICINE | Facility: CLINIC | Age: 69
End: 2019-09-04

## 2019-09-04 DIAGNOSIS — N52.9 MALE ERECTILE DISORDER: ICD-10-CM

## 2019-10-05 ENCOUNTER — HEALTH MAINTENANCE LETTER (OUTPATIENT)
Age: 69
End: 2019-10-05

## 2019-10-28 ENCOUNTER — COMMUNICATION - HEALTHEAST (OUTPATIENT)
Dept: NEUROLOGY | Facility: CLINIC | Age: 69
End: 2019-10-28

## 2019-10-28 DIAGNOSIS — F32.1 MAJOR DEPRESSIVE DISORDER, SINGLE EPISODE, MODERATE (H): ICD-10-CM

## 2019-11-05 ENCOUNTER — HOSPITAL ENCOUNTER (OUTPATIENT)
Dept: NEUROLOGY | Facility: CLINIC | Age: 69
Setting detail: THERAPIES SERIES
Discharge: STILL ADMITTED OR ADMINISTRATIVE | End: 2019-11-05
Attending: PSYCHIATRY & NEUROLOGY

## 2019-11-05 DIAGNOSIS — F02.80 LATE ONSET ALZHEIMER'S DISEASE WITHOUT BEHAVIORAL DISTURBANCE (H): ICD-10-CM

## 2019-11-05 DIAGNOSIS — G30.1 LATE ONSET ALZHEIMER'S DISEASE WITHOUT BEHAVIORAL DISTURBANCE (H): ICD-10-CM

## 2019-11-07 ENCOUNTER — COMMUNICATION - HEALTHEAST (OUTPATIENT)
Dept: FAMILY MEDICINE | Facility: CLINIC | Age: 69
End: 2019-11-07

## 2019-11-07 DIAGNOSIS — I10 ESSENTIAL HYPERTENSION, MALIGNANT: ICD-10-CM

## 2019-12-04 ENCOUNTER — COMMUNICATION - HEALTHEAST (OUTPATIENT)
Dept: FAMILY MEDICINE | Facility: CLINIC | Age: 69
End: 2019-12-04

## 2019-12-04 ENCOUNTER — OFFICE VISIT - HEALTHEAST (OUTPATIENT)
Dept: FAMILY MEDICINE | Facility: CLINIC | Age: 69
End: 2019-12-04

## 2019-12-04 DIAGNOSIS — I10 ESSENTIAL HYPERTENSION: ICD-10-CM

## 2019-12-04 DIAGNOSIS — K75.81 STEATOHEPATITIS: ICD-10-CM

## 2019-12-04 DIAGNOSIS — Z13.220 SCREENING CHOLESTEROL LEVEL: ICD-10-CM

## 2019-12-04 DIAGNOSIS — R74.8 ABNORMAL LEVELS OF OTHER SERUM ENZYMES: ICD-10-CM

## 2019-12-04 DIAGNOSIS — N40.1 BENIGN PROSTATIC HYPERPLASIA WITH LOWER URINARY TRACT SYMPTOMS, SYMPTOM DETAILS UNSPECIFIED: ICD-10-CM

## 2019-12-04 LAB
ALBUMIN SERPL-MCNC: 4.4 G/DL (ref 3.5–5)
ALP SERPL-CCNC: 57 U/L (ref 45–120)
ALT SERPL W P-5'-P-CCNC: 35 U/L (ref 0–45)
ANION GAP SERPL CALCULATED.3IONS-SCNC: 11 MMOL/L (ref 5–18)
AST SERPL W P-5'-P-CCNC: 24 U/L (ref 0–40)
BILIRUB SERPL-MCNC: 1.3 MG/DL (ref 0–1)
BUN SERPL-MCNC: 19 MG/DL (ref 8–22)
CALCIUM SERPL-MCNC: 9.6 MG/DL (ref 8.5–10.5)
CHLORIDE BLD-SCNC: 105 MMOL/L (ref 98–107)
CO2 SERPL-SCNC: 24 MMOL/L (ref 22–31)
CREAT SERPL-MCNC: 1.21 MG/DL (ref 0.7–1.3)
ERYTHROCYTE [DISTWIDTH] IN BLOOD BY AUTOMATED COUNT: 10.8 % (ref 11–14.5)
GFR SERPL CREATININE-BSD FRML MDRD: 59 ML/MIN/1.73M2
GLUCOSE BLD-MCNC: 107 MG/DL (ref 70–125)
HCT VFR BLD AUTO: 45.2 % (ref 40–54)
HGB BLD-MCNC: 15.4 G/DL (ref 14–18)
LDLC SERPL CALC-MCNC: 82 MG/DL
MCH RBC QN AUTO: 32.8 PG (ref 27–34)
MCHC RBC AUTO-ENTMCNC: 34 G/DL (ref 32–36)
MCV RBC AUTO: 97 FL (ref 80–100)
PLATELET # BLD AUTO: 350 THOU/UL (ref 140–440)
PMV BLD AUTO: 6.9 FL (ref 7–10)
POTASSIUM BLD-SCNC: 4.2 MMOL/L (ref 3.5–5)
PROT SERPL-MCNC: 7.3 G/DL (ref 6–8)
RBC # BLD AUTO: 4.68 MILL/UL (ref 4.4–6.2)
SODIUM SERPL-SCNC: 140 MMOL/L (ref 136–145)
WBC: 8.4 THOU/UL (ref 4–11)

## 2019-12-04 ASSESSMENT — MIFFLIN-ST. JEOR: SCORE: 1790.91

## 2019-12-05 ENCOUNTER — COMMUNICATION - HEALTHEAST (OUTPATIENT)
Dept: FAMILY MEDICINE | Facility: CLINIC | Age: 69
End: 2019-12-05

## 2020-01-26 ENCOUNTER — COMMUNICATION - HEALTHEAST (OUTPATIENT)
Dept: FAMILY MEDICINE | Facility: CLINIC | Age: 70
End: 2020-01-26

## 2020-01-28 ENCOUNTER — COMMUNICATION - HEALTHEAST (OUTPATIENT)
Dept: FAMILY MEDICINE | Facility: CLINIC | Age: 70
End: 2020-01-28

## 2020-01-28 DIAGNOSIS — F41.1 ANXIETY STATE: ICD-10-CM

## 2020-01-28 DIAGNOSIS — F02.80 LATE ONSET ALZHEIMER'S DISEASE WITHOUT BEHAVIORAL DISTURBANCE (H): ICD-10-CM

## 2020-01-28 DIAGNOSIS — G30.1 LATE ONSET ALZHEIMER'S DISEASE WITHOUT BEHAVIORAL DISTURBANCE (H): ICD-10-CM

## 2020-01-29 ENCOUNTER — AMBULATORY - HEALTHEAST (OUTPATIENT)
Dept: FAMILY MEDICINE | Facility: CLINIC | Age: 70
End: 2020-01-29

## 2020-01-29 DIAGNOSIS — F41.1 ANXIETY STATE: ICD-10-CM

## 2020-01-30 ENCOUNTER — COMMUNICATION - HEALTHEAST (OUTPATIENT)
Dept: FAMILY MEDICINE | Facility: CLINIC | Age: 70
End: 2020-01-30

## 2020-01-30 DIAGNOSIS — I10 ESSENTIAL HYPERTENSION, MALIGNANT: ICD-10-CM

## 2020-02-04 ENCOUNTER — COMMUNICATION - HEALTHEAST (OUTPATIENT)
Dept: SCHEDULING | Facility: CLINIC | Age: 70
End: 2020-02-04

## 2020-02-10 ENCOUNTER — HEALTH MAINTENANCE LETTER (OUTPATIENT)
Age: 70
End: 2020-02-10

## 2020-02-11 ENCOUNTER — OFFICE VISIT (OUTPATIENT)
Dept: NEUROSURGERY | Facility: CLINIC | Age: 70
End: 2020-02-11
Payer: MEDICARE

## 2020-02-11 ENCOUNTER — ANCILLARY PROCEDURE (OUTPATIENT)
Dept: MRI IMAGING | Facility: CLINIC | Age: 70
End: 2020-02-11
Attending: NEUROLOGICAL SURGERY
Payer: MEDICARE

## 2020-02-11 ENCOUNTER — RECORDS - HEALTHEAST (OUTPATIENT)
Dept: ADMINISTRATIVE | Facility: OTHER | Age: 70
End: 2020-02-11

## 2020-02-11 VITALS
OXYGEN SATURATION: 97 % | SYSTOLIC BLOOD PRESSURE: 127 MMHG | WEIGHT: 214 LBS | HEART RATE: 71 BPM | RESPIRATION RATE: 15 BRPM | BODY MASS INDEX: 26.75 KG/M2 | DIASTOLIC BLOOD PRESSURE: 78 MMHG

## 2020-02-11 DIAGNOSIS — I67.1 CEREBRAL ANEURYSM, NONRUPTURED: Primary | ICD-10-CM

## 2020-02-11 DIAGNOSIS — I67.1 CEREBRAL ANEURYSM, NONRUPTURED: ICD-10-CM

## 2020-02-11 RX ORDER — PRAZOSIN HYDROCHLORIDE 1 MG/1
1 CAPSULE ORAL AT BEDTIME
COMMUNITY
End: 2021-07-08 | Stop reason: SINTOL

## 2020-02-11 RX ORDER — BUPROPION HYDROCHLORIDE 150 MG/1
150 TABLET ORAL
COMMUNITY
Start: 2019-10-28 | End: 2021-07-29

## 2020-02-11 ASSESSMENT — PAIN SCALES - GENERAL: PAINLEVEL: NO PAIN (0)

## 2020-02-11 NOTE — PROGRESS NOTES
Service Date: 2020      Jamari Sloan MD   [address] [address]      RE: Catalino Coronado   MRN: [id number]   : 1950       Dear Dr. Sloan:      I had the pleasure to see Vahid today in my Neurosurgical Clinic for followup evaluation of an ACom aneurysm.  I have been following Vahid now for about the last year and a half for this ACOM aneurysm that measures less than 4 mm in size.  He has had no new neurologic symptoms.  Did a repeat MR angiogram today that demonstrates that the aneurysm is still the same size, less than 4 mm in size.  At this point in time, we will continue to follow this and would like to bring him back in a year with a repeat MR angiogram.      Thank you for your trust and the opportunity to participate in his care.  If you have any further questions or concerns, please feel free to contact me on my cell phone at [phone].      With kindest personal regards,       Ahmet Reece MD      Overall, I spent approximately 15 minutes with Vahid, with the majority of this time spent in consultation and developing a treatment plan.         AHMET REECE MD             D: 2020   T: 2020   MT: BOBBY      Name:     CATALINO CORONADO   MRN:      [id number]        Account:      [id number]   :      1950           Service Date: 2020      Document: [id number]

## 2020-02-11 NOTE — NURSING NOTE
Chief Complaint   Patient presents with     Intracranial Aneurysm     [facility] RETURN CEREBRAL ANEURYSM, NONRUPTURED MRA PRIOR        Mario High EMT

## 2020-02-11 NOTE — PATIENT INSTRUCTIONS
Follow up with Dr Duncan in one year with repeat MRA Brain without contrast.    Thank you for using "[employer]"

## 2020-02-11 NOTE — LETTER
RE: Catalino Milton  [address] [place] [address]     Dear Colleague,    Thank you for referring your patient, Catalino Milton, to the [facility] NEUROSURGERY at [facility]. Please see a copy of my visit note below.    Service Date: 2020      Jamari Sloan MD   [address] [address]      RE: Catalino Milton   MRN: [id number]   : 1950       Dear Dr. Sloan:      I had the pleasure to see Vahid today in my Neurosurgical Clinic for followup evaluation of an ACom aneurysm.  I have been following Vahid now for about the last year and a half for this ACOM aneurysm that measures less than 4 mm in size.  He has had no new neurologic symptoms.  Did a repeat MR angiogram today that demonstrates that the aneurysm is still the same size, less than 4 mm in size.  At this point in time, we will continue to follow this and would like to bring him back in a year with a repeat MR angiogram.      Thank you for your trust and the opportunity to participate in his care.  If you have any further questions or concerns, please feel free to contact me on my cell phone at [phone].      With kindest personal regards,       Chevy Duncan MD      Overall, I spent approximately 15 minutes with Vahid, with the majority of this time spent in consultation and developing a treatment plan.      D: 2020   T: 2020   MT: AKVIC      Name:     CATALINO MILTON   MRN:      [id number]        Account:      [id number]   :      1950           Service Date: 2020      Document: [id number]

## 2020-02-13 ENCOUNTER — AMBULATORY - HEALTHEAST (OUTPATIENT)
Dept: FAMILY MEDICINE | Facility: CLINIC | Age: 70
End: 2020-02-13

## 2020-02-13 ENCOUNTER — COMMUNICATION - HEALTHEAST (OUTPATIENT)
Dept: FAMILY MEDICINE | Facility: CLINIC | Age: 70
End: 2020-02-13

## 2020-02-13 DIAGNOSIS — G30.1 LATE ONSET ALZHEIMER'S DISEASE WITHOUT BEHAVIORAL DISTURBANCE (H): ICD-10-CM

## 2020-02-13 DIAGNOSIS — F02.80 LATE ONSET ALZHEIMER'S DISEASE WITHOUT BEHAVIORAL DISTURBANCE (H): ICD-10-CM

## 2020-02-28 ENCOUNTER — COMMUNICATION - HEALTHEAST (OUTPATIENT)
Dept: FAMILY MEDICINE | Facility: CLINIC | Age: 70
End: 2020-02-28

## 2020-02-28 DIAGNOSIS — F02.80 LATE ONSET ALZHEIMER'S DISEASE WITHOUT BEHAVIORAL DISTURBANCE (H): ICD-10-CM

## 2020-02-28 DIAGNOSIS — G30.1 LATE ONSET ALZHEIMER'S DISEASE WITHOUT BEHAVIORAL DISTURBANCE (H): ICD-10-CM

## 2020-03-02 ENCOUNTER — COMMUNICATION - HEALTHEAST (OUTPATIENT)
Dept: FAMILY MEDICINE | Facility: CLINIC | Age: 70
End: 2020-03-02

## 2020-03-02 DIAGNOSIS — G30.1 LATE ONSET ALZHEIMER'S DISEASE WITHOUT BEHAVIORAL DISTURBANCE (H): ICD-10-CM

## 2020-03-02 DIAGNOSIS — F02.80 LATE ONSET ALZHEIMER'S DISEASE WITHOUT BEHAVIORAL DISTURBANCE (H): ICD-10-CM

## 2020-03-06 ENCOUNTER — COMMUNICATION - HEALTHEAST (OUTPATIENT)
Dept: FAMILY MEDICINE | Facility: CLINIC | Age: 70
End: 2020-03-06

## 2020-03-09 ENCOUNTER — COMMUNICATION - HEALTHEAST (OUTPATIENT)
Dept: FAMILY MEDICINE | Facility: CLINIC | Age: 70
End: 2020-03-09

## 2020-03-12 ENCOUNTER — AMBULATORY - HEALTHEAST (OUTPATIENT)
Dept: FAMILY MEDICINE | Facility: CLINIC | Age: 70
End: 2020-03-12

## 2020-03-12 DIAGNOSIS — G30.1 LATE ONSET ALZHEIMER'S DISEASE WITHOUT BEHAVIORAL DISTURBANCE (H): ICD-10-CM

## 2020-03-12 DIAGNOSIS — F02.80 LATE ONSET ALZHEIMER'S DISEASE WITHOUT BEHAVIORAL DISTURBANCE (H): ICD-10-CM

## 2020-03-29 ENCOUNTER — COMMUNICATION - HEALTHEAST (OUTPATIENT)
Dept: FAMILY MEDICINE | Facility: CLINIC | Age: 70
End: 2020-03-29

## 2020-03-29 DIAGNOSIS — L21.9 SEBORRHEA: ICD-10-CM

## 2020-03-30 ENCOUNTER — COMMUNICATION - HEALTHEAST (OUTPATIENT)
Dept: FAMILY MEDICINE | Facility: CLINIC | Age: 70
End: 2020-03-30

## 2020-03-30 DIAGNOSIS — I10 ESSENTIAL HYPERTENSION: ICD-10-CM

## 2020-03-30 DIAGNOSIS — N40.0 BPH (BENIGN PROSTATIC HYPERPLASIA): ICD-10-CM

## 2020-04-24 ENCOUNTER — COMMUNICATION - HEALTHEAST (OUTPATIENT)
Dept: FAMILY MEDICINE | Facility: CLINIC | Age: 70
End: 2020-04-24

## 2020-04-24 DIAGNOSIS — F32.1 MAJOR DEPRESSIVE DISORDER, SINGLE EPISODE, MODERATE (H): ICD-10-CM

## 2020-05-28 ENCOUNTER — OFFICE VISIT - HEALTHEAST (OUTPATIENT)
Dept: FAMILY MEDICINE | Facility: CLINIC | Age: 70
End: 2020-05-28

## 2020-05-28 DIAGNOSIS — G30.1 LATE ONSET ALZHEIMER'S DISEASE WITHOUT BEHAVIORAL DISTURBANCE (H): ICD-10-CM

## 2020-05-28 DIAGNOSIS — R35.0 BENIGN PROSTATIC HYPERPLASIA WITH URINARY FREQUENCY: ICD-10-CM

## 2020-05-28 DIAGNOSIS — F32.1 MAJOR DEPRESSIVE DISORDER, SINGLE EPISODE, MODERATE (H): ICD-10-CM

## 2020-05-28 DIAGNOSIS — N40.1 BENIGN PROSTATIC HYPERPLASIA WITH URINARY FREQUENCY: ICD-10-CM

## 2020-05-28 DIAGNOSIS — I10 ESSENTIAL HYPERTENSION: ICD-10-CM

## 2020-05-28 DIAGNOSIS — F02.80 LATE ONSET ALZHEIMER'S DISEASE WITHOUT BEHAVIORAL DISTURBANCE (H): ICD-10-CM

## 2020-06-18 ENCOUNTER — COMMUNICATION - HEALTHEAST (OUTPATIENT)
Dept: FAMILY MEDICINE | Facility: CLINIC | Age: 70
End: 2020-06-18

## 2020-06-18 DIAGNOSIS — N52.9 MALE ERECTILE DISORDER: ICD-10-CM

## 2020-06-26 ENCOUNTER — AMBULATORY - HEALTHEAST (OUTPATIENT)
Dept: FAMILY MEDICINE | Facility: CLINIC | Age: 70
End: 2020-06-26

## 2020-06-26 DIAGNOSIS — N52.9 MALE ERECTILE DISORDER: ICD-10-CM

## 2020-07-31 ENCOUNTER — COMMUNICATION - HEALTHEAST (OUTPATIENT)
Dept: FAMILY MEDICINE | Facility: CLINIC | Age: 70
End: 2020-07-31

## 2020-07-31 ENCOUNTER — MEDICAL CORRESPONDENCE (OUTPATIENT)
Dept: HEALTH INFORMATION MANAGEMENT | Facility: CLINIC | Age: 70
End: 2020-07-31

## 2020-07-31 DIAGNOSIS — G30.1 LATE ONSET ALZHEIMER'S DISEASE WITHOUT BEHAVIORAL DISTURBANCE (H): ICD-10-CM

## 2020-07-31 DIAGNOSIS — F02.80 LATE ONSET ALZHEIMER'S DISEASE WITHOUT BEHAVIORAL DISTURBANCE (H): ICD-10-CM

## 2020-09-01 ENCOUNTER — OFFICE VISIT - HEALTHEAST (OUTPATIENT)
Dept: FAMILY MEDICINE | Facility: CLINIC | Age: 70
End: 2020-09-01

## 2020-09-01 ENCOUNTER — COMMUNICATION - HEALTHEAST (OUTPATIENT)
Dept: FAMILY MEDICINE | Facility: CLINIC | Age: 70
End: 2020-09-01

## 2020-09-01 DIAGNOSIS — I67.1 INTRACRANIAL ANEURYSM: ICD-10-CM

## 2020-09-01 DIAGNOSIS — I10 ESSENTIAL HYPERTENSION: ICD-10-CM

## 2020-09-01 DIAGNOSIS — E78.00 PURE HYPERCHOLESTEROLEMIA: ICD-10-CM

## 2020-09-01 DIAGNOSIS — Z00.00 HEALTHCARE MAINTENANCE: ICD-10-CM

## 2020-09-01 DIAGNOSIS — F02.80 LATE ONSET ALZHEIMER'S DISEASE WITHOUT BEHAVIORAL DISTURBANCE (H): ICD-10-CM

## 2020-09-01 DIAGNOSIS — G30.1 LATE ONSET ALZHEIMER'S DISEASE WITHOUT BEHAVIORAL DISTURBANCE (H): ICD-10-CM

## 2020-09-01 LAB
ALT SERPL W P-5'-P-CCNC: 35 U/L (ref 0–45)
ANION GAP SERPL CALCULATED.3IONS-SCNC: 10 MMOL/L (ref 5–18)
BUN SERPL-MCNC: 22 MG/DL (ref 8–28)
CALCIUM SERPL-MCNC: 9.5 MG/DL (ref 8.5–10.5)
CHLORIDE BLD-SCNC: 104 MMOL/L (ref 98–107)
CHOLEST SERPL-MCNC: 137 MG/DL
CO2 SERPL-SCNC: 26 MMOL/L (ref 22–31)
CREAT SERPL-MCNC: 1.14 MG/DL (ref 0.7–1.3)
FASTING STATUS PATIENT QL REPORTED: YES
GFR SERPL CREATININE-BSD FRML MDRD: >60 ML/MIN/1.73M2
GLUCOSE BLD-MCNC: 102 MG/DL (ref 70–125)
HDLC SERPL-MCNC: 41 MG/DL
LDLC SERPL CALC-MCNC: 79 MG/DL
POTASSIUM BLD-SCNC: 4.3 MMOL/L (ref 3.5–5)
SODIUM SERPL-SCNC: 140 MMOL/L (ref 136–145)
TRIGL SERPL-MCNC: 83 MG/DL

## 2020-09-01 ASSESSMENT — PATIENT HEALTH QUESTIONNAIRE - PHQ9: SUM OF ALL RESPONSES TO PHQ QUESTIONS 1-9: 6

## 2020-09-01 ASSESSMENT — MIFFLIN-ST. JEOR: SCORE: 1797.72

## 2020-09-04 ENCOUNTER — RECORDS - HEALTHEAST (OUTPATIENT)
Dept: ADMINISTRATIVE | Facility: OTHER | Age: 70
End: 2020-09-04

## 2020-09-04 ENCOUNTER — VIRTUAL VISIT (OUTPATIENT)
Dept: NEUROLOGY | Facility: CLINIC | Age: 70
End: 2020-09-04
Payer: MEDICARE

## 2020-09-04 DIAGNOSIS — F02.80 LATE ONSET ALZHEIMER'S DISEASE WITHOUT BEHAVIORAL DISTURBANCE (H): Primary | ICD-10-CM

## 2020-09-04 DIAGNOSIS — G30.1 LATE ONSET ALZHEIMER'S DISEASE WITHOUT BEHAVIORAL DISTURBANCE (H): Primary | ICD-10-CM

## 2020-09-04 RX ORDER — MEMANTINE HYDROCHLORIDE 10 MG/1
10 TABLET ORAL 2 TIMES DAILY
COMMUNITY
End: 2022-01-01

## 2020-09-04 RX ORDER — DONEPEZIL HYDROCHLORIDE 5 MG/1
TABLET, FILM COATED ORAL
Qty: 90 TABLET | Refills: 11 | Status: SHIPPED | OUTPATIENT
Start: 2020-09-04 | End: 2021-04-07

## 2020-09-04 ASSESSMENT — PATIENT HEALTH QUESTIONNAIRE - PHQ9: SUM OF ALL RESPONSES TO PHQ QUESTIONS 1-9: 4

## 2020-09-04 NOTE — PROGRESS NOTES
Chief Complaint: memory problem     History of Present Illness:  Mr. Coronado is a 70 year old left-handed male with history of hypertension, hyperlipidemia,  presenting for evaluation of memory problems. He is accompanied to today's visit by wife, Leia, who assists in providing the history.    Mr. Coronado reports that he is been doing well.  The short-term memory is getting worse.  The long-term memory is about the same.  He loses and misplaces objects and repeat questions.  They have attached PILE to help to find his belongings.    Medications: Wife reminds him and sets up the medications for him.  Appointments:  Wife manages them.  He remembered today's appointment.  Driving: no longer driving.  Household chores: he cannot run the washing machine or the  because he doesn't know how to operate the buttons.  He can do the vacuum, but he does not.    ADLs:    Showering: wife helps him to turn the water on, because they have the new system.  He does not need reminders to take showers.  Dressing: he has trouble putting a T shirt on.  He also has trouble with buckling his belt.  He can pick out his own clothes.    Feeding: he can feed himself and use a knife, fork and spoon.  Toileting: no problem.    He has been doing okay in COVID.  He is an introvert, so he does not mind staying inside.  They started a house remodeling started back in Jan.  There have been contractors coming and going.      There have been a few times when he [place]  There had only been less 5 events.  His wife thinks that they were also have been something that he had dreamed about.  There has been no delusions.      He has more trouble when there is more commotions or when he is tired.  Following directions involving several steps is hard.  She has to break it down to single steps.  He has difficulty finishing his sentence, for he forgets what he was going to say.  Word finding is  difficult.      He enjoys his cat being nearby.  His cat is soothing to him.      He is taking Aricept 5mg twice a day.  He does not experience nausea, vomiting.  He takes fiber over the counter, which has helped, so he has no more loose stools.  He is still on Namenda 10mg bid.      No falls.  No tremors.    He was previously followed by Dr. Rios.  Mr. Coronado was diagnosed with mixed AD and lewy body dementia.  The last visit was 11/5/2019.  Mr. Coronado had developed loose stools while on donepezil; therefore, it was titrated down to 5mg.  He is also on memantine twice daily.  As per Dr. Edouard's notes, Mr. Coronado developed progressive cognitive changes and short-term memory loss starting at least in 2016.          Medical review of systems: The comprehensive review of systems is otherwise reviewed and negative.       Patient Active Problem List   Diagnosis     Sebaceous cyst     Late onset Alzheimer's disease without behavioral disturbance (H)       Past Medical History:   Diagnosis Date     BPH (benign prostatic hyperplasia)      Cataracts, bilateral      Cerebral aneurysm, nonruptured      Dementia (H)      Hypercholesterolemia      Hypertension      He had normal childhood development and reports no major head injuries.     Past Surgical History:   Procedure Laterality Date     APPENDECTOMY       BIOPSY OF SKIN LESION       EXCISE MASS BACK  2/8/2013    Procedure: EXCISE MASS BACK;  Excision of Sebaceous Back Cyst ;  Surgeon: Sean Randhawa MD;  Location: [facility] OR     PHACOEMULSIFICATION CLEAR CORNEA WITH STANDARD INTRAOCULAR LENS IMPLANT Right 10/22/2018    Procedure: Right Eye Phacoemulsification with Standard Intraocular Lens Placement;  Surgeon: Elio Manzo MD;  Location: [facility] OR     PHACOEMULSIFICATION WITH STANDARD INTRAOCULAR LENS IMPLANT Left 10/8/2018    Procedure: PHACOEMULSIFICATION WITH STANDARD INTRAOCULAR LENS IMPLANT;  Left Eye Phacoemulsification with Intraocular Lens;  Surgeon: Anais  Elio Willard MD;  Location: [facility] OR     pilonidal cyst removal          Current Outpatient Medications   Medication Sig Dispense Refill     ASPIRIN Take 81 mg by mouth every morning        ATENOLOL Take 100 mg by mouth every morning        buPROPion (WELLBUTRIN XL) 150 MG 24 hr tablet Take 150 mg by mouth       Carboxymethylcellulose Sod PF (REFRESH PLUS) 0.5 % SOLN ophthalmic solution Place 1 drop into both eyes as needed       clobetasol (TEMOVATE) 0.05 % external solution Apply topically 2 times daily To scaly and itchy areas on scalp until no rash nor itch. Hold until patient requests. 60 mL 11     desonide (DESOWEN) 0.05 % external ointment Apply topically 2 times daily To rash at eyebrows and on face as needed until clear. Hold until patient requests. 60 g 11     donepezil (ARICEPT) 10 MG tablet Take 5 mg by mouth 2 times daily as needed       hydrochlorothiazide (MICROZIDE) 12.5 MG capsule TAKE ONE CAPSULE BY MOUTH EVERY MORNING.       ketoconazole (NIZORAL) 2 % external shampoo To entire wet scalp and then wash off after 5 minutes three times a week. Hold until patient requests. 240 mL 11     magnesium 250 MG tablet Take 1 tablet by mouth At Bedtime        memantine (NAMENDA) 10 MG tablet        prazosin (MINIPRESS) 1 MG capsule Take 1 mg by mouth At Bedtime       psyllium (METAMUCIL/KONSYL) capsule Take 1 capsule by mouth daily       sildenafil (VIAGRA) 50 MG tablet Take 1 tablet (50 mg) by mouth as needed for erectile dysfunction 30 tablet 3     simvastatin (ZOCOR) 40 MG tablet TAKE 1 TABLET BY MOUTH NIGHTLY AT BEDTIME.       tamsulosin (FLOMAX) 0.4 MG 24 hr capsule Take 1 capsule (0.4 mg) by mouth daily (Patient taking differently: Take 0.4 mg by mouth every morning ) 90 capsule 3     tobramycin-dexamethasone (TOBRADEX) 0.3-0.1 % ophthalmic ointment 0.5 inch strip each eye at night (Patient not taking: Reported on 2/11/2019) 1 Tube 1     vitamin E (TOCOPHEROL) 1000 UNIT capsule Take 1,000 Units by mouth  At Bedtime           No Known Allergies    Family History   Problem Relation Age of Onset     Heart Disease Father      Dementia Mother      Cancer No family hx of         no skin cancer       FHX: dementia in the patient's mother and in a number of first-degree relatives on the maternal side      Social History     Socioeconomic History     Marital status:      Spouse name: Not on file     Number of children: Not on file     Years of education: Not on file     Highest education level: Not on file   Occupational History     Not on file   Social Needs     Financial resource strain: Not on file     Food insecurity     Worry: Not on file     Inability: Not on file     Transportation needs     Medical: Not on file     Non-medical: Not on file   Tobacco Use     Smoking status: Former Smoker     Types: Cigarettes     Last attempt to quit: 2011     Years since quittin.6     Smokeless tobacco: Never Used   Substance and Sexual Activity     Alcohol use: Yes     Comment: daily mixed      Drug use: No     Sexual activity: Not on file   Lifestyle     Physical activity     Days per week: Not on file     Minutes per session: Not on file     Stress: Not on file   Relationships     Social connections     Talks on phone: Not on file     Gets together: Not on file     Attends [employer] service: Not on file     Active member of club or organization: Not on file     Attends meetings of clubs or organizations: Not on file     Relationship status: Not on file     Intimate partner violence     Fear of current or ex partner: Not on file     Emotionally abused: Not on file     Physically abused: Not on file     Forced sexual activity: Not on file   Other Topics Concern     Parent/sibling w/ CABG, MI or angioplasty before 65F 55M? Not Asked   Social History Narrative     Not on file     SHX: the patient completed high school education as well as 1 year of college. .  Both he and his wife raised 3 children while living  in the home that they continue to occupy in [place]. 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    General Physical examination:  There were no vitals taken for this visit.     General: Mr. Coronado is well appearing and is appropriately groomed and dressed.    Neurological examination:    Mental status: Mr. Coronado  is awake, alert, and appropriate.  He has low verbal output, and has difficulty finishing sentences.     MMSE: 8 /21  Orientation to time: fall, 1/5  Orientation to place: [place], [place], ?Pending sale to [facility], main floor 4/5  World -> 0/5  Registration: 3/3  Delayed recall: 0/3      Cranial nerves: EOMI, no facial asymmetry, shoulder shrug intact.  Motor: no pronator drift, finger tapping intact, no tremor  Coord: FNF intact.      Neuropsychological evaluation:  A full report of the neuropsychological battery testing is available separately. Findings are summarized here briefly.    Labs:  Lab Results   Component Value Date    WBC 7.6 07/06/2018    RBC 4.64 07/06/2018    HGB 15.1 07/06/2018    HCT 42.9 07/06/2018    MCV 93 07/06/2018    MCH 32.5 07/06/2018    MCHC 35.2 07/06/2018    RDW 12.8 07/06/2018     07/06/2018    CR 1.03 07/06/2018        Imaging:  Results for orders placed or performed in visit on 02/11/20   MRA BRAIN ([place] [facility] [place]) WO CONTRAST    Narrative    MRA of the head without contrast    Provided History:  Cerebral aneurysm, untreated, asymptomatic, follow  up; Cerebral aneurysm, nonruptured.  ICD-10: Cerebral aneurysm, nonruptured    Comparison:  Brain MRA 2/11/2019. Images from catheter cerebral  angiography 7/6/2018.      Technique:   3D time-of-flight MRA of the [place] was performed without  intravenous contrast. Three-dimensional reconstructions of the head  MRA were created and reviewed by the radiologist.    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    No new aneurysm. Relatively diminutive posterior circulation,  unchanged.      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    ZARIA CHRISTENSEN MD       MRI Brain 2018  1. Mild cortical atrophy  2. Mild white matter disease  3. Left ant communicating artery prominence, possible aneurysm    Impression:  Mr. Coronado is a 70 year old left-handed male with history of hyperlipidemia, hypertension, who presents with memory problems.  He has moderate dementia due to Alzheimer's disease.  He is tolerating donepezil and memantine.  His short-term memory is progressively getting worse.  We discussed trying higher dose of donepezil if he can tolerate it.  He is willing to go up on donepezil.    Recommendations:  1. Trial of donepezil 10mg in am and 5mg in am.  Watch for side effects such as nausea, vomiting and diarrhea.  He can go back to 5mg twice daily if side effects are severe.  2.  Continue intake of fiber for loose stools.  3.  Continue Namenda 10mg bid  4.  If loose stool is not well controlled can go back to donepezil 5mg bid.  5. Return in about 3 months.      I spent a total of 53 minutes face-to-face with the patient, and over half of that time was spent counseling regarding the symptoms, diagnosis, medication risks, lifestyle modification, therapeutic options, and prognosis.

## 2020-09-04 NOTE — LETTER
9/4/2020     RE: Catalino Coronado  [address] [place] [address]     Dear Colleague,    Thank you for referring your patient, Catalino Coronado, to the [facility] NEUROSPECIALTIES at [facility]. Please see a copy of my visit note below.    Chief Complaint: memory problem     History of Present Illness:  Mr. Coronado is a 70 year old left-handed male with history of hypertension, hyperlipidemia,  presenting for evaluation of memory problems. He is accompanied to today's visit by wife, Leia, who assists in providing the history.    Mr. Coronado reports that he is been doing well.  The short-term memory is getting worse.  The long-term memory is about the same.  He loses and misplaces objects and repeat questions.  They have attached PILE to help to find his belongings.    Medications: Wife reminds him and sets up the medications for him.  Appointments:  Wife manages them.  He remembered today's appointment.  Driving: no longer driving.  Household chores: he cannot run the washing machine or the  because he doesn't know how to operate the buttons.  He can do the vacuum, but he does not.    ADLs:    Showering: wife helps him to turn the water on, because they have the new system.  He does not need reminders to take showers.  Dressing: he has trouble putting a T shirt on.  He also has trouble with buckling his belt.  He can pick out his own clothes.    Feeding: he can feed himself and use a knife, fork and spoon.  Toileting: no problem.    He has been doing okay in COVID.  He is an introvert, so he does not mind staying inside.  They started a house remodeling started back in Jan.  There have been contractors coming and going.      There have been a few times when he [place]  There had only been less 5 events.  His wife thinks that they were also have been something that he had dreamed about.  There has been no delusions.       He has more trouble when there is more commotions or when he is tired.  Following directions involving several steps is hard.  She has to break it down to single steps.  He has difficulty finishing his sentence, for he forgets what he was going to say.  Word finding is difficult.      He enjoys his cat being nearby.  His cat is soothing to him.      He is taking Aricept 5mg twice a day.  He does not experience nausea, vomiting.  He takes fiber over the counter, which has helped, so he has no more loose stools.  He is still on Namenda 10mg bid.      No falls.  No tremors.    He was previously followed by Dr. Rios.  Mr. Coronado was diagnosed with mixed AD and lewy body dementia.  The last visit was 11/5/2019.  Mr. Coronado had developed loose stools while on donepezil; therefore, it was titrated down to 5mg.  He is also on memantine twice daily.  As per Dr. Edouard's notes, Mr. Coronado developed progressive cognitive changes and short-term memory loss starting at least in 2016.          Medical review of systems: The comprehensive review of systems is otherwise reviewed and negative.       Patient Active Problem List   Diagnosis     Sebaceous cyst     Late onset Alzheimer's disease without behavioral disturbance (H)       Past Medical History:   Diagnosis Date     BPH (benign prostatic hyperplasia)      Cataracts, bilateral      Cerebral aneurysm, nonruptured      Dementia (H)      Hypercholesterolemia      Hypertension      He had normal childhood development and reports no major head injuries.     Past Surgical History:   Procedure Laterality Date     APPENDECTOMY       BIOPSY OF SKIN LESION       EXCISE MASS BACK  2/8/2013    Procedure: EXCISE MASS BACK;  Excision of Sebaceous Back Cyst ;  Surgeon: Sean Randhawa MD;  Location: [facility] OR     PHACOEMULSIFICATION CLEAR CORNEA WITH STANDARD INTRAOCULAR LENS IMPLANT Right 10/22/2018    Procedure: Right Eye Phacoemulsification with Standard Intraocular Lens Placement;   Surgeon: Elio Manzo MD;  Location: [facility] OR     PHACOEMULSIFICATION WITH STANDARD INTRAOCULAR LENS IMPLANT Left 10/8/2018    Procedure: PHACOEMULSIFICATION WITH STANDARD INTRAOCULAR LENS IMPLANT;  Left Eye Phacoemulsification with Intraocular Lens;  Surgeon: Elio Manzo MD;  Location: [facility] OR     pilonidal cyst removal          Current Outpatient Medications   Medication Sig Dispense Refill     ASPIRIN Take 81 mg by mouth every morning        ATENOLOL Take 100 mg by mouth every morning        buPROPion (WELLBUTRIN XL) 150 MG 24 hr tablet Take 150 mg by mouth       Carboxymethylcellulose Sod PF (REFRESH PLUS) 0.5 % SOLN ophthalmic solution Place 1 drop into both eyes as needed       clobetasol (TEMOVATE) 0.05 % external solution Apply topically 2 times daily To scaly and itchy areas on scalp until no rash nor itch. Hold until patient requests. 60 mL 11     desonide (DESOWEN) 0.05 % external ointment Apply topically 2 times daily To rash at eyebrows and on face as needed until clear. Hold until patient requests. 60 g 11     donepezil (ARICEPT) 10 MG tablet Take 5 mg by mouth 2 times daily as needed       hydrochlorothiazide (MICROZIDE) 12.5 MG capsule TAKE ONE CAPSULE BY MOUTH EVERY MORNING.       ketoconazole (NIZORAL) 2 % external shampoo To entire wet scalp and then wash off after 5 minutes three times a week. Hold until patient requests. 240 mL 11     magnesium 250 MG tablet Take 1 tablet by mouth At Bedtime        memantine (NAMENDA) 10 MG tablet        prazosin (MINIPRESS) 1 MG capsule Take 1 mg by mouth At Bedtime       psyllium (METAMUCIL/KONSYL) capsule Take 1 capsule by mouth daily       sildenafil (VIAGRA) 50 MG tablet Take 1 tablet (50 mg) by mouth as needed for erectile dysfunction 30 tablet 3     simvastatin (ZOCOR) 40 MG tablet TAKE 1 TABLET BY MOUTH NIGHTLY AT BEDTIME.       tamsulosin (FLOMAX) 0.4 MG 24 hr capsule Take 1 capsule (0.4 mg) by mouth daily (Patient taking differently: Take  0.4 mg by mouth every morning ) 90 capsule 3     tobramycin-dexamethasone (TOBRADEX) 0.3-0.1 % ophthalmic ointment 0.5 inch strip each eye at night (Patient not taking: Reported on 2019) 1 Tube 1     vitamin E (TOCOPHEROL) 1000 UNIT capsule Take 1,000 Units by mouth At Bedtime           No Known Allergies    Family History   Problem Relation Age of Onset     Heart Disease Father      Dementia Mother      Cancer No family hx of         no skin cancer       FHX: dementia in the patient's mother and in a number of first-degree relatives on the maternal side      Social History     Socioeconomic History     Marital status:      Spouse name: Not on file     Number of children: Not on file     Years of education: Not on file     Highest education level: Not on file   Occupational History     Not on file   Social Needs     Financial resource strain: Not on file     Food insecurity     Worry: Not on file     Inability: Not on file     Transportation needs     Medical: Not on file     Non-medical: Not on file   Tobacco Use     Smoking status: Former Smoker     Types: Cigarettes     Last attempt to quit: 2011     Years since quittin.6     Smokeless tobacco: Never Used   Substance and Sexual Activity     Alcohol use: Yes     Comment: daily mixed      Drug use: No     Sexual activity: Not on file   Lifestyle     Physical activity     Days per week: Not on file     Minutes per session: Not on file     Stress: Not on file   Relationships     Social connections     Talks on phone: Not on file     Gets together: Not on file     Attends Yazidi service: Not on file     Active member of club or organization: Not on file     Attends meetings of clubs or organizations: Not on file     Relationship status: Not on file     Intimate partner violence     Fear of current or ex partner: Not on file     Emotionally abused: Not on file     Physically abused: Not on file     Forced sexual activity: Not on file   Other  Topics Concern     Parent/sibling w/ CABG, MI or angioplasty before 65F 55M? Not Asked   Social History Narrative     Not on file     SHX: the patient completed high school education as well as 1 year of college. .  Both he and his wife raised 3 children while living in the home that they continue to occupy in [place]. 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    General Physical examination:  There were no vitals taken for this visit.     General: Mr. Coronado is well appearing and is appropriately groomed and dressed.    Neurological examination:    Mental status: Mr. Coronado  is awake, alert, and appropriate.  He has low verbal output, and has difficulty finishing sentences.     MMSE: 8 /21  Orientation to time: fall, 1/5  Orientation to place: [place], [place], ?[facility], main floor 4/5  World -> 0/5  Registration: 3/3  Delayed recall: 0/3      Cranial nerves: EOMI, no facial asymmetry, shoulder shrug intact.  Motor: no pronator drift, finger tapping intact, no tremor  Coord: FNF intact.      Neuropsychological evaluation:  A full report of the neuropsychological battery testing is available separately. Findings are summarized here briefly.    Labs:  Lab Results   Component Value Date    WBC 7.6 07/06/2018    RBC 4.64 07/06/2018    HGB 15.1 07/06/2018    HCT 42.9 07/06/2018    MCV 93 07/06/2018    MCH 32.5 07/06/2018    MCHC 35.2 07/06/2018    RDW 12.8 07/06/2018     07/06/2018    CR 1.03 07/06/2018        Imaging:  Results for orders placed or performed in visit on 02/11/20   MRA BRAIN ([place] [facility]) WO CONTRAST    Narrative    MRA of the head without contrast    Provided History:  Cerebral aneurysm, untreated, asymptomatic, follow  up; Cerebral aneurysm, nonruptured.  ICD-10: Cerebral aneurysm, nonruptured    Comparison:  Brain MRA 2/11/2019. Images from catheter cerebral  angiography 7/6/2018.       Technique:   3D time-of-flight MRA of the [place] was performed without  intravenous contrast. Three-dimensional reconstructions of the head  MRA were created and reviewed by the radiologist.    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    No new aneurysm. Relatively diminutive posterior circulation,  unchanged.      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    ZARIA CHRISTENSEN MD       MRI Brain 2018  1. Mild cortical atrophy  2. Mild white matter disease  3. Left ant communicating artery prominence, possible aneurysm    Impression:  Mr. Coronado is a 70 year old left-handed male with history of hyperlipidemia, hypertension, who presents with memory problems.  He has moderate dementia due to Alzheimer's disease.  He is tolerating donepezil and memantine.  His short-term memory is progressively getting worse.  We discussed trying higher dose of donepezil if he can tolerate it.  He is willing to go up on donepezil.    Recommendations:  1. Trial of donepezil 10mg in am and 5mg in am.  Watch for side effects such as nausea, vomiting and diarrhea.  He can go back to 5mg twice daily if side effects are severe.  2.  Continue intake of fiber for loose stools.  3.  Continue Namenda 10mg bid  4.  If loose stool is not well controlled can go back to donepezil 5mg bid.  5. Return in about 3 months.      I spent a total of 53 minutes face-to-face with the patient, and over half of that time was spent counseling regarding the symptoms, diagnosis, medication risks, lifestyle modification, therapeutic options, and prognosis.    Catalino Coronado is a 70 year old male who is being evaluated via a billable video visit.   "    The patient has been notified of following:     \"This video visit will be conducted via a call between you and your physician/provider. We have found that certain health care needs can be provided without the need for an in-person physical exam.  This service lets us provide the care you need with a video conversation.  If a prescription is necessary we can send it directly to your pharmacy.  If lab work is needed we can place an order for that and you can then stop by our lab to have the test done at a later time.    Video visits are billed at different rates depending on your insurance coverage.  Please reach out to your insurance provider with any questions.    If during the course of the call the physician/provider feels a video visit is not appropriate, you will not be charged for this service.\"    Patient has given verbal consent for Video visit? Yes  How would you like to obtain your AVS? MyChart  If you are dropped from the video visit, the video invite should be resent to: Text to cell phone: [phone]  Will anyone else be joining your video visit? Yes: Wife . How would they like to receive their invitation? Text to cell phone: [phone]        Video-Visit Details    Type of service:  Video Visit    Video Start Time: 8:17 AM  Video End Time: 9:10 AM    Originating Location (pt. Location): Home    Distant Location (provider location):  [facility] NEUROSPECIALTIES     Platform used for Video Visit: Ana Paula Kennedy MD    "

## 2020-09-04 NOTE — PATIENT INSTRUCTIONS
1. Trial of donepezil 10mg in am and 5mg in am.  Watch for side effects such as nausea, vomiting and diarrhea.  He can go back to 5mg twice daily if side effects are severe.  2.  Continue intake of fiber for loose stools.  3.  Continue Namenda 10mg bid  4.  If loose stool is not well controlled can go back to donepezil 5mg bid.  5. Return in about 3 months.

## 2020-09-04 NOTE — PROGRESS NOTES
"Catalino Coronado is a 70 year old male who is being evaluated via a billable video visit.      The patient has been notified of following:     \"This video visit will be conducted via a call between you and your physician/provider. We have found that certain health care needs can be provided without the need for an in-person physical exam.  This service lets us provide the care you need with a video conversation.  If a prescription is necessary we can send it directly to your pharmacy.  If lab work is needed we can place an order for that and you can then stop by our lab to have the test done at a later time.    Video visits are billed at different rates depending on your insurance coverage.  Please reach out to your insurance provider with any questions.    If during the course of the call the physician/provider feels a video visit is not appropriate, you will not be charged for this service.\"    Patient has given verbal consent for Video visit? Yes  How would you like to obtain your AVS? MyChart  If you are dropped from the video visit, the video invite should be resent to: Text to cell phone: [phone]  Will anyone else be joining your video visit? Yes: Wife . How would they like to receive their invitation? Text to cell phone: [phone]        Video-Visit Details    Type of service:  Video Visit    Video Start Time: 8:17 AM  Video End Time: 9:10 AM    Originating Location (pt. Location): Home    Distant Location (provider location):  [facility] NEUROSPECIALTIES     Platform used for Video Visit: Ana Paula Kennedy MD        "

## 2020-09-15 ENCOUNTER — COMMUNICATION - HEALTHEAST (OUTPATIENT)
Dept: FAMILY MEDICINE | Facility: CLINIC | Age: 70
End: 2020-09-15

## 2020-09-15 DIAGNOSIS — N40.1 BENIGN PROSTATIC HYPERPLASIA WITH LOWER URINARY TRACT SYMPTOMS, SYMPTOM DETAILS UNSPECIFIED: ICD-10-CM

## 2020-10-21 ENCOUNTER — COMMUNICATION - HEALTHEAST (OUTPATIENT)
Dept: FAMILY MEDICINE | Facility: CLINIC | Age: 70
End: 2020-10-21

## 2020-10-21 DIAGNOSIS — E78.00 PURE HYPERCHOLESTEROLEMIA: ICD-10-CM

## 2020-11-14 ENCOUNTER — HEALTH MAINTENANCE LETTER (OUTPATIENT)
Age: 70
End: 2020-11-14

## 2020-12-15 ENCOUNTER — TELEPHONE (OUTPATIENT)
Dept: NEUROSURGERY | Facility: CLINIC | Age: 70
End: 2020-12-15

## 2020-12-17 ENCOUNTER — TELEPHONE (OUTPATIENT)
Dept: NEUROSURGERY | Facility: CLINIC | Age: 70
End: 2020-12-17

## 2020-12-28 ENCOUNTER — COMMUNICATION - HEALTHEAST (OUTPATIENT)
Dept: SCHEDULING | Facility: CLINIC | Age: 70
End: 2020-12-28

## 2020-12-31 ENCOUNTER — OFFICE VISIT - HEALTHEAST (OUTPATIENT)
Dept: FAMILY MEDICINE | Facility: CLINIC | Age: 70
End: 2020-12-31

## 2020-12-31 DIAGNOSIS — R42 ORTHOSTATIC DIZZINESS: ICD-10-CM

## 2020-12-31 DIAGNOSIS — E78.00 PURE HYPERCHOLESTEROLEMIA: ICD-10-CM

## 2020-12-31 DIAGNOSIS — R74.8 ABNORMAL LEVELS OF OTHER SERUM ENZYMES: ICD-10-CM

## 2020-12-31 DIAGNOSIS — I10 ESSENTIAL HYPERTENSION: ICD-10-CM

## 2020-12-31 LAB
ALT SERPL W P-5'-P-CCNC: 37 U/L (ref 0–45)
ANION GAP SERPL CALCULATED.3IONS-SCNC: 13 MMOL/L (ref 5–18)
BUN SERPL-MCNC: 22 MG/DL (ref 8–28)
CALCIUM SERPL-MCNC: 9.4 MG/DL (ref 8.5–10.5)
CHLORIDE BLD-SCNC: 102 MMOL/L (ref 98–107)
CO2 SERPL-SCNC: 25 MMOL/L (ref 22–31)
CREAT SERPL-MCNC: 1.29 MG/DL (ref 0.7–1.3)
GFR SERPL CREATININE-BSD FRML MDRD: 55 ML/MIN/1.73M2
GLUCOSE BLD-MCNC: 102 MG/DL (ref 70–125)
HGB BLD-MCNC: 14.8 G/DL (ref 14–18)
LDLC SERPL CALC-MCNC: 105 MG/DL
POTASSIUM BLD-SCNC: 4.3 MMOL/L (ref 3.5–5)
SODIUM SERPL-SCNC: 140 MMOL/L (ref 136–145)

## 2020-12-31 ASSESSMENT — MIFFLIN-ST. JEOR: SCORE: 1756.9

## 2021-01-04 ENCOUNTER — TELEPHONE (OUTPATIENT)
Dept: NEUROSURGERY | Facility: CLINIC | Age: 71
End: 2021-01-04

## 2021-01-05 ENCOUNTER — COMMUNICATION - HEALTHEAST (OUTPATIENT)
Dept: FAMILY MEDICINE | Facility: CLINIC | Age: 71
End: 2021-01-05

## 2021-01-12 ENCOUNTER — ANCILLARY PROCEDURE (OUTPATIENT)
Dept: MRI IMAGING | Facility: CLINIC | Age: 71
End: 2021-01-12
Attending: NEUROLOGICAL SURGERY
Payer: MEDICARE

## 2021-01-12 DIAGNOSIS — I67.1 CEREBRAL ANEURYSM, NONRUPTURED: ICD-10-CM

## 2021-01-12 PROCEDURE — G1004 CDSM NDSC: HCPCS | Mod: GC | Performed by: RADIOLOGY

## 2021-01-12 PROCEDURE — 70544 MR ANGIOGRAPHY HEAD W/O DYE: CPT | Mod: MG | Performed by: RADIOLOGY

## 2021-01-13 ENCOUNTER — VIRTUAL VISIT (OUTPATIENT)
Dept: NEUROSURGERY | Facility: CLINIC | Age: 71
End: 2021-01-13
Payer: MEDICARE

## 2021-01-13 DIAGNOSIS — I67.1 CEREBRAL ANEURYSM, NONRUPTURED: Primary | ICD-10-CM

## 2021-01-13 PROCEDURE — 99212 OFFICE O/P EST SF 10 MIN: CPT | Mod: 95 | Performed by: NEUROLOGICAL SURGERY

## 2021-01-13 NOTE — PROGRESS NOTES
Vahid is a 70 year old who is being evaluated via a billable video visit.      How would you like to obtain your AVS? MyChart  If the video visit is dropped, the invitation should be resent by: Send to e-mail at: [email or web address]  Will anyone else be joining your video visit? No        Video-Visit Details    Type of service:  Video Visit    Video Time: 5 min    Originating Location (pt. Location): Home    Distant Location (provider location):  [facility] NEUROSURGERY [facility]     Platform used for Video Visit: Ana Paula High, EMT

## 2021-01-13 NOTE — PROGRESS NOTES
Service Date: 2021      RE: Catalino Milton   MRN: [id number]   : 1950       This is a neurosurgical video visit that Vahid gave consent for.      Dear Doctor:      I had the pleasure to see Vahid today during a neurosurgical video visit.      Briefly, Vahid is a 70-year-old gentleman that has an ACom aneurysm.  This was found incidentally, and I have been following it since 2018.  We had performed a cerebral angiogram, and I had reviewed this with our vascular case conference.  The recommendation at that point in time was observation unless this demonstrated any growth on serial imaging.  We have been following it since with MR angiograms.      Since I last saw him, his wife notes that his Alzheimer disease is getting worse; however, he remains very functional and is living at home.  Other than that, he is doing well without any new neurologic problems.  He did have a repeat MR angiogram yesterday that again demonstrates a small ACom aneurysm that is unchanged in size and shape since 2018.  Given the fact that this has remained stable, I would recommend continued observation with a repeat MR angiogram in 1 year.      Overall, I spent approximately 5 minutes on the video call with Vahid.      With kindest personal regards,      MD AHMET Fang MD             D: 2021   T: 2021   MT: drea      Name:     CATALINO MILTON   MRN:      [id number]        Account:      [id number]   :      1950           Service Date: 2021      Document: [id number]

## 2021-01-13 NOTE — LETTER
2021      RE: Catalino Coronado  [address]  [place] [address]       Vahid is a 70 year old who is being evaluated via a billable video visit.      How would you like to obtain your AVS? MyChart  If the video visit is dropped, the invitation should be resent by: Send to e-mail at: [email or web address]  Will anyone else be joining your video visit? No        Video-Visit Details    Type of service:  Video Visit    Video Time: 5 min    Originating Location (pt. Location): Home    Distant Location (provider location):  [facility] NEUROSURGERY CLINIC [place]     Platform used for Video Visit: Ana Paula High, IRASEMA      Service Date: 2021      RE: Catalino Coronado   MRN: [id number]   : 1950       This is a neurosurgical video visit that Vahid gave consent for.      Dear Doctor:      I had the pleasure to see Vahid today during a neurosurgical video visit.      Briefly, Vahid is a 70-year-old gentleman that has an ACom aneurysm.  This was found incidentally, and I have been following it since 2018.  We had performed a cerebral angiogram, and I had reviewed this with our vascular case conference.  The recommendation at that point in time was observation unless this demonstrated any growth on serial imaging.  We have been following it since with MR angiograms.      Since I last saw him, his wife notes that his Alzheimer disease is getting worse; however, he remains very functional and is living at home.  Other than that, he is doing well without any new neurologic problems.  He did have a repeat MR angiogram yesterday that again demonstrates a small ACom aneurysm that is unchanged in size and shape since 2018.  Given the fact that this has remained stable, I would recommend continued observation with a repeat MR angiogram in 1 year.      Overall, I spent approximately 5 minutes on the video call with Vahid.      With kindest personal regards,      MD AHMET Fang  GEORGE REECE MD             D: 2021   T: 2021   MT: drea      Name:     POLLO MILTON   MRN:      [id number]        Account:      [id number]   :      1950           Service Date: 2021      Document: [id number]          Chevy Reece MD

## 2021-01-13 NOTE — LETTER
2021       RE: Catalino Coronado  [address] [place] [address]     Dear Colleague,    Thank you for referring your patient, Catalino Coronado, to the [facility] NEUROSURGERY CLINIC [place] at [facility]. Please see a copy of my visit note below.    Vahid is a 70 year old who is being evaluated via a billable video visit.      How would you like to obtain your AVS? MyChart  If the video visit is dropped, the invitation should be resent by: Send to e-mail at: [email or web address]  Will anyone else be joining your video visit? No        Video-Visit Details    Type of service:  Video Visit    Video Time: 5 min    Originating Location (pt. Location): Home    Distant Location (provider location):  [facility] NEUROSURGERY [facility]     Platform used for Video Visit: IRASEMA Coleman      Service Date: 2021      RE: Catalino Coronado   MRN: [id number]   : 1950       This is a neurosurgical video visit that Vahid gave consent for.      Dear Doctor:      I had the pleasure to see Vahid today during a neurosurgical video visit.      Briefly, Vahid is a 70-year-old gentleman that has an ACom aneurysm.  This was found incidentally, and I have been following it since 2018.  We had performed a cerebral angiogram, and I had reviewed this with our vascular case conference.  The recommendation at that point in time was observation unless this demonstrated any growth on serial imaging.  We have been following it since with MR angiograms.      Since I last saw him, his wife notes that his Alzheimer disease is getting worse; however, he remains very functional and is living at home.  Other than that, he is doing well without any new neurologic problems.  He did have a repeat MR angiogram yesterday that again demonstrates a small ACom aneurysm that is unchanged in size and shape since 2018.  Given the fact that this has remained  stable, I would recommend continued observation with a repeat MR angiogram in 1 year.      Overall, I spent approximately 5 minutes on the video call with Vahid ***      With kindest personal regards,      Chevy Duncan MD            D: 2021   T: 2021   MT: drea      Name:     POLLO MILTON   MRN:      [id number]        Account:      [id number]   :      1950           Service Date: 2021      Document: [id number]

## 2021-01-13 NOTE — PATIENT INSTRUCTIONS
Follow up with Dr Duncan in 1 year with MR Angiogram prior.    Call Dasha TOLENTINO for questions/concerns     Thank you for using [facility]

## 2021-01-27 ENCOUNTER — COMMUNICATION - HEALTHEAST (OUTPATIENT)
Dept: FAMILY MEDICINE | Facility: CLINIC | Age: 71
End: 2021-01-27

## 2021-01-27 DIAGNOSIS — I10 ESSENTIAL HYPERTENSION, MALIGNANT: ICD-10-CM

## 2021-01-29 DIAGNOSIS — L40.8 SEBOPSORIASIS: ICD-10-CM

## 2021-02-02 RX ORDER — BETAMETHASONE DIPROPIONATE 0.5 MG/ML
LOTION, AUGMENTED TOPICAL 2 TIMES DAILY
Qty: 60 ML | OUTPATIENT
Start: 2021-02-02

## 2021-02-02 NOTE — TELEPHONE ENCOUNTER
Received medication refill request for augmented betamethasone lotion. Refill request denied. Patient last seen 01/2019. Needs appt.

## 2021-02-02 NOTE — TELEPHONE ENCOUNTER
BETAMETHASONE DP AUG 0.05% LOT  Last Written Prescription Date:  2/12/2019  Last Fill Quantity: 60,   # refills: 11  Last Office Visit : 1/19/2019  Future Office visit:  None    Routing refill request to provider for review/approval because:  Medication not on updated med list.  Please advise for refills for Pt care.  Over due office visit (Jan 2019)    Bernadette Horton RN  Central Triage Red Flags/Med Refills

## 2021-02-04 ENCOUNTER — COMMUNICATION - HEALTHEAST (OUTPATIENT)
Dept: FAMILY MEDICINE | Facility: CLINIC | Age: 71
End: 2021-02-04

## 2021-02-04 DIAGNOSIS — G30.1 LATE ONSET ALZHEIMER'S DISEASE WITHOUT BEHAVIORAL DISTURBANCE (H): ICD-10-CM

## 2021-02-04 DIAGNOSIS — F02.80 LATE ONSET ALZHEIMER'S DISEASE WITHOUT BEHAVIORAL DISTURBANCE (H): ICD-10-CM

## 2021-02-08 ENCOUNTER — OFFICE VISIT (OUTPATIENT)
Dept: DERMATOLOGY | Facility: CLINIC | Age: 71
End: 2021-02-08
Payer: MEDICARE

## 2021-02-08 DIAGNOSIS — L21.9 DERMATITIS, SEBORRHEIC: Primary | ICD-10-CM

## 2021-02-08 DIAGNOSIS — L40.8 SEBOPSORIASIS: ICD-10-CM

## 2021-02-08 PROCEDURE — 99213 OFFICE O/P EST LOW 20 MIN: CPT | Performed by: DERMATOLOGY

## 2021-02-08 RX ORDER — CLOBETASOL PROPIONATE 0.5 MG/ML
SOLUTION TOPICAL 2 TIMES DAILY
Qty: 60 ML | Refills: 11 | Status: SHIPPED | OUTPATIENT
Start: 2021-02-08

## 2021-02-08 ASSESSMENT — PAIN SCALES - GENERAL: PAINLEVEL: NO PAIN (0)

## 2021-02-08 NOTE — PROGRESS NOTES
[facility] Dermatology Note        Dermatology Problem List:  1. Sebopsoriasis               -Current tx: Desonide 0.05% solution, ketoconazole 2% shampoo, clobetasol 0.05% solution  2. Seborrheic dermatitis      Encounter Date: February 8th, 2021     CC:  Skin Check and medication refill     History of Present Illness:  Mr. Catalino Coronado is a 70 year old male who presents for a skin check and medication refill. He was last seen 1/22/19 by Wilbur Matute MD, when he continued his treatment regimen including desonide ointment, ketoconazole shampoo, and clobetasol solution. His wife states that when he uses the prescribed treatment regimen, she has noticed improvement. They have not been doing the treatments for the past week due to running out of the clobetasol solution. He would like his back to be examined. The patient nor his wife voices any other concerns.        Past Medical History:         Patient Active Problem List   Diagnosis     Sebaceous cyst     Late onset Alzheimer's disease without behavioral disturbance      Past Medical History   Past Medical History:   Diagnosis Date     BPH (benign prostatic hyperplasia)       Cataracts, bilateral       Cerebral aneurysm, nonruptured       Dementia       Hypercholesterolemia       Hypertension           Past Surgical History         Past Surgical History:   Procedure Laterality Date     APPENDECTOMY         BIOPSY OF SKIN LESION         EXCISE MASS BACK   2/8/2013     Procedure: EXCISE MASS BACK;  Excision of Sebaceous Back Cyst ;  Surgeon: Sean Randhawa MD;  Location: [facility] OR     PHACOEMULSIFICATION CLEAR CORNEA WITH STANDARD INTRAOCULAR LENS IMPLANT Right 10/22/2018     Procedure: Right Eye Phacoemulsification with Standard Intraocular Lens Placement;  Surgeon: Elio Manzo MD;  Location:  OR     PHACOEMULSIFICATION WITH STANDARD INTRAOCULAR LENS IMPLANT Left 10/8/2018     Procedure: PHACOEMULSIFICATION WITH STANDARD INTRAOCULAR  LENS IMPLANT;  Left Eye Phacoemulsification with Intraocular Lens;  Surgeon: Elio Manzo MD;  Location: [facility] OR     pilonidal cyst removal                 Social History:  Patient reports that he quit smoking about 10 years ago. His smoking use included cigarettes. he has never used smokeless tobacco. He reports that he drinks alcohol. He reports that he does not use drugs.     Family History:  Family History         Family History   Problem Relation Age of Onset     Heart Disease Father       Dementia Mother       Cancer No family hx of           no skin cancer            Medications:  Current Outpatient Medications   Medication     ATENOLOL     clobetasol (TEMOVATE) 0.05 % external solution     desonide (DESOWEN) 0.05 % external ointment     donepezil (ARICEPT) 10 MG tablet     ketoconazole (NIZORAL) 2 % external shampoo     magnesium 250 MG tablet     memantine (NAMENDA) 10 MG tablet     sildenafil (VIAGRA) 50 MG tablet     simvastatin (ZOCOR) 40 MG tablet     tamsulosin (FLOMAX) 0.4 MG 24 hr capsule     vitamin E (TOCOPHEROL) 1000 UNIT capsule     ASPIRIN     buPROPion (WELLBUTRIN XL) 150 MG 24 hr tablet     Carboxymethylcellulose Sod PF (REFRESH PLUS) 0.5 % SOLN ophthalmic solution     donepezil (ARICEPT) 5 MG tablet     hydrochlorothiazide (MICROZIDE) 12.5 MG capsule     memantine (NAMENDA) 10 MG tablet     prazosin (MINIPRESS) 1 MG capsule     psyllium (METAMUCIL/KONSYL) capsule     tobramycin-dexamethasone (TOBRADEX) 0.3-0.1 % ophthalmic ointment     No current facility-administered medications for this visit.      No Known Allergies     Review of Systems:  -As per HPI  -Constitutional: Otherwise feeling well today, in usual state of health.  -HEENT: Patient denies nonhealing oral sores.  -Skin: As above in HPI. No additional skin concerns.     Physical exam:  Vitals: There were no vitals taken for this visit.  GEN: This is a well developed, well-nourished male in no acute distress, in a pleasant  mood.    SKIN: The exam included the head/face, neck,back, and abdomen.  - eyebrows with no scaling or increased erthyma  - Pitting acne scars on nape of neck  - Sebaceous proliferation on forehead  -Semicircular keloid scar on the central back  -There are waxy stuck on tan to brown papules on the back  -No other lesions of concern on areas examined.        Impression/Plan:  1. Sebopsoriasis - improved    Continue Desonide 0.05% solution, ketoconazole 2% shampoo, clobetasol 0.05% solution    Advised the use of zinc containing shampoos such as OTC T-gel or Head and Shoulders.     2. Seborrheic keratosis, non irritated    No further intervention required. Patient to report changes.         Follow-up in 1 year, earlier for new or changing lesions.      Staff Involved:  Shantell NUÑEZ, MS4 saw and examined this patient in the presence of Dr. White.    Staff Physician:  I was present with the medical student who participated in the service and in the documentation of the note. I have verified the history and personally performed the physical exam and medical decision making. I agree with the assessment and plan of care as documented in the note.     Flaca White MD  Professor and Chair  Department of Dermatology  [facility]: Phone: [phone], Fax:[phone]  [facility]: Phone: [phone], Fax: [phone]

## 2021-02-08 NOTE — NURSING NOTE
Dermatology Rooming Note    Catalino Coronado's goals for this visit include:   Chief Complaint   Patient presents with     Skin Check     Vahid is here today for a skin check.      BAM Vora

## 2021-02-08 NOTE — LETTER
2/8/2021       RE: Catalino Coronado  [address] [place] [address]     Dear Colleague,    Thank you for referring your patient, Catalino Coronado, to the [facility] [place] at [facility]. Please see a copy of my visit note below.    [facility] Dermatology Note        Dermatology Problem List:  1. Sebopsoriasis               -Current tx: Desonide 0.05% solution, ketoconazole 2% shampoo, clobetasol 0.05% solution  2. Seborrheic dermatitis      Encounter Date: February 8th, 2021     CC:  Skin Check and medication refill     History of Present Illness:  Mr. Catalino Coronado is a 70 year old male who presents for a skin check and medication refill. He was last seen 1/22/19 by Wilbur Matute MD, when he continued his treatment regimen including desonide ointment, ketoconazole shampoo, and clobetasol solution. His wife states that when he uses the prescribed treatment regimen, she has noticed improvement. They have not been doing the treatments for the past week due to running out of the clobetasol solution. He would like his back to be examined. The patient nor his wife voices any other concerns.        Past Medical History:         Patient Active Problem List   Diagnosis     Sebaceous cyst     Late onset Alzheimer's disease without behavioral disturbance      Past Medical History        Past Medical History:   Diagnosis Date     BPH (benign prostatic hyperplasia)       Cataracts, bilateral       Cerebral aneurysm, nonruptured       Dementia       Hypercholesterolemia       Hypertension           Past Surgical History         Past Surgical History:   Procedure Laterality Date     APPENDECTOMY         BIOPSY OF SKIN LESION         EXCISE MASS BACK   2/8/2013     Procedure: EXCISE MASS BACK;  Excision of Sebaceous Back Cyst ;  Surgeon: Sean Randhawa MD;  Location: [facility] OR     PHACOEMULSIFICATION CLEAR CORNEA WITH  STANDARD INTRAOCULAR LENS IMPLANT Right 10/22/2018     Procedure: Right Eye Phacoemulsification with Standard Intraocular Lens Placement;  Surgeon: Elio Mnazo MD;  Location: [facility] OR     PHACOEMULSIFICATION WITH STANDARD INTRAOCULAR LENS IMPLANT Left 10/8/2018     Procedure: PHACOEMULSIFICATION WITH STANDARD INTRAOCULAR LENS IMPLANT;  Left Eye Phacoemulsification with Intraocular Lens;  Surgeon: Elio Manzo MD;  Location: [facility] OR     pilonidal cyst removal                 Social History:  Patient reports that he quit smoking about 10 years ago. His smoking use included cigarettes. he has never used smokeless tobacco. He reports that he drinks alcohol. He reports that he does not use drugs.     Family History:  Family History         Family History   Problem Relation Age of Onset     Heart Disease Father       Dementia Mother       Cancer No family hx of           no skin cancer            Medications:  Current Outpatient Medications   Medication     ATENOLOL     clobetasol (TEMOVATE) 0.05 % external solution     desonide (DESOWEN) 0.05 % external ointment     donepezil (ARICEPT) 10 MG tablet     ketoconazole (NIZORAL) 2 % external shampoo     magnesium 250 MG tablet     memantine (NAMENDA) 10 MG tablet     sildenafil (VIAGRA) 50 MG tablet     simvastatin (ZOCOR) 40 MG tablet     tamsulosin (FLOMAX) 0.4 MG 24 hr capsule     vitamin E (TOCOPHEROL) 1000 UNIT capsule     ASPIRIN     buPROPion (WELLBUTRIN XL) 150 MG 24 hr tablet     Carboxymethylcellulose Sod PF (REFRESH PLUS) 0.5 % SOLN ophthalmic solution     donepezil (ARICEPT) 5 MG tablet     hydrochlorothiazide (MICROZIDE) 12.5 MG capsule     memantine (NAMENDA) 10 MG tablet     prazosin (MINIPRESS) 1 MG capsule     psyllium (METAMUCIL/KONSYL) capsule     tobramycin-dexamethasone (TOBRADEX) 0.3-0.1 % ophthalmic ointment     No current facility-administered medications for this visit.      No Known Allergies     Review of Systems:  -As per  HPI  -Constitutional: Otherwise feeling well today, in usual state of health.  -HEENT: Patient denies nonhealing oral sores.  -Skin: As above in HPI. No additional skin concerns.     Physical exam:  Vitals: There were no vitals taken for this visit.  GEN: This is a well developed, well-nourished male in no acute distress, in a pleasant mood.    SKIN: The exam included the head/face, neck,back, and abdomen.  - eyebrows with no scaling or increased erthyma  - Pitting acne scars on nape of neck  - Sebaceous proliferation on forehead  -Semicircular keloid scar on the central back  -There are waxy stuck on tan to brown papules on the back  -No other lesions of concern on areas examined.        Impression/Plan:  1. Sebopsoriasis - improved    Continue Desonide 0.05% solution, ketoconazole 2% shampoo, clobetasol 0.05% solution    Advised the use of zinc containing shampoos such as OTC T-gel or Head and Shoulders.     2. Seborrheic keratosis, non irritated    No further intervention required. Patient to report changes.         Follow-up in 1 year, earlier for new or changing lesions.      Staff Involved:  IShantell, MS4 saw and examined this patient in the presence of Dr. White.    Staff Physician:  I was present with the medical student who participated in the service and in the documentation of the note. I have verified the history and personally performed the physical exam and medical decision making. I agree with the assessment and plan of care as documented in the note.     Flaca White MD  Professor and Chair  Department of Dermatology  [facility]: Phone: [phone], Fax:[phone]  [facility]: Phone: [phone], Fax: [phone]

## 2021-03-01 ENCOUNTER — IMMUNIZATION (OUTPATIENT)
Dept: NURSING | Facility: CLINIC | Age: 71
End: 2021-03-01
Payer: MEDICARE

## 2021-03-01 PROCEDURE — 91301 PR COVID VAC MODERNA 100 MCG/0.5 ML IM: CPT

## 2021-03-01 PROCEDURE — 0011A PR COVID VAC MODERNA 100 MCG/0.5 ML IM: CPT

## 2021-03-24 ENCOUNTER — COMMUNICATION - HEALTHEAST (OUTPATIENT)
Dept: FAMILY MEDICINE | Facility: CLINIC | Age: 71
End: 2021-03-24

## 2021-03-24 DIAGNOSIS — N40.0 BPH (BENIGN PROSTATIC HYPERPLASIA): ICD-10-CM

## 2021-03-24 DIAGNOSIS — I10 ESSENTIAL HYPERTENSION: ICD-10-CM

## 2021-03-27 ENCOUNTER — COMMUNICATION - HEALTHEAST (OUTPATIENT)
Dept: FAMILY MEDICINE | Facility: CLINIC | Age: 71
End: 2021-03-27

## 2021-03-27 DIAGNOSIS — F32.1 MAJOR DEPRESSIVE DISORDER, SINGLE EPISODE, MODERATE (H): ICD-10-CM

## 2021-03-29 ENCOUNTER — IMMUNIZATION (OUTPATIENT)
Dept: NURSING | Facility: CLINIC | Age: 71
End: 2021-03-29
Attending: INTERNAL MEDICINE
Payer: MEDICARE

## 2021-03-29 PROCEDURE — 0012A PR COVID VAC MODERNA 100 MCG/0.5 ML IM: CPT

## 2021-03-29 PROCEDURE — 91301 PR COVID VAC MODERNA 100 MCG/0.5 ML IM: CPT

## 2021-03-30 ENCOUNTER — COMMUNICATION - HEALTHEAST (OUTPATIENT)
Dept: FAMILY MEDICINE | Facility: CLINIC | Age: 71
End: 2021-03-30

## 2021-03-30 ENCOUNTER — MYC MEDICAL ADVICE (OUTPATIENT)
Dept: NEUROLOGY | Facility: CLINIC | Age: 71
End: 2021-03-30

## 2021-03-30 ENCOUNTER — AMBULATORY - HEALTHEAST (OUTPATIENT)
Dept: FAMILY MEDICINE | Facility: CLINIC | Age: 71
End: 2021-03-30

## 2021-03-30 DIAGNOSIS — F02.80 LATE ONSET ALZHEIMER'S DISEASE WITHOUT BEHAVIORAL DISTURBANCE (H): ICD-10-CM

## 2021-03-30 DIAGNOSIS — G30.1 LATE ONSET ALZHEIMER'S DISEASE WITHOUT BEHAVIORAL DISTURBANCE (H): ICD-10-CM

## 2021-04-03 ENCOUNTER — HEALTH MAINTENANCE LETTER (OUTPATIENT)
Age: 71
End: 2021-04-03

## 2021-04-05 NOTE — TELEPHONE ENCOUNTER
donepezil (ARICEPT) 10 MG tablet      Historical entry  Last Office Visit : 9/4/20 recommended 3 month follow up  Future Office visit:  5/7/21    Routing refill request to provider for review/approval because:  Medication is reported/historical  Discrepancy between requested refill, sig on historical entry and last dictation note  Recommendations:  1. Trial of donepezil 10mg in am and 5mg in am.  Watch for side effects such as nausea, vomiting and diarrhea.  He can go back to 5mg twice daily if side effects are severe.  No changes made to requested refill

## 2021-04-07 RX ORDER — DONEPEZIL HYDROCHLORIDE 5 MG/1
5 TABLET, FILM COATED ORAL 2 TIMES DAILY
Qty: 60 TABLET | Refills: 3 | Status: SHIPPED | OUTPATIENT
Start: 2021-04-07 | End: 2021-07-08

## 2021-04-07 NOTE — TELEPHONE ENCOUNTER
Refill request for donepezil  Previous prescription was for 10mg AM and 5 mg PM.  Patient had not tolerated this dose and was reduced back to 5mg bid.    Routed to MD for review/approval

## 2021-04-08 ENCOUNTER — TELEPHONE (OUTPATIENT)
Dept: NEUROLOGY | Facility: CLINIC | Age: 71
End: 2021-04-08

## 2021-04-08 NOTE — TELEPHONE ENCOUNTER
Central Prior Authorization Team   [phone]    PA Initiation    Medication: Donepezil HCL 5 mg tab  Insurance Company: [employer] - Phone [phone] Fax [phone]  Pharmacy Filling the Rx: [id number] - [place], [place] - [address] [place] [address]  Filling Pharmacy Phone: [phone]  Filling Pharmacy Fax: [phone]  Start Date: 4/8/2021

## 2021-04-08 NOTE — TELEPHONE ENCOUNTER
Prior Authorization Approval    Authorization Effective Date: 1/8/2021  Authorization Expiration Date: 4/8/2022  Medication: Donepezil HCL 5 mg tab-PA APPROVED   Approved Dose/Quantity:   Reference #:     Insurance Company: [employer] - Phone [phone] Fax [phone]  Expected CoPay:       CoPay Card Available:      Foundation Assistance Needed:    Which Pharmacy is filling the prescription (Not needed for infusion/clinic administered): [id number] - [place], [place] - [address]  Pharmacy Notified: Yes- **Instructed pharmacy to notify patient when script is ready to /ship.**   Patient Notified: Yes

## 2021-04-08 NOTE — TELEPHONE ENCOUNTER
Prior Authorization Retail Medication Request    Medication/Dose: Donepezil HCL 5 mg tab  ICD code (if different than what is on RX):      Previously Tried and Failed:  See Chart  Rationale:  See Chart    Insurance Name:    Insurance ID:        Pharmacy Information (if different than what is on RX)  Name:  [facility]- [address] [place]  Phone:  [phone]

## 2021-05-14 ENCOUNTER — RECORDS - HEALTHEAST (OUTPATIENT)
Dept: ADMINISTRATIVE | Facility: OTHER | Age: 71
End: 2021-05-14

## 2021-05-14 ENCOUNTER — VIRTUAL VISIT (OUTPATIENT)
Dept: NEUROLOGY | Facility: CLINIC | Age: 71
End: 2021-05-14
Payer: MEDICARE

## 2021-05-14 DIAGNOSIS — R47.01 APHASIA: ICD-10-CM

## 2021-05-14 DIAGNOSIS — H91.90 HEARING LOSS, UNSPECIFIED HEARING LOSS TYPE, UNSPECIFIED LATERALITY: ICD-10-CM

## 2021-05-14 DIAGNOSIS — G30.1 LATE ONSET ALZHEIMER'S DISEASE WITHOUT BEHAVIORAL DISTURBANCE (H): Primary | ICD-10-CM

## 2021-05-14 DIAGNOSIS — F02.80 LATE ONSET ALZHEIMER'S DISEASE WITHOUT BEHAVIORAL DISTURBANCE (H): Primary | ICD-10-CM

## 2021-05-14 NOTE — PATIENT INSTRUCTIONS
"Mr. Coronado is a 71 year old left-handed former  with history of hyperlipidemia, hypertension, with progressive memory decline.  He has moderate dementia due to Alzheimer's disease with an evaporative memory and logopenic type aphasia.     [ ] Speech therapy  [ ] Hearing aids  [ ] PT for balance exercises  [ ] Decrease donepezil to 5 mg BID     I recommended that she write down instructions and keep them as short as possible in order to help with the evaporative memory.     Standard recommendations:  Exercise is the only known therapy that can delay cognitive decline. We recommend you perform at least 30 minutes of exercise (preferably aerobic, but other more mild forms are OK -- the main idea is to stay physically active) for 5 days a week.     Continue to optimize cerebrovascular risk factors (blood pressure control, lipid control, glycemic control, healthy diet and exercise).     Stay socially and intellectually active as much as possible. Participate in activities that are enjoyable to you. There is no need to perform mental quizzes or \"mind games\" to boost memory; studies have demonstrated that these specialized memory games do not actually boost the patient's cognitive performance in activities outside the game.      Eat healthy. A balanced diet with all major food groups is recommended. Other recommended diets include the Mediterranean diet.     Remove dangerous objects or fall hazards from the home environment.     The Alzheimer's Association has many resources, support groups, and FAQs for caregivers and patients living with dementia. I would highly recommend visiting their webpage: Alz.org  "
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
## 2021-05-14 NOTE — PROGRESS NOTES
"CC: No chief complaint on file.      HPI:  Mr. Catalino Coronado is a 71 year old man former  at  seen by Dr. 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 He was also evaluated by Dr. Wolfgang Edouard. Please see Dr. Kennedy's documentation for full details. In brief, he developed progressive loss in short term memory starting around 2016. Symptoms noted at his last visit in September 2020 were: \"He loses and misplaces objects and repeat questions. he cannot run the washing machine or the  because he doesn't know how to operate the buttons. His wife helps him to turn the water on, because they have the new system.\"    He was provided Aricept and was taking it 5 mg BID. During his last visit with Dr. Kennedy, he was instructed to increase Aricept.     MEDS:  Current Outpatient Medications   Medication Sig Dispense Refill     ASPIRIN Take 81 mg by mouth every morning        ATENOLOL Take 100 mg by mouth every morning        buPROPion (WELLBUTRIN XL) 150 MG 24 hr tablet Take 150 mg by mouth       Carboxymethylcellulose Sod PF (REFRESH PLUS) 0.5 % SOLN ophthalmic solution Place 1 drop into both eyes as needed       clobetasol (TEMOVATE) 0.05 % external solution Apply topically 2 times daily To scaly and itchy areas on scalp until no rash nor itch. Hold until patient requests. 60 mL 11     desonide (DESOWEN) 0.05 % external ointment Apply topically 2 times daily To rash at eyebrows and on face as needed until clear. Hold until patient requests. 60 g 11     donepezil (ARICEPT) 10 MG tablet Take 5 mg by mouth 2 times daily as needed       donepezil (ARICEPT) 5 MG tablet Take 1 tablet (5 mg) by mouth 2 times daily 60 tablet 3     hydrochlorothiazide (MICROZIDE) 12.5 MG capsule TAKE ONE CAPSULE BY MOUTH EVERY MORNING.       ketoconazole (NIZORAL) 2 % external shampoo To " "entire wet scalp and then wash off after 5 minutes three times a week. Hold until patient requests. 240 mL 11     magnesium 250 MG tablet Take 1 tablet by mouth At Bedtime        memantine (NAMENDA) 10 MG tablet 10 mg 2 times daily       memantine (NAMENDA) 10 MG tablet        prazosin (MINIPRESS) 1 MG capsule Take 1 mg by mouth At Bedtime       psyllium (METAMUCIL/KONSYL) capsule Take 1 capsule by mouth daily       sildenafil (VIAGRA) 50 MG tablet Take 1 tablet (50 mg) by mouth as needed for erectile dysfunction 30 tablet 3     simvastatin (ZOCOR) 40 MG tablet TAKE 1 TABLET BY MOUTH NIGHTLY AT BEDTIME.       tamsulosin (FLOMAX) 0.4 MG 24 hr capsule Take 1 capsule (0.4 mg) by mouth daily (Patient taking differently: Take 0.4 mg by mouth every morning ) 90 capsule 3     tobramycin-dexamethasone (TOBRADEX) 0.3-0.1 % ophthalmic ointment 0.5 inch strip each eye at night (Patient not taking: Reported on 2/11/2019) 1 Tube 1     vitamin E (TOCOPHEROL) 1000 UNIT capsule Take 1,000 Units by mouth At Bedtime           PROBLEM LIST:  Patient Active Problem List   Diagnosis     Sebaceous cyst     Late onset Alzheimer's disease without behavioral disturbance (H)        PMHx:  Past Medical History:   Diagnosis Date     BPH (benign prostatic hyperplasia)      Cataracts, bilateral      Cerebral aneurysm, nonruptured      Dementia (H)      Hypercholesterolemia      Hypertension        FHx:  \"Dementia in the patient's mother and in a number of first-degree relatives on the maternal side\"    SHx:  \"The patient completed high school education as well as 1 year of college. .  Both he and his wife raised 3 children while living in the home that they continue to occupy in [place]. The patient did work as a  for the [employer] apparently providing significant input into the bear hugger patient warming device\".      Physical Exam:  MMSE:  Neuro:    Objective Testing:  MRI brain 4/2018:  I personally reviewed this " study which showed diffuse cerebral atrophy with a predilection for the dorsolateral frontoparietal cortices. No abnormal DWI or susceptibility signal. Minimal white matter disease.     B12 normal (793)  CMP normal other than eGFR 55  TSH normal    Assessment/Plan:  Mr. Coronado is a 71 year old left-handed former  with history of hyperlipidemia, hypertension, with progressive memory decline.  He has moderate dementia due to Alzheimer's disease.
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
## 2021-05-14 NOTE — PROGRESS NOTES
Vahid is a 71 year old who is being evaluated via a billable video visit.      How would you like to obtain your AVS? MyChart  If the video visit is dropped, the invitation should be resent by: Text to cell phone: [phone]  Will anyone else be joining your video visit? Yes: Vahid and wife, Leia. How would they like to receive their invitation? Text to cell phone: [phone]      Video Start Time: 9:03AM  Video-Visit Details    Type of service:  Video Visit    Video End Time:10:53 AM    Originating Location (pt. Location): Home    Distant Location (provider location):  [facility] NEUROSPECIALT[facility]     Platform used for Video Visit: [employer]

## 2021-05-19 ENCOUNTER — RECORDS - HEALTHEAST (OUTPATIENT)
Dept: ADMINISTRATIVE | Facility: OTHER | Age: 71
End: 2021-05-19

## 2021-05-19 ENCOUNTER — OFFICE VISIT - HEALTHEAST (OUTPATIENT)
Dept: FAMILY MEDICINE | Facility: CLINIC | Age: 71
End: 2021-05-19

## 2021-05-19 DIAGNOSIS — R10.2 PELVIC PAIN IN MALE: ICD-10-CM

## 2021-05-19 LAB
ALBUMIN UR-MCNC: NEGATIVE G/DL
APPEARANCE UR: CLEAR
BILIRUB UR QL STRIP: NEGATIVE
COLOR UR AUTO: YELLOW
GLUCOSE UR STRIP-MCNC: NEGATIVE MG/DL
HGB UR QL STRIP: NEGATIVE
KETONES UR STRIP-MCNC: NEGATIVE MG/DL
LEUKOCYTE ESTERASE UR QL STRIP: NEGATIVE
NITRATE UR QL: NEGATIVE
PH UR STRIP: 5 [PH] (ref 5–8)
SP GR UR STRIP: 1.02 (ref 1–1.03)
UROBILINOGEN UR STRIP-ACNC: NORMAL

## 2021-05-19 ASSESSMENT — MIFFLIN-ST. JEOR: SCORE: 1775.04

## 2021-05-27 VITALS — WEIGHT: 206 LBS | BODY MASS INDEX: 25.75 KG/M2 | BODY MASS INDEX: 26.75 KG/M2 | HEIGHT: 75 IN

## 2021-05-27 VITALS — RESPIRATION RATE: 19 BRPM | HEART RATE: 67 BPM | SYSTOLIC BLOOD PRESSURE: 145 MMHG | DIASTOLIC BLOOD PRESSURE: 78 MMHG

## 2021-05-27 NOTE — TELEPHONE ENCOUNTER
Due to be seen    Rx renewed per Medication Renewal Policy. Medication was last renewed on 12/30/18. 7/11/18    Maritza Gallardo, [facility] Triage/Med Refill 4/1/2019     Requested Prescriptions   Pending Prescriptions Disp Refills     tamsulosin (FLOMAX) 0.4 mg cap [Pharmacy Med Name: TAMSULOSIN HCL 0.4 MG CAPSULE] 90 capsule 0     Sig: TAKE 1 CAPSULE BY MOUTH EVERY DAY    Alfuzosin/Tamsulosin/Silodosin Refill Protocol  Passed - 3/30/2019  8:29 AM       Passed - PCP or prescribing provider visit in past 12 months      Last office visit with prescriber/PCP: 8/7/2017 Jamari Sloan MD OR same dept: 4/3/2018 Flavia Haley MD OR same specialty: 4/3/2018 Flavia Haley MD  Last physical: 1/4/2018 Last MTM visit: Visit date not found   Next visit within 3 mo: Visit date not found  Next physical within 3 mo: Visit date not found  Prescriber OR PCP: Jamari Sloan MD  Last diagnosis associated with med order: 1. BPH (benign prostatic hyperplasia)  - tamsulosin (FLOMAX) 0.4 mg cap [Pharmacy Med Name: TAMSULOSIN HCL 0.4 MG CAPSULE]; TAKE 1 CAPSULE BY MOUTH EVERY DAY  Dispense: 90 capsule; Refill: 0    2. Essential hypertension  - atenolol (TENORMIN) 100 MG tablet [Pharmacy Med Name: ATENOLOL 100 MG TABLET]; TAKE ONE TABLET BY MOUTH ONE TIME DAILY  Dispense: 90 tablet; Refill: 2    If protocol passes may refill for 12 months if within 3 months of last provider visit (or a total of 15 months).             atenolol (TENORMIN) 100 MG tablet [Pharmacy Med Name: ATENOLOL 100 MG TABLET] 90 tablet 2     Sig: TAKE ONE TABLET BY MOUTH ONE TIME DAILY    Beta-Blockers Refill Protocol Passed - 3/30/2019  8:29 AM       Passed - PCP or prescribing provider visit in past 12 months or next 3 months    Last office visit with prescriber/PCP: 8/7/2017 Jamari Sloan MD OR same dept: 4/3/2018 Flavia Haley MD OR same specialty: 4/3/2018 Flavia Haley MD  Last physical: 1/4/2018 Last MTM  visit: Visit date not found   Next visit within 3 mo: Visit date not found  Next physical within 3 mo: Visit date not found  Prescriber OR PCP: Jamari Sloan MD  Last diagnosis associated with med order: 1. BPH (benign prostatic hyperplasia)  - tamsulosin (FLOMAX) 0.4 mg cap [Pharmacy Med Name: TAMSULOSIN HCL 0.4 MG CAPSULE]; TAKE 1 CAPSULE BY MOUTH EVERY DAY  Dispense: 90 capsule; Refill: 0    2. Essential hypertension  - atenolol (TENORMIN) 100 MG tablet [Pharmacy Med Name: ATENOLOL 100 MG TABLET]; TAKE ONE TABLET BY MOUTH ONE TIME DAILY  Dispense: 90 tablet; Refill: 2    If protocol passes may refill for 12 months if within 3 months of last provider visit (or a total of 15 months).            Passed - Blood pressure filed in past 12 months    BP Readings from Last 1 Encounters:   01/04/19 151/74

## 2021-05-28 NOTE — TELEPHONE ENCOUNTER
RN cannot approve Refill Request    RN can NOT refill this medication PCP messaged that patient is overdue for Office Visit.       Maritza Gallardo, [facility] Triage/Med Refill 5/15/2019    Requested Prescriptions   Pending Prescriptions Disp Refills     hydroCHLOROthiazide (MICROZIDE) 12.5 mg capsule [Pharmacy Med Name: HYDROCHLOROTHIAZIDE 12.5 MG CP] 90 capsule 0     Sig: TAKE 1 CAPSULE BY MOUTH EVERY DAY IN THE MORNING       Diuretics/Combination Diuretics Refill Protocol  Failed - 5/15/2019  7:35 AM        Failed - Visit with PCP or prescribing provider visit in past 12 months     Last office visit with prescriber/PCP: 8/7/2017 Jamari Sloan MD OR same dept: Visit date not found OR same specialty: 4/3/2018 Flavia Haley MD  Last physical: 1/4/2018 Last MTM visit: Visit date not found   Next visit within 3 mo: Visit date not found  Next physical within 3 mo: Visit date not found  Prescriber OR PCP: Jamari Sloan MD  Last diagnosis associated with med order: 1. Essential hypertension, malignant  - hydroCHLOROthiazide (MICROZIDE) 12.5 mg capsule [Pharmacy Med Name: HYDROCHLOROTHIAZIDE 12.5 MG CP]; TAKE 1 CAPSULE BY MOUTH EVERY DAY IN THE MORNING  Dispense: 90 capsule; Refill: 0    If protocol passes may refill for 12 months if within 3 months of last provider visit (or a total of 15 months).             Passed - Serum Potassium in past 12 months      No results found for: LN-POTASSIUM          Passed - Serum Sodium in past 12 months      No results found for: LN-SODIUM          Passed - Blood pressure on file in past 12 months     BP Readings from Last 1 Encounters:   01/04/19 151/74             Passed - Serum Creatinine in past 12 months      Creatinine   Date Value Ref Range Status   05/15/2018 1.05 0.70 - 1.30 mg/dL Final

## 2021-05-30 VITALS — BODY MASS INDEX: 27.56 KG/M2 | HEIGHT: 74 IN | WEIGHT: 214.75 LBS

## 2021-05-30 NOTE — PROGRESS NOTES
Assessment / Impression   1.  Dementia multifactorial etiology including dementia the also retyped and dementia with Lewy bodies  2.  Depression improved on therapy with bupropion  3.  History of delirium worsened on lower dose Aricept  4.  History of intermittent diarrhea      Plan:   1.  Continue present therapies although one could consider alternative cholinesterase inhibitor therapy given the patient's ongoing confusion  2.  Strategies provided to help the patient with attentional function, recall etc.    Long conversation with the patient and wife Leia in attendance.  The patient overall has been doing reasonably well on higher dose cholinesterase inhibitor therapy and we will continue same.  He has had some trouble with intermittent diarrhea and does in fact demonstrate ongoing evidence of confusion suggesting that an alternative agent may be more helpful.    Total time for evaluation 30 minutes with majority time spent in counseling.      Subjective:     HPI: Catalino Coronado is a 69 y.o. male with above-noted diagnoses who is seen in follow-up.  The patient appears to be in better spirits since initiation of therapy with Wellbutrin.  He did attempt to down titrate Aricept given intermittent diarrhea but I suspect he had worsening confusion so the patient is now taking a twice daily dose of therapy presently.  He does report difficulty in maintaining a coherent stream of thought and gives example suggesting difficulty with working memory and cognitive set shifting.          Review of Systems:          As above        Objective:   There were no vitals filed for this visit.    No results found for this or any previous visit (from the past 24 hour(s)).    Physical Exam: Patient is neatly groomed casually dressed and seated for evaluation.  Affect is brighter and I note him to be a bit more engaging today than that noted previously.  Attentional difficulty is clearly identified.  The patient has problems with  cognitive set shifting and working.  No fluctuation in level of consciousness or willem distractibility noted.

## 2021-05-30 NOTE — PROGRESS NOTES
.Persons accompanying you (the patient) today: Mindy Dupree     How have you been doing since we saw you last? Please note any concerns.  No concerns at this time.       Please list any recent hospitalizations/surgeries/procedures you've had since we saw you last:  None     Have you had any falls since your last visit? No    Do you have any pain today? No

## 2021-05-31 VITALS — WEIGHT: 210 LBS | BODY MASS INDEX: 26.78 KG/M2

## 2021-05-31 VITALS — HEIGHT: 74 IN | WEIGHT: 216 LBS | BODY MASS INDEX: 27.72 KG/M2

## 2021-05-31 NOTE — TELEPHONE ENCOUNTER
RN cannot approve Refill Request    RN can NOT refill this medication PCP messaged that patient is overdue for Labs and Office Visit. Last office visit: 8/7/2017 Jamari Sloan MD Last Physical: 1/4/2018 Last MTM visit: Visit date not found Last visit same specialty: 4/3/2018 Flavia Haley MD.  Next visit within 3 mo: Visit date not found  Next physical within 3 mo: Visit date not found      Dia Perla, [facility] Triage/Med Refill 8/13/2019    Requested Prescriptions   Pending Prescriptions Disp Refills     simvastatin (ZOCOR) 40 MG tablet [Pharmacy Med Name: SIMVASTATIN 40 MG TABLET] 60 tablet 8     Sig: TAKE 1 TABLET BY MOUTH NIGHTLY AT BEDTIME.       Statins Refill Protocol (Hmg CoA Reductase Inhibitors) Failed - 8/12/2019 10:59 AM        Failed - PCP or prescribing provider visit in past 12 months      Last office visit with prescriber/PCP: 8/7/2017 Jamari Sloan MD OR same dept: Visit date not found OR same specialty: 4/3/2018 Flavia Haley MD  Last physical: 1/4/2018 Last MTM visit: Visit date not found   Next visit within 3 mo: Visit date not found  Next physical within 3 mo: Visit date not found  Prescriber OR PCP: Jamari Sloan MD  Last diagnosis associated with med order: 1. Pure hypercholesterolemia  - simvastatin (ZOCOR) 40 MG tablet [Pharmacy Med Name: SIMVASTATIN 40 MG TABLET]; TAKE 1 TABLET BY MOUTH NIGHTLY AT BEDTIME.  Dispense: 60 tablet; Refill: 8    If protocol passes may refill for 12 months if within 3 months of last provider visit (or a total of 15 months).

## 2021-05-31 NOTE — TELEPHONE ENCOUNTER
RN cannot approve Refill Request    RN can NOT refill this medication Protocol failed and NO refill given. Last office visit: 8/7/2017 Jamari Sloan MD Last Physical: 1/4/2018 Last MTM visit: Visit date not found Last visit same specialty: 4/3/2018 Flavia Haley MD.  Next visit within 3 mo: Visit date not found  Next physical within 3 mo: Visit date not found      Ambar Tracy, [facility] Triage/Med Refill 8/11/2019    Requested Prescriptions   Pending Prescriptions Disp Refills     hydroCHLOROthiazide (MICROZIDE) 12.5 mg capsule [Pharmacy Med Name: HYDROCHLOROTHIAZIDE 12.5 MG CP] 90 capsule 0     Sig: TAKE 1 CAPSULE BY MOUTH EVERY DAY IN THE MORNING (DUE FOR MD VISIT FOR REFILLS)       Diuretics/Combination Diuretics Refill Protocol  Failed - 8/11/2019  8:31 AM        Failed - Visit with PCP or prescribing provider visit in past 12 months     Last office visit with prescriber/PCP: 8/7/2017 Jamari Sloan MD OR same dept: Visit date not found OR same specialty: 4/3/2018 Flavia Haley MD  Last physical: 1/4/2018 Last MTM visit: Visit date not found   Next visit within 3 mo: Visit date not found  Next physical within 3 mo: Visit date not found  Prescriber OR PCP: Jamari Sloan MD  Last diagnosis associated with med order: 1. Essential hypertension, malignant  - hydroCHLOROthiazide (MICROZIDE) 12.5 mg capsule [Pharmacy Med Name: HYDROCHLOROTHIAZIDE 12.5 MG CP]; TAKE 1 CAPSULE BY MOUTH EVERY DAY IN THE MORNING (DUE FOR MD VISIT FOR REFILLS)  Dispense: 90 capsule; Refill: 0    If protocol passes may refill for 12 months if within 3 months of last provider visit (or a total of 15 months).             Failed - Serum Potassium in past 12 months      No results found for: LN-POTASSIUM          Failed - Serum Sodium in past 12 months      No results found for: LN-SODIUM          Failed - Serum Creatinine in past 12 months      Creatinine   Date Value Ref Range Status   05/15/2018 1.05 0.70  - 1.30 mg/dL Final             Passed - Blood pressure on file in past 12 months     BP Readings from Last 1 Encounters:   01/04/19 151/74

## 2021-06-01 VITALS — BODY MASS INDEX: 28.32 KG/M2 | WEIGHT: 222.1 LBS

## 2021-06-01 VITALS — BODY MASS INDEX: 27.67 KG/M2 | WEIGHT: 217 LBS

## 2021-06-01 VITALS — BODY MASS INDEX: 27.22 KG/M2 | WEIGHT: 212 LBS

## 2021-06-01 VITALS — WEIGHT: 210 LBS | HEIGHT: 74 IN | BODY MASS INDEX: 26.95 KG/M2

## 2021-06-01 VITALS — BODY MASS INDEX: 26.78 KG/M2 | WEIGHT: 210 LBS

## 2021-06-02 VITALS — WEIGHT: 212 LBS | BODY MASS INDEX: 26.5 KG/M2

## 2021-06-03 VITALS
HEART RATE: 70 BPM | HEIGHT: 74 IN | SYSTOLIC BLOOD PRESSURE: 142 MMHG | DIASTOLIC BLOOD PRESSURE: 70 MMHG | TEMPERATURE: 97.5 F | BODY MASS INDEX: 27.34 KG/M2 | WEIGHT: 213 LBS | RESPIRATION RATE: 14 BRPM

## 2021-06-03 NOTE — PROGRESS NOTES
Assessment / Impression   1.  Dementia likely mixed in etiology including dementia the Alzheimer type and dementia with Lewy bodies  2.  Loose stools likely secondary to cholinesterase inhibitor therapy  3.  Depression improved on present combination therapies  4.  History of delirium noted with lower dose Aricept  5.  Other medical problems as noted      Plan:   1.  Reduce Aricept to 5 mg p.o. daily to determine whether loose stools are etiologically related to dose.  If this proves to be the case I would recommend a trial of 2.5 mg Aricept p.o. 3 times daily with meals with gradual up titration to 10 mg/day if tolerated over time  2.  Continue present therapies for depression.  Further advancement antidepressant therapy may be required move forward depending on patient's clinical progress  3.  Continue wellness and environmental support interventions  4.  I would [facility] if specialty care is required.  For the time being, however, I believe primary care follow-up would be adequate    A very long conversation with the patient and wife Leia in attendance.  Patient appears to be doing reasonably well with respect to depression and Alzheimer's disease.  He is experiencing some diarrhea and I suspect this is due to cholinesterase inhibitor therapy.  I did discuss in great detail treatments moving forward as outlined above.  In lieu of return to my clinic, I believe the [facility] program would be likely most appropriate.    Total time for evaluation 30 minutes with majority time spent counseling.      Subjective:     HPI: Catalino Coronado is a 69 y.o. male with above-noted diagnoses who is seen in follow-up.  The patient has been doing reasonably well with some variability in mood.  He is somewhat preoccupied with how his Alzheimer's will progress and we discussed in great detail ways of managing illness and expectations moving forward.          Review of  Systems:          Negative        Objective:   There were no vitals filed for this visit.    No results found for this or any previous visit (from the past 24 hour(s)).    Physical Exam: Patient is neatly groomed casually dressed and seated for evaluation.  He is alert and attentive.  Verbal output is quite limited at this time.  No fluctuation in level consciousness or willem distractibility.  Mood appears to be possibly slightly depressed at this time.  No abnormal thought content noted.  No behavior suggesting internally generated stimuli.

## 2021-06-03 NOTE — TELEPHONE ENCOUNTER
RN cannot approve Refill Request    RN can NOT refill this medication PCP messaged that patient is overdue for Labs and Office Visit. Last office visit: 8/7/2017 Jamari Sloan MD Last Physical: 1/4/2018 Last MTM visit: Visit date not found Last visit same specialty: 4/3/2018 Flavia Haley MD.  Next visit within 3 mo: Visit date not found  Next physical within 3 mo: Visit date not found      Zakia Pisano, [facility] Triage/Med Refill 11/7/2019    Requested Prescriptions   Pending Prescriptions Disp Refills     hydroCHLOROthiazide (MICROZIDE) 12.5 mg capsule [Pharmacy Med Name: HYDROCHLOROTHIAZIDE 12.5 MG CP] 90 capsule 0     Sig: TAKE 1 CAPSULE BY MOUTH EVERY DAY IN THE MORNING (DUE FOR MD VISIT FOR REFILLS)       Diuretics/Combination Diuretics Refill Protocol  Failed - 11/7/2019 12:56 AM        Failed - Visit with PCP or prescribing provider visit in past 12 months     Last office visit with prescriber/PCP: 8/7/2017 Jamari Sloan MD OR same dept: Visit date not found OR same specialty: 4/3/2018 Flavia Haley MD  Last physical: 1/4/2018 Last MTM visit: Visit date not found   Next visit within 3 mo: Visit date not found  Next physical within 3 mo: Visit date not found  Prescriber OR PCP: Jamari Sloan MD  Last diagnosis associated with med order: 1. Essential hypertension, malignant  - hydroCHLOROthiazide (MICROZIDE) 12.5 mg capsule [Pharmacy Med Name: HYDROCHLOROTHIAZIDE 12.5 MG CP]; TAKE 1 CAPSULE BY MOUTH EVERY DAY IN THE MORNING (DUE FOR MD VISIT FOR REFILLS)  Dispense: 90 capsule; Refill: 0    If protocol passes may refill for 12 months if within 3 months of last provider visit (or a total of 15 months).             Failed - Serum Potassium in past 12 months      No results found for: LN-POTASSIUM          Failed - Serum Sodium in past 12 months      No results found for: LN-SODIUM          Failed - Serum Creatinine in past 12 months      Creatinine   Date Value Ref Range Status    05/15/2018 1.05 0.70 - 1.30 mg/dL Final             Passed - Blood pressure on file in past 12 months     BP Readings from Last 1 Encounters:   01/04/19 151/74

## 2021-06-04 NOTE — PROGRESS NOTES
Dr Edouard leaving.  Will be establishing a practice someplace.  Pt seen there regularly for dementia    Forgets short term    Needs follow up for med refill    aricept causing ongoing loose stools.   Trying to get to 20 but can't due to the stools    namenda supposedly constipating but not noticed    Pt does not remember any other meds    See med list.    Trouble with urinating.  Seconds after void small leak  Pt on flomax    Hypertension denies chest pain or headache.         OBJECTIVE:   Vitals:    12/04/19 1109   BP: 142/70   Pulse: 70   Resp: 14   Temp: 97.5  F (36.4  C)      Eyes: non icteric, noninflamed  Lungs: no resp distress  Heart: regular  Ankles: no edema  Muscles: nontender  Mental status: euthymic  Neuro: nonfocal        Body mass index is 27.35 kg/m .       ASSESSMENT/PLAN:    1. Benign prostatic hyperplasia with lower urinary tract symptoms, symptom details unspecified  prazosin (MINIPRESS) 1 MG capsule   2. Essential hypertension     3. Steatohepatitis  Comprehensive Metabolic Panel    HM2(CBC w/o Differential)   4. Metabolic Tests Nonspecific Abnormal Serum Enzyme Levels     5. Screening cholesterol level  LDL Cholesterol, Direct     Chronic issues stable/ same treatment.  Trial of adding alphablocker    Discussed watching sx vs lightheaded    Accept post void leak and sit and wait after void  Bulk laxative for stools  Discussed measures (notes) for memory  Anticipated that Dr Edouard will be available in a setting yet to be determined.

## 2021-06-05 NOTE — TELEPHONE ENCOUNTER
Called and spoke with patient's wife.  Patient is having an MRI in two weeks with neurosurgery and they are wanting to get a one time dose of 1mg lorazepam to help him stay calm in the MRI.  Their previous doctor  Dr. Edouard who is now retired has ordered this in the past and it worked well.

## 2021-06-05 NOTE — TELEPHONE ENCOUNTER
Looks like Dr. Sloan ok with either valium or lorazepam with MRA...  I'm confused with the question--If Dr Sloan ordered valium and Dr. Edouard ordered lorazepam, either way, this should be covered--could you clarify what the request is for?

## 2021-06-05 NOTE — TELEPHONE ENCOUNTER
Dr. Johnson please advise.   Your prescribe patient Donepezil on 1/20/20.   Dr. Edouard prescribed patient Memantine 1/16/2019.    PCP is Dr. Sloan.

## 2021-06-05 NOTE — TELEPHONE ENCOUNTER
Refill Approved    Rx renewed per Medication Renewal Policy. Medication was last renewed on 11/7/19.    Maritza Gallardo, [facility] Triage/Med Refill 1/30/2020     Requested Prescriptions   Pending Prescriptions Disp Refills     hydroCHLOROthiazide (MICROZIDE) 12.5 mg capsule [Pharmacy Med Name: HYDROCHLOROTHIAZIDE 12.5 MG CP] 90 capsule 0     Sig: TAKE 1 CAPSULE BY MOUTH EVERY DAY IN THE MORNING (DUE FOR MD VISIT FOR REFILLS)       Diuretics/Combination Diuretics Refill Protocol  Passed - 1/30/2020  2:15 AM        Passed - Visit with PCP or prescribing provider visit in past 12 months     Last office visit with prescriber/PCP: 12/4/2019 Jamari Sloan MD OR same dept: 12/4/2019 Jamari Sloan MD OR same specialty: 12/4/2019 Jamari Sloan MD  Last physical: 1/4/2018 Last MTM visit: Visit date not found   Next visit within 3 mo: Visit date not found  Next physical within 3 mo: Visit date not found  Prescriber OR PCP: Jamari Sloan MD  Last diagnosis associated with med order: 1. Essential hypertension, malignant  - hydroCHLOROthiazide (MICROZIDE) 12.5 mg capsule [Pharmacy Med Name: HYDROCHLOROTHIAZIDE 12.5 MG CP]; TAKE 1 CAPSULE BY MOUTH EVERY DAY IN THE MORNING (DUE FOR MD VISIT FOR REFILLS)  Dispense: 90 capsule; Refill: 0    If protocol passes may refill for 12 months if within 3 months of last provider visit (or a total of 15 months).             Passed - Serum Potassium in past 12 months      Lab Results   Component Value Date    Potassium 4.2 12/04/2019             Passed - Serum Sodium in past 12 months      Lab Results   Component Value Date    Sodium 140 12/04/2019             Passed - Blood pressure on file in past 12 months     BP Readings from Last 1 Encounters:   12/04/19 142/70             Passed - Serum Creatinine in past 12 months      Creatinine   Date Value Ref Range Status   12/04/2019 1.21 0.70 - 1.30 mg/dL Final

## 2021-06-05 NOTE — TELEPHONE ENCOUNTER
I apologize I did not realize  was out yesterday morning when I sent this message. DOD. Are you okay with sending a prescription?

## 2021-06-06 NOTE — TELEPHONE ENCOUNTER
Dr. Sloan,   I tried to que up the medication for you to sign, however I keep on getting a message stating med is already pending in another encounter. I sent to look at all encounters related to the Aricept and can not locate a pending med. Per the PA task pharmacy needed another script send in for the Aricept 5 mg two times a day.

## 2021-06-06 NOTE — TELEPHONE ENCOUNTER
Refill Approved    Rx renewed per Medication Renewal Policy. Medication was last renewed on 1/29/20.    Maritza Gallardo, [facility] Triage/Med Refill 2/28/2020     Requested Prescriptions   Pending Prescriptions Disp Refills     donepeziL (ARICEPT) 5 MG tablet [Pharmacy Med Name: DONEPEZIL HCL 5 MG TABLET] 60 tablet 0     Sig: TAKE 1 TABLET BY MOUTH TWICE A DAY       Cholinesterase Inhibitor/Anti-Alzheimer Agent Refill Protocol Passed - 2/28/2020  7:33 AM        Passed - Visit with PCP or prescribing provider visit in last 6 months or next 3 months     Last office visit with prescriber/PCP: Visit date not found OR same dept: 12/4/2019 Jamari Sloan MD OR same specialty: 12/4/2019 Jamari Sloan MD Last physical: Visit date not found Last MTM visit: Visit date not found     Next appt within 3 mo: Visit date not found  Next physical within 3 mo: Visit date not found  Prescriber OR PCP: Jono Johnson MD  Last diagnosis associated with med order: 1. Late onset Alzheimer's disease without behavioral disturbance (H)  - donepeziL (ARICEPT) 5 MG tablet [Pharmacy Med Name: DONEPEZIL HCL 5 MG TABLET]; TAKE 1 TABLET BY MOUTH TWICE A DAY  Dispense: 60 tablet; Refill: 0    If protocol passes may refill for 6 months if within 3 months of last provider visit (or a total of 9 months).

## 2021-06-06 NOTE — TELEPHONE ENCOUNTER
DOD: called and spoke to the pharmacist. Per him, insurance does not cover the Donepezil 5 mg take 1 tablet two times a day. He said we can send a Rx for the 10 mg take 1 tablet daily. I pended the 10 mg. If ok, please sign if appropriate. Thank you.

## 2021-06-06 NOTE — TELEPHONE ENCOUNTER
Central PA team  [phone]  Pool: HE PA MED ([id number])          PA has been initiated.       PA form completed and faxed insurance via Cover My Meds     Key:  [id number] - PA Case ID: [id number]     Medication:  Donepezil HCl 5MG tablets    Insurance:  [employer]        Response will be received via fax and may take up to 5-10 business days depending on plan

## 2021-06-06 NOTE — TELEPHONE ENCOUNTER
Medication Question or Clarification  Who is calling: [facility]   What medication are you calling about (include dose and sig)?: donepeziL (ARICEPT) 5 MG tablet. Take 1 tablet by mouth two times a day.  Who prescribed the medication?: Jamari Sloan MD   What is your question/concern?: Patients insurance will not cover dosing two times a day. Please advise.   Requested Pharmacy: [facility] #[id number]  Okay to leave a detailed message?: No

## 2021-06-07 NOTE — TELEPHONE ENCOUNTER
Requested Prescriptions     Pending Prescriptions Disp Refills     tamsulosin (FLOMAX) 0.4 mg cap [Pharmacy Med Name: TAMSULOSIN HCL 0.4 MG CAPSULE] 90 capsule 3     Sig: TAKE 1 CAPSULE BY MOUTH EVERY DAY     atenoloL (TENORMIN) 100 MG tablet [Pharmacy Med Name: ATENOLOL 100 MG TABLET] 90 tablet 3     Sig: TAKE 1 TABLET BY MOUTH EVERY DAY         Sunshine Goldberg

## 2021-06-07 NOTE — TELEPHONE ENCOUNTER
Called and spoke with patient. Patient gave verbal consent to speak with Leia. Called and spoke with Leia, Message was given, Leia understood, no further questions.

## 2021-06-07 NOTE — TELEPHONE ENCOUNTER
Please let them know that I authorized 1 month supply.    They will need to contact Dr. Sloan sometime this next month to discuss ongoing refills of this.     Dr. Javan guardado at [facility], who is now retired, previously prescribed this

## 2021-06-08 ENCOUNTER — HOSPITAL ENCOUNTER (OUTPATIENT)
Dept: SPEECH THERAPY | Facility: CLINIC | Age: 71
Setting detail: THERAPIES SERIES
End: 2021-06-08
Attending: PSYCHIATRY & NEUROLOGY
Payer: MEDICARE

## 2021-06-08 ENCOUNTER — HOSPITAL ENCOUNTER (OUTPATIENT)
Dept: PHYSICAL THERAPY | Facility: CLINIC | Age: 71
Setting detail: THERAPIES SERIES
End: 2021-06-08
Attending: PSYCHIATRY & NEUROLOGY
Payer: MEDICARE

## 2021-06-08 DIAGNOSIS — G30.1 LATE ONSET ALZHEIMER'S DISEASE WITHOUT BEHAVIORAL DISTURBANCE (H): ICD-10-CM

## 2021-06-08 DIAGNOSIS — R47.01 APHASIA: ICD-10-CM

## 2021-06-08 DIAGNOSIS — F02.80 LATE ONSET ALZHEIMER'S DISEASE WITHOUT BEHAVIORAL DISTURBANCE (H): ICD-10-CM

## 2021-06-08 PROCEDURE — 97161 PT EVAL LOW COMPLEX 20 MIN: CPT | Mod: GP | Performed by: PHYSICAL THERAPIST

## 2021-06-08 PROCEDURE — 92523 SPEECH SOUND LANG COMPREHEN: CPT | Mod: GN | Performed by: SPEECH-LANGUAGE PATHOLOGIST

## 2021-06-08 PROCEDURE — 97116 GAIT TRAINING THERAPY: CPT | Mod: GP | Performed by: PHYSICAL THERAPIST

## 2021-06-08 NOTE — DISCHARGE INSTRUCTIONS
6/8/21    Consider a  gait belt  or the belt on pants - if Vahid loses his balance - you can hold the belt and not his arm.    Keep walking with you daughter.   GOOD TO Exercise.   look for benches, chairs to sit on - OR carry a camping chair.      [place]  [place] did 5 minutes at 1.0 miles /hour.   Try for 10 minutes.  Use the safety clip on treadmill and get on and off slowly.     Try the stationary bike - try 5 to 10 minutes.  Can work up on the time as you get used to it.     We want safe exercise.      Don t think the pole or walker will help right now.

## 2021-06-08 NOTE — PROGRESS NOTES
"Catalino Coronado is a 70 y.o. male who is being evaluated via a billable video visit.      The patient has been notified of following:     \"This video visit will be conducted via a call between you and your physician/provider. We have found that certain health care needs can be provided without the need for an in-person physical exam.  This service lets us provide the care you need with a video conversation.  If a prescription is necessary we can send it directly to your pharmacy.  If lab work is needed we can place an order for that and you can then stop by our lab to have the test done at a later time.    Video visits are billed at different rates depending on your insurance coverage. Please reach out to your insurance provider with any questions.    If during the course of the call the physician/provider feels a video visit is not appropriate, you will not be charged for this service.\"    Patient has given verbal consent to a Video visit? Yes    Patient would like to receive their AVS by AVS Preference: Mail a copy.    Patient would like the video invitation sent by: Text to cell phone: [phone]    Will anyone else be joining your video visit? No        Video Start Time: 11:30 AM    Additional provider notes:        Video-Visit Details    Type of service:  Video Visit    Video End Time (time video stopped): 11:51 AM  Originating Location (pt. Location): Home    Distant Location (provider location):  [facility]/OB     Platform used for Video Visit: Well      Jamari Sloan MD   Per spouse  Dr Edouard started on bupropion as was depressed after dx of dementia.   Improved depression symptoms.   Would sit in chair.  Since then, is more active and frustrated.  Has had incredible improvement.   Pt states it comes and goes.   When having a good day and then gets sad and feels not going to make it.  Once a month.  Lasts about a day.  Or better after a night's sleep.      Remodeling house for " wheelchair issues for future needs due to alzheimers.   Now doing well.  Word finding is biggest issue    Hx buprop 150 effective for cig cessation     Pharm reviewed med list and brings up the following:  Asa 81/d.  Not really necessary    Alpha blockers  (2)  ? bp  For bph and ? Ptsd.   Questions orthostatic sxs as possibility but pt denies    Split aricept.  Query.  ? Suggesting split buprop as buprop increases diarrhea        10 aricept diarrhea better with split to 5 two times a day.    Pharm suggesting aricept 10 at time different than the bupropion.  Pt and spouse wish to leaving well enough alone    Benign prostatic hypertrophy.  Denies increase in nocturia.  alzheimers same.  On same meds  Mood disorder above sx.  Wishes to try increased dose and monitor sxs  Hypertension denies chest pain or headache.   Side effects of aricept as above    ASSESSMENT/PLAN:    Alz/ same meds.  Waiting for Dr Edouard to open up office    Hx buprop 150 effective for cig cessation and decreased dep sxs but pt wishes to try higher dose and follow sxs.    Other issues reviewed.  Pharm suggestions discussed.  Will leave meds as is    1. Essential hypertension     2. Major depressive disorder, single episode, moderate (H)  buPROPion (WELLBUTRIN XL) 150 MG 24 hr tablet   3. Late onset Alzheimer's disease without behavioral disturbance (H)     4. Benign prostatic hyperplasia with urinary frequency       Problem number of new, unstable, or uncontrolled problems above (others are stable):  Chronic issues stable/ same treatment  Current Outpatient Medications on File Prior to Visit   Medication Sig Dispense Refill     aspirin 81 MG EC tablet Take 1 tablet (81 mg total) by mouth daily.  0     atenoloL (TENORMIN) 100 MG tablet TAKE 1 TABLET BY MOUTH EVERY DAY 90 tablet 3     clobetasol (TEMOVATE) 0.05 % external solution APPLY TOPICALLY TWICE DAILY 50 mL 0     desonide (DESOWEN) 0.05 % ointment Apply topically 2 (two) times a day. 15 g 0      donepeziL (ARICEPT) 5 MG tablet Take 1 tablet (5 mg total) by mouth 2 (two) times a day. 180 tablet 1     fluticasone (FLONASE) 50 mcg/actuation nasal spray USE TWO SPRAYS IN EACH NOSTRIL DAILY 48 g 3     hydroCHLOROthiazide (MICROZIDE) 12.5 mg capsule TAKE 1 CAPSULE BY MOUTH EVERY DAY IN THE MORNING (DUE FOR MD VISIT FOR REFILLS) 90 capsule 3     ketoconazole (NIZORAL) 2 % shampoo APPLY TO ENTIRE WET SCALP & WASH OFF AFTER 5 MINS 3 TIMES A WEEK. 240 mL 4     LORazepam (ATIVAN) 1 MG tablet Take 1 tablet (1 mg total) by mouth once in imaging for anxiety. Take 1/2 hour prior to procedure 1 tablet 0     memantine (NAMENDA) 5 MG tablet Take 1 tab (5mg) daily X 1 week, then 1 tab twice daily X 1 week, then 1 tab every evening along with a 10mg tab every morning X 1 week. (Patient taking differently: Take 5 mg by mouth 2 (two) times a day. Take 1 tab (5mg) daily X 1 week, then 1 tab twice daily X 1 week, then 1 tab every evening along with a 10mg tab every morning X 1 week.      ) 28 tablet 0     prazosin (MINIPRESS) 1 MG capsule Take 1 capsule (1 mg total) by mouth at bedtime. 60 capsule 5     sildenafil (VIAGRA) 50 MG tablet Take 1 tablet (50 mg total) by mouth as needed for erectile dysfunction. 16 tablet 3     simvastatin (ZOCOR) 40 MG tablet TAKE 1 TABLET BY MOUTH NIGHTLY AT BEDTIME. 60 tablet 8     tamsulosin (FLOMAX) 0.4 mg cap TAKE 1 CAPSULE BY MOUTH EVERY DAY 90 capsule 3     triamcinolone (KENALOG) 0.1 % cream APPLY TOPICALLY TWICE DAILY. 45 g 1     [DISCONTINUED] buPROPion (WELLBUTRIN XL) 150 MG 24 hr tablet Take 1 tablet (150 mg total) by mouth daily. 30 tablet 5     No current facility-administered medications on file prior to visit.

## 2021-06-09 NOTE — TELEPHONE ENCOUNTER
Mikal Bae,    Thank you for sending us a [employer] message.  has refilled this medication for you. Please let us know if you have any further questions or concerns.    Thank you,  David SUE MA

## 2021-06-09 NOTE — PROGRESS NOTES
06/08/21 1500       Present No   General Information   Type of Evaluation Speech and Language   Type Of Visit Initial   Start Of Care Date 06/08/21   Referring Physician Dr Hensley   Orders Evaluate And Treat   Medical Diagnosis Alzheimers Dementia    Onset Of Illness/injury Or Date Of Surgery 06/08/18   Precautions/Limitations  no known precautions/limitations   Hearing Pts wife reports changes in hearing   Surgical/Medical history reviewed No   Pertinent History Of Current Problem Pt is a 71 year old male that presents in clinic today with increased difficulty with expression. PMHx includes AD. Pt presents in clinic with his wife. Pts wife reports that pt has been experiencing a more recent decline and reduced speaking in the last year. Pt can still partially dress himself and feed himself, but has shown increased confusion with toileting and bathing. Pts wife is aware of the progressive nature of his dementia, but would like to see what she can do to help maintain his communication for as long as possible. Pt was largely non-verbal when discussing his history.    Prior Level Of Function Comment Pt was able to communicate his basic wants and needs, thoughts and ideas   Current Community Support  Family/friend caregiver   Patient Role/employment History Retired   Living environment [facility]   General Observations Pt presents in clinic with his wife, happy and content but largely non-verbal   Patient/family Goals To improve communication of basic wants, needs, thoughts and ideas   Fall Risk Screen   Fall screen completed by SLP   Have you fallen 2 or more times in the past year? No   Have you fallen and had an injury in the past year? No   Is patient a fall risk? No   Abuse Screen (yes response referral indicated)   Feels Unsafe at Home or Work/School no   Feels Threatened by Someone no   Does Anyone Try to Keep You From Having Contact with Others or Doing Things Outside Your Home? no    Physical Signs of Abuse Present no   Pain Assessment   Pain Reported No   Speech   Deficits in Speech Respiration None   Language: Auditory Comprehension (understanding of spoken language)   Tests were administered at the following levels Basic (rote activities);Moderate (routine daily activities)   Word Discrimination; [place] Diagnostic Aphasia Exam (out of 72 total) 60   One Step Commands (out of 10 total) 10   Y/N Simple Questions (out of 10 total) 5   Yes/No Sentence and Simple Paragraph; [place] Diagnostic Aphasia Exam 3 short (out of 6 total) 0   Functional Assessment Scale (Auditory Comprehension) Moderate Impairment   Language: Verbal Expression (use of spoken language to express information)   Tests were administered at the following levels Basic (rote activities);Moderate (routine daily activities)   Automatized Sequences; [place] Diagnostic Aphasia Exam (out of 8 total) 4   Phrase/Sentence Completion (out of 10 total) 10   Responsive Naming; [place] Diagnostic Aphasia Exam 3 (out of 20 total) 11   Biographic Information (out of 3 total) 1   Functional Assessment Scale (Verbal Expression) Moderate to Severe Impairment   Reading Comprehension (understanding of written language)   Tests were administered at the following levels Basic (rote activities)   Simple Phrase/Sentence (out of 15 total) 10   Functional Assessment Scale (Reading Comprehension) Moderate Impairment   Cognitive Status Examination   Attention impaired   Behavioral Observations confused   Cognitive Status Exam Comments Focus of therapy session was on pts communication. however, suspect significant impairments in attention, memory and reasoning as  it relates to diagnosis of AD   Education Assessment   Barriers to Learning Cognitive   Preferred Learning Style Listening;Reading;Demonstration;Pictures/video   General Therapy Interventions   Planned Therapy Interventions Language   Language Auditory comprehension;Reading comprehension;Verbal  expression   Clinical Impression, SLP Eval   Criteria for Skilled Therapeutic Interventions Met (SLP Eval) yes;treatment indicated   SLP Diagnosis moderate-severe mixed receptive and expressive aphasia   Influenced by the following factors/impairments Alzheimers dementia   Functional limitations due to impairments inability to communicate basic wants and needs   Rehab potential affected by motivation   Therapy Frequency 1 time;per week   Predicted Duration of Therapy Intervention (days/wks) up to 12 weeks   Risks and Benefits of Treatment have been explained. Yes   Patient, Family & other staff in agreement with plan of care Yes   Clinical Impression Comments Overall, pt presents with moderate-severe mixed receptive/expressive aphasia with strength in his receptive skills over expressive. However, noted decline in comprehension when information is too complex. Pts speech is characterized as halting, incomplete and telegraphic. Pts struggles with automatic speech and putting words together to make a sentence. Pt would benefit from skilled speech therapy to address aphasia and incorporate wording finding strategist to improve basic communication with his wife and family. Recommend speech therapy 1 time per week for up to 12 weeks to address communication    Language/Cognition Goals   Language/Cognition Goals 1;2;3;4;5   Language/Cognition Goal 1   Goal Identifier 1   Goal Description Pt will demonstrate improve oral expression of a sentence give 1 word with max cues in 80% of trials.   Target Date 09/06/21   Language/Cognition Goal 2   Goal Identifier 2   Goal Description Pt will demonstrate oral expression of 4 attributes to a given item with mod cues in 4/5 trials   Target Date 09/06/21   Language/Cognition Goal 3   Goal Identifier 3.   Goal Description Pt will demonstrate reading comprehension of sentence completion with 90% acc and mod assist   Target Date 09/06/21   Language/Cognition Goal 4   Goal Identifier 4.    Goal Description Pt will demonstrate auditory comprehension of basic yes/no questions regarding environmental information with 80% acc   Target Date 09/06/21   Language/Cognition Goal 5   Goal Identifier 5.   Goal Description Pt will use memory aid to identify or state basic personal information on 4/5 opportunities   Target Date 09/06/21   Total Session Time   Sound production with lang comprehension and expression minutes (23055) 58   Total Evaluation Time 58   Therapy Certification   Certification date from 06/08/21   Certification date to 09/06/21   Medical Diagnosis Alzheimers Dementia   Certification I certify the need for these services furnished under this plan of treatment and while under my care.  (Physician co-signature of this document indicates review and certification of the therapy plan).

## 2021-06-10 NOTE — PROGRESS NOTES
"Low back pain.  If bent over for a while.  A burn.  Building a shop.  Doing heavy stuff.  If wake stiff lasts half hour.  Brkfst.  Then does normal things.      Then the sighing continues as if he has pain.   Wife concerned about the statin.    Did have pain lifting coffee cup off a table.    Forgetfulness.  Why I went to the other room.  Does figure it out.    Puzzled at talking about folding swimming pool cover.      meds per list.    Has been physically active.  Nl stools  Nl urine.    Hearing is off.  The kids are tired of us going 'what?\"    Hypertension denies chest pain or headache.  Hyperlipidemia on statin denies muscle pain  Fatty liver no icterus  Stopped cig years ago.  Was on antidepressant.  Allergic rhinitis doing well with medications controlling congestion     Stopped due to epistaxis.  Restarted without issues.    Mood is good.    Fmh: mother with dementia in her late 60s    ROS: as noted above    OBJECTIVE:   Vitals:    05/18/17 1042   BP: 134/64   Pulse: 68   Resp: 20      Head: atraumatic   Eyes: nl eom, anicteric   Ears: nl external ears   Neck: nl nodes, supple   Lungs: clear to ausc   Heart: regular rhythm  Back: no tenderness  Abd: soft nontender   Joints: uninflamed   Ext: nontender calves   Mental: euthymic  Neuro: no weakness    Knows date and what he had for breakfast and general news re Trump and problems.  No recollection of special  [place] for the investigation  Gait: normal  A bit slow moving  Unable to remember which shot he was to get    Weight down a little    ASSESSMENT/PLAN:    1. Memory difficulties  Thyroid Stimulating Hormone (TSH)    Glycosylated Hemoglobin A1c    HM2(CBC w/o Differential)    Erythrocyte Sedimentation Rate   2. Essential hypertension with goal blood pressure less than 140/90  Comprehensive Metabolic Panel   3. Steatohepatitis     4. Pure hypercholesterolemia  LDL Cholesterol, Direct    CK Total    atorvastatin (LIPITOR) 20 MG tablet   5. Backache  CK " Total   6. Essential hypertension  aspirin 81 MG EC tablet    atenolol (TENORMIN) 100 MG tablet   7. Essential hypertension, malignant  hydroCHLOROthiazide (MICROZIDE) 12.5 mg capsule   8. Allergic rhinitis  fluticasone (FLONASE) 50 mcg/actuation nasal spray     Chronic issues stable/ same treatment.   ? Memory issues    If labs normal.  Pt and spouse to read paper daily and discuss.  Try to take note of ability to recall.  If issues.  Call to schedule CT of head.    More than 25 of 40 minutes total time spent education counseling regarding the issues and care of same as listed in the assessment and plan of this note

## 2021-06-11 NOTE — PROGRESS NOTES
Assessment and Plan:       Problem List Items Addressed This Visit        Unprioritized    Pure hypercholesterolemia     Simvastatin  Check fasting lipid         Relevant Orders    Lipid Cascade FASTING    ALT (SGPT)    Essential Hypertension     Pressure well controlled on atenolol 100, hydrochlorothiazide 12.5, also on aspirin.  No changes, check BMP         Relevant Orders    Basic Metabolic Panel    Alzheimer's disease (H)     Previously followed with Dr. Edouard  Now, going to be seeing a new provider at the [facility], next week.  Namenda plus Aricept  Wife indicates that symptoms are progressive.  Also on Wellbutrin.  Recently increased dose for decreased energy, depression symptoms  Scores a 6 on PHQ 9 today.    Advanced directives completed  Patient at this stage where he is able to enter into conversations but not bringing any new information    Family well set up to care for him for now.  Son lives and works in their home, 2 daughters that live close by.         Healthcare maintenance - Primary     Healthcare maintenance assessment  Immunization-has had shingles shot, flu shot given  Cancer [id number]-date cancer screening, discussed, gently recommended against prostate cancer screening.  They agreed for now.  Vascular-lipid, ALT, blood pressure well controlled, no longer smoking  Other-advanced directives have been completed             Relevant Orders    Influenza,Quad,High Dose,PF 65 YR+ (Completed)            The patient's current medical problems were reviewed.    I have had an Advance Directives discussion with the patient.  The following health maintenance schedule was reviewed with the patient and provided in printed form in the after visit summary:   Health Maintenance   Topic Date Due     DEPRESSION ACTION PLAN  1950     HEPATITIS C SCREENING  1950     LIPID  03/18/2016     ADVANCE CARE PLANNING  03/18/2016     TD 18+ HE  03/18/2021     FALL RISK ASSESSMENT  12/04/2020      MEDICARE ANNUAL WELLNESS VISIT  09/01/2021     COLORECTAL CANCER SCREENING  02/16/2027     PNEUMOCOCCAL IMMUNIZATION 65+ LOW/MEDIUM RISK  Completed     HEPATITIS B VACCINES  Completed     INFLUENZA VACCINE RULE BASED  Completed     ZOSTER VACCINES  Completed        Subjective:   Chief Complaint: Catalino Coronado is an 70 y.o. male here for an Annual Wellness visit.   HPI:  Largely included above  Wife speaks for him-       Review of Systems:    Please see above.  The rest of the review of systems are negative for all systems.    Patient Care Team:  Davis Aguilar MD as PCP - General (Family Medicine)  Jamari Sloan MD as Assigned PCP     Patient Active Problem List   Diagnosis     Peyronie's Disease     Allergic Rhinitis     Benign prostatic hyperplasia with urinary frequency     Pure hypercholesterolemia     Essential Hypertension     Metabolic Tests Nonspecific Abnormal Serum Enzyme Levels     Backache     Steatohepatitis     Eczema     Seborrhea     Alzheimer's disease (H)     Healthcare maintenance     No past medical history on file.   No past surgical history on file.   No family history on file.   Social History     Socioeconomic History     Marital status:      Spouse name: Not on file     Number of children: Not on file     Years of education: Not on file     Highest education level: Not on file   Occupational History     Not on file   Social Needs     Financial resource strain: Not on file     Food insecurity     Worry: Not on file     Inability: Not on file     Transportation needs     Medical: Not on file     Non-medical: Not on file   Tobacco Use     Smoking status: Former Smoker     Smokeless tobacco: Never Used     Tobacco comment: quit 5 yrs ago   Substance and Sexual Activity     Alcohol use: No     Drug use: No     Sexual activity: Not on file   Lifestyle     Physical activity     Days per week: Not on file     Minutes per session: Not on file     Stress: Not on file    Relationships     Social connections     Talks on phone: Not on file     Gets together: Not on file     Attends [employer] service: Not on file     Active member of club or organization: Not on file     Attends meetings of clubs or organizations: Not on file     Relationship status: Not on file     Intimate partner violence     Fear of current or ex partner: Not on file     Emotionally abused: Not on file     Physically abused: Not on file     Forced sexual activity: Not on file   Other Topics Concern     Not on file   Social History Narrative     Not on file      Current Outpatient Medications   Medication Sig Dispense Refill     aspirin 81 MG EC tablet Take 1 tablet (81 mg total) by mouth daily.  0     atenoloL (TENORMIN) 100 MG tablet TAKE 1 TABLET BY MOUTH EVERY DAY 90 tablet 3     buPROPion (WELLBUTRIN XL) 150 MG 24 hr tablet Take 2 tablets (300 mg total) by mouth daily. 180 tablet 3     clobetasol (TEMOVATE) 0.05 % external solution APPLY TOPICALLY TWICE DAILY 50 mL 0     desonide (DESOWEN) 0.05 % ointment Apply topically 2 (two) times a day. 15 g 0     donepeziL (ARICEPT) 5 MG tablet Take 1 tablet (5 mg total) by mouth 2 (two) times a day. 180 tablet 1     fluticasone (FLONASE) 50 mcg/actuation nasal spray USE TWO SPRAYS IN EACH NOSTRIL DAILY 48 g 3     hydroCHLOROthiazide (MICROZIDE) 12.5 mg capsule TAKE 1 CAPSULE BY MOUTH EVERY DAY IN THE MORNING (DUE FOR MD VISIT FOR REFILLS) 90 capsule 3     ketoconazole (NIZORAL) 2 % shampoo APPLY TO ENTIRE WET SCALP & WASH OFF AFTER 5 MINS 3 TIMES A WEEK. 240 mL 4     LORazepam (ATIVAN) 1 MG tablet Take 1 tablet (1 mg total) by mouth once in imaging for anxiety. Take 1/2 hour prior to procedure 1 tablet 0     memantine (NAMENDA) 5 MG tablet Take 1 tab (5mg) daily X 1 week, then 1 tab twice daily X 1 week, then 1 tab every evening along with a 10mg tab every morning X 1 week. (Patient taking differently: Take 5 mg by mouth 2 (two) times a day. Take 1 tab (5mg) daily X 1  "week, then 1 tab twice daily X 1 week, then 1 tab every evening along with a 10mg tab every morning X 1 week.      ) 28 tablet 0     sildenafiL (VIAGRA) 50 MG tablet Take 1 tablet (50 mg total) by mouth as needed for erectile dysfunction. 16 tablet 3     simvastatin (ZOCOR) 40 MG tablet TAKE 1 TABLET BY MOUTH NIGHTLY AT BEDTIME. 60 tablet 8     tamsulosin (FLOMAX) 0.4 mg cap TAKE 1 CAPSULE BY MOUTH EVERY DAY 90 capsule 3     triamcinolone (KENALOG) 0.1 % cream APPLY TOPICALLY TWICE DAILY. 45 g 1     No current facility-administered medications for this visit.       Objective:   Vital Signs:   Visit Vitals  /80 (Patient Site: Right Arm, Patient Position: Sitting, Cuff Size: Adult Large)   Pulse (!) 54   Temp 97.8  F (36.6  C) (Oral)   Resp 16   Ht 6' 3\" (1.905 m)   Wt 211 lb (95.7 kg)   BMI 26.37 kg/m           VisionScreening:  No exam data present     PHYSICAL EXAM  Gen- alert, oriented/ appropriately responsive  HEENT- normal cephalic, atraumatic.   Chest- Normal inspiration and expiration.    Clear to ascultation.    No chest wall deformity or scar.  CV- Heart regular rate and rhythm  normal tones, no murmurs   No gallops or rubs.  Ext- appear well perfused, no edema  Skin- warm and dry,   no visualized rash      Assessment Results 9/1/2020   Activities of Daily Living 1 - Full function   Instrumental Activities of Daily Living 2-4 - Moderate impairment   Mini Cog Total Score 0   Some recent data might be hidden     A Mini-Cog score of 0-2 suggests the possibility of dementia, score of 3-5 suggests no dementia      Identified Health Risks:   Dementia- already evaluated  He is at risk for lack of exercise and has been provided with information to increase physical activity for the benefit of his well-being.    The patient was provided with written information regarding signs of hearing loss.  Information on urinary incontinence and treatment options given to patient.  The patient was provided with " suggestions to help him develop a healthy emotional lifestyle.   Patient's advanced directive was discussed and I am comfortable with the patient's wishes.        The patient was provided with appropriate referrals to address his memory problem.

## 2021-06-12 NOTE — PROGRESS NOTES
Myalgia pains gone with stopping atorvastatin.  Ck came down  Hyperlipidemia on statin denies muscle pain  Transition to simvastatin  Memory issues same.  Forgets things.    No falls  No incontinence    Has been working on railing on porch.   Constructing.  No memory issues   ROS: as noted above    OBJECTIVE:   Vitals:    08/07/17 1028   BP: 107/63   Pulse: (!) 58      Head: atraumatic   Eyes: nl eom, anicteric   Ears: nl external ears   Neck: nl nodes, supple   Lungs: clear to ausc   Heart: regular rhythm  Back: no tenderness  Abd: soft nontender   Joints: uninflamed   Ext: nontender calves   Mental: euthymic  Neuro: no weakness  Gait: normal    Tells accurately what he had for breakfast.  Details of the coffee and the price per pound.    ASSESSMENT/PLAN:    1. Memory difficulties  CT Head With Contrast   2. Pure hypercholesterolemia  LDL Cholesterol, Direct    CK Total   3. Backache  CK Total     CT head for dementia workup.   Discussed meds and deciding none at this time  Discussed low dose statin and appropriateness of sim as opposed to atorvastatin    follow up per results

## 2021-06-14 NOTE — TELEPHONE ENCOUNTER
"Wife and patient calling\" Vahid's been having dizzy spells for the past 3-4 weeks.   His BP the last time I took it was 116/76, no other sx  But then yesterday 12/27 we went for a walk and he got dizzy and fell into a small snow drift.   He said he was weak. I am a nurse, he was not weak on just one side, it was all over. I've been watching him closely.  He also has a small brain aneurism, and sees his neurologist on 1/13/21.  He doesn't have any sx right now, but the dizziness is an ongoing problem.\"  Denies other sx   Triaged pt for both dizziness and neurological deficit.  Gave home care advice and sx to monitor closely  Call back if needed   Transferred to scheduling for appt within 24 hours.  Estela Celis RN  [facility]  COVID 19 Nurse Triage Plan/Patient Instructions    Please be aware that novel coronavirus (COVID-19) may be circulating in the community. If you develop symptoms such as fever, cough, or SOB or if you have concerns about the presence of another infection including coronavirus (COVID-19), please contact your health care provider or visit [email or web address].     Disposition/Instructions    In-Person Visit with provider recommended. Reference Visit Selection Guide.    Thank you for taking steps to prevent the spread of this virus.  o Limit your contact with others.  o Wear a simple mask to cover your cough.  o Wash your hands well and often.    Resources    [facility]: About COVID-19: [email or web address]    CDC: What to Do If You're Sick: www.cdc.gov/coronavirus/2019-ncov/about/steps-when-sick.html    CDC: Ending Home Isolation: www.cdc.gov/coronavirus/2019-ncov/hcp/disposition-in-home-patients.html     CDC: Caring for Someone: www.cdc.gov/coronavirus/2019-ncov/if-you-are-sick/care-for-someone.html     [facility]: Interim Guidance for Hospital Discharge to Home: www.health.[facility].mn.us/diseases/coronavirus/hcp/hospdischarge.pdf    [facility] clinical trials " "(COVID-19 research studies): clinicalaffairs.[facility]-clinical-trials     Below are the COVID-19 hotlines at the [place] Department of Health ([facility]). Interpreters are available.   o For health questions: Call 175-196-0383 or 1-666.668.8974 (7 a.m. to 7 p.m.)  o For questions about schools and childcare: Call 793-630-8913 or 1-110.805.9033 (7 a.m. to 7 p.m.)       Reason for Disposition    Taking a medicine that could cause dizziness (e.g., blood pressure medications, diuretics)    [1] Weakness of arm / hand, or leg / foot AND [2] is a chronic symptom (recurrent or ongoing AND present > 4 weeks)    Additional Information    Negative: SEVERE dizziness (e.g., unable to stand, requires support to walk, feels like passing out now)    Negative: Extra heart beats OR irregular heart beating  (i.e., \"palpitations\")    Negative: [1] Drinking very little AND [2] dehydration suspected (e.g., no urine > 12 hours, very dry mouth, very lightheaded)    Negative: Patient sounds very sick or weak to the triager    Negative: [1] Dizziness caused by heat exposure, sudden standing, or poor fluid intake AND [2] no improvement after 2 hours of rest and fluids    Negative: [1] Fever > 103 F (39.4 C) AND [2] not able to get the fever down using Fever Care Advice    Negative: [1] Fever > 101 F (38.3 C) AND [2] age > 60    Negative: [1] Fever > 100.0 F (37.8 C) AND [2] bedridden (e.g., nursing home patient, CVA, chronic illness, recovering from surgery)    Negative: [1] Fever > 100.0 F (37.8 C) AND [2] diabetes mellitus or weak immune system (e.g., HIV positive, cancer chemo, splenectomy, organ transplant, chronic steroids)    Negative: [1] MODERATE dizziness (e.g., interferes with normal activities) AND [2] has NOT been evaluated by physician for this  (Exception: dizziness caused by heat exposure, sudden standing, or poor fluid intake)    Negative: Fever present > 3 days (72 hours)    Negative: [1] SEVERE weakness (i.e., unable to walk " or barely able to walk, requires support) AND [2] new onset or worsening    Negative: [1] Weakness (i.e., paralysis, loss of muscle strength) of the face, arm / hand, or leg / foot on one side of the body AND [2] sudden onset AND [3] present now    Negative: [1] Numbness (i.e., loss of sensation) of the face, arm / hand, or leg / foot on one side of the body AND [2] sudden onset AND [3] present now    Negative: [1] Loss of speech or garbled speech AND [2] sudden onset AND [3] present now    Negative: Difficult to awaken or acting confused (e.g., disoriented, slurred speech)    Negative: Sounds like a life-threatening emergency to the triager    Negative: Confusion, disorientation, or hallucinations is main symptom    Negative: Neck pain is main symptom (and having weakness, numbness, or tingling in arm / hand because of neck pain)    Negative: Back pain is main symptom (and having weakness, numbness, or tingling in leg because of back pain)    Negative: Hand pain is main symptom (and having mild weakness, numbness, or tingling in hand related to hand pain)    Negative: Dizziness is main symptom    Negative: Vision loss or change is main symptom    Negative: Followed a head injury within last 3 days    Negative: Followed a neck injury within last 3 days    Negative: [1] Tingling in both hands and/or feet AND [2] breathing faster than normal AND [3] feels similar to prior panic attack or hyperventilation episode    Negative: Weakness in both sides of the body or weakness all over    Negative: Headache  (and neurologic deficit)    Negative: [1] Back pain AND [2] numbness (loss of sensation) in groin or rectal area    Negative: [1] Unable to urinate (or only a few drops) > 4 hours AND [2] bladder feels very full (e.g., palpable bladder or strong urge to urinate)    Negative: [1] Loss of bladder or bowel control (urine or bowel incontinence; wetting self, leaking stool) AND [2] new onset    Negative: [1] Weakness (i.e.,  paralysis, loss of muscle strength) of the face, arm / hand, or leg / foot on one side of the body AND [2] sudden onset AND [3] brief (now gone)    Negative: [1] Numbness (i.e., loss of sensation) of the face, arm / hand, or leg / foot on one side of the body AND [2] sudden onset AND [3] brief (now gone)    Negative: [1] Loss of speech or garbled speech AND [2] sudden onset AND [3] brief (now gone)    Negative: Bell's palsy suspected (i.e., weakness on only one side of the face, developing over hours to days, no other symptoms)    Negative: Patient sounds very sick or weak to the triager    Negative: Neck pain (and neurologic deficit)    Negative: Back pain (and neurologic deficit)    Negative: [1] Weakness of the face, arm / hand, or leg / foot on one side of the body AND [2] gradual onset (e.g., days to weeks) AND [3] present now    Negative: [1] Numbness (i.e., loss of sensation) of the face, arm / hand, or leg / foot on one side of the body AND [2] gradual onset (e.g., days to weeks) AND [3] present now    Negative: [1] Loss of speech or garbled speech AND [2] gradual onset (e.g., days to weeks) AND [3] present now    Negative: [1] Tingling (e.g., pins and needles) of the face, arm / hand, or leg / foot on one side of the body AND [2] present now    Negative: [1] Loss of speech or garbled speech AND [2] is a chronic symptom (recurrent or ongoing AND present > 4 weeks)    Protocols used: DIZZINESS - [id number], NEUROLOGIC DEFICIT-A-AH

## 2021-06-14 NOTE — PROGRESS NOTES
Assessment/ Plan  1. Orthostatic dizziness  Almost certainly the cause of dizziness, orthostatic drop today in blood pressure.  For step will be to stop hydrochlorothiazide and push fluids.  If this is ineffective, stop tamsulosin as well.  Discussed isometric exercises to acutely raise blood pressure.  - Basic Metabolic Panel  - Hemoglobin    2. Pure hypercholesterolemia  Labs ordered, on simvastatin  - ALT (SGPT)  - LDL Cholesterol, Direct    3. Essential hypertension  Blood pressure is running 108 132 over 80s at home.  Discussed that it is reasonable to loosen standards for blood pressure as one ages.  I think he is at risk of falling, back off, stop hydrochlorothiazide, consider stopping metoprolol in the future if needed    4. Metabolic Tests Nonspecific Abnormal Serum Enzyme Levels  Check ALT    Body mass index is 25.25 kg/m .    Subjective  CC:  Chief Complaint   Patient presents with     Dizziness     HPI:  Dizziness  Narrative: For the last 2 months or so, patient has been dizzy, primarily when he standing up.  His wife is a nurse and noted this, noted that prazosin which she was taking for urinary dribbling can cause problems with that.  She stopped this on her own but noticed that the dizziness continued.  Patient has a history of Alzheimer's and difficulty recalling these episodes.  Recollection done with the help of his wife.  -----------------  Is this presyncope?  Yes, almost certainly*   Vertigo?  No almost certainly not*    Duration/ frequency?  About the last 2 months, happen quite frequently during the day  Trigger?  Almost exclusively standing up  Associated symptoms?  None  Anything that relieves?  Sitting down  Comments: Patient does drink coffee and some tea, 2 alcoholic beverages at night, diet Mountain Dew during the day.  Trying to drink more fluids.  Also takes hydrochlorothiazide, metoprolol and tamsulosin      Patient Active Problem List   Diagnosis     Peyronie's Disease     Allergic  Rhinitis     Benign prostatic hyperplasia with urinary frequency     Pure hypercholesterolemia     Essential Hypertension     Metabolic Tests Nonspecific Abnormal Serum Enzyme Levels     Backache     Steatohepatitis     Eczema     Seborrhea     Alzheimer's disease (H)     Healthcare maintenance     Intracranial aneurysm     Current medications reviewed as follows:  Current Outpatient Medications on File Prior to Visit   Medication Sig     aspirin 81 MG EC tablet Take 1 tablet (81 mg total) by mouth daily.     atenoloL (TENORMIN) 100 MG tablet TAKE 1 TABLET BY MOUTH EVERY DAY     buPROPion (WELLBUTRIN XL) 150 MG 24 hr tablet Take 2 tablets (300 mg total) by mouth daily.     clobetasol (TEMOVATE) 0.05 % external solution APPLY TOPICALLY TWICE DAILY     desonide (DESOWEN) 0.05 % ointment Apply topically 2 (two) times a day.     donepeziL (ARICEPT) 5 MG tablet Take 1 tablet (5 mg total) by mouth 2 (two) times a day.     fluticasone (FLONASE) 50 mcg/actuation nasal spray USE TWO SPRAYS IN EACH NOSTRIL DAILY     hydroCHLOROthiazide (MICROZIDE) 12.5 mg capsule TAKE 1 CAPSULE BY MOUTH EVERY DAY IN THE MORNING (DUE FOR MD VISIT FOR REFILLS)     ketoconazole (NIZORAL) 2 % shampoo APPLY TO ENTIRE WET SCALP & WASH OFF AFTER 5 MINS 3 TIMES A WEEK.     LORazepam (ATIVAN) 1 MG tablet Take 1 tablet (1 mg total) by mouth once in imaging for anxiety. Take 1/2 hour prior to procedure     memantine (NAMENDA) 5 MG tablet Take 1 tab (5mg) daily X 1 week, then 1 tab twice daily X 1 week, then 1 tab every evening along with a 10mg tab every morning X 1 week. (Patient taking differently: Take 5 mg by mouth 2 (two) times a day. Take 1 tab (5mg) daily X 1 week, then 1 tab twice daily X 1 week, then 1 tab every evening along with a 10mg tab every morning X 1 week.      )     sildenafiL (VIAGRA) 50 MG tablet Take 1 tablet (50 mg total) by mouth as needed for erectile dysfunction.     simvastatin (ZOCOR) 40 MG tablet TAKE 1 TABLET BY MOUTH  "EVERYDAY AT BEDTIME     tamsulosin (FLOMAX) 0.4 mg cap TAKE 1 CAPSULE BY MOUTH EVERY DAY     triamcinolone (KENALOG) 0.1 % cream APPLY TOPICALLY TWICE DAILY.     prazosin (MINIPRESS) 1 MG capsule TAKE 1 CAPSULE (1 MG TOTAL) BY MOUTH AT BEDTIME.     No current facility-administered medications on file prior to visit.      Social History     Tobacco Use   Smoking Status Former Smoker   Smokeless Tobacco Never Used   Tobacco Comment    quit 5 yrs ago     Social History     Social History Narrative     Not on file     Patient Care Team:  Davis Aguilar MD as PCP - General (Family Medicine)  Davis Aguilar MD as Assigned PCP  ROS  As above, no chest pain, shortness of breath, diaphoresis  With spells    Objective  Physical Exam  Vitals:    12/31/20 1602   BP: 136/80   Patient Site: Right Arm   Patient Position: Sitting   Cuff Size: Adult Regular   Pulse: 60   Resp: 15   Temp: 97.5  F (36.4  C)   TempSrc: Temporal   Weight: 202 lb (91.6 kg)   Height: 6' 3\" (1.905 m)     Lying down  -Blood pressure 151/75  -Pulse 68    Standing for 1 minute  -Blood pressure 146/75  -Pulse 65    Standing for 3 minutes  -Blood pressure 129/80  -Pulse 67  Gen- alert, forgetful/ appropriately responsive  HEENT- normal cephalic, atraumatic.   Chest- Normal inspiration and expiration.    Clear to ascultation.    No chest wall deformity or scar.  CV- Heart regular rate and rhythm  normal tones, no murmurs   No gallops or rubs.  Ext- appear well perfused, no edema  Skin- warm and dry,   no visualized rash    Diagnostics  Results for orders placed or performed in visit on 12/31/20   Hemoglobin   Result Value Ref Range    Hemoglobin 14.8 14.0 - 18.0 g/dL         Please note: Voice recognition software was used in this dictation.  It may therefore contain typographical errors.      "

## 2021-06-14 NOTE — TELEPHONE ENCOUNTER
hydroCHLOROthiazide (MICROZIDE) 12.5 mg capsule [[id number]]    Electronically signed by: Maritza Gallardo RN on 01/30/20 1151 Status: Discontinued   Ordering user: Maritza Gallardo RN 01/30/20 1151 Ordering provider: Jamari Sloan MD   Authorized by: Jamari Sloan MD   Frequency:  01/30/20 - 12/31/20  Released by: Maritza Gallardo RN 01/30/20 1151   Discontinued by: Davis Aguilar MD 12/31/20 1738   Diagnoses

## 2021-06-15 NOTE — PROGRESS NOTES
Wellness visit.  Materials are reviewed and addressed.    Memory issues pt feels about the same.   Wife says worse.  Not oriented to time.  Misplacing keys. Other things.   Has not gotten lost.      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  To [place] about 1/3 to half the time.    Wife home all the time.    Hyperlipidemia on statin denies muscle pain.   Had statin change due to ck elevation  Hypertension denies chest pain or headache.  Benign prostatic hypertrophy.  Denies increase in nocturia.   Twice at night.  Flow is okay.  The other night had urgency without output.    No problems carrying groceries.  Up and down stairs carrying without problems.    Day after colonoscopy felt a bump there.   Like a skin tag.    Eczema meds.  Questions.  Scalp, facial seborrhea and torso.  comes and goes     Stopped damien 8 years ago.    142/70    Admits to being anxious re today's visit    ROS:  Constitutional: denies fever  Vision: denies change in vision  ENT: denies cough or congestion  Thyroid: denies unusual fatigue  Resp: denies shortness of breath  Card: denies palpitations  GI: denies vomiting or abnormal stool, melena, hematochezia  : denies dysuria  Neuro: denies numbness or weakness.    Ongoing memory issues  Derm: above rash  Joints: denies redness or swelling  Endo: denies polyuria  Mental health: mood is good  Extremities: no edema  Mobility: stable    Otherwise negative review of systems    ROS: as noted above    OBJECTIVE:   Vitals:    01/04/18 1003   BP: 144/78   Pulse: 68   Resp: 20   Temp: 97  F (36.1  C)    142/70  Wt is noted.  No diaphoresis  Eyes: nl eom, anicteric   External ears seborrhea, nose: nl    Neck: nl nodes, supple, thyroid normal   Lungs: clear to ausc   Heart: regular rhythm  Abd: soft nontender   No cva (renal) tenderness  Neuro: no weakness    Unable to draw ten past 11    Skin seborrhea scalp, glabellar, ears  Joints: uninflamed   No  ketotic breath odor noted  Mental: euthymic  Ext: nontender calves   Gait: normal    Bmi:27 stable      ASSESSMENT/PLAN:     Wellness visit plus four unstable issues  Health Maintenance/ Plan: anticipatory guidance, discussion of risk factors, lifestyle modification, and risk factor management.     Hypertension poorly controlled due to anxiety/ same dose    Topical meds discussed for various areas and potency for better control    Memory decline/ to [facility].  Wife retired from nursing staff there.    Hyperlipidemia and statin ck issues/ check labs and adjust accordingly        Additional diagnoses and related orders:  1. Memory deficit  Ambulatory referral to Neurology   2. Eczema  triamcinolone (KENALOG) 0.1 % cream   3. Seborrhea     4. Pure hypercholesterolemia  LDL Cholesterol, Direct    CK Total   5. Essential hypertension     6. Routine general medical examination at a health care facility     7. BPH (benign prostatic hyperplasia)  PSA, Annual Screen (Prostatic-Specific Antigen)

## 2021-06-15 NOTE — TELEPHONE ENCOUNTER
RN cannot approve Refill Request    RN can NOT refill this medication Patient reports taking differently. Last office visit: 12/4/2019 Jamari Sloan MD Last Physical: 1/4/2018 Last MTM visit: Visit date not found Last visit same specialty: 12/31/2020 Davis Aguilar MD.  Next visit within 3 mo: Visit date not found  Next physical within 3 mo: Visit date not found      John FARAZKevin Klein, [facility] Connection Triage/Med Refill 2/4/2021    Requested Prescriptions   Pending Prescriptions Disp Refills     memantine (NAMENDA) 10 MG tablet [Pharmacy Med Name: MEMANTINE HCL 10 MG TABLET] 180 tablet 3     Sig: TAKE 1 TABLET BY MOUTH TWICE A DAY       Cholinesterase Inhibitor/Anti-Alzheimer Agent Refill Protocol Passed - 2/4/2021 12:18 AM        Passed - Visit with PCP or prescribing provider visit in last 6 months or next 3 months     Last office visit with prescriber/PCP: Visit date not found OR same dept: 12/31/2020 Davis Aguilar MD OR same specialty: 12/31/2020 Davis Aguilar MD Last physical: Visit date not found Last MTM visit: Visit date not found     Next appt within 3 mo: Visit date not found  Next physical within 3 mo: Visit date not found  Prescriber OR PCP: Jamari Sloan MD  Last diagnosis associated with med order: 1. Late onset Alzheimer's disease without behavioral disturbance (H)  - memantine (NAMENDA) 10 MG tablet [Pharmacy Med Name: MEMANTINE HCL 10 MG TABLET]; TAKE 1 TABLET BY MOUTH TWICE A DAY  Dispense: 180 tablet; Refill: 3    If protocol passes may refill for 6 months if within 3 months of last provider visit (or a total of 9 months).

## 2021-06-16 NOTE — PROGRESS NOTES
.OUT PATIENT CLINICAL SOCIAL WORK: PSYCHOSOCIAL ASSESSMENT      Catalino Coronado   1950   3/19/2018    Primary Language: English  Referral Source: Jamari Sloan MD.   Person(s) Present at Visit: The patient, his wife Leia, and writer.   Goal(s) for Visit: First Consultation  Presenting Concerns:     Catalino Coronado is a 67 y.o. male who presents to the outpatient clinic for a first consultation with Dr. Edouard.  The patient is accompanied by his wife, Leia. The patient reported he experiences misplacing things and forgetfulness.  The patient reported he forgets peoples names and the patient's wife reports he forgets dates and times.  The patient stated he gets frustrated from misplacing things and not remembering where he put them.  The wife reported the patient expresses he feels sad and depressed after becoming retired.        PRIMARY DECISION MAKER FOR HEALTHCARE  Patient  Copy of legal documents on chart? No - Requested copy from: patient and wife at next clinic visit.     Name: Leia Coronado  Relationship: Spouse Home #: [phone]    Work/Mobile #: [phone]      PATIENT REPRESENTATIVE  Same as above    Additional Contacts: Same as above.     Primary Decision Maker for Healthcare provides verbal authorization for this clinic to have care coordination conversations with the above contacts as needed: Yes    HEALTHCARE DIRECTIVE/LEGAL ISSUES  Pt has healthcare directive: Yes    Name: Unknown    Contact information: Unknown    If yes, copy of documents on chart? No - Requested copy from: patient and spouse at next clinic visit.        Additional Information: The patient and his spouse reported the health care directive was filed through the [facility].  The health care directive is not on file for [facility], requested the directive at next visit.     FINANCIAL INFORMATION  Primary Funding Source: [employer]  Secondary Funding Source: Medicare    Additional Financial Information: Blue Cross      Financial POA: No    SSI / SSDI: Unknown    Social Security: Unknown    : No    VA Benefits: [place]  NA                      How long: NA    LTC Insurance: unknown    Medical Assistance (MA) Status:     N/A    County of Residence: [facility] Services: No  type:NA       OCCUPATION / EDUCATION:  Current Employment: None/Retired  Prior Occupation(s):      BELIEF SYSTEM: Patient reported no cultural or Pentecostalism concerns regarding care.     MEDICAL:  Please see  Dr. Gill's consultation from 3/19/18 for complete medical history.  Community MD/Clinic: Jamari Sloan MD.  [facility].        Additional Information/Concerns: None.     CHEMICAL HEALTH:  Current Tobacco Use: None  Prior Tobacco Use: Yes The patient quit smoking approximately 6 years ago.       Current Alcohol/Drug Use: Yes The patient reported 1 drink per day.         Treatment: None  Prior Alcohol/Drug Use: Yes As stated above.       Treatment: None  Additional Information/Concerns:  None    PSYCHIATRIC / BEHAVIORAL:  Current Dx: None  Current Treatment: None.  Prior Dx: None  Prior treatment: None.  Additional Information/Concerns: The patient reported feeling sad and depressed upon retiring and not knowing what to do with his time.     HOME / LIVING ENVIRONMENT:  Patient lives: With SO/Family  Home Accessibility Concerns: None  Additional Information/Concerns: The patient currently resides in a house in [place].     COMMUNITY / SOCIAL SUPPORT:   Community services: None    Additional Information/Concerns: None.     FAMILY SUPPORT:  Relationship Status:   Children: 3 children.  Additional Information/Concerns: The patient has been  to his wife for the past 48 years.      DAILY ROUTINE:  Sleep-The patient reported no change in sleep.      Nutrition-The patient reported no change in nutrition.   Activities-The patient reported woodworking, hunting, and artwork.      Additional  Information/Concerns: None.       FUNCTIONAL STATUS:  Is patient driving? Yes   Additional Information: No concerns.   Is patient independent with the following activities? Bathing, Dressing, Grooming, Toileting, Eating, Mobility, Home Making, Medication Management, Meal Preparation, Shopping and Financial Management  Additional Information/Concerns: The patient reported he is independent in all activities of daily living.       PRIOR COGNITIVE TESTING / IMAGING: The patient had a CT scan.  See patient's Chart for further information.    INTERVENTION(S):  Assessment and Resources  Additional Information: Writer provided education about the role of social work services and how we can be of assistance to patient and family.     PATIENT/FAMILY OBSERVATIONS:  Patient: The patient appeared well groomed with a cooperative and relaxed mood.  The patient was appropriate with normal speech and intact thoughts.  The patient had no problem with perception was calm and oriented x3.  The patient had age appropriate insight during the assessment.   Family/Caregiver: The patient's wife provided collaborative information regarding the patient's care.     PLAN/FOLLOW-UP: Social Work will follow up with patient and family at next clinic visit or sooner as requested.    Tiffanie Bates   Social Work Intern  3/19/18    I have read, discussed, and agree with the documentation as presented by Tiffanie Bates, Social Work Intern.    Kat Larios, [facility]  3/19/18

## 2021-06-16 NOTE — TELEPHONE ENCOUNTER
Telephone Encounter by Michelle Lux at 3/9/2020  9:40 AM     Author: Michelle Lux Service: -- Author Type: --    Filed: 3/9/2020  9:42 AM Encounter Date: 3/9/2020 Status: Signed    : Michelle Lux       PA for Donepezil 5mg  Two times a day       Routing comment       Catalino Coronado Dean A, MD 3 days ago          Can you do a prior auth for a 5mg, 2X/day refill of donepezil for Vahid?  The 10mg/day dose causes GI symptoms so he tolerates the 5mg dose 2X/day much better. And of course [employer] won't fill the 5mg dose.

## 2021-06-16 NOTE — PROGRESS NOTES
Persons accompanying you (the patient) today: Mindy Dupree    How have you been doing since we saw you last? Please note any concerns.  SPOKE WITH SW     Please list any recent hospitalizations/surgeries/procedures you've had since we saw you last:  N/A

## 2021-06-16 NOTE — TELEPHONE ENCOUNTER
Telephone Encounter by Michelle Lux at 2/6/2020  1:12 PM     Author: Michelle Lux Service: -- Author Type: --    Filed: 2/6/2020  1:14 PM Encounter Date: 2/4/2020 Status: Signed    : Michelle Lux       Called pharmacy to clarify medication needing a PA.     Per pharmacist they are questioning the Memantine since they see that Donepezil was just ordered. Donepezil is ready for patient to .    Pharmacy needs clarification about Memantine since patient is already been prescribed Donepezil, stated they shouldn't be both?    Please call pharmacy to clarify which Rx patient should be taking.  Both do not require a PA, they just had questions regarding both meds.

## 2021-06-16 NOTE — PROGRESS NOTES
[facility] Outpatient Consultation    Assessment / Impression:   1.  Cognitive disorder not otherwise specified, rule out dementia  2.  Hyperlipidemia  3.  Hypertension  4.  History of smoking, patient abstinent ×6 years  5.  Status post appendectomy    Plan:   1.  MRI of the brain without contrast  2.  Neuropsychometric testing  3.  Review screening blood tests on return to clinic    This 67-year-old male with above-noted past medical history has developed progressive changes in cognition and possibly functionality dating back at least 2 years and initially manifesting with impairments in short-term memory.  In addition, some difficulty with object recognition as manifested by having difficulty reading an analog clock face has been noted.  Presently, the above-noted evaluation is felt to be appropriate.    Total time for evaluation 50 minutes with majority of time spent in counseling.  Follow-up on completion.    Subjective:   HPI: Catalino Coronado is a 67 y.o. male with above-noted diagnoses who presents for cognitive evaluation.  Accompanied by his wife of 48 years, Liea, the patient reports having had difficulty, he believes, for the past 2 months regarding short-term recall.  He does not believe these difficulties have been progressive in nature nor does he believe that he has lost the ability to manage his own affairs at this time.  Leia notes that she first became aware of Vahid's difficulties 2 years ago with initial manifestations including short-term recall difficulty.  Gradually progressive in nature, Leia also notes that her  has had difficulty in telling time when observing an analog clock.  She notes no other cognitive difficulties but does not believe that her  could manage his affairs independently if need be.  Both the spouse and patient deny that the patient has experienced the following symptoms: Loss of consciousness, confusion, tremors, visual hallucinations or illusions,  sleep disordered breathing or periods of apnea, repeated falls, difficulty with literacy, orthostasis, glare intolerance, or other changes in vision.  He denies fasciculations.  No headaches shortness of breath or chest discomfort clearly identified.    Past Medical History:   As above    Social History:   Born in [place], [place], the patient completed high school education as well as 1 year of college.   for 48 years, both he and his wife raised 3 children while living in the home that they continue to occupy in [place].  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    Past Psychiatric History:   Negative    Family History:   Remarkable for dementia in the patient's mother and in a number of first-degree relatives on the maternal side    Review of Systems:  Pertinent items are noted in HPI.    Objective:   Patient is a somewhat thin elderly male who appears otherwise no acute physical distress    Vitals:    03/19/18 1335 03/19/18 1336 03/19/18 1337   BP: (!) 194/95 158/83 (!) 180/98   Pulse: 62 66 71   Weight: 217 lb (98.4 kg)       Physical Exam:    Skin exam reveals some ischemic changes.  The face is a bit ready.  HEENT grossly unremarkable.  Oropharynx is clear.  Teeth and gums in good repair.  Lungs with diminished breath sounds.  Work of breathing normal.  Chest with increased AP diameter with reduced excursion on deep inspiration.  Cardiac exam with a regular S1-S2.  Abdomen soft nontender.  Extremities without significant edema.  Peripheral joint changes consistent with degenerative joint disease.    Neurological Exam:     Cranial nerve examination reveals forward gaze to be conjugate.  Visual fields appear to be full.  Volitional EOMs are intact aside from slight degree of volitional upgaze.  Pursuit and saccadic movements appear to be intact although an occasional suggestion of motor  impersistence is noted.  Face is symmetrical with sensation intact.  Hearing is adequate.  Speech is well articulated with normal volume and rate.  Tongue is midline without atrophy or fasciculation.  Palatal lift is symmetrical.  Motor tone appears to be normal throughout with diminished reflexes.  Frontal release signs are absent.  Station is within normal base and erect posture.  Romberg is negative.  Gait demonstrates symmetric stride good ground clearance and good symmetric associated arm movements.  Turns are steady without focal weakness or ataxia.  The patient has some difficulty with tandem gait but no evidence of gait apraxia or ataxia clearly noted.  Coordination appears to be diminished in all extremities including rapid alternating movements, heel to knee to shin.  No willem ataxia is identified.  A mild intention tremor noted in the upper extremities.  Fine finger dexterity appears to be a bit reduced.    Cognitive Evaluation:     The patient is casually dressed and seated for evaluation.  I note him to be alert pleasant and directable.  Thought content appeared to be a bit reduced.  No fluctuation in level of consciousness or willem distractibility noted.  Insight may be partially preserved.  General fund of knowledge was a bit diminished.  Short-term recall a bit impaired.  I noted no general difficulties with fluency and certainly no evidence of motor speech problems or significant word hesitancy.  No paraphasias were noted.  A slight degree of apraxia may have been identified.  The patient's affect was a bit apprehensive mood appears to be euthymic no evidence of abnormal thought content or form.    Medications:  Scheduled Meds:  Continuous Infusions:  PRN Meds:.    Wolfgang Edouard MD  3/19/2018

## 2021-06-16 NOTE — PROGRESS NOTES
[facility]        OUTPATIENT PHYSICAL THERAPY FUNCTIONAL EVALUATION  PLAN OF TREATMENT FOR OUTPATIENT REHABILITATION  (COMPLETE FOR INITIAL CLAIMS ONLY)  Patient's Last Name, First Name, M.I.  YOB: 1950  IvonneCatalino  BRADLEY     Provider's Name   [facility]   Medical Record No.  [id number]     Start of Care Date:  06/08/21   Onset Date:   5'14'21   Type:     _X__PT   ____OT  ____SLP Medical Diagnosis:     alhzheimers   PT Diagnosis:  mild gait deviation Visits from SOC:  1                              __________________________________________________________________________________  Plan of Treatment/Functional Goals:              GOALS  safety   wife and pt to be aware of safety precautions for community mob and have tools to use for safety  06/20/21           Therapy Frequency:  other (see comments)   Predicted Duration of Therapy Intervention:  today only    Nora Schilling, PT                                    I CERTIFY THE NEED FOR THESE SERVICES FURNISHED UNDER        THIS PLAN OF TREATMENT AND WHILE UNDER MY CARE     (Physician co-signature of this document indicates review and certification of the therapy plan).                Certification Date From:    6-8-21----6-30-21  Certification Date To:       Referring Provider:  Gabriel Hensley    Initial Assessment  See Epic Evaluation- Start of Care Date: 06/08/21

## 2021-06-16 NOTE — PROGRESS NOTES
"   06/08/21 1200   General Information   Start of Care Date 06/08/21   Referring Physician Gabriel Hensley   Orders Evaluate and Treat as Indicated   Order Date 05/14/21   Medical Diagnosis alzheimer's w/ balance issues   Surgical/Medical history reviewed Yes   Pertinent history of current problem (include personal factors and/or comorbidities that impact the POC) Gerg is here w/ Leia wife.  reports \"good\".  no change per pt.  Leia says he is optomistic.  dtr walks w/ him 3 /wk. stumbles on steps.  slow decline.  worry re; falls.  Leia states Dec '20 tripped fell into snow.  has a walker on back porch up stairs. wife had it for past surgery. has standard.   Pertinent Visual History  glasses   Prior level of functional mobility Ambulation   Current Community Support Family/friend caregiver   Patient role/Employment history Retired   Living environment [facility]   Patient/Family Goals Statement walk safer   Fall Risk Screen   Fall screen completed by PT   Have you fallen 2 or more times in the past year? No   Have you fallen and had an injury in the past year? No   Fall screen comments one fall   Vitals Signs   Heart Rate 76   Blood Pressure 131/65   Cognitive Status Examination   Orientation orientation to person, place and time   Level of Consciousness alert   Follows Commands and Answers Questions 100% of the time   Integumentary   Integumentary No deficits were identified   Posture   Posture Forward head position   Range of Motion (ROM)   ROM Comment wfls   Strength   Strength Comments wfls   Bed Mobility   Bed Mobility Comments indep   Gait Special Tests   Gait Special Tests 25 FOOT TIMED WALK   Gait Special Tests 25 Foot Timed Walk   Seconds 12   Comments TM 5 min 0.08 miles.   Balance   Balance Comments sls 2 to 4 sec.   Sensory Examination   Sensory Perception no deficits were identified   Coordination   Coordination no deficits were identified   Muscle Tone   Muscle Tone no deficits were identified "   Modality Interventions   Planned Modality Interventions Comments gait training today only   Clinical Impression   Criteria for Skilled Therapeutic Interventions Met yes, treatment indicated   PT Diagnosis mild gait deviation   Influenced by the following impairments mild imbalance   Functional limitations due to impairments gait difficulty   Clinical Presentation Stable/Uncomplicated   Clinical Decision Making (Complexity) Low complexity   Therapy Frequency other (see comments)   Predicted Duration of Therapy Intervention (days/wks) today only   Risk & Benefits of therapy have been explained Yes   Patient, Family & other staff in agreement with plan of care Yes   Clinical Impression Comments needs gait belt and supervision for gait. walking pole not approp.   Education Assessment   Preferred Learning Style Demonstration   Barriers to Learning Cognitive   GOALS   PT Eval Goals 1   Goal 1   Goal Identifier safety   Goal Description  wife and pt to be aware of safety precautions for community mob and have tools to use for safety   Target Date 06/20/21   Total Evaluation Time   PT Desmond Low Complexity Minutes (06687) 76

## 2021-06-16 NOTE — TELEPHONE ENCOUNTER
Refill Approved    Rx renewed per Medication Renewal Policy. Medication was last renewed on 3/30/20.    John Klein, [facility] Connection Triage/Med Refill 3/24/2021     Requested Prescriptions   Pending Prescriptions Disp Refills     atenoloL (TENORMIN) 100 MG tablet [Pharmacy Med Name: ATENOLOL 100 MG TABLET] 90 tablet 3     Sig: TAKE 1 TABLET BY MOUTH EVERY DAY       Beta-Blockers Refill Protocol Passed - 3/24/2021 12:14 AM        Passed - PCP or prescribing provider visit in past 12 months or next 3 months     Last office visit with prescriber/PCP: 12/4/2019 Jamari Sloan MD OR same dept: 12/31/2020 Davis Aguilar MD OR same specialty: 12/31/2020 Davis Aguilar MD  Last physical: 1/4/2018 Last MTM visit: Visit date not found   Next visit within 3 mo: Visit date not found  Next physical within 3 mo: Visit date not found  Prescriber OR PCP: Jamari Sloan MD  Last diagnosis associated with med order: 1. Essential hypertension  - atenoloL (TENORMIN) 100 MG tablet [Pharmacy Med Name: ATENOLOL 100 MG TABLET]; TAKE 1 TABLET BY MOUTH EVERY DAY  Dispense: 90 tablet; Refill: 3    2. BPH (benign prostatic hyperplasia)  - tamsulosin (FLOMAX) 0.4 mg cap [Pharmacy Med Name: TAMSULOSIN HCL 0.4 MG CAPSULE]; TAKE 1 CAPSULE BY MOUTH EVERY DAY  Dispense: 90 capsule; Refill: 3    If protocol passes may refill for 12 months if within 3 months of last provider visit (or a total of 15 months).             Passed - Blood pressure filed in past 12 months     BP Readings from Last 1 Encounters:   12/31/20 136/80                tamsulosin (FLOMAX) 0.4 mg cap [Pharmacy Med Name: TAMSULOSIN HCL 0.4 MG CAPSULE] 90 capsule 3     Sig: TAKE 1 CAPSULE BY MOUTH EVERY DAY       Alfuzosin/Tamsulosin/Silodosin Refill Protocol  Passed - 3/24/2021 12:14 AM        Passed - PCP or prescribing provider visit in past 12 months       Last office visit with prescriber/PCP: 12/4/2019 Jamari Sloan MD OR same dept: 12/31/2020 Davis Aguilar MD  OR same specialty: 12/31/2020 Davis Aguilar MD  Last physical: 1/4/2018 Last MTM visit: Visit date not found   Next visit within 3 mo: Visit date not found  Next physical within 3 mo: Visit date not found  Prescriber OR PCP: Jamari Sloan MD  Last diagnosis associated with med order: 1. Essential hypertension  - atenoloL (TENORMIN) 100 MG tablet [Pharmacy Med Name: ATENOLOL 100 MG TABLET]; TAKE 1 TABLET BY MOUTH EVERY DAY  Dispense: 90 tablet; Refill: 3    2. BPH (benign prostatic hyperplasia)  - tamsulosin (FLOMAX) 0.4 mg cap [Pharmacy Med Name: TAMSULOSIN HCL 0.4 MG CAPSULE]; TAKE 1 CAPSULE BY MOUTH EVERY DAY  Dispense: 90 capsule; Refill: 3    If protocol passes may refill for 12 months if within 3 months of last provider visit (or a total of 15 months).

## 2021-06-16 NOTE — TELEPHONE ENCOUNTER
Telephone Encounter by Berenice Farrar at 3/11/2020  8:27 AM     Author: Berenice Farrar Service: -- Author Type: --    Filed: 3/11/2020  8:29 AM Encounter Date: 3/9/2020 Status: Signed    : Berenice Farrar APPROVED:    Approval start date: 12/11/2019  Approval end date:  03/10/2021    Pharmacy has been notified of approval and will contact patient when medication is ready for pickup.

## 2021-06-17 NOTE — PROGRESS NOTES
4/27/18 note:    I did review the patient's MR angiogram with neuroradiology.  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  Given the smaller size of aneurysm and the patient's acquired cognitive impairment, I believe careful observation moving forward would be the most appropriate option, however surgical opinion will be obtained.  I did contact the patient's wife Leia in to discuss this matter with her.  She apparently has had an association with neurosurgeons at the [facility] through her work as an RN and because of this I will asked my staff to arrange this consult to be obtained at the [facility].  I have asked Leia to contact me should there be any concerns regarding consultation moving forward otherwise we will plan to follow-up with the patient as previously arranged.

## 2021-06-17 NOTE — PROGRESS NOTES
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 changes in his cognition and has become increasingly involved in the management of his medications and their social calendar (in addition to continuing to manage their family finances). Given these findings, that degree of support in daily life is encouraged and could be supplemented by additional safety precautions in his workshop.  Given how prone he was to confusion during today's evaluation, I am concerned that he would struggle to learn how to use a now complex power tool (i.e., saw or drill) or consistently use good safety practices.  In addition, this raises significant concerns about his safety to use of a firearm (as he reportedly enjoys hunting).  Significantly slowed cognitive processing speed and impaired divided attention also raise concerns about his ability to continue to drive; precautions are warranted.  From a mood perspective, Mr. Coronado does not appear to be struggling with depression or anxiety in daily life. However, he was clearly nervous during today's testing session and it will be important to monitor his mood closely over time.    Mr. Coronado has requested to receive the results of this evaluation from his referring provider at the time of an upcoming follow-up appointment; however, he was encouraged to schedule a formal feedback appointment with me after that appointment to discuss these results in further detail.     Thank  you for allowing me to participate in Mr. Coronado's care.  Please contact me with any questions regarding the content of this report.      Jacqueline Kitchen, PhD, LP, ABPP  Clinical Neuropsychologist, LP#[id number]  Board Certified in Clinical Neuropsychology    [facility]  Neuropsychology Section   Phone:  [phone]

## 2021-06-17 NOTE — PROGRESS NOTES
ASSESSMENT/PLAN:  1. Acute left-sided low back pain without sciatica  XR Lumbar Spine 2 or 3 VWS    tiZANidine (ZANAFLEX) 4 MG tablet       This is a 66 yo male with acute left sided low back pain.  This began after chopping ice in the alley.  Pain began the morning following this activity - upon awakening.  He has had some improvement in symptoms, but continues to have pain.  While I'm suspicious that this is muscular in nature, it is possible that he has a compression fracture due to the pounding injury.  An xray of the lumbar spine was obtained - and reviewed with patient.  Some degenerative disc disease is noted - discussed.  There are no neurologic red flags by history or exam.  It is reasonable to continue conservative therapy; will add muscle relaxer (Tizanidine).  If symptoms do not improve or any neurologic compromise is noted, then patient needs to be further evaluated.          There are no discontinued medications.  There are no Patient Instructions on file for this visit.    Chief Complaint:  Chief Complaint   Patient presents with     Back Pain     injured while removing ice, no falls       HPI:   Catalino Coronado is a 67 y.o. male c/o  Injured his back last Wednesday while chipping ice in alley  Trying to use treadmill and stretch  Walks like a drunken   Groans and moans and can't sit in a chair for very long    Has had back injuries in past -   Left lower back - nothing down leg      PMH:   Patient Active Problem List    Diagnosis Date Noted     Eczema 01/04/2018     Seborrhea 01/04/2018     Steatohepatitis 12/08/2014     Backache      Nicotine Dependence      Peyronie's Disease      Allergic Rhinitis      Benign Prostatic Hypertrophy      Pure hypercholesterolemia      Essential Hypertension      Metabolic Tests Nonspecific Abnormal Serum Enzyme Levels      History reviewed. No pertinent past medical history.  History reviewed. No pertinent surgical history.  Social History     Social History      Marital status:      Spouse name: N/A     Number of children: N/A     Years of education: N/A     Occupational History     Not on file.     Social History Main Topics     Smoking status: Former Smoker     Smokeless tobacco: Never Used      Comment: quit 5 yrs ago     Alcohol use No     Drug use: No     Sexual activity: Not on file     Other Topics Concern     Not on file     Social History Narrative       Meds:    Current Outpatient Prescriptions:      aspirin 81 MG EC tablet, Take 1 tablet (81 mg total) by mouth daily., Disp: , Rfl: 0     atenolol (TENORMIN) 100 MG tablet, TAKE ONE TABLET BY MOUTH ONE TIME DAILY, Disp: 90 tablet, Rfl: 3     clobetasol (TEMOVATE) 0.05 % external solution, APPLY TOPICALLY TWICE DAILY, Disp: 50 mL, Rfl: 0     desonide (DESOWEN) 0.05 % ointment, Apply topically 2 (two) times a day., Disp: 15 g, Rfl: 0     fluticasone (FLONASE) 50 mcg/actuation nasal spray, USE TWO SPRAYS IN EACH NOSTRIL DAILY, Disp: 48 g, Rfl: 3     hydroCHLOROthiazide (MICROZIDE) 12.5 mg capsule, TAKE ONE CAPSULE BY MOUTH EVERY MORNING., Disp: 90 capsule, Rfl: 3     ketoconazole (NIZORAL) 2 % shampoo, , Disp: , Rfl:      sildenafil (VIAGRA) 50 MG tablet, Take 1 tablet (50 mg total) by mouth daily as needed for erectile dysfunction., Disp: 10 tablet, Rfl: 3     simvastatin (ZOCOR) 40 MG tablet, Take 1 tablet (40 mg total) by mouth at bedtime., Disp: 60 tablet, Rfl: 5     tamsulosin (FLOMAX) 0.4 mg Cp24, Take 1 capsule (0.4 mg total) by mouth daily., Disp: 90 capsule, Rfl: 3     triamcinolone (KENALOG) 0.1 % cream, Apply topically 2 (two) times a day., Disp: 45 g, Rfl: 1     tiZANidine (ZANAFLEX) 4 MG tablet, Take 1 tablet (4 mg total) by mouth every 8 (eight) hours as needed., Disp: 30 tablet, Rfl: 0    Allergies:  No Known Allergies    ROS:  Pertinent positives as noted in HPI; otherwise 12 point ROS negative.      Physical Exam:  EXAM:  /80 (Patient Site: Right Arm, Patient Position: Sitting, Cuff  Size: Adult Large)  Pulse 72  Wt 222 lb 1.6 oz (100.7 kg)  BMI 28.32 kg/m2   Gen:  NAD, appears well, well-hydrated  HEENT:  TMs nl, oropharynx benign, nasal mucosa nl, conjunctiva clear  Neck:  Supple, no adenopathy, no thyromegaly, no carotid bruits, no JVD  Lungs:  Clear to auscultation bilaterally  Cor:  RRR no murmur  Abd:  Soft, nontender, BS+, no masses, no guarding or rebound, no HSM  Back:  Decreased ROM lumbar spine - neg SLR; moves slowly; tender to palpation at lower left lumbar back  Extr:  Neg.  Neuro:  No asymmetry, Nl motor tone/strength, nl sensation, reflexes =, gait nl, nl coordination, CN intact,   Skin:  Warm/dry        Results:    Xr Lumbar Spine 2 Or 3 Vws    Result Date: 4/4/2018  XR LUMBAR SPINE 2 OR 3 VWS 4/3/2018 4:15 PM INDICATION: Low back pain COMPARISON: None. FINDINGS: Nomenclature presumes 5 lumbar type vertebral bodies. Vertebral body heights are unremarkable. There is straightening of the normal lumbar lordosis. There is L4-L5 retrolisthesis of approximately 3 mm. There is intervertebral disc height loss and endplate osteophyte formation at multiple levels, suggesting underlying spondylosis. This is most pronounced at L4-L5 and L3-L4, where it is moderate. The remaining lumbar levels demonstrate milder evidence of  spondylolisthesis. There is facet arthropathy at L3-L4 L4-L5 and L5-S1. The pedicles appear symmetric. Imaged portions of the sacrum appear intact. There is atherosclerotic calcification of the abdominal aorta.

## 2021-06-18 NOTE — PROGRESS NOTES
Assessment / Impression     1.  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  Matter of driving    Plan:   1.  Begin Aricept 5 mg p.o. daily with breakfast.  Patient advised with respect to potential for adverse effects  2.  On return to clinic in 1 month consider advancement of therapy with Aricept and provision of high-dose vitamin E  3.  I have advised no driving however we may wish to revisit this depending on patient's response to pharmacotherapy.  In the meantime, I have given the patient referral for driving evaluation.  4.  OT evaluation and return to clinic  5.   to see today    Long conversation held with the patient and wife in attendance.  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  The patient's neuropsychometric testing also appeared to be consistent with a hybrid illness as well.  We discussed all matters of treatment including fundamentals a wellness and environmental support.  I will have an occupational therapy assessment on return to clinic    Total time for evaluation one hour with majority time spent in counseling.      Subjective:     HPI: Catalino Coronado is a 68 y.o. male with above-noted diagnoses who returns for follow-up.  Metabolic brain scan as noted above.  Interestingly, the brain scan does provide evidence for both RAKAN and DLB.  The patient offers no new complaints.          Review of Systems:          As above        Objective:     Vitals:    06/08/18 1253 06/08/18 1254   BP: 158/79 156/81   Pulse: 64 62   Weight: 212 lb (96.2 kg)        No results found for this or any previous visit (from the past 24 hour(s)).    Physical Exam: As noted previously

## 2021-06-18 NOTE — PROGRESS NOTES
Occupational Therapy  Occupational Therapy    Medicare Insurance    Units: 1 Desmond  Total Minutes: 50    Medical Diagnosis: Dementia  Treatment Diagnosis: Cognitive Impairment  Referring Practitioner: Dr. Wolfgang Edouard  Date of onset: 6-8-18  Start of care: June 19, 2018    Mr.Gregory BRADLEY Coronado was referred by Wolfgang Edouard MD for an occupational therapy outpatient cognitive evaluation. The assessments used in this evaluation will help determine if cognitive impairment is present and if so, how functional daily activity may be affected.  Following the assessments, the occupational therapist may offer education and recommendations to assist caregivers in providing the optimal level of support for their loved one. Catalino was seen on 6/19/2018 and was accompanied by his wife, who remained in the waiting room.  Vahid presented as appropriately groomed and dressed, ambulating independently. He was pleasant and fully cooperative with the evaluation. The following information regarding I/ADL's was provided by Vahid.      Living Arrangement:  Vahid lives in his own home with Leia, his wife.   Homemaking:  He stated Leia is responsible for all housekeeping and meal preparation tasks.   Financial Management: He stated Leia manages the finances.   Medications: He stated Leia sets up a weekly pillbox for him.   Driving:  He is not currently driving.   ADL:  He stated he manages self cares independently.   Leisure: When asked about leisure activity, he stated he enjoys woodworking.     Pain: none stated    OBJECTIVE  Results of assessments are as follows:    ACL: 4.4 /5.8  CPT: 4.0 /5.6  MOCA: 13 /30    The above assessment scores indicate a Moderate  level of impairment.    ASSESSMENT  Recommendations:  Cognitive functioning recommendations: 4.2-4.4: The assessment scores indicate a recommendation for 24 hour supervision with a secure environment and a daily structured schedule. Catalino may benefit from cues for ADL tasks. His  caregiver is recommended to complete complex tasks such as meal preparation, medication and financial management or provide very close supervision when he is doing these tasks. Driving is not recommended due to difficulty with multi-tasking, divided attention and complex problem solving. Catalino likely has significant difficulty with judgment during daily activities and learning new skills. Catalino may be goal directed but he is unable to set his own goals. Verbal and visual cues used during ADL tasks will assist Catalino in optimal functioning. His attention span is also significantly decreased. The use of memory aids (calendars, to-do lists, etc.) will likely be inconsistent and Catalino will respond best to verbal and visual cues to use memory aids. Further, Catalino may benefit from increased structure throughout the day creating healthy habits and routines and eliminating his need to problem solve from one task to another.    Thank you for this referral.  If I can be of further assistance, please do not hesitate to contact me.    MEDICARE PATIENT: Yes: HCIN #[id number]; Provider # ; Certification Dates: from 6-19-18 to 6-19-18    Physician Recommendation:  1. I certify the need for these services furnished within this plan and while under my care. I agree with the therapist's recommendation for plan of care.  2. If there is any recommendation for modification of therapy plan, please indicate below.    Mya Bhatti, OT

## 2021-06-18 NOTE — PROGRESS NOTES
[facility]          OUTPATIENT SPEECH LANGUAGE PATHOLOGY LANGUAGE-COGNITION  EVALUATION  PLAN OF TREATMENT FOR OUTPATIENT REHABILITATION  (COMPLETE FOR INITIAL CLAIMS ONLY)  Patient's Last Name, First Name, M.I.  YOB: 1950  Catalino Coronado                        Provider s Name: [facility] Medical Record No.  [id number]     Onset Date:  06/08/18   Start of Care Date: 06/08/21   Type:     ___PT  __OT   _X_SLP    Medical Diagnosis:  Alzheimers Dementia   Speech Language Pathology Diagnosis:  moderate-severe mixed receptive and expressive aphasia    Visits from SOC: 1                                        ________________________________________________________________________________  Plan of Treatment/Functional Goals:   Planned Therapy Interventions: Language            Language / Cognition Goals  1. Goal Identifier: 1       Goal Description: Pt will demonstrate improve oral expression of a sentence give 1 word with max cues in 80% of trials.       Target Date: 09/06/21   2. Goal Identifier: 2       Goal Description: Pt will demonstrate oral expression of 4 attributes to a given item with mod cues in 4/5 trials       Target Date: 09/06/21   3. Goal Identifier: 3.       Goal Description: Pt will demonstrate reading comprehension of sentence completion with 90% acc and mod assist       Target Date: 09/06/21   4. Goal Identifier: 4.       Goal Description: Pt will demonstrate auditory comprehension of basic yes/no questions regarding environmental information with 80% acc       Target Date: 09/06/21   5. Goal Identifier: 5.       Goal Description: Pt will use memory aid to identify or state basic personal information on 4/5 opportunities       Target Date: 09/06/21                 Predicted Duration of Therapy Intervention (days/wks): up to 12 weeks    Hallie Perkins  SLP       I CERTIFY THE NEED FOR THESE SERVICES FURNISHED UNDER        THIS PLAN OF TREATMENT AND WHILE UNDER MY CARE     (Physician co-signature of this document indicates review and certification of the therapy plan).                  Certification Date From:  06/08/21  Certification Date To:   09/06/21          Referring Physician:  Dr Hensley    Initial Assessment        See Epic Evaluation Start Of Care Date: 06/08/21

## 2021-06-18 NOTE — PROGRESS NOTES
Assessment / Impression   1.  Dementia of uncertain etiology, likely multifactorial, rule out hybrid dementia the Alzheimer type and dementia with Lewy bodies, rule out other or additional neurodegenerative etiology  2.  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  FDG brain PET  2.  Plan trial cholinesterase inhibitor therapy  3.  Screening blood tests  4.  Consider further biomarker test depending on results of above-noted evaluation    Long conversation held with the patient and wife Leia in attendance today.  I reviewed results of MRI of the brain revealing fairly nonspecific findings including good preservation of medial temporal structures (a small anterior communicating artery aneurysm was incidentally identified).  Screening blood tests have not been performed but will be performed today.  Neuropsychometric testing reveals evidence of significant prefrontal impairments including inattention along with difficulty with new learning and visuospatial capabilities.  In addition, significant cognitive slowing was also identified felt to be atypical for DLB.  I explained to the patient and spouse that there remains a degree of uncertainty as to the exact etiology of cognitive impairments but also my fairly firm opinion that the illness likely represents a neurodegenerative process.  With these thoughts in mind we discussed further biomarker testing and after much conversation we agreed to move forward with an FDG brain PET.  Depending on results, additional biomarker testing may be appropriate.  In addition, I do believe an eventual trial of cholinesterase inhibitor therapy will be appropriate.  Findings a bit unusual on the patient's exam included degree of incoordination on neurologic exam as well as mild square wave jerks on ocular exam.  In addition a slight degree of motor impersistence was appreciated.  These findings are felt to be fairly soft and obviously  a bit nonspecific as well.    Total time for evaluation one hour with the majority of time spent counseling.      Subjective:     HPI: Catalino Coronado is a 68 y.o. male with above-noted diagnoses who returns for diagnostic test review.  In terms of the narrative presented on intake evaluation, it should be noted that the patient has had difficulties that have been progressive over at least a two-year period of time.  Also note that the patient was born in [facility].  Again, as noted above, structural imaging the brain was fairly unrevealing.  An incidental anterior communicating artery aneurysm was discovered.  Of most note was the fact that medial temporal areas appear to be well preserved.  Neuropsychometric testing did reveal a combination of cortical and subcortical findings including difficulties with new learning, executive function and attention.  Visuospatial difficulties were also noted.  Interestingly, the patient was felt to have more motoric slowing than what might be considered from an illness like dementia with Lewy bodies.          Review of Systems:          None.  The patient denies difficulty looking down.  He denies neck stiffness or repeated falls.  No tremors or fasciculations appreciated by the patient      Objective:     Vitals:    05/15/18 1049 05/15/18 1050   BP: 144/75 148/79   Pulse: 66 63   Weight: 210 lb (95.3 kg)        No results found for this or any previous visit (from the past 24 hour(s)).    Physical Exam: The patient is casually dressed seated for evaluation.  He remains fairly quiet in the exam room and does not appear to express significant emotion during the course of conversation.  A brief exam was performed today.  The patient is thin but neatly groomed and casually dressed.  Posture is erect while seated.  No tremors or fasciculations were identified on observation.  No tonal abnormalities were noted either axillae for peripherally.  Eye findings reveal a  conjugate forward gaze with slight restriction in upgaze and possibly slight slowing of saccadic movements.  Occasional mild square wave jerks were noted.  The patient's postural stability today appears to be fairly good and I note her Romberg to be negative.

## 2021-06-18 NOTE — PROGRESS NOTES
Persons accompanying you (the patient) today: Wife Leia    How have you been doing since we saw you last? Please note any concerns.  Pt has been doing well. Him and his wife are here to go over Lab and PET scan results with Dr. Edouard.     Please list any recent hospitalizations/surgeries/procedures you've had since we saw you last:  None    Have you had any falls since your last visit? No    Do you have any pain today? No

## 2021-06-18 NOTE — PROGRESS NOTES
Persons accompanying you (the patient) today: Mindy Dupree    How have you been doing since we saw you last? Please note any concerns.  Pt is here for a f/u apt to go over all testing results.    Please list any recent hospitalizations/surgeries/procedures you've had since we saw you last:  None    Have you had any falls since your last visit? No    Do you have any pain today? No

## 2021-06-18 NOTE — PROGRESS NOTES
.OUT PATIENT-CLINICAL SOCIAL WORK PROGRESS NOTE      6/8/2018           Catalino Coronado is a 68 y.o. male with likely hybrid dementia of the Alzheimer's type and dementia with Lewy bodies per medical record. Social work was requested to support patient and his wife with educational and informational resources.    ASSESSMENT: This writer briefly met with Patient and his wife Leia following their appointment with Dr. Edouard. Wife reports already having our social work contact information and information on dementia with Lewy Bodies. At this time, wife is interested in resources or books specifically for the combination of Alzheimer's and Lewy Body dementia.     PLAN: This writer will gather resources to mail to the Patient and his wife, which they appreciated. The couple has our contact information if other questions arise before their next appointment. Social work will be available for any questions or concerns.     Type of Service: Office Visit    Services Provided: Resources    Resources Provided: Psychoeducational Material, Caregiver Support Information    Gayle Farley, Social Work Intern  6/8/18  [id number]    I have read, discussed, and agree with the documentation as presented by Gayle Farley, Social Work Intern.    Kat Larios, [facility]  [id number]

## 2021-06-19 NOTE — PROGRESS NOTES
Persons accompanying you (the patient) today: Wife Leia     How have you been doing since we saw you last? Please note any concerns.  Pt states that he's been doing great since last apt.     Please list any recent hospitalizations/surgeries/procedures you've had since we saw you last:  None     Have you had any falls since your last visit? No    Do you have any pain today? No

## 2021-06-19 NOTE — LETTER
Letter by Jamari Sloan MD at      Author: Jamari Sloan MD Service: -- Author Type: --    Filed:  Encounter Date: 12/5/2019 Status: Signed         Catalino Coronado  [address] [place] [address]             December 5, 2019        Dear Mr. Coronado,    Below are the results from your recent visit:    Resulted Orders   Comprehensive Metabolic Panel   Result Value Ref Range    Sodium 140 136 - 145 mmol/L    Potassium 4.2 3.5 - 5.0 mmol/L    Chloride 105 98 - 107 mmol/L    CO2 24 22 - 31 mmol/L    Anion Gap, Calculation 11 5 - 18 mmol/L    Glucose 107 70 - 125 mg/dL    BUN 19 8 - 22 mg/dL    Creatinine 1.21 0.70 - 1.30 mg/dL    GFR MDRD Af Amer >60 >60 mL/min/1.73m2    GFR MDRD Non Af Amer 59 (L) >60 mL/min/1.73m2    Bilirubin, Total 1.3 (H) 0.0 - 1.0 mg/dL    Calcium 9.6 8.5 - 10.5 mg/dL    Protein, Total 7.3 6.0 - 8.0 g/dL    Albumin 4.4 3.5 - 5.0 g/dL    Alkaline Phosphatase 57 45 - 120 U/L    AST 24 0 - 40 U/L    ALT 35 0 - 45 U/L    Narrative    Fasting Glucose reference range is 70-99 mg/dL per  American Diabetes Association (ADA) guidelines.   HM2(CBC w/o Differential)   Result Value Ref Range    WBC 8.4 4.0 - 11.0 thou/uL    RBC 4.68 4.40 - 6.20 mill/uL    Hemoglobin 15.4 14.0 - 18.0 g/dL    Hematocrit 45.2 40.0 - 54.0 %    MCV 97 80 - 100 fL    MCH 32.8 27.0 - 34.0 pg    MCHC 34.0 32.0 - 36.0 g/dL    RDW 10.8 (L) 11.0 - 14.5 %    Platelets 350 140 - 440 thou/uL    MPV 6.9 (L) 7.0 - 10.0 fL   LDL Cholesterol, Direct   Result Value Ref Range    Direct LDL 82 <=129 mg/dl       Lab results are stable      Please call with questions or contact us using [employer].    Sincerely,        Electronically signed by Jamari Sloan MD

## 2021-06-19 NOTE — PROGRESS NOTES
Assessment / Impression     1.  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  Anterior communicating artery aneurysm    Plan:   1.  Increase Aricept to 10 mg p.o. daily  2.  Begin high-dose vitamin E 1000 international units p.o. twice daily after 10 mg Aricept established  3.  Patient to have a angiogram to follow-up with cerebral aneurysm.  I would expect that the patient will be followed with respect to this problem.  I have asked the wife to contact me should the patient have any difficulty tolerating sedatives in preparation for angiogram    A long conversation held with the patient and spouse in attendance.  Patient did have OT evaluation revealing a dementia level of impairment is a moderate degree by both CPT and [place] testing.  At this point in time the patient appears to be doing well with some improvements in performance noted following initiation of cholinesterase inhibitor therapy we will continue as noted above.    Total time for evaluation 30 minutes with majority of time spent in counseling.  All questions answered.      Subjective:     HPI: Catalino Coronado is a 68 y.o. male with above-noted diagnoses who is seen in follow-up.  The patient reports feeling better following initiation of therapy with Aricept.  Improvements have been subtle falling mostly along the lines of motivation.  No dramatic improvements noted with treatment.  The patient has no history of visual hallucinations and although neuropsychometric testing and functional brain imaging do suggest a hybrid process, the patient's response to cholinesterase inhibitor therapy and much of his clinical presentation is more consistent with an Alzheimer process.          Review of Systems:          As above        Objective:     Vitals:    07/02/18 1425   BP: 149/70   Pulse: 71       No results found for this or any previous visit (from the past 24 hour(s)).    Physical Exam: As noted  previously.  The patient appears clinically stable at this time.

## 2021-06-20 NOTE — LETTER
Letter by Davis Aguilar MD at      Author: Davis Aguilar MD Service: -- Author Type: --    Filed:  Encounter Date: 9/1/2020 Status: (Other)         Catalino Coronado  [address] [place] [address]             September 1, 2020         Dear Mr. Coronado,    Below are the results from your recent visit:    Resulted Orders   Basic Metabolic Panel   Result Value Ref Range    Sodium 140 136 - 145 mmol/L    Potassium 4.3 3.5 - 5.0 mmol/L    Chloride 104 98 - 107 mmol/L    CO2 26 22 - 31 mmol/L    Anion Gap, Calculation 10 5 - 18 mmol/L    Glucose 102 70 - 125 mg/dL    Calcium 9.5 8.5 - 10.5 mg/dL    BUN 22 8 - 28 mg/dL    Creatinine 1.14 0.70 - 1.30 mg/dL    GFR MDRD Af Amer >60 >60 mL/min/1.73m2    GFR MDRD Non Af Amer >60 >60 mL/min/1.73m2    Narrative    Fasting Glucose reference range is 70-99 mg/dL per  American Diabetes Association (ADA) guidelines.   Lipid [place] FASTING   Result Value Ref Range    Cholesterol 137 <=199 mg/dL    Triglycerides 83 <=149 mg/dL    HDL Cholesterol 41 >=40 mg/dL    LDL Calculated 79 <=129 mg/dL    Patient Fasting > 8hrs? Yes    ALT (SGPT)   Result Value Ref Range    ALT 35 0 - 45 U/L       Vahid, your blood tests look good.    Please call with questions or contact us using [facility].    Sincerely,        Electronically signed by Davis Aguilar MD

## 2021-06-21 NOTE — LETTER
Letter by Davis Aguilar MD at      Author: Davis Aguilar MD Service: -- Author Type: --    Filed:  Encounter Date: 1/5/2021 Status: (Other)         Catalino Coronado  [address] [place] [address]             January 5, 2021         Dear Mr. Coronado,    Below are the results from your recent visit:    Resulted Orders   Basic Metabolic Panel   Result Value Ref Range    Sodium 140 136 - 145 mmol/L    Potassium 4.3 3.5 - 5.0 mmol/L    Chloride 102 98 - 107 mmol/L    CO2 25 22 - 31 mmol/L    Anion Gap, Calculation 13 5 - 18 mmol/L    Glucose 102 70 - 125 mg/dL    Calcium 9.4 8.5 - 10.5 mg/dL    BUN 22 8 - 28 mg/dL    Creatinine 1.29 0.70 - 1.30 mg/dL    GFR MDRD Af Amer >60 >60 mL/min/1.73m2    GFR MDRD Non Af Amer 55 (L) >60 mL/min/1.73m2    Narrative    Fasting Glucose reference range is 70-99 mg/dL per  American Diabetes Association (ADA) guidelines.   Hemoglobin   Result Value Ref Range    Hemoglobin 14.8 14.0 - 18.0 g/dL   ALT (SGPT)   Result Value Ref Range    ALT 37 0 - 45 U/L   LDL Cholesterol, Direct   Result Value Ref Range    Direct  <=129 mg/dl       Blood tests look good.    Please call with questions or contact us using [employer].    Sincerely,        Electronically signed by Davis Aguilar MD

## 2021-06-21 NOTE — PROGRESS NOTES
Persons accompanying you (the patient) today:   Leia, Wife     How have you been doing since we saw you last? Please note any concerns.  Pt has been having a lot of stomach issues including diarrhea     Please list any recent hospitalizations/surgeries/procedures you've had since we saw you last:  None     Have you had any falls since your last visit? No    Do you have any pain today? Yes: Stomach

## 2021-06-21 NOTE — PROGRESS NOTES
Assessment / Impression   1.  GI distress likely multifactorial in etiology but in part secondary to cholinesterase inhibitor therapy  2.  Dementia, likely dementia the also retyped although a Lewy body component cannot be entirely excluded  3.  Depression NOS  4.  Anterior communicating cerebral artery aneurysm      Plan:   1.  Split 10 mg Aricept tablet and administer 5 mg p.o. nightly with breakfast and 5 mg p.o. every afternoon with dinner  2.  Increase binding foodstuffs to improve diarrhea  3.  Consider role of Namenda and treatment of Alzheimer's disease as well as treating diarrhea  4.  Continue to hold antidepressant therapy at this time although I am to be notified should depression worsen in any way at which time alternative antidepressant therapy will be considered  5.  The patient will have a repeat imaging study through his neurosurgeon to follow anterior communicating artery aneurysm in the near future.    A long conversation with the patient and wife Leia in attendance.  Patient was initiated on therapy with venlafaxine for management of depression by phone (see phone messages).  Although the therapy was showing some effect at a dose of 75 mg/day, the patient unfortunately developed recurrence of diarrhea necessitating a discontinuation of this treatment.  Mood has not worsened since discontinuation of antidepressant therapy but diarrhea has persisted to some extent.  I discussed the possible etiologies for this problem including medication side effects and I discussed simple approaches to management.  At this point in time we will proceed with the above-noted plan with the patient to follow-up in January.  At that point in time depending on status we may wish to retrial alternative antidepressant therapy.    Total time for evaluation 30 minutes with majority time spent in counseling.      Subjective:     HPI: Catalino Coronado is a 68 y.o. male with above-noted diagnoses who is seen in follow-up.   The patient apparently was developing more signs and symptoms of depression and because of this Leia, the patient spouse, contacted us by phone.  I did order by phone therapy with venlafaxine.  While on a dose of 75 mg of venlafaxine Catalino experienced some recurrence of diarrhea with eventually necessitating discontinuation of this treatment.  Unfortunately, diarrhea has not completely resolved.    The patient today appears to be in good spirits.  He tends to deny symptoms of depression and spouse believes that improvements noted with venlafaxine has been persistent despite discontinuing this therapy.          Review of Systems:          As above.  The patient has experienced a 10 pound weight loss since June of this past year.  Weight is believed to be stable at this time.        Objective:     Vitals:    11/19/18 1444   BP: 135/60   Pulse: 63       No results found for this or any previous visit (from the past 24 hour(s)).    Physical Exam: Neatly groomed casually dressed the patient seated for evaluation.  He is alert pleasant and socially appropriate.  Affect is bright and mood appears to be congruent.  Significant difficulties with language are noted.

## 2021-06-22 NOTE — PROGRESS NOTES
Persons accompanying you (the patient) today: Daughter and wife    How have you been doing since we saw you last? Please note any concerns.  PT. Says he has been doing good since the last time we saw him. Wife wants to talk about getting a MRA done and the patient wants to get approval for driving a motor vehicle.    Please list any recent hospitalizations/surgeries/procedures you've had since we saw you last:  none    Have you had any falls since your last visit? No    Do you have any pain today? No

## 2021-06-22 NOTE — PROGRESS NOTES
Assessment / Impression   1.  Dementia likely mixed in etiology including dementia the also retyped and dementia with Lewy bodies  2.  GI distress improved with prior interventions  3.  Matter of driving  4.  Anterior communicating artery cerebral aneurysm  5.  Anxiety and depression NOS      Plan:   1.  Add Namenda titrating in a standard fashion  2.  No driving  3.  Neurosurgery to follow anterior communicating artery aneurysm  4.  I did advise the patient that he could call should he wish to retrial antidepressant therapy for anxiety and possible depression.  At this point I would likely select an SSRI class medicine given current symptoms    Long conversation held with the patient daughter Nabila and wife Leia in attendance.  Total time for evaluation 30 minutes with majority of time spent in counseling.  The patient's diarrhea continues to be problematic although somewhat improved at this point in time.  With this in mind I did outline a number of options for the patient who is selected a trial of Namenda to both bolster pharmacotherapy for Alzheimer's and Lewy body dementia as well as to possibly improve loose stools.  With respect to anxiety and depression, the patient has been having periods where he tends to obsess over his diagnosis.  Some symptoms of depression also persist.  I did suggest empiric trial of antidepressant therapy if requested in the future.    Follow-up in 6 months or sooner if needed      Subjective:     HPI: Catalino Coronado is a 68 y.o. male with above-noted diagnoses who returns for follow-up.  Patient is feeling well at this point in time except for occasional diarrhea.  This has improved with adjustments in diet and adjustments in dosing Aricept.  It would appear that the patient has responded quite favorably to cholinesterase inhibitor therapy as we might expect given the above-noted diagnoses.  The patient had questions regarding driving automobile (he was advised that he will not  be allowed to resume driving) as well as matters related to firearms in the home, etc.  All of this was discussed in detail.          Review of Systems:          As above        Objective:     Vitals:    01/04/19 0949 01/04/19 0950 01/04/19 0951   BP: 151/73 149/78 151/74   Pulse: 94 99 100   Weight: 212 lb (96.2 kg)         No results found for this or any previous visit (from the past 24 hour(s)).    Physical Exam: Patient is neatly groomed casually dressed and seated for evaluation.  I noted to be alert pleasant and socially appropriate.  No overt evidence of confusion at this time.  Affect is pleasant.

## 2021-06-24 NOTE — TELEPHONE ENCOUNTER
Refill Approved    Rx renewed per Medication Renewal Policy. Medication was last renewed on 6/17/18.    Adriana Guevara, [facility] Triage/Med Refill 2/17/2019     Requested Prescriptions   Pending Prescriptions Disp Refills     hydroCHLOROthiazide (MICROZIDE) 12.5 mg capsule [Pharmacy Med Name: HYDROCHLOROTHIAZIDE 12.5 MG CP] 90 capsule 2     Sig: TAKE ONE CAPSULE BY MOUTH EVERY MORNING.    Diuretics/Combination Diuretics Refill Protocol  Passed - 2/14/2019  1:16 AM       Passed - Visit with PCP or prescribing provider visit in past 12 months    Last office visit with prescriber/PCP: 8/7/2017 Jamari Sloan MD OR same dept: 4/3/2018 Flavia Haley MD OR same specialty: 4/3/2018 Flavia Haley MD  Last physical: 1/4/2018 Last MTM visit: Visit date not found   Next visit within 3 mo: Visit date not found  Next physical within 3 mo: Visit date not found  Prescriber OR PCP: Jamari Sloan MD  Last diagnosis associated with med order: 1. Essential hypertension, malignant  - hydroCHLOROthiazide (MICROZIDE) 12.5 mg capsule [Pharmacy Med Name: HYDROCHLOROTHIAZIDE 12.5 MG CP]; TAKE ONE CAPSULE BY MOUTH EVERY MORNING.  Dispense: 90 capsule; Refill: 2    If protocol passes may refill for 12 months if within 3 months of last provider visit (or a total of 15 months).            Passed - Serum Potassium in past 12 months     Lab Results   Component Value Date    Potassium 4.3 05/15/2018            Passed - Serum Sodium in past 12 months     Lab Results   Component Value Date    Sodium 138 05/15/2018            Passed - Blood pressure on file in past 12 months    BP Readings from Last 1 Encounters:   01/04/19 151/74            Passed - Serum Creatinine in past 12 months     Creatinine   Date Value Ref Range Status   05/15/2018 1.05 0.70 - 1.30 mg/dL Final

## 2021-07-02 RX ORDER — DONEPEZIL HYDROCHLORIDE 10 MG/1
5 TABLET, FILM COATED ORAL 2 TIMES DAILY PRN
Qty: 90 TABLET | Status: CANCELLED | OUTPATIENT
Start: 2021-07-02

## 2021-07-03 NOTE — ADDENDUM NOTE
Addendum Note by Rebekah Peter CMA at 7/2/2018  3:25 PM     Author: Rebekah Peter CMA Service: -- Author Type: Certified Medical Assistant    Filed: 7/2/2018  3:25 PM Date of Service: 7/2/2018  3:25 PM Status: Signed    : Rebekah Peter CMA (Certified Medical Assistant)    Encounter addended by: Rebekah Peter CMA on: 7/2/2018  3:25 PM<BR>     Actions taken: Charge Capture section accepted

## 2021-07-03 NOTE — ADDENDUM NOTE
Addendum Note by Rebekah Peter CMA at 3/19/2018  3:20 PM     Author: Rebekah Peter CMA Service: -- Author Type: Certified Medical Assistant    Filed: 3/19/2018  3:20 PM Date of Service: 3/19/2018  3:20 PM Status: Signed    : Rebekah Peter CMA (Certified Medical Assistant)    Encounter addended by: Rebekah Peter CMA on: 3/19/2018  3:20 PM<BR>     Actions taken: Charge Capture section accepted

## 2021-07-03 NOTE — ADDENDUM NOTE
Addendum Note by Gisele Magdaleno CMA at 3/12/2020  1:32 PM     Author: Gisele Magdaleon CMA Service: -- Author Type: Medical Assistant    Filed: 3/12/2020  1:32 PM Encounter Date: 3/2/2020 Status: Signed    : Gisele Magdaleno CMA (Medical Assistant)    Addended by: GISELE MAGDALENO on: 3/12/2020 01:32 PM        Modules accepted: Orders

## 2021-07-03 NOTE — ADDENDUM NOTE
Addendum Note by Rebekah Peter CMA at 5/15/2018  3:06 PM     Author: Rebekah Peter CMA Service: -- Author Type: Certified Medical Assistant    Filed: 5/15/2018  3:06 PM Date of Service: 5/15/2018  3:06 PM Status: Signed    : Rebekah Peter CMA (Certified Medical Assistant)    Encounter addended by: Rebekah Peter CMA on: 5/15/2018  3:06 PM<BR>     Actions taken: Charge Capture section accepted

## 2021-07-03 NOTE — ADDENDUM NOTE
Addendum Note by Rebekah Peter CMA at 7/10/2019 11:59 PM     Author: Rebekah Peter CMA Service: -- Author Type: Certified Medical Assistant    Filed: 9/11/2019  7:56 AM Date of Service: 7/10/2019 11:59 PM Status: Signed    : Rebekah Peter CMA (Certified Medical Assistant)    Encounter addended by: Rebekah Peter CMA on: 9/11/2019  7:56 AM      Actions taken: Charge Capture section accepted

## 2021-07-03 NOTE — ADDENDUM NOTE
Addendum Note by Gisele Magdaleno CMA at 3/12/2020  1:32 PM     Author: Gisele Magdaleno CMA Service: -- Author Type: Medical Assistant    Filed: 3/12/2020  1:32 PM Encounter Date: 2/13/2020 Status: Signed    : Gisele Magdaleno CMA (Medical Assistant)    Addended by: GISELE MAGDALENO on: 3/12/2020 01:32 PM        Modules accepted: Orders

## 2021-07-03 NOTE — ADDENDUM NOTE
Addendum Note by Rebekah Peter CMA at 1/4/2019 11:59 PM     Author: Rebekah Peter CMA Service: -- Author Type: Certified Medical Assistant    Filed: 1/7/2019  9:47 AM Date of Service: 1/4/2019 11:59 PM Status: Signed    : Rebekah Peter CMA (Certified Medical Assistant)    Encounter addended by: Rebekah Peter CMA on: 1/7/2019  9:47 AM      Actions taken: Charge Capture section accepted

## 2021-07-03 NOTE — ADDENDUM NOTE
Addendum Note by Andreina Deutsch MD at 3/9/2020  8:33 AM     Author: Andreina Deutsch MD Service: -- Author Type: Physician    Filed: 3/9/2020  8:33 AM Encounter Date: 3/2/2020 Status: Signed    : Andreina Deutsch MD (Physician)    Addended by: ANDREINA DEUTSCH on: 3/9/2020 08:33 AM        Modules accepted: Orders

## 2021-07-03 NOTE — ADDENDUM NOTE
Addendum Note by Andreina Deutsch MD at 6/26/2020  9:51 AM     Author: Andreina Deutsch MD Service: -- Author Type: Physician    Filed: 6/26/2020  9:51 AM Encounter Date: 6/18/2020 Status: Signed    : Andreina Deutsch MD (Physician)    Addended by: ANDREINA DEUTSCH on: 6/26/2020 09:51 AM        Modules accepted: Orders

## 2021-07-03 NOTE — ADDENDUM NOTE
Addendum Note by Rebekah Peter CMA at 6/8/2018  2:18 PM     Author: Rebekah Peter CMA Service: -- Author Type: Certified Medical Assistant    Filed: 6/8/2018  2:18 PM Date of Service: 6/8/2018  2:18 PM Status: Signed    : Rebekah Peter CMA (Certified Medical Assistant)    Encounter addended by: Rebekah Peter CMA on: 6/8/2018  2:18 PM<BR>     Actions taken: Charge Capture section accepted

## 2021-07-05 ENCOUNTER — HOSPITAL ENCOUNTER (OUTPATIENT)
Dept: SPEECH THERAPY | Facility: CLINIC | Age: 71
Setting detail: THERAPIES SERIES
End: 2021-07-05
Payer: MEDICARE

## 2021-07-05 PROCEDURE — 92507 TX SP LANG VOICE COMM INDIV: CPT | Mod: GN | Performed by: SPEECH-LANGUAGE PATHOLOGIST

## 2021-07-07 DIAGNOSIS — F02.80 LATE ONSET ALZHEIMER'S DISEASE WITHOUT BEHAVIORAL DISTURBANCE (H): ICD-10-CM

## 2021-07-07 DIAGNOSIS — G30.1 LATE ONSET ALZHEIMER'S DISEASE WITHOUT BEHAVIORAL DISTURBANCE (H): ICD-10-CM

## 2021-07-07 RX ORDER — DONEPEZIL HYDROCHLORIDE 5 MG/1
5 TABLET, FILM COATED ORAL 2 TIMES DAILY
Qty: 60 TABLET | Refills: 3 | Status: CANCELLED | OUTPATIENT
Start: 2021-07-07

## 2021-07-08 ENCOUNTER — MYC MEDICAL ADVICE (OUTPATIENT)
Dept: NEUROLOGY | Facility: CLINIC | Age: 71
End: 2021-07-08

## 2021-07-08 DIAGNOSIS — F02.80 LATE ONSET ALZHEIMER'S DISEASE WITHOUT BEHAVIORAL DISTURBANCE (H): ICD-10-CM

## 2021-07-08 DIAGNOSIS — G30.1 LATE ONSET ALZHEIMER'S DISEASE WITHOUT BEHAVIORAL DISTURBANCE (H): ICD-10-CM

## 2021-07-08 RX ORDER — DONEPEZIL HYDROCHLORIDE 5 MG/1
5 TABLET, FILM COATED ORAL 2 TIMES DAILY
Qty: 60 TABLET | Refills: 5 | Status: SHIPPED | OUTPATIENT
Start: 2021-07-08 | End: 2021-12-01 | Stop reason: SINTOL

## 2021-07-08 NOTE — TELEPHONE ENCOUNTER
Refill request received for aricept 5 mg BID. Refill approved per clinic protocol. Next visit is in Nov 2021.

## 2021-07-15 ENCOUNTER — OFFICE VISIT (OUTPATIENT)
Dept: AUDIOLOGY | Facility: CLINIC | Age: 71
End: 2021-07-15
Attending: PSYCHIATRY & NEUROLOGY
Payer: MEDICARE

## 2021-07-15 DIAGNOSIS — H90.3 SENSORINEURAL HEARING LOSS, BILATERAL: Primary | ICD-10-CM

## 2021-07-15 DIAGNOSIS — H91.90 HEARING LOSS, UNSPECIFIED HEARING LOSS TYPE, UNSPECIFIED LATERALITY: ICD-10-CM

## 2021-07-15 PROCEDURE — 92550 TYMPANOMETRY & REFLEX THRESH: CPT | Performed by: AUDIOLOGIST

## 2021-07-15 PROCEDURE — 92557 COMPREHENSIVE HEARING TEST: CPT | Performed by: AUDIOLOGIST

## 2021-07-15 NOTE — PROGRESS NOTES
AUDIOLOGY REPORT    SUBJECTIVE:  Catalino Coronado is a 71 year old male who was seen in the Audiology Clinic at the [facility] for audiologic evaluation, referred by Gabriel Hensley M.D.. They were accompanied today by their wife who reports he has a diagnosis of Alzheimer's disease. The case history of obtained from the patient with assistance from his wife. The patient reports a gradual decrease in hearing status and notes previous complaints of tinnitus.  He notes spinning that happens a few times a week but denies falls or injury. The patient denies bilateral otalgia, bilateral drainage and bilateral aural fullness. No history of ear surgeries were reported. The patient notes difficulty with communication in a variety of listening situations.      OBJECTIVE:  Otoscopic exam indicates ears are clear of cerumen bilaterally     Pure Tone Thresholds assessed using conventional audiometry with fair  reliability from 250-8000 Hz bilaterally using insert earphones and circumaural headphones     RIGHT:  normal sloping to severe sensorineural hearing loss    LEFT:    normal sloping to severe sensorineural hearing loss  Patient's reliability significantly improved with continuous re-instruction and encouragement. Thresholds may be slightly better than obtained. Masked bone conduction could not be obtained with good reliability.    Tympanogram:    RIGHT: normal eardrum mobility    LEFT:   normal eardrum mobility    Reflexes (reported by stimulus ear):  RIGHT: Ipsilateral is present at normal levels  RIGHT: Contralateral is present at normal levels  LEFT:   Ipsilateral is present at normal levels  LEFT:   Contralateral is present at normal levels    Speech Reception Threshold:    RIGHT: 25 dB HL    LEFT:   20 dB HL    Word Recognition Score:     RIGHT: 100% at 75 dB HL using NU-6 recorded word list.    LEFT:   92% at 75 dB HL using NU-6 recorded word list.      ASSESSMENT:      ICD-10-CM    1. Hearing loss, unspecified hearing loss type, unspecified laterality  H91.90 AUDIOLOGY ADULT REFERRAL        Today's results indicate a bilateral sensorineural hearing loss. Today s results were discussed with the patient in detail.     PLAN:  Patient was counseled regarding hearing loss and impact on communication.  Patient is a good candidate for amplification at this time. A trial with amplification was recommended, they will check with insurance to see if there is a hearing aid benefit. They are welcome to schedule a hearing aid consultation at this clinic should they desire. t is recommended that the patient continue to monitor his hearing status annually, sooner if concerns arise.  Please call this clinic with questions regarding these results or recommendations.        Miryam Gonzalez  Audiologist  [place] License  #[id number]

## 2021-07-16 ENCOUNTER — MYC MEDICAL ADVICE (OUTPATIENT)
Dept: NEUROLOGY | Facility: CLINIC | Age: 71
End: 2021-07-16

## 2021-07-19 ENCOUNTER — HOSPITAL ENCOUNTER (OUTPATIENT)
Dept: SPEECH THERAPY | Facility: CLINIC | Age: 71
Setting detail: THERAPIES SERIES
End: 2021-07-19
Payer: MEDICARE

## 2021-07-19 PROCEDURE — 92507 TX SP LANG VOICE COMM INDIV: CPT | Mod: GN | Performed by: SPEECH-LANGUAGE PATHOLOGIST

## 2021-07-22 NOTE — PROGRESS NOTES
Assessment/ Plan  1. Pelvic pain in male  Discomfort, not really pain.  Difficult history given patient's forgetfulness but exam is normal.  Plan is observation for now.  Follow-up if worsening.  Discussed option of imaging, CT would probably be optimal, decided against at this point.  Discussed this with patient and his wife.  T  - Urinalysis-UC if Indicated    Subjective  CC:  Chief Complaint   Patient presents with     Dementia     Penis Pain     HPI:  Vahid is a 71-year-old with history of dementia who presents with vague sensation in his pelvis for the last few months.  Comes and goes.  Not really described as pain but the feeling that something is down there.  Seems to get better with urination.  No burning or any incontinence.  Patient does not have a history of constipation, in fact has diarrhea which they attribute to his medicines for Alzheimer's.  No weight loss, fever or chills.  PFSH:  Patient Active Problem List   Diagnosis     Peyronie's Disease     Allergic Rhinitis     Benign prostatic hyperplasia with urinary frequency     Pure hypercholesterolemia     Essential Hypertension     Metabolic Tests Nonspecific Abnormal Serum Enzyme Levels     Backache     Steatohepatitis     Eczema     Seborrhea     Alzheimer's disease (H)     Healthcare maintenance     Intracranial aneurysm     Current medications reviewed as follows:  Current Outpatient Medications on File Prior to Visit   Medication Sig     aspirin 81 MG EC tablet Take 1 tablet (81 mg total) by mouth daily.     atenoloL (TENORMIN) 100 MG tablet TAKE 1 TABLET BY MOUTH EVERY DAY     buPROPion (WELLBUTRIN XL) 150 MG 24 hr tablet Take 2 tablets (300 mg total) by mouth daily.     clobetasol (TEMOVATE) 0.05 % external solution APPLY TOPICALLY TWICE DAILY     desonide (DESOWEN) 0.05 % ointment Apply topically 2 (two) times a day.     donepeziL (ARICEPT) 5 MG tablet Take 1 tablet (5 mg total) by mouth 2 (two) times a day.     fluticasone (FLONASE) 50  "mcg/actuation nasal spray USE TWO SPRAYS IN EACH NOSTRIL DAILY     ketoconazole (NIZORAL) 2 % shampoo APPLY TO ENTIRE WET SCALP & WASH OFF AFTER 5 MINS 3 TIMES A WEEK.     LORazepam (ATIVAN) 1 MG tablet Take 1 tablet (1 mg total) by mouth once in imaging for anxiety. Take 1/2 hour prior to procedure     memantine (NAMENDA) 10 MG tablet TAKE 1 TABLET BY MOUTH TWICE A DAY     memantine (NAMENDA) 5 MG tablet Take 1 tab (5mg) daily X 1 week, then 1 tab twice daily X 1 week, then 1 tab every evening along with a 10mg tab every morning X 1 week. (Patient taking differently: Take 5 mg by mouth 2 (two) times a day. Take 1 tab (5mg) daily X 1 week, then 1 tab twice daily X 1 week, then 1 tab every evening along with a 10mg tab every morning X 1 week.      )     sildenafiL (VIAGRA) 50 MG tablet Take 1 tablet (50 mg total) by mouth as needed for erectile dysfunction.     simvastatin (ZOCOR) 40 MG tablet TAKE 1 TABLET BY MOUTH EVERYDAY AT BEDTIME     tamsulosin (FLOMAX) 0.4 mg cap TAKE 1 CAPSULE BY MOUTH EVERY DAY     triamcinolone (KENALOG) 0.1 % cream APPLY TOPICALLY TWICE DAILY.     No current facility-administered medications on file prior to visit.      Social History     Tobacco Use   Smoking Status Former Smoker   Smokeless Tobacco Never Used   Tobacco Comment    quit 5 yrs ago     Social History     Social History Narrative     Not on file     Patient Care Team:  Davis Aguilar MD as PCP - General (Family Medicine)  Davis Aguilar MD as Assigned PCP  ROS  As above      Objective  Physical Exam  Vitals:    05/19/21 1533   BP: 145/78   Patient Site: Left Arm   Patient Position: Sitting   Cuff Size: Adult Large   Pulse: 67   Resp: 19   Weight: 206 lb (93.4 kg)   Height: 6' 3\" (1.905 m)     Body mass index is 25.75 kg/m .  Elderly gentleman no acute distress.  Abdomen soft and nontender.  Male genitalia examined and appears normal, normal testicles, no inguinal hernia.  No mass.  Rectal exam shows enlarged " prostate but is not particularly tender, no dual in rectal vault  Diagnostics:   Results for orders placed or performed in visit on 05/19/21   Urinalysis-UC if Indicated   Result Value Ref Range    Color, UA Yellow Colorless, Yellow, Straw, Light Yellow    Clarity, UA Clear Clear    Glucose, UA Negative Negative    Protein, UA Negative Negative    Bilirubin, UA Negative Negative    Urobilinogen, UA 0.2 E.U./dL 0.2 E.U./dL, 1.0 E.U./dL    pH, UA 5.0 5.0 - 8.0    Blood, UA Negative Negative    Ketones, UA Negative Negative    Nitrite, UA Negative Negative    Leukocytes, UA Negative Negative    Specific Gravity, UA 1.025 1.005 - 1.030           Please note: Voice recognition software was used in this dictation.  It may therefore contain typographical errors.

## 2021-07-26 DIAGNOSIS — F32.1 MAJOR DEPRESSIVE DISORDER, SINGLE EPISODE, MODERATE (H): Primary | ICD-10-CM

## 2021-07-29 ENCOUNTER — MYC MEDICAL ADVICE (OUTPATIENT)
Dept: FAMILY MEDICINE | Facility: CLINIC | Age: 71
End: 2021-07-29

## 2021-07-29 ENCOUNTER — MYC MEDICAL ADVICE (OUTPATIENT)
Dept: NEUROLOGY | Facility: CLINIC | Age: 71
End: 2021-07-29

## 2021-07-29 DIAGNOSIS — F32.1 MAJOR DEPRESSIVE DISORDER, SINGLE EPISODE, MODERATE (H): ICD-10-CM

## 2021-07-29 RX ORDER — BUPROPION HYDROCHLORIDE 150 MG/1
TABLET ORAL
Qty: 180 TABLET | Refills: 3 | Status: SHIPPED | OUTPATIENT
Start: 2021-07-29 | End: 2021-07-30

## 2021-07-30 NOTE — TELEPHONE ENCOUNTER
"  Last office visit provider:  5/19/21     Requested Prescriptions   Pending Prescriptions Disp Refills     buPROPion (WELLBUTRIN XL) 150 MG 24 hr tablet [Pharmacy Med Name: BUPROPION HCL  MG TABLET] 180 tablet 3     Sig: TAKE 2 TABLETS (300 MG TOTAL) BY MOUTH DAILY.       SSRIs Protocol Passed - 7/26/2021  1:06 PM        Passed - Recent (12 mo) or future (30 days) visit within the authorizing provider's specialty     Patient has had an office visit with the authorizing provider or a provider within the authorizing providers department within the previous 12 mos or has a future within next 30 days. See \"Patient Info\" tab in inbasket, or \"Choose Columns\" in Meds & Orders section of the refill encounter.              Passed - Medication is Bupropion     If the medication is Bupropion (Wellbutrin), and the patient is taking for smoking cessation; OK to refill.          Passed - Medication is active on med list        Passed - Patient is age 18 or older             moshe mix RN 07/29/21 7:30 PM  "

## 2021-08-03 ENCOUNTER — MYC MEDICAL ADVICE (OUTPATIENT)
Dept: DERMATOLOGY | Facility: CLINIC | Age: 71
End: 2021-08-03

## 2021-08-03 DIAGNOSIS — L40.8 SEBOPSORIASIS: ICD-10-CM

## 2021-08-03 RX ORDER — DESONIDE 0.5 MG/G
OINTMENT TOPICAL 2 TIMES DAILY
Qty: 60 G | Refills: 11 | Status: SHIPPED | OUTPATIENT
Start: 2021-08-03 | End: 2022-01-01

## 2021-08-03 RX ORDER — BUPROPION HYDROCHLORIDE 150 MG/1
300 TABLET ORAL EVERY MORNING
Qty: 180 TABLET | Refills: 0 | Status: SHIPPED | OUTPATIENT
Start: 2021-08-03 | End: 2022-01-01

## 2021-08-03 NOTE — TELEPHONE ENCOUNTER
"Routing refill request to provider for review/approval because:  Labs not current:  PHQ-9      Last Written Prescription Date:  5/28/2020  Last Fill Quantity: 180,  # refills: 3   Last office visit provider:  5/19/2021     Requested Prescriptions   Pending Prescriptions Disp Refills     buPROPion (WELLBUTRIN XL) 150 MG 24 hr tablet 180 tablet 0     Sig: Take 2 tablets (300 mg) by mouth every morning       SSRIs Protocol Failed - 7/30/2021  9:18 AM        Failed - PHQ-9 score less than 5 in past 6 months     Please review last PHQ-9 score.           Failed - Recent (6 mo) or future (30 days) visit within the authorizing provider's specialty     Patient had office visit in the last 6 months or has a visit in the next 30 days with authorizing provider or within the authorizing provider's specialty.  See \"Patient Info\" tab in inbasket, or \"Choose Columns\" in Meds & Orders section of the refill encounter.            Passed - Medication is Bupropion     If the medication is Bupropion (Wellbutrin), and the patient is taking for smoking cessation; OK to refill.          Passed - Medication is active on med list        Passed - Patient is age 18 or older             Chanel Mathias RN 08/02/21 10:23 PM  "

## 2021-08-03 NOTE — TELEPHONE ENCOUNTER
Received refill request for desonide for sebopsoriasis.  Patient last seen by Dr. White 2/2021.  Request is approved with 1 year of refills.

## 2021-08-03 NOTE — TELEPHONE ENCOUNTER
Desonide 0.05%         Last Written Prescription Date:  1-  Last Fill Quantity: 60g,   # refills: 11  Last Office Visit : 2-8-2021  Future Office visit:  none    Routing refill request to provider for review/approval because:  Last order was in 2019.      Kathleen M Doege RN

## 2021-08-06 NOTE — PATIENT INSTRUCTIONS - HE
Patient Instructions by Davis Aguilar MD at 9/1/2020  9:20 AM     Author: Davis Aguilar MD Service: -- Author Type: Physician    Filed: 9/1/2020 10:43 AM Encounter Date: 9/1/2020 Status: Signed    : Davis Aguilar MD (Physician)         Patient Education     Exercise for a Healthier Heart  You may wonder how you can improve the health of your heart. If youre thinking about exercise, youre on the right track. You dont need to become an athlete, but you do need a certain amount of brisk exercise to help strengthen your heart. If you have been diagnosed with a heart condition, your doctor may recommend exercise to help stabilize your condition. To help make exercise a habit, choose safe, fun activities.       Be sure to check with your health care provider before starting an exercise program.    Why exercise?  Exercising regularly offers many healthy rewards. It can help you do all of the following:    Improve your blood cholesterol levels to help prevent further heart trouble    Lower your blood pressure to help prevent a stroke or heart attack    Control diabetes, or reduce your risk of getting this disease    Improve your heart and lung function    Reach and maintain a healthy weight    Make your muscles stronger and more limber so you can stay active    Prevent falls and fractures by slowing the loss of bone mass (osteoporosis)    Manage stress better  Exercise tips  Ease into your routine. Set small goals. Then build on them.  Exercise on most days. Aim for a total of 150 or more minutes of moderate to  vigorous intensity activity each week. Consider 40 minutes, 3 to 4 times a week. For best results, activity should last for 40 minutes on average. It is OK to work up to the 40 minute period over time. Examples of moderate-intensity activity is walking one mile in 15 minutes or 30 to 45 minutes of yard work.  Step up your daily activity level. Along with your exercise program, try  being more active throughout the day. Walk instead of drive. Do more household tasks or yard work.  Choose one or more activities you enjoy. Walking is one of the easiest things you can do. You can also try swimming, riding a bike, or taking an exercise class.  Stop exercising and call your doctor if you:    Have chest pain or feel dizzy or lightheaded    Feel burning, tightness, pressure, or heaviness in your chest, neck, shoulders, back, or arms    Have unusual shortness of breath    Have increased joint or muscle pain    Have palpitations or an irregular heartbeat      9945-5541 [employer]. [address] [place] [address]. All rights reserved. This information is not intended as a substitute for professional medical care. Always follow your healthcare professional's instructions.         Patient Education   Understanding [employer] MyPlate  The USDA (US Department of Agriculture) has guidelines to help you make healthy food choices. These are called MyPlate. MyPlate shows the food groups that make up healthy meals using the image of a place setting. Before you eat, think about the healthiest choices for what to put onto your plate or into your cup or bowl. To learn more about building a healthy plate, visit www.choosemyplate.gov.       The Food Groups    Fruits: Any fruit or 100% fruit juice counts as part of the Fruit Group. Fruits may be fresh, canned, frozen, or dried, and may be whole, cut-up, or pureed. Make half your plate fruits and vegetables.    Vegetables: Any vegetable or 100% vegetable juice counts as a member of the Vegetable Group. Vegetables may be fresh, frozen, canned, or dried. They can be served raw or cooked and may be whole, cut-up, or mashed. Make half your plate fruits and vegetables.     Grains: All foods made from grains are part of the Grains Group. These include wheat, rice, oats, cornmeal, and barley such as bread, pasta, oatmeal, cereal, tortillas, and grits. Grains  should be no more than a quarter of your plate. At least half of your grains should be whole grains.    Protein: This group includes meat, poultry, seafood, beans and peas, eggs, processed soy products (like tofu), nuts (including nut butters), and seeds. Make protein choices no more than a quarter of your plate. Meat and poultry choices should be lean or low fat.    Dairy: All fluid milk products and foods made from milk that contain calcium, like yogurt and cheese are part of the Dairy Group. (Foods that have little calcium, such as cream, butter, and cream cheese, are not part of the group.) Most dairy choices should be low-fat or fat-free.    Oils: These are fats that are liquid at room temperature. They include canola, corn, olive, soybean, and sunflower oil. Foods that are mainly oil include mayonnaise, certain salad dressings, and soft margarines. You should have only 5 to 7 teaspoons of oils a day. You probably already get this much from the food you eat.  Use [employer] to Help Build Your Meals  The [employer] can help you plan and track your meals and activity. You can look up individual foods to see or compare their nutritional value. You can get guidelines for what and how much you should eat. You can compare your food choices. And you can assess personal physical activities and see ways you can improve. Go to www.[employer].gov/supertracker/.    0510-0631 [employer]. [address] [place] [address], [place] [address]. All rights reserved. This information is not intended as a substitute for professional medical care. Always follow your healthcare professional's instructions.           Patient Education   Activities of Daily Living  Your Health Risk Assessment indicates you have difficulties with activities of daily living such as eating, getting dressed, grooming, bathing, walking, or using the toilet. Please make a follow up appointment for us to address this issue in more detail.      Patient Education   Instrumental Activities of Daily Living  Your Health Risk Assessment indicates you have difficulties with instrumental activities of daily living which include laundry, housekeeping, banking, shopping, using the telephone, food preparation, transportation, or taking your own medications. Please make a follow up appointment for us to address this issue in more detail.    [employer] has resources available on the following website: https://www.healthPopCap Games.org/caregivers.html     Also, here is a local agency that provides help with meals and other assistance:   [facility] Line: 520.736.3257     Patient Education   Signs of Hearing Loss  Hearing loss is a problem shared by many people. In fact, it is one of the most common health conditions, particularly as people age. Most people over age 65 have some hearing loss, and by age 80, almost everyone does. Because hearing loss usually occurs slowly over the years, you may not realize your hearing ability has gotten worse.       Have your hearing checked  Contact your [facility] care provider if you:    Have to strain to hear normal conversation.    Have to watch other peoples faces very carefully to follow what theyre saying.    Need to ask people to repeat what theyve said.    Often misunderstand what people are saying.    Turn the volume of the television or radio up so high that others complain.    Feel that people are mumbling when theyre talking to you.    Find that the effort to hear leaves you feeling tired and irritated.    Notice, when using the phone, that you hear better with 1 ear than the other.    4339-6203 [employer]. [address] [place] [address], [place] [address]. All rights reserved. This information is not intended as a substitute for professional medical care. Always follow your healthcare professional's instructions.         Patient Education   Urinary Incontinence (Male)    Urinary incontinence means not being able to  control the release of urine from the bladder.  Causes  Common causes of urinary incontinence in men include:    Infection    Certain medicines    Aging    Poor pelvic muscle tone    Bladder spasms    Obesity    Urinary retention  Nervous system diseases, diabetes, sleep apnea, urinary tract infections, prostate surgery, and pelvic trauma can also cause incontinence. Constipation and smoking have also been identified as risk factors.  Symptoms    Urge incontinence (also called overactive bladder) is a sudden urge to urinate even though there may not be much urine in the bladder. The need to urinate often during the night is common. It is due to bladder spasms.    Stress incontinence is involuntary urine leakage that can occur with sneezing, coughing, and other actions that put stress on the bladder.  Treatment  Treatment of urinary incontinence depends on the cause. Infections of the bladder are treated with antibiotics. Urinary retention is treated with a bladder catheter.  Home care  Follow these guidelines when caring for yourself at home:    Don't consume foods and drinks that may irritate the bladder. These include drinks containing alcohol, caffeinate, or carbonation; chocolate; and acidic fruits and juices.    Limit fluid intake to 6 to 8 cups a day.    Lose weight if you are overweight. This will reduce your symptoms.    If needed, wear absorbent pads to catch urine. Change pads frequently to maintain hygiene and prevent skin and bladder infections.    Bathe daily to maintain good hygiene.    If an antibiotic was prescribed to treat a bladder infection, be sure to take it until finished, even if you are feeling better before then. This is to make sure your infection has cleared.    If a catheter was left in place, it is important to keep bacteria from getting into the collection bag. Don't disconnect the catheter from the collection bag.    Use a leg band to secure the catheter drainage tube, so it does not  pull on the catheter. Drain the collection bag when it becomes full using the drain spout at the bottom of the bag. Don't disconnect the bag from the catheter.    Don't pull on or try to remove a catheter. The catheter must be removed by a healthcare provider.  Follow-up care  Follow up with your healthcare provider, or as advised.  When to seek medical advice  Call your healthcare provider right away if any of these occur:    Fever over 100.4 F (38 C), or as directed by your healthcare provider    Bladder pain or fullness    Abdominal swelling, nausea, or vomiting    Back pain    Weakness, dizziness, or fainting    If a catheter was left in place, return if:  ? Catheter falls out  ? Catheter stops draining for 6 hours  Date Last Reviewed: 10/1/2017    6359-4950 [employer]. [address] [place] [address]. All rights reserved. This information is not intended as a substitute for professional medical care. Always follow your healthcare professional's instructions.     Patient Education   Your Health Risk Assessment indicates you feel you are not in good emotional health.    Recreation   Recreation is not limited to sports and team events. It includes any activity that provides relaxation, interest, enjoyment, and exercise. Recreation provides an outlet for physical, mental, and social energy. It can give a sense of worth and achievement. It can help you stay healthy.    Mental Exercise and Social Involvement  Mental and emotional health is as important as physical health. Keep in touch with friends and family. Stay as active as possible. Continue to learn and challenge yourself.   Things you can do to stay mentally active are:    Learn something new, like a foreign language or musical instrument.     Play SCRABBLE or do crossword puzzles. If you cannot find people to play these games with you at home, you can play them with others on your computer through the Internet.     Join a [employer]  club--anything from card games to chess or checkers or lawn bowling.     Start a new hobby.     Go back to school.     Volunteer.     Read.     Keep up with world events.         Advance Directive  Patients advance directive was discussed and I am comfortable with the patients wishes.  Patient Education   Personalized Prevention Plan  You are due for the preventive services outlined below.  Your care team is available to assist you in scheduling these services.  If you have already completed any of these items, please share that information with your care team to update in your medical record.  Health Maintenance   Topic Date Due   ? DEPRESSION ACTION PLAN  1950   ? HEPATITIS C SCREENING  1950   ? LIPID  03/18/2016   ? ADVANCE CARE PLANNING  03/18/2016   ? TD 18+ HE  03/18/2021   ? FALL RISK ASSESSMENT  12/04/2020   ? MEDICARE ANNUAL WELLNESS VISIT  09/01/2021   ? COLORECTAL CANCER SCREENING  02/16/2027   ? PNEUMOCOCCAL IMMUNIZATION 65+ LOW/MEDIUM RISK  Completed   ? HEPATITIS B VACCINES  Completed   ? INFLUENZA VACCINE RULE BASED  Completed   ? ZOSTER VACCINES  Completed

## 2021-08-10 ENCOUNTER — TELEPHONE (OUTPATIENT)
Dept: DERMATOLOGY | Facility: CLINIC | Age: 71
End: 2021-08-10

## 2021-08-10 ENCOUNTER — HOSPITAL ENCOUNTER (OUTPATIENT)
Dept: SPEECH THERAPY | Facility: CLINIC | Age: 71
Setting detail: THERAPIES SERIES
End: 2021-08-10
Payer: MEDICARE

## 2021-08-10 DIAGNOSIS — L40.8 SEBOPSORIASIS: Primary | ICD-10-CM

## 2021-08-10 RX ORDER — HYDROCORTISONE 25 MG/G
OINTMENT TOPICAL 2 TIMES DAILY
Qty: 30 G | Refills: 11 | Status: SHIPPED | OUTPATIENT
Start: 2021-08-10 | End: 2021-12-16

## 2021-08-10 NOTE — TELEPHONE ENCOUNTER
desonide (DESOWEN) 0.05 % external ointment  Last Written Prescription Date:  8/3/2021  Last Fill Quantity: 60,   # refills: 11  Last Office Visit : 2/8/2021  Future Office visit:  None    Routing refill request to provider for review/approval because:  This med not covered under the Pt's insurance.    Please choose an alternative from the list.    Thank you       Bernadette Horton RN  Central Triage Red Flags/Med Refills

## 2021-08-24 ENCOUNTER — HOSPITAL ENCOUNTER (OUTPATIENT)
Dept: SPEECH THERAPY | Facility: CLINIC | Age: 71
Setting detail: THERAPIES SERIES
End: 2021-08-24
Payer: MEDICARE

## 2021-08-24 PROCEDURE — 92507 TX SP LANG VOICE COMM INDIV: CPT | Mod: GN | Performed by: SPEECH-LANGUAGE PATHOLOGIST

## 2021-09-12 ENCOUNTER — HEALTH MAINTENANCE LETTER (OUTPATIENT)
Age: 71
End: 2021-09-12

## 2021-09-13 ENCOUNTER — OFFICE VISIT (OUTPATIENT)
Dept: AUDIOLOGY | Facility: CLINIC | Age: 71
End: 2021-09-13
Payer: MEDICARE

## 2021-09-13 DIAGNOSIS — H90.3 SENSORINEURAL HEARING LOSS, BILATERAL: Primary | ICD-10-CM

## 2021-09-13 PROCEDURE — 92591 PR HEARING AID EXAM BINAURAL: CPT | Mod: GY | Performed by: AUDIOLOGIST

## 2021-09-13 NOTE — PROGRESS NOTES
"AUDIOLOGY REPORT    SUBJECTIVE: Catalino Coronado is a 71 year old male was seen in the Audiology Clinic at  [facility] on 9/13/21 to discuss concerns with hearing and functional communication difficulties. The patient was accompanied by their wife who also assists with caregiving. Catalino has been seen previously on 7/15/21, and results revealed normal to severe likely sensorineural hearing loss bilaterally (unable to obtain reliable masked thresholds). Results obtained with a fair reliability.  Patient needed continuous re instruction and encouragement which did significantly help reliability. Patient's history is significant for Alzheimer's Disease. Catalino notes difficulty with communication in a variety of listening situations, mostly with groups/talking with his children and grandchildren and with the television. He is currently retired.     OBJECTIVE: Patient is a hearing aid candidate. Patient would like to move forward with a hearing aid evaluation today. Therefore, the patient was presented with different options for amplification to help aid in communication. Discussed styles, levels of technology and monaural vs. binaural fitting. Discussed rechargeable vs disposable batteries and connectivity options. We selected a device that is compatible with patient's wife [employer] phone and whose steffany has \"find my hearing aids\". The patient does not use a phone anymore and will not be utilizing streaming features.  Vahid would prefer to have a behind-the-ear style hearing aids/one that is less occluding. His wife reports that she is able to assist the patient if needed with insertion/removal and use.     The hearing aid(s) mutually chosen were:  Binaural: Oticon More 3   COLOR: Steel Gray   BATTERY SIZE: rechargeable  EARMOLD/TIPS: Large/12 MM open   CANAL/ LENGTH: Length 3, Strength 85 (different strength may be needed if using frequency lowering)    We " briefly discussed the free Connect Clip accessory, but patient and his wife are not interested in this currently.     ASSESSMENT: Reviewed purchase information and warranty information with patient. The 45 day trial period was explained to patient. The patient was given a copy of the [place] Department of Health consumer brochure on purchasing hearing instruments. Patient risk factors have been provided to the patient in writing prior to the sale of the hearing aid per FDA regulation. The risk factors are also available in the User Instructional Booklet to be presented on the day of the hearing aid fitting. Hearing aid(s) ordered. Hearing aid evaluation completed.    PLAN: Catalino is scheduled to return in 2-3 weeks for a hearing aid fitting and programming. Purchase agreement and non-covered service form will be completed on that date. Please contact this clinic with any questions or concerns.      Froilan Hathaway. CCC-A  Licensed Audiologist   [place] #[id number]

## 2021-09-15 ENCOUNTER — HOSPITAL ENCOUNTER (OUTPATIENT)
Dept: SPEECH THERAPY | Facility: CLINIC | Age: 71
Setting detail: THERAPIES SERIES
End: 2021-09-15
Payer: MEDICARE

## 2021-09-15 PROCEDURE — 92507 TX SP LANG VOICE COMM INDIV: CPT | Mod: GN | Performed by: SPEECH-LANGUAGE PATHOLOGIST

## 2021-09-24 ENCOUNTER — OFFICE VISIT (OUTPATIENT)
Dept: NEUROLOGY | Facility: CLINIC | Age: 71
End: 2021-09-24
Payer: MEDICARE

## 2021-09-24 VITALS
BODY MASS INDEX: 25.8 KG/M2 | TEMPERATURE: 97.4 F | HEART RATE: 61 BPM | SYSTOLIC BLOOD PRESSURE: 141 MMHG | DIASTOLIC BLOOD PRESSURE: 88 MMHG | WEIGHT: 206.4 LBS

## 2021-09-24 DIAGNOSIS — G30.1 LATE ONSET ALZHEIMER'S DISEASE WITHOUT BEHAVIORAL DISTURBANCE (H): Primary | ICD-10-CM

## 2021-09-24 DIAGNOSIS — F02.80 LATE ONSET ALZHEIMER'S DISEASE WITHOUT BEHAVIORAL DISTURBANCE (H): Primary | ICD-10-CM

## 2021-09-24 RX ORDER — PSYLLIUM HUSK/CALCIUM CARB 1 G-60 MG
CAPSULE ORAL
COMMUNITY
End: 2021-12-16

## 2021-09-24 NOTE — LETTER
"9/24/2021     RE: Catalino Coronado  [address] [place] [address]     Dear Colleague,    Thank you for referring your patient, Catalino Coronado, to the [facility] at [facility]. Please see a copy of my visit note below.    HPI:  Mr. Catalino Coronado is a 71 year old man former  at  seen by Dr. 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 He was also evaluated by Dr. Wolfgang Edouard. Please see Dr. Kennedy's documentation for full details. In brief, he developed progressive loss in short term memory starting around 2016. At that time, symptoms were: \"he will often be making furniture in his workshop and will set something down and struggle to find it again. He also describes difficulties trying to recall what he is done in the recent past, such as what he did last weekend or what day of the week it is\". Symptoms noted at his last visit in September 2020 were: \"He loses and misplaces objects and repeat questions. he cannot run the washing machine or the  because he doesn't know how to operate the buttons. His wife helps him to turn the water on, because they have the new system.\" Brain MRI showed diffuse cerebral atrophy with a predilection for the dorsolateral frontoparietal cortices. No abnormal DWI or susceptibility signal. Minimal white matter disease. B12 normal (793); CMP normal other than eGFR 55; TSH normal.    Vahid presents with his wife. He is unable to provide a story.    Cognitive: They are doing speech therapy. They find it is helping. Severe expressive > receptive deficits. He reports \"it hurts it hurts it hurts\" and holds over his bladder. It is relieved when he urinates. They had a check up with his PCP and no alternative " "explanation was found for this behavior.  Mood/behavior: During the day he \"hitting on his head\" when frustrated. He has a therapy cat. He became distraught when his cat got lost. No anxiety reported.  Sleep: He has a recurring dream that someone is trying to hurt his genitals. His wife sees him holding his genitals and looking distraught. Dream enactment behavior; trashing behavior. No insomnia.  Motor: Walking and mobility are going well. He sometimes goes outside or uses the treadmill. No motor. No urinary incontinence. He has mild trouble swallowing pills but not other food textures.  Background medical problems: No new medical issues. Their son lives with them and works as a  and works from home. They made the home handicap accessible.     Neuro:  He is alert, awake, and cooperative. He follows 1 step commands but cannot consistently follow 2 step commands.    A/P:  Slight progression in communicative abilities since last visit.     - Put pills in puree (like apple sauce) as this can help with swallowing  - Melatonin 3 mg at night to help with bad dreams and dream enactment. You can increase by 3 mg every week up to 18 mg.   - Try to safety proof the bed area in case he flails and hits the lamp or hits other dangerous objects  - Exercise more!    I spent 38 minutes reviewing the chart, personally assessing objective testing, direct patient care, completing documentation and billing (visit was changed in time and my previously made note was deleted).    Again, thank you for allowing me to participate in the care of your patient.      Sincerely,    Gabriel Hensley MD    "

## 2021-09-24 NOTE — PROGRESS NOTES
"HPI:  Mr. Catalino Coronado is a 71 year old man former  at  seen by Dr. 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 He was also evaluated by Dr. Wolfgang Edouard. Please see Dr. Kennedy's documentation for full details. In brief, he developed progressive loss in short term memory starting around 2016. At that time, symptoms were: \"he will often be making furniture in his workshop and will set something down and struggle to find it again. He also describes difficulties trying to recall what he is done in the recent past, such as what he did last weekend or what day of the week it is\". Symptoms noted at his last visit in September 2020 were: \"He loses and misplaces objects and repeat questions. he cannot run the washing machine or the  because he doesn't know how to operate the buttons. His wife helps him to turn the water on, because they have the new system.\" Brain MRI showed diffuse cerebral atrophy with a predilection for the dorsolateral frontoparietal cortices. No abnormal DWI or susceptibility signal. Minimal white matter disease. B12 normal (793); CMP normal other than eGFR 55; TSH normal.    Vahid presents with his wife. He is unable to provide a story.    Cognitive: They are doing speech therapy. They find it is helping. Severe expressive > receptive deficits. He reports \"it hurts it hurts it hurts\" and holds over his bladder. It is relieved when he urinates. They had a check up with his PCP and no alternative explanation was found for this behavior.  Mood/behavior: During the day he \"hitting on his head\" when frustrated. He has a therapy cat. He became distraught when his cat got lost. No anxiety reported.  Sleep: He has a recurring dream that someone is trying to hurt his genitals. His wife sees him " holding his genitals and looking distraught. Dream enactment behavior; trashing behavior. No insomnia.  Motor: Walking and mobility are going well. He sometimes goes outside or uses the treadmill. No motor. No urinary incontinence. He has mild trouble swallowing pills but not other food textures.  Background medical problems: No new medical issues. Their son lives with them and works as a  and works from home. They made the home handicap accessible.     Neuro:  He is alert, awake, and cooperative. He follows 1 step commands but cannot consistently follow 2 step commands.    A/P:  Slight progression in communicative abilities since last visit.     - Put pills in puree (like apple sauce) as this can help with swallowing  - Melatonin 3 mg at night to help with bad dreams and dream enactment. You can increase by 3 mg every week up to 18 mg.   - Try to safety proof the bed area in case he flails and hits the lamp or hits other dangerous objects  - Exercise more!    I spent 38 minutes reviewing the chart, personally assessing objective testing, direct patient care, completing documentation and billing (visit was changed in time and my previously made note was deleted).

## 2021-09-24 NOTE — PATIENT INSTRUCTIONS
"- Put pills in puree (like apple sauce) as this can help with swallowing  - Melatonin 3 mg at night to help with bad dreams and dream enactment. You can increase by 3 mg every week up to 18 mg.   - Try to safety proof the bed area in case he flails and hits the lamp or hits other dangerous objects  - Exercise more!    Standard recommendations:  Exercise is the only known therapy that can delay cognitive decline. We recommend you perform at least 30 minutes of exercise (preferably aerobic, but other more mild forms are OK -- the main idea is to stay physically active) for 5 days a week.    Continue to optimize cerebrovascular risk factors (blood pressure control, lipid control, glycemic control, healthy diet and exercise).    Stay socially and intellectually active as much as possible. Participate in activities that are enjoyable to you. There is no need to perform mental quizzes or \"mind games\" to boost memory; studies have demonstrated that these specialized memory games do not actually boost the patient's cognitive performance in activities outside the game.     Eat healthy. A balanced diet with all major food groups is recommended. Other recommended diets include the Mediterranean diet.    Remove dangerous objects or fall hazards from the home environment.    The Alzheimer's Association has many resources, support groups, and FAQs for caregivers and patients living with dementia. I would highly recommend visiting their webpage: Alz.org        "

## 2021-10-04 ENCOUNTER — OFFICE VISIT (OUTPATIENT)
Dept: AUDIOLOGY | Facility: CLINIC | Age: 71
End: 2021-10-04

## 2021-10-04 DIAGNOSIS — H90.3 SENSORINEURAL HEARING LOSS, BILATERAL: Primary | ICD-10-CM

## 2021-10-04 PROCEDURE — V5160 DISPENSING FEE BINAURAL: HCPCS | Performed by: AUDIOLOGIST

## 2021-10-04 PROCEDURE — V5261 HEARING AID, DIGIT, BIN, BTE: HCPCS | Performed by: AUDIOLOGIST

## 2021-10-04 PROCEDURE — V5020 CONFORMITY EVALUATION: HCPCS | Performed by: AUDIOLOGIST

## 2021-10-04 PROCEDURE — V5011 HEARING AID FITTING/CHECKING: HCPCS | Performed by: AUDIOLOGIST

## 2021-10-04 NOTE — PROGRESS NOTES
"AUDIOLOGY REPORT    SUBJECTIVE: Catalino \"Vahid\" ELVIA Coronado is a 71 year old male who was seen in the Audiology Clinic at [facility] for a fitting of bilateral Oticon More 3 hearing aids. He was accompanied by his wife, Leia. Previous results have revealed normal hearing sloping to severe likely sensorineural hearing loss bilaterally. History is significant for Alzheimer's, so the patient's wife plans to help with use and care of the hearing aids.     OBJECTIVE: The hearing aid conformity evaluation was completed.The hearing aids were placed and provided a good fit. Real-ear-probe-microphone measurements were completed on the Jimdo system and were a good match to NAL-NL1 targets with soft sounds audible, moderate sounds comfortable, and loud sounds below discomfort. UCLs are verified through maximum power output measures and demonstrate appropriate limiting of loud inputs. Vahid and his wife were oriented to proper hearing aid use, care, cleaning (no water, dry brush), batteries (size: rechargeable, low-battery signal), aid insertion/removal, user booklet, warranty information, storage cases, and other hearing aid details. They confirmed understanding of hearing aid use and care and showed proper insertion of the hearing aids while in the office today. Vahid reported good volume and sound quality.   Hearing aids were programmed as follows:  Program 1: Universal  Program button and volume control were deactivated today. The hearing aids were paired to the Oticon steffany on Leia's Android phone to be able to utilize the \"Find My Hearing Aids\" feature. Use was reviewed.    EARS FIT: Bilateral  HEARING AID MODEL NAME: Oticon More 3  HEARING AID STYLE: -in-the-ear behind-the-ear  EARMOLDS/TIP: 12mm open domes  SERIAL NUMBERS: Right: [id number] Left: [id number]  WARRANTY END DATE: 10/14/2024    ASSESSMENT: Bilateral Oticon More 3 hearing aids were fit today. Verification " measures were performed. Catalino signed the Hearing Aid Purchase Agreement and was given a copy, as well as details on his hearing aids. The patient was counseled that exact out of pocket amounts cannot be determined for hearing aid claims being sent to insurance. Any insurance coverage information presented to the patient is an estimate only, and is not a guarantee of payment. The patient has been advised to check with their own insurance.    PLAN: Catalino will return for follow-up in 2-3 weeks for a hearing aid review appointment. Please call this clinic with questions regarding today s appointment.    Alex Castro, [facility]  Licensed Audiologist  [place] #[id number]

## 2021-10-05 ENCOUNTER — HOSPITAL ENCOUNTER (OUTPATIENT)
Dept: SPEECH THERAPY | Facility: CLINIC | Age: 71
Setting detail: THERAPIES SERIES
End: 2021-10-05
Payer: MEDICARE

## 2021-10-05 PROCEDURE — 92507 TX SP LANG VOICE COMM INDIV: CPT | Mod: GN | Performed by: SPEECH-LANGUAGE PATHOLOGIST

## 2021-10-05 NOTE — PROGRESS NOTES
Outpatient Speech Language Pathology Progress Note     Patient: Catalino Coronado  : 1950    Beginning/End Dates of Reporting Period:  21 to 10/05/21    Referring Provider: Dr Aguilar    Therapy Diagnosis: Alzheimers Dementia    Client Self Report: Pt was alert and cooperative for speech therapy. Pts wife present for discussion at the end of treatement session     Objective Measurements: Informal speech/language assessment with a focus on auditory comprehension, reading comprehension, verbal expression and written expression    Outcome Measures (most recent score):        Goals:  Goal Identifier 1   Goal Description Pt will demonstrate improve oral expression of a sentence give 1 word with max cues in 80% of trials.   Target Date 1/3/22   Date Met      Progress (detail required for progress note):  Pt is making strong progress in oral expresion, and has improved his oral expression of picture naming from 55% at eval to 95% at progress report     Goal Identifier 2   Goal Description Pt will demonstrate oral expression of 4 attributes to a given item with mod cues in 4/5 trials   Target Date 1/3/22   Date Met      Progress (detail required for progress note):  Ongoing     Goal Identifier 3.   Goal Description Pt will demonstrate reading comprehension of sentence completion with 90% acc and mod assist   Target Date 1/3/22   Date Met      Progress (detail required for progress note):  RC 1 sentence with 70% acc. Pt continues to work to meet this goal.      Goal Identifier 4.   Goal Description Pt will demonstrate auditory comprehension of basic yes/no questions regarding environmental information with 80% acc   Target Date 1/3/22   Date Met   10/5/21   Progress (detail required for progress note):  Goal met. AC of yes/no questions has improved from 50% at evaluation to 80% at progress interim.      Goal Identifier 5.   Goal Description Pt will use memory aid to identify or state basic personal information on  4/5 opportunities   Target Date 1/3/22   Date Met      Progress (detail required for progress note):  Ongoing         Plan:  Continue therapy per current plan of care.Pt has demonstrated improvement in AC of yes/no questions from 50% to 80%. Pt has also demonstrated improvement in naming from 55% acc to 95% acc. Pt continues to work to demonstrate progress in expanding his verbal expression and use attributes to help describe what he is trying to say. Pts participation in therapy has also greatly improved and pt is more engaged. Pt completing his HEP with his wife. Pt is showing progress and would benefit from ongoing speech therapy to improve length of his utterances as well as comprehension of basic information and demonstrating comprehension through answering yes or no.     Changes to goals: Goal 4. Pt will demonstrate AC of 1-2 sentences with yes/no questions and min-mod assist of total communication for comprehension.

## 2021-10-13 ENCOUNTER — HOSPITAL ENCOUNTER (OUTPATIENT)
Dept: SPEECH THERAPY | Facility: CLINIC | Age: 71
Setting detail: THERAPIES SERIES
End: 2021-10-13
Payer: MEDICARE

## 2021-10-13 PROCEDURE — 92507 TX SP LANG VOICE COMM INDIV: CPT | Mod: GN | Performed by: SPEECH-LANGUAGE PATHOLOGIST

## 2021-10-20 ENCOUNTER — TELEPHONE (OUTPATIENT)
Dept: DERMATOLOGY | Facility: CLINIC | Age: 71
End: 2021-10-20

## 2021-10-20 ENCOUNTER — HOSPITAL ENCOUNTER (OUTPATIENT)
Dept: SPEECH THERAPY | Facility: CLINIC | Age: 71
Setting detail: THERAPIES SERIES
End: 2021-10-20
Payer: MEDICARE

## 2021-10-20 ENCOUNTER — OFFICE VISIT (OUTPATIENT)
Dept: DERMATOLOGY | Facility: CLINIC | Age: 71
End: 2021-10-20
Payer: MEDICARE

## 2021-10-20 DIAGNOSIS — L40.8 SEBOPSORIASIS: ICD-10-CM

## 2021-10-20 DIAGNOSIS — L60.3 ONYCHODYSTROPHY: Primary | ICD-10-CM

## 2021-10-20 PROCEDURE — 92507 TX SP LANG VOICE COMM INDIV: CPT | Mod: GN | Performed by: SPEECH-LANGUAGE PATHOLOGIST

## 2021-10-20 PROCEDURE — 99213 OFFICE O/P EST LOW 20 MIN: CPT | Performed by: DERMATOLOGY

## 2021-10-20 RX ORDER — CALCIPOTRIENE 50 UG/G
CREAM TOPICAL
Qty: 60 G | Refills: 11 | Status: SHIPPED | OUTPATIENT
Start: 2021-10-20 | End: 2022-01-01

## 2021-10-20 RX ORDER — CALCIPOTRIENE 0.05 MG/ML
SOLUTION TOPICAL
Qty: 60 ML | Refills: 11 | Status: SHIPPED | OUTPATIENT
Start: 2021-10-20 | End: 2022-01-01

## 2021-10-20 RX ORDER — KETOCONAZOLE 20 MG/ML
SHAMPOO TOPICAL
Qty: 240 ML | Refills: 11 | Status: SHIPPED | OUTPATIENT
Start: 2021-10-20 | End: 2022-01-01

## 2021-10-20 RX ORDER — ERYTHROMYCIN 5 MG/G
OINTMENT OPHTHALMIC
COMMUNITY
Start: 2021-08-09 | End: 2021-12-16

## 2021-10-20 ASSESSMENT — PAIN SCALES - GENERAL: PAINLEVEL: NO PAIN (0)

## 2021-10-20 NOTE — TELEPHONE ENCOUNTER
Prior Authorization Retail Medication Request    Medication/Dose: calcipotriene (DOVONOX) 0.005 % external cream  ICD code (if different than what is on RX):  L40.8  Previously Tried and Failed:  See provider note  Rationale:  Apply once daily to the ears and face    Insurance Name:  Medicare   Insurance ID:  [id number]     Secondary: [employer] [place]  ID: [id number]

## 2021-10-20 NOTE — TELEPHONE ENCOUNTER
Prior Authorization Retail Medication Request    Medication/Dose: calcipotriene (DOVONOX) 0.005 % external solution  ICD code (if different than what is on RX):  L40.8  Previously Tried and Failed:  See provider note  Rationale:  Apply 10-15 drops at nighttime before bed for the scalp    Insurance Name:  Medicare  Insurance ID:  [id number]     Secondary: [employer] [place]  ID: [id number]

## 2021-10-20 NOTE — NURSING NOTE
Chief Complaint   Patient presents with     Psoriasis     Follow up     Pt states he is here for a follow up on Psoriasis. Pt also has some concerns with toenails. Believes it might be a fungus.     Pt states his psoriasis is flaring on the back of his head eyebrows, and ears.  Pt is using all the treatments prescribed.  With little relief.   Cynthia Pollock LPN on 10/20/2021 at 2:32 PM

## 2021-10-20 NOTE — LETTER
10/20/2021       RE: Catalino Coronado  [address] [place] [address]     Dear Colleague,    Thank you for referring your patient, Catalino Coronado, to the [facility] [place] at [facility]. Please see a copy of my visit note below.    [facility] Dermatology Note  Encounter Date: Oct 20, 2021  Office Visit     Dermatology Problem List:  1. Sebopsoriasis   - Current tx: Desonide 0.05% solution, ketoconazole 2% shampoo, calcipotriene 0.005% solution  - Previous tx: clobetasol 0.05% solution  2. Seborrheic dermatitis   3. Onychodystrophy  - Current tx: OTC 20% urea cream  ____________________________________________    Assessment & Plan:    # Seborrheic dermatitis / sebopsoriasis. Chronic, flare.   Discussed topical treatment options as well as otezla. Patient would like to continue treating with topicals.  - Eyebrow/ears:    - Continue Desonide 0.05% ointment daily; start calcipotriene 0.005% cream daily   - Continue ketoconazole 2% shampoo; can alternate with T-gel shampoo  - For scalp:    - Stop clobetasol 0.05% solution   - Continue ketoconazole 2% shampoo; can alternate with T-gel shampoo  - Consider otezla at next follow-up if flaring    # Onychodystrophy concerning for onychomycosis  Discussed oral treatment, but not bothersome so patient prefers to treat with urea to treat toenail thickness.  - Recommended OTC urea 20% cream    Procedures Performed:   None    Follow-up: 3 month(s) in-person, or earlier for new or changing lesions    Staff and Scribe:     Provider Disclosure:   The documentation recorded by the scribe accurately reflects the services I personally performed and the decisions made by me.    Roverto Chen MD    Department of Dermatology  [facility]: Phone: [phone], Fax:[phone]  [facility]  [facility]: Phone: [phone] Fax: [phone]      Scribe Disclosure:  I, Hebert John, am serving as a scribe to document services personally performed by Roverto Chen MD based on data collection and the provider's statements to me.   ____________________________________________    CC: Psoriasis (Follow up)    HPI:  Mr. Catalino Coronado is a(n) 71 year old male who presents today as a return patient for follow-up. Last seen in dermatology by Dr. White on 2/8/2021, at which time patient was advised to use zinc containing shampoo in addition to current treatments for sebopsoriasis.    Today, patient reports that his psoriasis on his scalp, ears, and forehead is still a problem. Additionally, reports toenail discoloration that comes and goes.    Patient is otherwise feeling well, without additional skin concerns.    Labs Reviewed:  N/A    Physical Exam:  Vitals: There were no vitals taken for this visit.  SKIN: Focused examination of head and feet was performed.  - Well demarcated pink scaly plaques on the bilateral eyebrows and outer ear canal.   - Mild white flaky scale on the scalp.  - No other lesions of concern on areas examined.     Medications:  Current Outpatient Medications   Medication     ATENOLOL     buPROPion (WELLBUTRIN XL) 150 MG 24 hr tablet     clobetasol (TEMOVATE) 0.05 % external solution     desonide (DESOWEN) 0.05 % external ointment     donepezil (ARICEPT) 5 MG tablet     erythromycin (ROMYCIN) 5 MG/GM ophthalmic ointment     hydrochlorothiazide (MICROZIDE) 12.5 MG capsule     hydrocortisone 2.5 % ointment     ketoconazole (NIZORAL) 2 % external shampoo     magnesium 250 MG tablet     memantine (NAMENDA) 10 MG tablet     Psyllium-Calcium (METAMUCIL PLUS CALCIUM) CAPS     simvastatin (ZOCOR) 40 MG tablet     tamsulosin (FLOMAX) 0.4 MG 24 hr capsule     vitamin E (TOCOPHEROL) 1000 UNIT capsule     No current facility-administered medications for this visit.       Past Medical History:   Patient Active Problem List   Diagnosis     Sebaceous cyst     Late onset Alzheimer's disease without behavioral disturbance (H)     Past Medical History:   Diagnosis Date     BPH (benign prostatic hyperplasia)      Cataracts, bilateral      Cerebral aneurysm, nonruptured      Dementia (H)      Hypercholesterolemia      Hypertension

## 2021-10-20 NOTE — PROGRESS NOTES
[facility] Dermatology Note  Encounter Date: Oct 20, 2021  Office Visit     Dermatology Problem List:  1. Sebopsoriasis   - Current tx: Desonide 0.05% solution, ketoconazole 2% shampoo, calcipotriene 0.005% solution  - Previous tx: clobetasol 0.05% solution  2. Seborrheic dermatitis   3. Onychodystrophy  - Current tx: OTC 20% urea cream  ____________________________________________    Assessment & Plan:    # Seborrheic dermatitis / sebopsoriasis. Chronic, flare.   Discussed topical treatment options as well as otezla. Patient would like to continue treating with topicals.  - Eyebrow/ears:    - Continue Desonide 0.05% ointment daily; start calcipotriene 0.005% cream daily   - Continue ketoconazole 2% shampoo; can alternate with T-gel shampoo  - For scalp:    - Stop clobetasol 0.05% solution   - Continue ketoconazole 2% shampoo; can alternate with T-gel shampoo  - Consider otezla at next follow-up if flaring    # Onychodystrophy concerning for onychomycosis  Discussed oral treatment, but not bothersome so patient prefers to treat with urea to treat toenail thickness.  - Recommended OTC urea 20% cream    Procedures Performed:   None    Follow-up: 3 month(s) in-person, or earlier for new or changing lesions    Staff and Scribe:     Provider Disclosure:   The documentation recorded by the scribe accurately reflects the services I personally performed and the decisions made by me.    Roverto Chen MD    Department of Dermatology  [facility]: Phone: [phone], Fax:[phone]  [facility] [place]: Phone: [phone] Fax: [phone]      Scribe Disclosure:  I, Hebert Jhon, am serving as a scribe to document services personally performed by Roverto Chen MD based on data collection and the provider's statements to me.   ____________________________________________    CC:  Psoriasis (Follow up)    HPI:  Mr. Catalino Coronado is a(n) 71 year old male who presents today as a return patient for follow-up. Last seen in dermatology by Dr. White on 2/8/2021, at which time patient was advised to use zinc containing shampoo in addition to current treatments for sebopsoriasis.    Today, patient reports that his psoriasis on his scalp, ears, and forehead is still a problem. Additionally, reports toenail discoloration that comes and goes.    Patient is otherwise feeling well, without additional skin concerns.    Labs Reviewed:  N/A    Physical Exam:  Vitals: There were no vitals taken for this visit.  SKIN: Focused examination of head and feet was performed.  - Well demarcated pink scaly plaques on the bilateral eyebrows and outer ear canal.   - Mild white flaky scale on the scalp.  - No other lesions of concern on areas examined.     Medications:  Current Outpatient Medications   Medication     ATENOLOL     buPROPion (WELLBUTRIN XL) 150 MG 24 hr tablet     clobetasol (TEMOVATE) 0.05 % external solution     desonide (DESOWEN) 0.05 % external ointment     donepezil (ARICEPT) 5 MG tablet     erythromycin (ROMYCIN) 5 MG/GM ophthalmic ointment     hydrochlorothiazide (MICROZIDE) 12.5 MG capsule     hydrocortisone 2.5 % ointment     ketoconazole (NIZORAL) 2 % external shampoo     magnesium 250 MG tablet     memantine (NAMENDA) 10 MG tablet     Psyllium-Calcium (METAMUCIL PLUS CALCIUM) CAPS     simvastatin (ZOCOR) 40 MG tablet     tamsulosin (FLOMAX) 0.4 MG 24 hr capsule     vitamin E (TOCOPHEROL) 1000 UNIT capsule     No current facility-administered medications for this visit.      Past Medical History:   Patient Active Problem List   Diagnosis     Sebaceous cyst     Late onset Alzheimer's disease without behavioral disturbance (H)     Past Medical History:   Diagnosis Date     BPH (benign prostatic hyperplasia)      Cataracts, bilateral      Cerebral aneurysm, nonruptured      Dementia (H)       Hypercholesterolemia      Hypertension

## 2021-10-20 NOTE — PATIENT INSTRUCTIONS
For scalp, continue ketoconazole shampoo and start calcipotriene solution nightly, taper as able.   For ears, and eyebrows use desonide in the morning and calcipotriene in the evening.  For toenails, use urea 20% cream which is available over the counter on amazon.

## 2021-10-21 NOTE — TELEPHONE ENCOUNTER
PA Initiation    Medication: calcipotriene (DOVONOX) 0.005 % external solution   Insurance Company: [employer] Part D - Phone [phone] Fax [phone]  Pharmacy Filling the Rx: [id number] [place] [facility] [place], [place] - [address] [place] [address]  Filling Pharmacy Phone: [phone]  Filling Pharmacy Fax: [phone]  Start Date: 10/21/2021

## 2021-10-21 NOTE — TELEPHONE ENCOUNTER
Prior Authorization Approval    Authorization Effective Date: 7/23/2021  Authorization Expiration Date: 10/21/2022  Medication: calcipotriene (DOVONOX) 0.005 % external cream--APPROVED  Approved Dose/Quantity:   Reference #:     Insurance Company: [employer] Part D - Phone [phone] Fax [phone]  Expected CoPay:       CoPay Card Available:      Foundation Assistance Needed:    Which Pharmacy is filling the prescription (Not needed for infusion/clinic administered): [id number] [place] [facility] - [place], [place] - [address]  Pharmacy Notified: Yes  Patient Notified: Yes **Instructed pharmacy to notify patient when script is ready to /ship.**

## 2021-10-21 NOTE — TELEPHONE ENCOUNTER
Prior Authorization Approval    Authorization Effective Date: 7/23/2021  Authorization Expiration Date: 10/21/2022  Medication: calcipotriene (DOVONOX) 0.005 % external solution--APPROVED  Approved Dose/Quantity:   Reference #:     Insurance Company: [employer] Part D - Phone [phone] Fax [phone]  Expected CoPay:       CoPay Card Available:      Foundation Assistance Needed:    Which Pharmacy is filling the prescription (Not needed for infusion/clinic administered): [id number] [place] [facility] - [place], [place] - [address]  Pharmacy Notified: Yes  Patient Notified: Yes **Instructed pharmacy to notify patient when script is ready to /ship.**

## 2021-10-21 NOTE — TELEPHONE ENCOUNTER
PA Initiation    Medication: calcipotriene (DOVONOX) 0.005 % external cream   Insurance Company: [employer] Part D - Phone [phone] Fax [phone]  Pharmacy Filling the Rx[id number] [place] [facility] - [place], [place] - [address] [place] [address]  Filling Pharmacy Phone: [phone]  Filling Pharmacy Fax: [phone]  Start Date: 10/21/2021

## 2021-10-22 ENCOUNTER — TELEPHONE (OUTPATIENT)
Dept: AUDIOLOGY | Facility: CLINIC | Age: 71
End: 2021-10-22

## 2021-10-22 NOTE — TELEPHONE ENCOUNTER
LVM letting patient's wife know that hearing aids can be dropped off at 4th Floor check-in to return for credit. Provided call back number for any further questions.    Alex Castro, [facility]  Licensed Audiologist  [place] #[id number]    [facility] Call Center    Phone Message    May a detailed message be left on voicemail: yes     Reason for Call: Other: Pt's wife Leia called in to cancel Appt for IRP on 10/25 due to a conflict. She stated that Pt does not want the hearing aids and wants to return them to the clinic, and is wondering if an Appt is needed for this or if they can just drop them off. Writer tried Vocera twice but no answer. Please call Pt's wife Leia to discuss. Thank you.     Action Taken: Message routed to:  [facility] ([facility] Audiology    Travel Screening: Not Applicable

## 2021-10-26 DIAGNOSIS — E78.00 PURE HYPERCHOLESTEROLEMIA: Primary | ICD-10-CM

## 2021-10-28 ENCOUNTER — HOSPITAL ENCOUNTER (OUTPATIENT)
Dept: SPEECH THERAPY | Facility: CLINIC | Age: 71
Setting detail: THERAPIES SERIES
End: 2021-10-28
Payer: MEDICARE

## 2021-10-28 PROCEDURE — 92507 TX SP LANG VOICE COMM INDIV: CPT | Mod: GN | Performed by: SPEECH-LANGUAGE PATHOLOGIST

## 2021-10-28 RX ORDER — SIMVASTATIN 40 MG
40 TABLET ORAL AT BEDTIME
Qty: 90 TABLET | Refills: 0 | Status: SHIPPED | OUTPATIENT
Start: 2021-10-28 | End: 2022-01-01

## 2021-10-28 NOTE — TELEPHONE ENCOUNTER
"Disp Refills Start End CELIA    simvastatin (ZOCOR) 40 MG tablet 90 tablet 5 10/21/2020  No   Sig: TAKE 1 TABLET BY MOUTH EVERYDAY AT BEDTIME   Sent to pharmacy as: simvastatin 40 mg tablet (ZOCOR)   E-Prescribing Status: Receipt confirmed by pharmacy (10/21/2020  5:41 PM CDT)       simvastatin (ZOCOR) 40 MG tablet [[id number]]    Electronically signed by: Davis Aguilar MD on 10/21/20 1741 Status: Active   Ordering user: Davis Aguilar MD 10/21/20 1741 Authorized by: Davis Aguilar MD   Frequency:  10/21/20 - Until Discontinued Released by: Davis Aguilar MD 10/21/20 1741   Diagnoses  Pure hypercholesterolemia [E78.00]   Routing refill request to provider for review/approval because:  Drug interaction warning      Last office visit provider:  5/19/21     Requested Prescriptions   Pending Prescriptions Disp Refills     simvastatin (ZOCOR) 40 MG tablet       Sig: Take 1 tablet (40 mg) by mouth At Bedtime       Statins Protocol Passed - 10/26/2021  1:56 PM        Passed - LDL on file in past 12 months     Recent Labs   Lab Test 12/31/20  1641                Passed - No abnormal creatine kinase in past 12 months     Recent Labs   Lab Test 01/04/18  1115   *                Passed - Recent (12 mo) or future (30 days) visit within the authorizing provider's specialty     Patient has had an office visit with the authorizing provider or a provider within the authorizing providers department within the previous 12 mos or has a future within next 30 days. See \"Patient Info\" tab in inbasket, or \"Choose Columns\" in Meds & Orders section of the refill encounter.              Passed - Medication is active on med list        Passed - Patient is age 18 or older             Nora Vitale RN 10/28/21 8:21 AM  "

## 2021-11-02 ENCOUNTER — HOSPITAL ENCOUNTER (OUTPATIENT)
Dept: SPEECH THERAPY | Facility: CLINIC | Age: 71
Setting detail: THERAPIES SERIES
End: 2021-11-02
Payer: MEDICARE

## 2021-11-02 PROCEDURE — 92507 TX SP LANG VOICE COMM INDIV: CPT | Mod: GN | Performed by: SPEECH-LANGUAGE PATHOLOGIST

## 2021-11-07 ENCOUNTER — HEALTH MAINTENANCE LETTER (OUTPATIENT)
Age: 71
End: 2021-11-07

## 2021-11-17 ENCOUNTER — HOSPITAL ENCOUNTER (OUTPATIENT)
Dept: SPEECH THERAPY | Facility: CLINIC | Age: 71
Setting detail: THERAPIES SERIES
End: 2021-11-17
Payer: MEDICARE

## 2021-11-17 ENCOUNTER — TELEPHONE (OUTPATIENT)
Dept: AUDIOLOGY | Facility: CLINIC | Age: 71
End: 2021-11-17
Payer: MEDICARE

## 2021-11-17 ENCOUNTER — TELEPHONE (OUTPATIENT)
Dept: NEUROSURGERY | Facility: CLINIC | Age: 71
End: 2021-11-17
Payer: MEDICARE

## 2021-11-17 PROCEDURE — 92507 TX SP LANG VOICE COMM INDIV: CPT | Mod: GN | Performed by: SPEECH-LANGUAGE PATHOLOGIST

## 2021-11-17 NOTE — TELEPHONE ENCOUNTER
LVM stating that they can ignore the bill, as the refund request for the hearing aids was submitted on 11/2/2021.    Alex Castro, [facility]  Licensed Audiologist  [place] #[id number]    [facility] Call Center    Phone Message    May a detailed message be left on voicemail: yes     Reason for Call: Other: Pt wife called, said they returned the hearing aids to the clinic 2 weeks ago but she still received a bill for them. Please call Pt wife Leia back to discuss. Thank you.      Action Taken: Message routed to:  [facility] AUDIOLOGY    Travel Screening: Not Applicable

## 2021-11-24 ENCOUNTER — HOSPITAL ENCOUNTER (OUTPATIENT)
Dept: SPEECH THERAPY | Facility: CLINIC | Age: 71
Setting detail: THERAPIES SERIES
End: 2021-11-24
Payer: MEDICARE

## 2021-12-01 ENCOUNTER — OFFICE VISIT (OUTPATIENT)
Dept: NEUROLOGY | Facility: CLINIC | Age: 71
End: 2021-12-01
Payer: MEDICARE

## 2021-12-01 VITALS
WEIGHT: 182 LBS | DIASTOLIC BLOOD PRESSURE: 80 MMHG | HEART RATE: 73 BPM | BODY MASS INDEX: 22.75 KG/M2 | SYSTOLIC BLOOD PRESSURE: 146 MMHG | TEMPERATURE: 97.5 F

## 2021-12-01 DIAGNOSIS — F02.80 LATE ONSET ALZHEIMER'S DISEASE WITHOUT BEHAVIORAL DISTURBANCE (H): Primary | ICD-10-CM

## 2021-12-01 DIAGNOSIS — G30.1 LATE ONSET ALZHEIMER'S DISEASE WITHOUT BEHAVIORAL DISTURBANCE (H): Primary | ICD-10-CM

## 2021-12-01 ASSESSMENT — PAIN SCALES - GENERAL: PAINLEVEL: NO PAIN (0)

## 2021-12-01 NOTE — PROGRESS NOTES
"HPI:  Mr. Catalino Coronado is a 71 year old man former  at  seen by Dr. 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 He was also evaluated by Dr. Wolfgang Edouard. Please see Dr. Kennedy's documentation for full details. In brief, he developed progressive loss in short term memory starting around 2016. At that time, symptoms were: \"he will often be making furniture in his workshop and will set something down and struggle to find it again. He also describes difficulties trying to recall what he is done in the recent past, such as what he did last weekend or what day of the week it is\". Symptoms noted at his last visit in September 2020 were: \"He loses and misplaces objects and repeat questions. he cannot run the washing machine or the  because he doesn't know how to operate the buttons. His wife helps him to turn the water on, because they have the new system.\" During his visit with me 9/2021 his wife reported dream enactment behavior. Brain MRI showed diffuse cerebral atrophy with a predilection for the dorsolateral frontoparietal cortices. No abnormal DWI or susceptibility signal. Minimal white matter disease. B12 normal (793); CMP normal other than eGFR 55; TSH normal. He has been attending speech therapy, was prescribed hearing aids, therapy cat, physical therapy for balance, Aricept and melatonin. Aricept was decreased due to diarrhea.      Cognitive: Seems to do the opposite of what the instruction are. For instance, his wife asks him to lift his leg to put on clothes, but he will push his leg down.  Mood/behavior: He continues to think people are \"after him\". No violent behavior. 2 therapy cats, Crystal and Rama. One of the cats sleep with him.  Sleep: He is sleeping well. He was placed on melatonin at his " last visit for thrashing during sleep, and this has helped.  Motor: No falls. Balance is relatively good, but his wife is nearby if he needs it. He walks inside the house.  Background medical problems: 24 pounds in the last month in the setting of diarrhea. He is taking Imodium twice daily, which has helped. Incontinence present. There has also been a decrease in intake, which may be caused by nausea. Last time, his wife reported he had trouble swallowing pills but not other textures. This has now improved.      Exam:  Mild parkinsonism; mild stooped posture, reduced armswing (right worse than left) and stooped posture. Follows 1 step but not 2 step commands. Difficulty pantomiming finger positions. Extraocular movements normal. Mild paratonia in the limbs, symmetrical.    Plan:  - Stop Aricept due to diarrhea  - Follow-up in 6 months    Today, I spent 33 minutes reviewing the chart, personally assessing objective testing, direct patient care, completing documentation and billing.

## 2021-12-01 NOTE — PATIENT INSTRUCTIONS
Stop Aricept  If the diarrhea continues, we may need to look into an intestinal infection as a reason for diarrhea    Weight loss is common in Alzheimer's disease. Putting food in front of Vahid will be useful to encourage eating.

## 2021-12-01 NOTE — LETTER
"12/1/2021     RE: Catalino Coronado  [address] [place] [address]     Dear Colleague,    Thank you for referring your patient, Catalino Coronado, to the [facility] at . Please see a copy of my visit note below.    HPI:  Mr. Catalino Coronado is a 71 year old man former  at  seen by Dr. 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 He was also evaluated by Dr. Wolfgang Edouard. Please see Dr. Kennedy's documentation for full details. In brief, he developed progressive loss in short term memory starting around 2016. At that time, symptoms were: \"he will often be making furniture in his workshop and will set something down and struggle to find it again. He also describes difficulties trying to recall what he is done in the recent past, such as what he did last weekend or what day of the week it is\". Symptoms noted at his last visit in September 2020 were: \"He loses and misplaces objects and repeat questions. he cannot run the washing machine or the  because he doesn't know how to operate the buttons. His wife helps him to turn the water on, because they have the new system.\" During his visit with me 9/2021 his wife reported dream enactment behavior. Brain MRI showed diffuse cerebral atrophy with a predilection for the dorsolateral frontoparietal cortices. No abnormal DWI or susceptibility signal. Minimal white matter disease. B12 normal (793); CMP normal other than eGFR 55; TSH normal. He has been attending speech therapy, was prescribed hearing aids, therapy cat, physical therapy for balance, Aricept and melatonin. Aricept was decreased due to diarrhea.      Cognitive: Seems to do the opposite of what the instruction are. For instance, " "his wife asks him to lift his leg to put on clothes, but he will push his leg down.  Mood/behavior: He continues to think people are \"after him\". No violent behavior. 2 therapy cats, Crystal and Rama. One of the cats sleep with him.  Sleep: He is sleeping well. He was placed on melatonin at his last visit for thrashing during sleep, and this has helped.  Motor: No falls. Balance is relatively good, but his wife is nearby if he needs it. He walks inside the house.  Background medical problems: 24 pounds in the last month in the setting of diarrhea. He is taking Imodium twice daily, which has helped. Incontinence present. There has also been a decrease in intake, which may be caused by nausea. Last time, his wife reported he had trouble swallowing pills but not other textures. This has now improved.      Exam:  Mild parkinsonism; mild stooped posture, reduced armswing (right worse than left) and stooped posture. Follows 1 step but not 2 step commands. Difficulty pantomiming finger positions. Extraocular movements normal. Mild paratonia in the limbs, symmetrical.    Plan:  - Stop Aricept due to diarrhea  - Follow-up in 6 months    Today, I spent 33 minutes reviewing the chart, personally assessing objective testing, direct patient care, completing documentation and billing.    Again, thank you for allowing me to participate in the care of your patient.      Sincerely,    Gabriel Hensley MD    "

## 2021-12-13 ENCOUNTER — TELEPHONE (OUTPATIENT)
Dept: NEUROLOGY | Facility: CLINIC | Age: 71
End: 2021-12-13
Payer: MEDICARE

## 2021-12-13 ENCOUNTER — MYC MEDICAL ADVICE (OUTPATIENT)
Dept: FAMILY MEDICINE | Facility: CLINIC | Age: 71
End: 2021-12-13
Payer: MEDICARE

## 2021-12-13 NOTE — TELEPHONE ENCOUNTER
"Since the last office, the aricept was discontinued (on Dec 1st.). About 5-6 days later, he deteriorated; trouble walking, shaking, confusion, disoriented, fell out of bed from sleep, \"tip-over\", keeps leaning to left side (but is equally strong on both sides). Acute part lasted through Saturday. A little better now, in the morning is best, in the evening symptoms returned.  No change to his other medications.     Questions from wife:  Could the discontinuation of aricept cause that? Is this just the progression of disease? Should he restart aricept and take imodium?    --------------ADDENDUM 1:06 PM-----------------  Message received from Dr. Hensley:  Gabriel Hensley MD Vassar, Allison, RN  Caller: Unspecified (Today, 10:32 AM)  Hi Charles,   I'm on hospital call all this week. Could you let them know that it sounds like more than just Aricept? He's been having diarrhea it sounds like a long time now, and these symptoms are quite non-specific and could be due to any number of reasons. I would suggest an urgent follow-up with his regular doctor to look for nutritional, electrolyte, infectious and other causes of his symptoms. Thank you for your help here! -Will     This was communicated to Leia, whom verbalized understanding and states she will contact Dr. Aguilar's office for an urgent appointment.   "

## 2021-12-13 NOTE — TELEPHONE ENCOUNTER
What is the concern that needs to be addressed by a nurse? Spouse called to speak with nurse about a number of issues patient has been having since Dr. Hensley Discontinued a medication. Did schedule next available with Shellie on 12/22 but Leia would like to discuss symptoms right away    May a detailed message be left on voicemail? Yes     Date of last office visit:12/1/21    Message routed to: Mincep RN POol

## 2021-12-14 NOTE — TELEPHONE ENCOUNTER
I should see him  Best would be reserved appointment used on Med/ Thursday  If not, double book, early in a half-day- maybe towmorrow AM?

## 2021-12-15 ENCOUNTER — OFFICE VISIT (OUTPATIENT)
Dept: FAMILY MEDICINE | Facility: CLINIC | Age: 71
End: 2021-12-15
Payer: MEDICARE

## 2021-12-15 VITALS
SYSTOLIC BLOOD PRESSURE: 145 MMHG | HEART RATE: 70 BPM | WEIGHT: 199 LBS | BODY MASS INDEX: 24.87 KG/M2 | OXYGEN SATURATION: 98 % | DIASTOLIC BLOOD PRESSURE: 70 MMHG

## 2021-12-15 DIAGNOSIS — R19.7 DIARRHEA, UNSPECIFIED TYPE: ICD-10-CM

## 2021-12-15 DIAGNOSIS — F02.80 LATE ONSET ALZHEIMER'S DISEASE WITHOUT BEHAVIORAL DISTURBANCE (H): Primary | ICD-10-CM

## 2021-12-15 DIAGNOSIS — G30.1 LATE ONSET ALZHEIMER'S DISEASE WITHOUT BEHAVIORAL DISTURBANCE (H): Primary | ICD-10-CM

## 2021-12-15 DIAGNOSIS — N18.31 CHRONIC KIDNEY DISEASE, STAGE 3A (H): ICD-10-CM

## 2021-12-15 DIAGNOSIS — R41.0 CONFUSION: ICD-10-CM

## 2021-12-15 PROBLEM — Z00.00 HEALTHCARE MAINTENANCE: Status: ACTIVE | Noted: 2020-09-01

## 2021-12-15 PROBLEM — R35.0 BENIGN PROSTATIC HYPERPLASIA WITH URINARY FREQUENCY: Status: ACTIVE | Noted: 2021-12-15

## 2021-12-15 PROBLEM — N48.6 PEYRONIE'S DISEASE: Status: ACTIVE | Noted: 2021-12-15

## 2021-12-15 PROBLEM — L21.9 SEBORRHEA: Status: ACTIVE | Noted: 2018-01-04

## 2021-12-15 PROBLEM — E78.00 PURE HYPERCHOLESTEROLEMIA: Status: ACTIVE | Noted: 2021-12-15

## 2021-12-15 PROBLEM — I10 ESSENTIAL HYPERTENSION: Status: ACTIVE | Noted: 2021-12-15

## 2021-12-15 PROBLEM — J30.9 ALLERGIC RHINITIS: Status: ACTIVE | Noted: 2021-12-15

## 2021-12-15 PROBLEM — I67.1 INTRACRANIAL ANEURYSM: Status: ACTIVE | Noted: 2018-05-01

## 2021-12-15 PROBLEM — G30.9 ALZHEIMER'S DISEASE (H): Status: ACTIVE | Noted: 2020-05-28

## 2021-12-15 PROBLEM — N40.1 BENIGN PROSTATIC HYPERPLASIA WITH URINARY FREQUENCY: Status: ACTIVE | Noted: 2021-12-15

## 2021-12-15 LAB
ALBUMIN SERPL-MCNC: 4.2 G/DL (ref 3.5–5)
ALBUMIN UR-MCNC: NEGATIVE MG/DL
ALP SERPL-CCNC: 58 U/L (ref 45–120)
ALT SERPL W P-5'-P-CCNC: 40 U/L (ref 0–45)
ANION GAP SERPL CALCULATED.3IONS-SCNC: 11 MMOL/L (ref 5–18)
APPEARANCE UR: CLEAR
AST SERPL W P-5'-P-CCNC: 23 U/L (ref 0–40)
BILIRUB SERPL-MCNC: 1.1 MG/DL (ref 0–1)
BILIRUB UR QL STRIP: NEGATIVE
BUN SERPL-MCNC: 20 MG/DL (ref 8–28)
CALCIUM SERPL-MCNC: 9.3 MG/DL (ref 8.5–10.5)
CHLORIDE BLD-SCNC: 106 MMOL/L (ref 98–107)
CO2 SERPL-SCNC: 24 MMOL/L (ref 22–31)
COLOR UR AUTO: YELLOW
CREAT SERPL-MCNC: 1.2 MG/DL (ref 0.7–1.3)
ERYTHROCYTE [DISTWIDTH] IN BLOOD BY AUTOMATED COUNT: 12 % (ref 10–15)
ERYTHROCYTE [SEDIMENTATION RATE] IN BLOOD BY WESTERGREN METHOD: 5 MM/HR (ref 0–15)
GFR SERPL CREATININE-BSD FRML MDRD: 60 ML/MIN/1.73M2
GLUCOSE BLD-MCNC: 107 MG/DL (ref 70–125)
GLUCOSE UR STRIP-MCNC: NEGATIVE MG/DL
HCT VFR BLD AUTO: 43.8 % (ref 40–53)
HGB BLD-MCNC: 15.1 G/DL (ref 13.3–17.7)
HGB UR QL STRIP: NEGATIVE
KETONES UR STRIP-MCNC: NEGATIVE MG/DL
LEUKOCYTE ESTERASE UR QL STRIP: NEGATIVE
MCH RBC QN AUTO: 32.5 PG (ref 26.5–33)
MCHC RBC AUTO-ENTMCNC: 34.5 G/DL (ref 31.5–36.5)
MCV RBC AUTO: 94 FL (ref 78–100)
NITRATE UR QL: NEGATIVE
PH UR STRIP: 5.5 [PH] (ref 5–8)
PLATELET # BLD AUTO: 354 10E3/UL (ref 150–450)
POTASSIUM BLD-SCNC: 4.2 MMOL/L (ref 3.5–5)
PROT SERPL-MCNC: 6.8 G/DL (ref 6–8)
RBC # BLD AUTO: 4.64 10E6/UL (ref 4.4–5.9)
SODIUM SERPL-SCNC: 141 MMOL/L (ref 136–145)
SP GR UR STRIP: >=1.03 (ref 1–1.03)
UROBILINOGEN UR STRIP-ACNC: 0.2 E.U./DL
WBC # BLD AUTO: 7.8 10E3/UL (ref 4–11)

## 2021-12-15 PROCEDURE — 36415 COLL VENOUS BLD VENIPUNCTURE: CPT | Performed by: FAMILY MEDICINE

## 2021-12-15 PROCEDURE — 85027 COMPLETE CBC AUTOMATED: CPT | Performed by: FAMILY MEDICINE

## 2021-12-15 PROCEDURE — 85652 RBC SED RATE AUTOMATED: CPT | Performed by: FAMILY MEDICINE

## 2021-12-15 PROCEDURE — 81003 URINALYSIS AUTO W/O SCOPE: CPT | Performed by: FAMILY MEDICINE

## 2021-12-15 PROCEDURE — 80053 COMPREHEN METABOLIC PANEL: CPT | Performed by: FAMILY MEDICINE

## 2021-12-15 PROCEDURE — 99214 OFFICE O/P EST MOD 30 MIN: CPT | Performed by: FAMILY MEDICINE

## 2021-12-15 RX ORDER — SILDENAFIL 50 MG/1
50 TABLET, FILM COATED ORAL
COMMUNITY
Start: 2020-06-26 | End: 2021-12-22

## 2021-12-15 RX ORDER — DONEPEZIL HYDROCHLORIDE 5 MG/1
5 TABLET, FILM COATED ORAL DAILY
COMMUNITY
Start: 2020-03-12 | End: 2022-01-01

## 2021-12-15 NOTE — PROGRESS NOTES
Assessment/ Plan  1. Late onset Alzheimer's disease without behavioral disturbance (H)  Increased confusion, unclear cause  Probably related to stopping of Aricept but rule out metabolic causes/neurologic causes  Negative neuro exam but wife is concerned he is episodic.  MRI brain in addition to labs which.  - CBC with platelets; Future  - Comprehensive metabolic panel; Future  - UA reflex to Microscopic and Culture; Future  - Erythrocyte sedimentation rate auto; Future  - Symptomatic; Unknown COVID-19 Virus (Coronavirus) by PCR  - CBC with platelets  - Comprehensive metabolic panel  - Erythrocyte sedimentation rate auto  - MR Brain w/o Contrast; Future    2. Chronic kidney disease, stage 3a (H)      3. Confusion    - CBC with platelets; Future  - Comprehensive metabolic panel; Future  - UA reflex to Microscopic and Culture; Future  - Erythrocyte sedimentation rate auto; Future  - Symptomatic; Unknown COVID-19 Virus (Coronavirus) by PCR  - CBC with platelets  - Comprehensive metabolic panel  - Erythrocyte sedimentation rate auto  - MR Brain w/o Contrast; Future    4. Diarrhea, unspecified type  Possibly/probably related to the Aricept.  No symptoms of colitis except diarrhea-doubt C. difficile.     Body mass index is 24.87 kg/m .    Subjective  CC:  chief complaint  HPI:  Patient stopped taking Aricept due to diarrhea and concern that it was not helping 12/1.  Very shortly after this, he decompensated.  Memory has been much worse.  He has been falling off to the left side.  Has had 2 or 3 falls since stopping this.  Will sit down to go to the bathroom.  Sometimes gets irritable with his wife but has been generally good-natured.  No increase or significant decrease in level of consciousness.  No acute illness has been noted.  Wife has been under the assumption that this is probably related to the medication.    He is scheduled to have an MR angiogram in early January for follow-up evaluation.  He is seeing neurology  and is followed by neurology for his dementia.  Patient Active Problem List   Diagnosis     Sebaceous cyst     Late onset Alzheimer's disease without behavioral disturbance (H)     Allergic rhinitis     Alzheimer's disease (H)     Benign prostatic hyperplasia with urinary frequency     Seborrhea     Essential hypertension     Healthcare maintenance     Intracranial aneurysm     Peyronie's disease     Pure hypercholesterolemia     Steatohepatitis     Chronic kidney disease, stage 3a (H)     Current medications reviewed as follows:  ATENOLOL, Take 100 mg by mouth every morning   calcipotriene (DOVONOX) 0.005 % external cream, Apply once daily to the ears and face  calcipotriene (DOVONOX) 0.005 % external solution, Apply 10-15 drops at nighttime before bed for the scalp  clobetasol (TEMOVATE) 0.05 % external solution, Apply topically 2 times daily To scaly and itchy areas on scalp until no rash nor itch. Hold until patient requests.  desonide (DESOWEN) 0.05 % external ointment, Apply topically 2 times daily To rash at eyebrows and on face as needed until clear.  donepezil (ARICEPT) 5 MG tablet, Take 5 mg by mouth  ketoconazole (NIZORAL) 2 % external shampoo, To entire wet scalp and then wash off after 5 minutes three times a week. Hold until patient requests.  magnesium 250 MG tablet, Take 1 tablet by mouth At Bedtime   memantine (NAMENDA) 10 MG tablet, 10 mg 2 times daily  sildenafil (VIAGRA) 50 MG tablet, Take 50 mg by mouth  simvastatin (ZOCOR) 40 MG tablet, Take 1 tablet (40 mg) by mouth At Bedtime  vitamin E (TOCOPHEROL) 1000 UNIT capsule, Take 1,000 Units by mouth At Bedtime   buPROPion (WELLBUTRIN XL) 150 MG 24 hr tablet, Take 2 tablets (300 mg) by mouth every morning  erythromycin (ROMYCIN) 5 MG/GM ophthalmic ointment, PLEASE SEE ATTACHED FOR DETAILED DIRECTIONS (Patient not taking: Reported on 10/20/2021)  hydrochlorothiazide (MICROZIDE) 12.5 MG capsule, TAKE ONE CAPSULE BY MOUTH EVERY MORNING.  hydrocortisone  2.5 % ointment, Apply topically 2 times daily  Psyllium-Calcium (METAMUCIL PLUS CALCIUM) CAPS,   tamsulosin (FLOMAX) 0.4 MG 24 hr capsule, Take 1 capsule (0.4 mg) by mouth daily (Patient not taking: Reported on 12/15/2021)    No current facility-administered medications on file prior to visit.     History   Smoking Status     Former Smoker     Types: Cigarettes     Quit date: 1/1/2011   Smokeless Tobacco     Never Used     Comment: quit 5 yrs ago     Social History     Social History Narrative     Not on file     Patient Care Team:  Davis Aguilar MD as PCP - General (Family Practice)  Luther Augustin MD as MD (Neurology)  Elio Manzo MD as MD (Ophthalmology)  Davis Aguilar MD as Assigned PCP  Evelyne Benoit AuD as Audiologist (Audiology)  Katie Mack AuD as Audiologist (Audiology)  Brooklynn Dale AuD as Audiologist (Audiology)  Gabriel Hensley MD as Assigned Neuroscience Provider  Roverto Chen MD as Assigned Surgical Provider  ROS  As above      Objective  Physical Exam  Vitals:    12/15/21 1126   BP: (!) 145/70   BP Location: Right arm   Patient Position: Sitting   Cuff Size: Adult Large   Pulse: 70   SpO2: 98%   Weight: 90.3 kg (199 lb)     Cooperative, confused.  Cranial nerves II through XII appear intact  Chest clear to auscultation, cardiac exam regular rate and rhythm normal tones no murmurs, gallops, rubs.  Abdomen is soft, no apparent tenderness.  Extremities normal.  Skin is normal.  Patient appears well-hydrated.  Diagnostics  Pending    Please note: Voice recognition software was used in this dictation.  It may therefore contain typographical errors.

## 2021-12-16 ENCOUNTER — LAB (OUTPATIENT)
Dept: FAMILY MEDICINE | Facility: CLINIC | Age: 71
End: 2021-12-16
Attending: FAMILY MEDICINE
Payer: MEDICARE

## 2021-12-16 PROCEDURE — U0005 INFEC AGEN DETEC AMPLI PROBE: HCPCS | Performed by: FAMILY MEDICINE

## 2021-12-16 PROCEDURE — U0003 INFECTIOUS AGENT DETECTION BY NUCLEIC ACID (DNA OR RNA); SEVERE ACUTE RESPIRATORY SYNDROME CORONAVIRUS 2 (SARS-COV-2) (CORONAVIRUS DISEASE [COVID-19]), AMPLIFIED PROBE TECHNIQUE, MAKING USE OF HIGH THROUGHPUT TECHNOLOGIES AS DESCRIBED BY CMS-2020-01-R: HCPCS | Performed by: FAMILY MEDICINE

## 2021-12-17 ENCOUNTER — MYC MEDICAL ADVICE (OUTPATIENT)
Dept: FAMILY MEDICINE | Facility: CLINIC | Age: 71
End: 2021-12-17
Payer: MEDICARE

## 2021-12-17 LAB — SARS-COV-2 RNA RESP QL NAA+PROBE: NEGATIVE

## 2021-12-22 ENCOUNTER — OFFICE VISIT (OUTPATIENT)
Dept: NEUROLOGY | Facility: CLINIC | Age: 71
End: 2021-12-22
Payer: MEDICARE

## 2021-12-22 ENCOUNTER — HOSPITAL ENCOUNTER (OUTPATIENT)
Dept: SPEECH THERAPY | Facility: CLINIC | Age: 71
Setting detail: THERAPIES SERIES
End: 2021-12-22
Attending: PSYCHIATRY & NEUROLOGY
Payer: MEDICARE

## 2021-12-22 VITALS
BODY MASS INDEX: 25.2 KG/M2 | HEART RATE: 76 BPM | WEIGHT: 201.6 LBS | DIASTOLIC BLOOD PRESSURE: 96 MMHG | TEMPERATURE: 97.9 F | SYSTOLIC BLOOD PRESSURE: 156 MMHG

## 2021-12-22 DIAGNOSIS — G93.40 ENCEPHALOPATHY: ICD-10-CM

## 2021-12-22 DIAGNOSIS — R26.89 IMBALANCE: ICD-10-CM

## 2021-12-22 DIAGNOSIS — G30.0 EARLY ONSET ALZHEIMER'S DEMENTIA WITH BEHAVIORAL DISTURBANCE (H): Primary | ICD-10-CM

## 2021-12-22 DIAGNOSIS — F02.818 EARLY ONSET ALZHEIMER'S DEMENTIA WITH BEHAVIORAL DISTURBANCE (H): Primary | ICD-10-CM

## 2021-12-22 PROCEDURE — 92507 TX SP LANG VOICE COMM INDIV: CPT | Mod: GN | Performed by: SPEECH-LANGUAGE PATHOLOGIST

## 2021-12-22 NOTE — LETTER
"12/22/2021       RE: Catalino Coronado  [address] [place] [address]     Dear Colleague,    Thank you for referring your patient, Catalino Coronado, to the [facility] NEUROSPECIALTIES at [facility]. Please see a copy of my visit note below.    HPI:  Mr. Catalino Coronado is a 71 year old man former  at  seen by Dr. 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 He was also evaluated by Dr. Wolfgang Edouard. Please see Dr. Kennedy's documentation for full details. In brief, he developed progressive loss in short term memory starting around 2016. At that time, symptoms were: \"he will often be making furniture in his workshop and will set something down and struggle to find it again. He also describes difficulties trying to recall what he is done in the recent past, such as what he did last weekend or what day of the week it is\". Symptoms noted at his last visit in September 2020 were: \"He loses and misplaces objects and repeat questions. he cannot run the washing machine or the  because he doesn't know how to operate the buttons. His wife helps him to turn the water on, because they have the new system.\" During his visit with me 9/2021 his wife reported dream enactment behavior. Brain MRI showed diffuse cerebral atrophy with a predilection for the dorsolateral frontoparietal cortices. No abnormal DWI or susceptibility signal. Minimal white matter disease. B12 normal (793); CMP normal other than eGFR 55; TSH normal. He has been attending speech therapy, was prescribed hearing aids, therapy cat, physical therapy for balance, Aricept and melatonin. Aricept was decreased and eventually stopped due persistent to diarrhea.    His wife since our last visit in early " "December has noticed worsening of cognition. He has fallen 3 times. No hitting of his head. The circumstances were out of bed while trying to turn over, another in the bathroom and we wasn't sitting down (he still has diarrhea but it has improved) and listing to the left and slidding off the toilet onto the fall between the toilet and the wall, and the 3rd one at the top of the stairs about to go down the stairs and had a \"slow motion\" fall. Tremors, weakness in his legs, walking in circles, more stooped. More confused. This was 5 days after cessation of Aricept. He was more combative too. It looks like he \"sags\" to the left. He may be improved but there is less shakiness. He crawls into the car instead of entering normally. His wife gave him a \"drill [place]\" command and he followed it. He looked very angry, which is uncharacteristic. No fevers/chills/asking for more blankets, cough, rashes, or pain noted. He is eating well. No vomiting. No new medications or sleeping medications.    A thorough set of reversible dementia labs were ordered by his primary physician Dr. Aguilar. He has a normal CMP, CBC, ESR, UA and is afebrile without any alterations in vitals to suggest infection. He has an MRI coming up in 2 days. He also has an MRA that is part of routine follow-up for his brain aneurysm.    Exam:  Alert, awake, attending to people in the room. Inconsistently follows 1 step commands but not two step commands. He vocalized simple 1-word responses including \"yes, no, OK, alright\". There may be an element of neglect of the left hemibody as he requires less instructions to follow commands of the right body compared to the left. He is able to stand up and walk without assistance. He has trouble finding the chair if he turned away from it, and must be overtly showed the chair before seating otherwise he may forget it's there and trip over it. No overt dysmetria on finger-to-nose testing. Reflexes symmetrical and " "diffusely decreased in the bilateral upper and lower limbs. Moving both upper and lower limbs purposefully without asymmetry. Conjugate eye movements without evidence for restricted extraocular movements. Pupils equal in size.    He has clear lung sounds.    I also took orthostatic vitals with sitting and standing at 30 seconds and 2 minutes. These were without any drop in BP or HR.    A/P:  Possible this was an effect of Aricept withdrawal, but this would be an extreme case. I explained that with this degree of dementia any additional stressor can be \"the straw that broke the camel's back\" and cause a large effect on cognition. His primary physician did an excellent job of looking for precipitating causes and this work-up with unremarkable.    Further-work up / recommendations:  - TSH  - B12  - B6  - We will try a trial of 5 mg Aricept  - Agree with MRI brain although no focal deficits noted  - Discontinue life alert due to patient not able to understand how to understand the Life Alert system. Life Alert system is thus no longer needed.  - Disability parking I filled out today and sent to Leia    Today, I spent 77 minutes reviewing the chart, personally assessing objective testing, printing, filling out, sending forms, direct patient care, completing documentation and billing.    Again, thank you for allowing me to participate in the care of your patient.      Sincerely,    Gabriel Hensley MD      "

## 2021-12-22 NOTE — PROGRESS NOTES
"HPI:  Mr. Catalino Coronado is a 71 year old man former  at  seen by Dr. 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 He was also evaluated by Dr. Wolfgang Edouard. Please see Dr. Kennedy's documentation for full details. In brief, he developed progressive loss in short term memory starting around 2016. At that time, symptoms were: \"he will often be making furniture in his workshop and will set something down and struggle to find it again. He also describes difficulties trying to recall what he is done in the recent past, such as what he did last weekend or what day of the week it is\". Symptoms noted at his last visit in September 2020 were: \"He loses and misplaces objects and repeat questions. he cannot run the washing machine or the  because he doesn't know how to operate the buttons. His wife helps him to turn the water on, because they have the new system.\" During his visit with me 9/2021 his wife reported dream enactment behavior. Brain MRI showed diffuse cerebral atrophy with a predilection for the dorsolateral frontoparietal cortices. No abnormal DWI or susceptibility signal. Minimal white matter disease. B12 normal (793); CMP normal other than eGFR 55; TSH normal. He has been attending speech therapy, was prescribed hearing aids, therapy cat, physical therapy for balance, Aricept and melatonin. Aricept was decreased and eventually stopped due persistent to diarrhea.    His wife since our last visit in early December has noticed worsening of cognition. He has fallen 3 times. No hitting of his head. The circumstances were out of bed while trying to turn over, another in the bathroom and we wasn't sitting down (he still has diarrhea but it has improved) and listing to the left and slidding off the toilet " "onto the fall between the toilet and the wall, and the 3rd one at the top of the stairs about to go down the stairs and had a \"slow motion\" fall. Tremors, weakness in his legs, walking in circles, more stooped. More confused. This was 5 days after cessation of Aricept. He was more combative too. It looks like he \"sags\" to the left. He may be improved but there is less shakiness. He crawls into the car instead of entering normally. His wife gave him a \"drill [place]\" command and he followed it. He looked very angry, which is uncharacteristic. No fevers/chills/asking for more blankets, cough, rashes, or pain noted. He is eating well. No vomiting. No new medications or sleeping medications.    A thorough set of reversible dementia labs were ordered by his primary physician Dr. Aguilar. He has a normal CMP, CBC, ESR, UA and is afebrile without any alterations in vitals to suggest infection. He has an MRI coming up in 2 days. He also has an MRA that is part of routine follow-up for his brain aneurysm.    Exam:  Alert, awake, attending to people in the room. Inconsistently follows 1 step commands but not two step commands. He vocalized simple 1-word responses including \"yes, no, OK, alright\". There may be an element of neglect of the left hemibody as he requires less instructions to follow commands of the right body compared to the left. He is able to stand up and walk without assistance. He has trouble finding the chair if he turned away from it, and must be overtly showed the chair before seating otherwise he may forget it's there and trip over it. No overt dysmetria on finger-to-nose testing. Reflexes symmetrical and diffusely decreased in the bilateral upper and lower limbs. Moving both upper and lower limbs purposefully without asymmetry. Conjugate eye movements without evidence for restricted extraocular movements. Pupils equal in size.    He has clear lung sounds.    I also took orthostatic vitals with sitting and " "standing at 30 seconds and 2 minutes. These were without any drop in BP or HR.    A/P:  Possible this was an effect of Aricept withdrawal, but this would be an extreme case. I explained that with this degree of dementia any additional stressor can be \"the straw that broke the camel's back\" and cause a large effect on cognition. His primary physician did an excellent job of looking for precipitating causes and this work-up with unremarkable.    Further-work up / recommendations:  - TSH  - B12  - B6  - We will try a trial of 5 mg Aricept  - Agree with MRI brain although no focal deficits noted  - Discontinue life alert due to patient not able to understand how to understand the Life Alert system. Life Alert system is thus no longer needed.  - Disability parking I filled out today and sent to Leia    Today, I spent 77 minutes reviewing the chart, personally assessing objective testing, printing, filling out, sending forms, direct patient care, completing documentation and billing.          "

## 2021-12-24 ENCOUNTER — ANCILLARY PROCEDURE (OUTPATIENT)
Dept: MRI IMAGING | Facility: CLINIC | Age: 71
End: 2021-12-24
Attending: FAMILY MEDICINE
Payer: MEDICARE

## 2021-12-24 ENCOUNTER — ANCILLARY PROCEDURE (OUTPATIENT)
Dept: MRI IMAGING | Facility: CLINIC | Age: 71
End: 2021-12-24
Attending: NEUROLOGICAL SURGERY
Payer: MEDICARE

## 2021-12-24 DIAGNOSIS — I67.1 CEREBRAL ANEURYSM, NONRUPTURED: ICD-10-CM

## 2021-12-24 DIAGNOSIS — G30.1 LATE ONSET ALZHEIMER'S DISEASE WITHOUT BEHAVIORAL DISTURBANCE (H): ICD-10-CM

## 2021-12-24 DIAGNOSIS — R41.0 CONFUSION: ICD-10-CM

## 2021-12-24 DIAGNOSIS — F02.80 LATE ONSET ALZHEIMER'S DISEASE WITHOUT BEHAVIORAL DISTURBANCE (H): ICD-10-CM

## 2021-12-24 PROCEDURE — G1004 CDSM NDSC: HCPCS | Mod: GC | Performed by: RADIOLOGY

## 2021-12-24 PROCEDURE — 99207 MR BRAIN W/O CONTRAST: CPT | Mod: GC | Performed by: STUDENT IN AN ORGANIZED HEALTH CARE EDUCATION/TRAINING PROGRAM

## 2021-12-24 PROCEDURE — 70544 MR ANGIOGRAPHY HEAD W/O DYE: CPT | Mod: ME | Performed by: RADIOLOGY

## 2021-12-28 ENCOUNTER — HOSPITAL ENCOUNTER (OUTPATIENT)
Dept: SPEECH THERAPY | Facility: CLINIC | Age: 71
Setting detail: THERAPIES SERIES
End: 2021-12-28
Attending: PSYCHIATRY & NEUROLOGY
Payer: MEDICARE

## 2021-12-28 PROCEDURE — 92507 TX SP LANG VOICE COMM INDIV: CPT | Mod: GN | Performed by: SPEECH-LANGUAGE PATHOLOGIST

## 2022-01-01 ENCOUNTER — DOCUMENTATION ONLY (OUTPATIENT)
Dept: ONCOLOGY | Facility: CLINIC | Age: 72
End: 2022-01-01

## 2022-01-01 ENCOUNTER — HOSPITAL ENCOUNTER (OUTPATIENT)
Dept: SPEECH THERAPY | Facility: CLINIC | Age: 72
Setting detail: THERAPIES SERIES
End: 2022-02-14
Attending: FAMILY MEDICINE
Payer: MEDICARE

## 2022-01-01 ENCOUNTER — APPOINTMENT (OUTPATIENT)
Dept: SPEECH THERAPY | Facility: CLINIC | Age: 72
DRG: 551 | End: 2022-01-01
Payer: MEDICARE

## 2022-01-01 ENCOUNTER — MYC REFILL (OUTPATIENT)
Dept: NEUROLOGY | Facility: CLINIC | Age: 72
End: 2022-01-01

## 2022-01-01 ENCOUNTER — APPOINTMENT (OUTPATIENT)
Dept: PHYSICAL THERAPY | Facility: CLINIC | Age: 72
DRG: 551 | End: 2022-01-01
Attending: INTERNAL MEDICINE
Payer: MEDICARE

## 2022-01-01 ENCOUNTER — MYC MEDICAL ADVICE (OUTPATIENT)
Dept: FAMILY MEDICINE | Facility: CLINIC | Age: 72
End: 2022-01-01

## 2022-01-01 ENCOUNTER — TELEPHONE (OUTPATIENT)
Dept: FAMILY MEDICINE | Facility: CLINIC | Age: 72
End: 2022-01-01

## 2022-01-01 ENCOUNTER — MEDICAL CORRESPONDENCE (OUTPATIENT)
Dept: HEALTH INFORMATION MANAGEMENT | Facility: CLINIC | Age: 72
End: 2022-01-01

## 2022-01-01 ENCOUNTER — VIRTUAL VISIT (OUTPATIENT)
Dept: FAMILY MEDICINE | Facility: CLINIC | Age: 72
End: 2022-01-01
Payer: MEDICARE

## 2022-01-01 ENCOUNTER — DOCUMENTATION ONLY (OUTPATIENT)
Dept: OTHER | Facility: CLINIC | Age: 72
End: 2022-01-01

## 2022-01-01 ENCOUNTER — HOSPITAL ENCOUNTER (INPATIENT)
Facility: CLINIC | Age: 72
LOS: 18 days | Discharge: HOSPICE | DRG: 551 | End: 2022-12-10
Attending: EMERGENCY MEDICINE | Admitting: STUDENT IN AN ORGANIZED HEALTH CARE EDUCATION/TRAINING PROGRAM
Payer: MEDICARE

## 2022-01-01 ENCOUNTER — HOSPITAL ENCOUNTER (OUTPATIENT)
Dept: SPEECH THERAPY | Facility: CLINIC | Age: 72
Setting detail: THERAPIES SERIES
Discharge: HOME | End: 2022-04-04
Attending: FAMILY MEDICINE
Payer: MEDICARE

## 2022-01-01 ENCOUNTER — HOSPITAL ENCOUNTER (OUTPATIENT)
Dept: SPEECH THERAPY | Facility: CLINIC | Age: 72
Setting detail: THERAPIES SERIES
End: 2022-02-23
Attending: FAMILY MEDICINE
Payer: MEDICARE

## 2022-01-01 ENCOUNTER — APPOINTMENT (OUTPATIENT)
Dept: CT IMAGING | Facility: CLINIC | Age: 72
End: 2022-01-01
Attending: EMERGENCY MEDICINE
Payer: MEDICARE

## 2022-01-01 ENCOUNTER — HOSPITAL ENCOUNTER (OUTPATIENT)
Dept: SPEECH THERAPY | Facility: CLINIC | Age: 72
Setting detail: THERAPIES SERIES
End: 2022-01-19
Attending: PSYCHIATRY & NEUROLOGY
Payer: MEDICARE

## 2022-01-01 ENCOUNTER — MYC MEDICAL ADVICE (OUTPATIENT)
Dept: NEUROLOGY | Facility: CLINIC | Age: 72
End: 2022-01-01

## 2022-01-01 ENCOUNTER — MYC MEDICAL ADVICE (OUTPATIENT)
Dept: FAMILY MEDICINE | Facility: CLINIC | Age: 72
End: 2022-01-01
Payer: MEDICARE

## 2022-01-01 ENCOUNTER — PATIENT OUTREACH (OUTPATIENT)
Dept: NURSING | Facility: CLINIC | Age: 72
End: 2022-01-01

## 2022-01-01 ENCOUNTER — APPOINTMENT (OUTPATIENT)
Dept: GENERAL RADIOLOGY | Facility: CLINIC | Age: 72
End: 2022-01-01
Attending: EMERGENCY MEDICINE
Payer: MEDICARE

## 2022-01-01 ENCOUNTER — APPOINTMENT (OUTPATIENT)
Dept: CT IMAGING | Facility: CLINIC | Age: 72
DRG: 551 | End: 2022-01-01
Attending: EMERGENCY MEDICINE
Payer: MEDICARE

## 2022-01-01 ENCOUNTER — HEALTH MAINTENANCE LETTER (OUTPATIENT)
Age: 72
End: 2022-01-01

## 2022-01-01 ENCOUNTER — APPOINTMENT (OUTPATIENT)
Dept: OCCUPATIONAL THERAPY | Facility: CLINIC | Age: 72
DRG: 551 | End: 2022-01-01
Payer: MEDICARE

## 2022-01-01 ENCOUNTER — OFFICE VISIT (OUTPATIENT)
Dept: NEUROLOGY | Facility: CLINIC | Age: 72
End: 2022-01-01
Payer: MEDICARE

## 2022-01-01 ENCOUNTER — OFFICE VISIT (OUTPATIENT)
Dept: FAMILY MEDICINE | Facility: CLINIC | Age: 72
End: 2022-01-01
Payer: MEDICARE

## 2022-01-01 ENCOUNTER — APPOINTMENT (OUTPATIENT)
Dept: OCCUPATIONAL THERAPY | Facility: CLINIC | Age: 72
DRG: 551 | End: 2022-01-01
Attending: PHYSICIAN ASSISTANT
Payer: MEDICARE

## 2022-01-01 ENCOUNTER — NURSE TRIAGE (OUTPATIENT)
Dept: NURSING | Facility: CLINIC | Age: 72
End: 2022-01-01

## 2022-01-01 ENCOUNTER — HOSPITAL ENCOUNTER (OUTPATIENT)
Dept: SPEECH THERAPY | Facility: CLINIC | Age: 72
Setting detail: THERAPIES SERIES
End: 2022-01-26
Attending: PSYCHIATRY & NEUROLOGY
Payer: MEDICARE

## 2022-01-01 ENCOUNTER — APPOINTMENT (OUTPATIENT)
Dept: SPEECH THERAPY | Facility: CLINIC | Age: 72
DRG: 551 | End: 2022-01-01
Attending: STUDENT IN AN ORGANIZED HEALTH CARE EDUCATION/TRAINING PROGRAM
Payer: MEDICARE

## 2022-01-01 ENCOUNTER — HOSPITAL ENCOUNTER (OUTPATIENT)
Dept: SPEECH THERAPY | Facility: CLINIC | Age: 72
Setting detail: THERAPIES SERIES
Discharge: HOME | End: 2022-06-09
Attending: FAMILY MEDICINE
Payer: MEDICARE

## 2022-01-01 ENCOUNTER — HOSPITAL ENCOUNTER (OUTPATIENT)
Dept: SPEECH THERAPY | Facility: CLINIC | Age: 72
Setting detail: THERAPIES SERIES
Discharge: HOME | End: 2022-05-02
Attending: FAMILY MEDICINE
Payer: MEDICARE

## 2022-01-01 ENCOUNTER — TRANSFERRED RECORDS (OUTPATIENT)
Dept: HEALTH INFORMATION MANAGEMENT | Facility: CLINIC | Age: 72
End: 2022-01-01

## 2022-01-01 ENCOUNTER — TELEPHONE (OUTPATIENT)
Dept: DERMATOLOGY | Facility: CLINIC | Age: 72
End: 2022-01-01

## 2022-01-01 ENCOUNTER — MYC REFILL (OUTPATIENT)
Dept: NEUROLOGY | Facility: CLINIC | Age: 72
End: 2022-01-01
Payer: MEDICARE

## 2022-01-01 ENCOUNTER — MYC REFILL (OUTPATIENT)
Dept: FAMILY MEDICINE | Facility: CLINIC | Age: 72
End: 2022-01-01
Payer: MEDICARE

## 2022-01-01 ENCOUNTER — MYC MEDICAL ADVICE (OUTPATIENT)
Dept: NEUROSURGERY | Facility: CLINIC | Age: 72
End: 2022-01-01

## 2022-01-01 ENCOUNTER — MYC MEDICAL ADVICE (OUTPATIENT)
Dept: DERMATOLOGY | Facility: CLINIC | Age: 72
End: 2022-01-01

## 2022-01-01 ENCOUNTER — TELEPHONE (OUTPATIENT)
Dept: NEUROSURGERY | Facility: CLINIC | Age: 72
End: 2022-01-01

## 2022-01-01 ENCOUNTER — OFFICE VISIT (OUTPATIENT)
Dept: DERMATOLOGY | Facility: CLINIC | Age: 72
End: 2022-01-01
Payer: MEDICARE

## 2022-01-01 ENCOUNTER — APPOINTMENT (OUTPATIENT)
Dept: SPEECH THERAPY | Facility: CLINIC | Age: 72
DRG: 551 | End: 2022-01-01
Attending: INTERNAL MEDICINE
Payer: MEDICARE

## 2022-01-01 ENCOUNTER — HOSPITAL ENCOUNTER (OUTPATIENT)
Dept: SPEECH THERAPY | Facility: CLINIC | Age: 72
Setting detail: THERAPIES SERIES
End: 2022-02-02
Attending: FAMILY MEDICINE
Payer: MEDICARE

## 2022-01-01 ENCOUNTER — HOSPITAL ENCOUNTER (EMERGENCY)
Facility: CLINIC | Age: 72
Discharge: HOME | End: 2022-11-13
Attending: EMERGENCY MEDICINE | Admitting: EMERGENCY MEDICINE
Payer: MEDICARE

## 2022-01-01 ENCOUNTER — HOSPITAL ENCOUNTER (OUTPATIENT)
Dept: SPEECH THERAPY | Facility: CLINIC | Age: 72
Setting detail: THERAPIES SERIES
Discharge: HOME | End: 2022-05-19
Attending: FAMILY MEDICINE
Payer: MEDICARE

## 2022-01-01 ENCOUNTER — APPOINTMENT (OUTPATIENT)
Dept: GENERAL RADIOLOGY | Facility: CLINIC | Age: 72
DRG: 551 | End: 2022-01-01
Attending: INTERNAL MEDICINE
Payer: MEDICARE

## 2022-01-01 ENCOUNTER — HOSPITAL ENCOUNTER (OUTPATIENT)
Dept: SPEECH THERAPY | Facility: CLINIC | Age: 72
Setting detail: THERAPIES SERIES
Discharge: HOME | End: 2022-05-26
Attending: FAMILY MEDICINE
Payer: MEDICARE

## 2022-01-01 ENCOUNTER — APPOINTMENT (OUTPATIENT)
Dept: NEUROLOGY | Facility: CLINIC | Age: 72
DRG: 551 | End: 2022-01-01
Payer: MEDICARE

## 2022-01-01 ENCOUNTER — PATIENT OUTREACH (OUTPATIENT)
Dept: CARE COORDINATION | Facility: CLINIC | Age: 72
End: 2022-01-01

## 2022-01-01 ENCOUNTER — TELEPHONE (OUTPATIENT)
Dept: NEUROLOGY | Facility: CLINIC | Age: 72
End: 2022-01-01

## 2022-01-01 ENCOUNTER — HOSPITAL ENCOUNTER (OUTPATIENT)
Dept: SPEECH THERAPY | Facility: CLINIC | Age: 72
Setting detail: THERAPIES SERIES
Discharge: HOME | End: 2022-03-16
Attending: FAMILY MEDICINE
Payer: MEDICARE

## 2022-01-01 ENCOUNTER — APPOINTMENT (OUTPATIENT)
Dept: PHYSICAL THERAPY | Facility: CLINIC | Age: 72
DRG: 551 | End: 2022-01-01
Attending: PHYSICIAN ASSISTANT
Payer: MEDICARE

## 2022-01-01 ENCOUNTER — OFFICE VISIT (OUTPATIENT)
Dept: URGENT CARE | Facility: URGENT CARE | Age: 72
End: 2022-01-01
Payer: MEDICARE

## 2022-01-01 ENCOUNTER — DOCUMENTATION ONLY (OUTPATIENT)
Facility: CLINIC | Age: 72
End: 2022-01-01

## 2022-01-01 ENCOUNTER — VIRTUAL VISIT (OUTPATIENT)
Dept: NEUROSURGERY | Facility: CLINIC | Age: 72
End: 2022-01-01
Payer: MEDICARE

## 2022-01-01 VITALS
SYSTOLIC BLOOD PRESSURE: 128 MMHG | HEIGHT: 75 IN | HEART RATE: 65 BPM | SYSTOLIC BLOOD PRESSURE: 144 MMHG | BODY MASS INDEX: 24.37 KG/M2 | TEMPERATURE: 97.7 F | WEIGHT: 196 LBS | TEMPERATURE: 97.2 F | BODY MASS INDEX: 24.84 KG/M2 | HEIGHT: 75 IN | DIASTOLIC BLOOD PRESSURE: 97 MMHG | DIASTOLIC BLOOD PRESSURE: 80 MMHG | WEIGHT: 199.8 LBS | HEART RATE: 144 BPM

## 2022-01-01 VITALS
BODY MASS INDEX: 25.12 KG/M2 | WEIGHT: 202 LBS | TEMPERATURE: 97.5 F | RESPIRATION RATE: 15 BRPM | SYSTOLIC BLOOD PRESSURE: 136 MMHG | DIASTOLIC BLOOD PRESSURE: 80 MMHG | HEIGHT: 75 IN | HEART RATE: 60 BPM

## 2022-01-01 VITALS
SYSTOLIC BLOOD PRESSURE: 134 MMHG | BODY MASS INDEX: 26.24 KG/M2 | RESPIRATION RATE: 16 BRPM | DIASTOLIC BLOOD PRESSURE: 80 MMHG | HEIGHT: 75 IN | HEART RATE: 54 BPM | WEIGHT: 211 LBS | TEMPERATURE: 97.8 F

## 2022-01-01 VITALS
OXYGEN SATURATION: 98 % | BODY MASS INDEX: 25.31 KG/M2 | WEIGHT: 202.5 LBS | RESPIRATION RATE: 15 BRPM | SYSTOLIC BLOOD PRESSURE: 132 MMHG | DIASTOLIC BLOOD PRESSURE: 70 MMHG | TEMPERATURE: 97.5 F | HEART RATE: 98 BPM

## 2022-01-01 VITALS
BODY MASS INDEX: 25.61 KG/M2 | WEIGHT: 206 LBS | RESPIRATION RATE: 19 BRPM | SYSTOLIC BLOOD PRESSURE: 145 MMHG | HEIGHT: 75 IN | DIASTOLIC BLOOD PRESSURE: 78 MMHG | HEART RATE: 67 BPM

## 2022-01-01 VITALS
BODY MASS INDEX: 26.28 KG/M2 | HEIGHT: 75 IN | WEIGHT: 211.4 LBS | TEMPERATURE: 97.9 F | DIASTOLIC BLOOD PRESSURE: 75 MMHG | SYSTOLIC BLOOD PRESSURE: 116 MMHG | HEART RATE: 69 BPM

## 2022-01-01 VITALS
BODY MASS INDEX: 26.25 KG/M2 | OXYGEN SATURATION: 96 % | WEIGHT: 210 LBS | TEMPERATURE: 98.9 F | HEART RATE: 58 BPM | RESPIRATION RATE: 12 BRPM | SYSTOLIC BLOOD PRESSURE: 96 MMHG | DIASTOLIC BLOOD PRESSURE: 58 MMHG

## 2022-01-01 VITALS
OXYGEN SATURATION: 98 % | RESPIRATION RATE: 18 BRPM | SYSTOLIC BLOOD PRESSURE: 100 MMHG | DIASTOLIC BLOOD PRESSURE: 42 MMHG | TEMPERATURE: 98.2 F | HEART RATE: 68 BPM

## 2022-01-01 VITALS
OXYGEN SATURATION: 96 % | SYSTOLIC BLOOD PRESSURE: 110 MMHG | BODY MASS INDEX: 26.25 KG/M2 | WEIGHT: 210 LBS | HEART RATE: 59 BPM | DIASTOLIC BLOOD PRESSURE: 69 MMHG | TEMPERATURE: 98.3 F

## 2022-01-01 VITALS
WEIGHT: 214.6 LBS | HEART RATE: 98 BPM | SYSTOLIC BLOOD PRESSURE: 127 MMHG | DIASTOLIC BLOOD PRESSURE: 73 MMHG | BODY MASS INDEX: 26.82 KG/M2 | TEMPERATURE: 98 F

## 2022-01-01 DIAGNOSIS — G30.0 EARLY ONSET ALZHEIMER'S DEMENTIA WITH BEHAVIORAL DISTURBANCE (H): ICD-10-CM

## 2022-01-01 DIAGNOSIS — G30.9 ALZHEIMER'S DISEASE (H): Primary | ICD-10-CM

## 2022-01-01 DIAGNOSIS — M54.50 ACUTE LOW BACK PAIN WITHOUT SCIATICA, UNSPECIFIED BACK PAIN LATERALITY: ICD-10-CM

## 2022-01-01 DIAGNOSIS — R13.12 DYSPHAGIA, OROPHARYNGEAL PHASE: Primary | ICD-10-CM

## 2022-01-01 DIAGNOSIS — G30.1 LATE ONSET ALZHEIMER'S DISEASE WITHOUT BEHAVIORAL DISTURBANCE (H): ICD-10-CM

## 2022-01-01 DIAGNOSIS — F32.1 MAJOR DEPRESSIVE DISORDER, SINGLE EPISODE, MODERATE (H): ICD-10-CM

## 2022-01-01 DIAGNOSIS — L40.8 SEBOPSORIASIS: Primary | ICD-10-CM

## 2022-01-01 DIAGNOSIS — G30.0 EARLY ONSET ALZHEIMER'S DEMENTIA WITH BEHAVIORAL DISTURBANCE (H): Primary | ICD-10-CM

## 2022-01-01 DIAGNOSIS — F02.80 LATE ONSET ALZHEIMER'S DISEASE WITHOUT BEHAVIORAL DISTURBANCE (H): ICD-10-CM

## 2022-01-01 DIAGNOSIS — I44.4 LEFT ANTERIOR FASCICULAR BLOCK: ICD-10-CM

## 2022-01-01 DIAGNOSIS — M25.551 RIGHT HIP PAIN: ICD-10-CM

## 2022-01-01 DIAGNOSIS — F02.80 ALZHEIMER'S DISEASE (H): Primary | ICD-10-CM

## 2022-01-01 DIAGNOSIS — L91.8 INFLAMED SKIN TAG: Primary | ICD-10-CM

## 2022-01-01 DIAGNOSIS — R09.82 POST-NASAL DRIP: ICD-10-CM

## 2022-01-01 DIAGNOSIS — Z11.52 ENCOUNTER FOR SCREENING LABORATORY TESTING FOR SEVERE ACUTE RESPIRATORY SYNDROME CORONAVIRUS 2 (SARS-COV-2): ICD-10-CM

## 2022-01-01 DIAGNOSIS — M25.50 ARTHRALGIA, UNSPECIFIED JOINT: Primary | ICD-10-CM

## 2022-01-01 DIAGNOSIS — L40.8 SEBOPSORIASIS: ICD-10-CM

## 2022-01-01 DIAGNOSIS — F02.80 ALZHEIMER'S DISEASE (H): ICD-10-CM

## 2022-01-01 DIAGNOSIS — R52 PAIN: ICD-10-CM

## 2022-01-01 DIAGNOSIS — E86.0 DEHYDRATION: ICD-10-CM

## 2022-01-01 DIAGNOSIS — F02.818 EARLY ONSET ALZHEIMER'S DEMENTIA WITH BEHAVIORAL DISTURBANCE (H): ICD-10-CM
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
## 2022-01-01 DIAGNOSIS — M54.9 DORSALGIA: ICD-10-CM

## 2022-01-01 DIAGNOSIS — R26.9 GAIT DISTURBANCE: ICD-10-CM

## 2022-01-01 DIAGNOSIS — M54.50 LOW BACK PAIN WITHOUT SCIATICA, UNSPECIFIED BACK PAIN LATERALITY, UNSPECIFIED CHRONICITY: ICD-10-CM

## 2022-01-01 DIAGNOSIS — F02.818 EARLY ONSET ALZHEIMER'S DEMENTIA WITH BEHAVIORAL DISTURBANCE (H): Primary | ICD-10-CM

## 2022-01-01 DIAGNOSIS — S32.009D CLOSED FRACTURE OF TRANSVERSE PROCESS OF LUMBAR VERTEBRA WITH ROUTINE HEALING, SUBSEQUENT ENCOUNTER: ICD-10-CM

## 2022-01-01 DIAGNOSIS — G30.1 LATE ONSET ALZHEIMER'S DISEASE WITHOUT BEHAVIORAL DISTURBANCE (H): Primary | ICD-10-CM

## 2022-01-01 DIAGNOSIS — G20.C PARKINSONISM, UNSPECIFIED PARKINSONISM TYPE (H): ICD-10-CM

## 2022-01-01 DIAGNOSIS — R33.9 URINARY RETENTION: ICD-10-CM

## 2022-01-01 DIAGNOSIS — I10 ESSENTIAL HYPERTENSION: ICD-10-CM

## 2022-01-01 DIAGNOSIS — R07.0 THROAT PAIN: ICD-10-CM

## 2022-01-01 DIAGNOSIS — E78.00 PURE HYPERCHOLESTEROLEMIA: ICD-10-CM

## 2022-01-01 DIAGNOSIS — N30.00 ACUTE CYSTITIS WITHOUT HEMATURIA: ICD-10-CM

## 2022-01-01 DIAGNOSIS — G30.9 ALZHEIMER'S DISEASE (H): ICD-10-CM

## 2022-01-01 DIAGNOSIS — N40.1 BENIGN PROSTATIC HYPERPLASIA WITH LOWER URINARY TRACT SYMPTOMS, SYMPTOM DETAILS UNSPECIFIED: Primary | ICD-10-CM

## 2022-01-01 DIAGNOSIS — L21.9 DERMATITIS, SEBORRHEIC: ICD-10-CM

## 2022-01-01 DIAGNOSIS — M25.561 RIGHT KNEE PAIN: ICD-10-CM

## 2022-01-01 DIAGNOSIS — F03.918 DEMENTIA WITH AGGRESSIVE BEHAVIOR (H): ICD-10-CM

## 2022-01-01 DIAGNOSIS — F02.80 LATE ONSET ALZHEIMER'S DISEASE WITHOUT BEHAVIORAL DISTURBANCE (H): Primary | ICD-10-CM

## 2022-01-01 DIAGNOSIS — L40.9 PSORIASIS: ICD-10-CM

## 2022-01-01 DIAGNOSIS — R60.0 EDEMA OF RIGHT LOWER EXTREMITY: Primary | ICD-10-CM
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
## 2022-01-01 DIAGNOSIS — M25.50 ARTHRALGIA, UNSPECIFIED JOINT: ICD-10-CM

## 2022-01-01 DIAGNOSIS — L21.9 DERMATITIS, SEBORRHEIC: Primary | ICD-10-CM

## 2022-01-01 DIAGNOSIS — M53.3 PAIN IN THE COCCYX: Primary | ICD-10-CM

## 2022-01-01 DIAGNOSIS — I67.1 CEREBRAL ANEURYSM, NONRUPTURED: Primary | ICD-10-CM

## 2022-01-01 DIAGNOSIS — R47.01 APHASIA: ICD-10-CM

## 2022-01-01 DIAGNOSIS — G25.0 ESSENTIAL TREMOR: ICD-10-CM

## 2022-01-01 DIAGNOSIS — N18.31 CHRONIC KIDNEY DISEASE, STAGE 3A (H): ICD-10-CM

## 2022-01-01 DIAGNOSIS — R29.6 FALLS FREQUENTLY: ICD-10-CM

## 2022-01-01 DIAGNOSIS — Z53.9 DIAGNOSIS NOT YET DEFINED: Primary | ICD-10-CM

## 2022-01-01 DIAGNOSIS — S09.90XA CLOSED HEAD INJURY, INITIAL ENCOUNTER: ICD-10-CM

## 2022-01-01 LAB
ALBUMIN SERPL BCG-MCNC: 3 G/DL (ref 3.5–5.2)
ALBUMIN SERPL BCG-MCNC: 3.3 G/DL (ref 3.5–5.2)
ALBUMIN SERPL BCG-MCNC: 3.4 G/DL (ref 3.5–5.2)
ALBUMIN SERPL BCG-MCNC: 3.5 G/DL (ref 3.5–5.2)
ALBUMIN SERPL BCG-MCNC: 4.3 G/DL (ref 3.5–5.2)
ALBUMIN SERPL BCG-MCNC: 4.4 G/DL (ref 3.5–5.2)
ALBUMIN UR-MCNC: 10 MG/DL
ALBUMIN UR-MCNC: 10 MG/DL
ALBUMIN UR-MCNC: 30 MG/DL
ALBUMIN UR-MCNC: 50 MG/DL
ALP SERPL-CCNC: 59 U/L (ref 40–129)
ALP SERPL-CCNC: 66 U/L (ref 40–129)
ALP SERPL-CCNC: 85 U/L (ref 40–129)
ALP SERPL-CCNC: 90 U/L (ref 40–129)
ALP SERPL-CCNC: 92 U/L (ref 40–129)
ALP SERPL-CCNC: 93 U/L (ref 40–129)
ALT SERPL W P-5'-P-CCNC: 20 U/L (ref 10–50)
ALT SERPL W P-5'-P-CCNC: 23 U/L (ref 10–50)
ALT SERPL W P-5'-P-CCNC: 30 U/L (ref 10–50)
ALT SERPL W P-5'-P-CCNC: 33 U/L (ref 10–50)
ALT SERPL W P-5'-P-CCNC: 41 U/L (ref 10–50)
ALT SERPL W P-5'-P-CCNC: 47 U/L (ref 10–50)
ANION GAP SERPL CALCULATED.3IONS-SCNC: 10 MMOL/L (ref 7–15)
ANION GAP SERPL CALCULATED.3IONS-SCNC: 10 MMOL/L (ref 7–15)
ANION GAP SERPL CALCULATED.3IONS-SCNC: 11 MMOL/L (ref 7–15)
ANION GAP SERPL CALCULATED.3IONS-SCNC: 11 MMOL/L (ref 7–15)
ANION GAP SERPL CALCULATED.3IONS-SCNC: 12 MMOL/L (ref 7–15)
ANION GAP SERPL CALCULATED.3IONS-SCNC: 13 MMOL/L (ref 7–15)
ANION GAP SERPL CALCULATED.3IONS-SCNC: 14 MMOL/L (ref 7–15)
ANION GAP SERPL CALCULATED.3IONS-SCNC: 16 MMOL/L (ref 7–15)
ANION GAP SERPL CALCULATED.3IONS-SCNC: 18 MMOL/L (ref 7–15)
ANION GAP SERPL CALCULATED.3IONS-SCNC: 19 MMOL/L (ref 7–15)
APPEARANCE UR: CLEAR
AST SERPL W P-5'-P-CCNC: 26 U/L (ref 10–50)
AST SERPL W P-5'-P-CCNC: 30 U/L (ref 10–50)
AST SERPL W P-5'-P-CCNC: 32 U/L (ref 10–50)
AST SERPL W P-5'-P-CCNC: 40 U/L (ref 10–50)
AST SERPL W P-5'-P-CCNC: 40 U/L (ref 10–50)
AST SERPL W P-5'-P-CCNC: 53 U/L (ref 10–50)
ATRIAL RATE - MUSE: 61 BPM
BACTERIA UR CULT: NORMAL
BACTERIA UR CULT: NORMAL
BASOPHILS # BLD AUTO: 0 10E3/UL (ref 0–0.2)
BASOPHILS # BLD AUTO: 0.1 10E3/UL (ref 0–0.2)
BASOPHILS # BLD MANUAL: 0 10E3/UL (ref 0–0.2)
BASOPHILS NFR BLD AUTO: 0 %
BASOPHILS NFR BLD AUTO: 1 %
BASOPHILS NFR BLD MANUAL: 0 %
BILIRUB DIRECT SERPL-MCNC: <0.2 MG/DL (ref 0–0.3)
BILIRUB SERPL-MCNC: 0.4 MG/DL
BILIRUB SERPL-MCNC: 0.5 MG/DL
BILIRUB SERPL-MCNC: 0.5 MG/DL
BILIRUB SERPL-MCNC: 0.6 MG/DL
BILIRUB SERPL-MCNC: 0.6 MG/DL
BILIRUB SERPL-MCNC: 1 MG/DL
BILIRUB UR QL STRIP: NEGATIVE
BUN SERPL-MCNC: 11.6 MG/DL (ref 8–23)
BUN SERPL-MCNC: 12.3 MG/DL (ref 8–23)
BUN SERPL-MCNC: 12.3 MG/DL (ref 8–23)
BUN SERPL-MCNC: 13.1 MG/DL (ref 8–23)
BUN SERPL-MCNC: 16.5 MG/DL (ref 8–23)
BUN SERPL-MCNC: 17.1 MG/DL (ref 8–23)
BUN SERPL-MCNC: 17.6 MG/DL (ref 8–23)
BUN SERPL-MCNC: 19 MG/DL (ref 8–23)
BUN SERPL-MCNC: 19.2 MG/DL (ref 8–23)
BUN SERPL-MCNC: 20.6 MG/DL (ref 8–23)
BUN SERPL-MCNC: 23.4 MG/DL (ref 8–23)
BUN SERPL-MCNC: 24.7 MG/DL (ref 8–23)
BUN SERPL-MCNC: 26.9 MG/DL (ref 8–23)
BUN SERPL-MCNC: 41.1 MG/DL (ref 8–23)
BUN SERPL-MCNC: 43.8 MG/DL (ref 8–23)
BUN SERPL-MCNC: 44.4 MG/DL (ref 8–23)
BUN SERPL-MCNC: 45 MG/DL (ref 8–23)
BUN SERPL-MCNC: 7.1 MG/DL (ref 8–23)
BUN SERPL-MCNC: 8.4 MG/DL (ref 8–23)
C DIFF GDH STL QL IA: POSITIVE
C DIFF TOX A+B STL QL IA: POSITIVE
C DIFF TOX B STL QL: POSITIVE
CALCIUM SERPL-MCNC: 7.9 MG/DL (ref 8.8–10.2)
CALCIUM SERPL-MCNC: 8.2 MG/DL (ref 8.8–10.2)
CALCIUM SERPL-MCNC: 8.3 MG/DL (ref 8.8–10.2)
CALCIUM SERPL-MCNC: 8.5 MG/DL (ref 8.8–10.2)
CALCIUM SERPL-MCNC: 8.5 MG/DL (ref 8.8–10.2)
CALCIUM SERPL-MCNC: 8.6 MG/DL (ref 8.8–10.2)
CALCIUM SERPL-MCNC: 8.7 MG/DL (ref 8.8–10.2)
CALCIUM SERPL-MCNC: 8.7 MG/DL (ref 8.8–10.2)
CALCIUM SERPL-MCNC: 8.8 MG/DL (ref 8.8–10.2)
CALCIUM SERPL-MCNC: 8.9 MG/DL (ref 8.8–10.2)
CALCIUM SERPL-MCNC: 9.1 MG/DL (ref 8.8–10.2)
CALCIUM SERPL-MCNC: 9.7 MG/DL (ref 8.8–10.2)
CALCIUM SERPL-MCNC: 9.8 MG/DL (ref 8.8–10.2)
CHLORIDE SERPL-SCNC: 104 MMOL/L (ref 98–107)
CHLORIDE SERPL-SCNC: 106 MMOL/L (ref 98–107)
CHLORIDE SERPL-SCNC: 107 MMOL/L (ref 98–107)
CHLORIDE SERPL-SCNC: 108 MMOL/L (ref 98–107)
CHLORIDE SERPL-SCNC: 109 MMOL/L (ref 98–107)
CHLORIDE SERPL-SCNC: 110 MMOL/L (ref 98–107)
COLOR UR AUTO: YELLOW
CREAT SERPL-MCNC: 0.66 MG/DL (ref 0.67–1.17)
CREAT SERPL-MCNC: 0.71 MG/DL (ref 0.67–1.17)
CREAT SERPL-MCNC: 0.73 MG/DL (ref 0.67–1.17)
CREAT SERPL-MCNC: 0.74 MG/DL (ref 0.67–1.17)
CREAT SERPL-MCNC: 0.74 MG/DL (ref 0.67–1.17)
CREAT SERPL-MCNC: 0.75 MG/DL (ref 0.67–1.17)
CREAT SERPL-MCNC: 0.77 MG/DL (ref 0.67–1.17)
CREAT SERPL-MCNC: 0.77 MG/DL (ref 0.67–1.17)
CREAT SERPL-MCNC: 0.78 MG/DL (ref 0.67–1.17)
CREAT SERPL-MCNC: 0.79 MG/DL (ref 0.67–1.17)
CREAT SERPL-MCNC: 0.8 MG/DL (ref 0.67–1.17)
CREAT SERPL-MCNC: 0.81 MG/DL (ref 0.67–1.17)
CREAT SERPL-MCNC: 0.82 MG/DL (ref 0.67–1.17)
CREAT SERPL-MCNC: 0.86 MG/DL (ref 0.67–1.17)
CREAT SERPL-MCNC: 0.95 MG/DL (ref 0.67–1.17)
CREAT SERPL-MCNC: 0.99 MG/DL (ref 0.67–1.17)
CREAT SERPL-MCNC: 1.01 MG/DL (ref 0.67–1.17)
CREAT SERPL-MCNC: 1.12 MG/DL (ref 0.67–1.17)
CREAT SERPL-MCNC: 1.19 MG/DL (ref 0.67–1.17)
DEPRECATED HCO3 PLAS-SCNC: 16 MMOL/L (ref 22–29)
DEPRECATED HCO3 PLAS-SCNC: 18 MMOL/L (ref 22–29)
DEPRECATED HCO3 PLAS-SCNC: 18 MMOL/L (ref 22–29)
DEPRECATED HCO3 PLAS-SCNC: 19 MMOL/L (ref 22–29)
DEPRECATED HCO3 PLAS-SCNC: 20 MMOL/L (ref 22–29)
DEPRECATED HCO3 PLAS-SCNC: 21 MMOL/L (ref 22–29)
DEPRECATED HCO3 PLAS-SCNC: 22 MMOL/L (ref 22–29)
DEPRECATED HCO3 PLAS-SCNC: 23 MMOL/L (ref 22–29)
DEPRECATED HCO3 PLAS-SCNC: 23 MMOL/L (ref 22–29)
DEPRECATED HCO3 PLAS-SCNC: 25 MMOL/L (ref 22–29)
DEPRECATED S PYO AG THROAT QL EIA: NEGATIVE
DIASTOLIC BLOOD PRESSURE - MUSE: NORMAL MMHG
EOSINOPHIL # BLD AUTO: 0.2 10E3/UL (ref 0–0.7)
EOSINOPHIL # BLD AUTO: 0.3 10E3/UL (ref 0–0.7)
EOSINOPHIL # BLD AUTO: 0.4 10E3/UL (ref 0–0.7)
EOSINOPHIL # BLD AUTO: 0.4 10E3/UL (ref 0–0.7)
EOSINOPHIL # BLD AUTO: 0.5 10E3/UL (ref 0–0.7)
EOSINOPHIL # BLD AUTO: 0.5 10E3/UL (ref 0–0.7)
EOSINOPHIL # BLD AUTO: 0.6 10E3/UL (ref 0–0.7)
EOSINOPHIL # BLD MANUAL: 0.7 10E3/UL (ref 0–0.7)
EOSINOPHIL NFR BLD AUTO: 2 %
EOSINOPHIL NFR BLD AUTO: 3 %
EOSINOPHIL NFR BLD AUTO: 4 %
EOSINOPHIL NFR BLD AUTO: 5 %
EOSINOPHIL NFR BLD AUTO: 7 %
EOSINOPHIL NFR BLD MANUAL: 7 %
ERYTHROCYTE [DISTWIDTH] IN BLOOD BY AUTOMATED COUNT: 11.9 % (ref 10–15)
ERYTHROCYTE [DISTWIDTH] IN BLOOD BY AUTOMATED COUNT: 11.9 % (ref 10–15)
ERYTHROCYTE [DISTWIDTH] IN BLOOD BY AUTOMATED COUNT: 12.1 % (ref 10–15)
ERYTHROCYTE [DISTWIDTH] IN BLOOD BY AUTOMATED COUNT: 12.3 % (ref 10–15)
ERYTHROCYTE [DISTWIDTH] IN BLOOD BY AUTOMATED COUNT: 12.5 % (ref 10–15)
ERYTHROCYTE [DISTWIDTH] IN BLOOD BY AUTOMATED COUNT: 12.6 % (ref 10–15)
ERYTHROCYTE [DISTWIDTH] IN BLOOD BY AUTOMATED COUNT: 12.7 % (ref 10–15)
ERYTHROCYTE [DISTWIDTH] IN BLOOD BY AUTOMATED COUNT: 12.8 % (ref 10–15)
ERYTHROCYTE [DISTWIDTH] IN BLOOD BY AUTOMATED COUNT: 13 % (ref 10–15)
ERYTHROCYTE [DISTWIDTH] IN BLOOD BY AUTOMATED COUNT: 13.1 % (ref 10–15)
ERYTHROCYTE [DISTWIDTH] IN BLOOD BY AUTOMATED COUNT: 13.1 % (ref 10–15)
GFR SERPL CREATININE-BSD FRML MDRD: 65 ML/MIN/1.73M2
GFR SERPL CREATININE-BSD FRML MDRD: 70 ML/MIN/1.73M2
GFR SERPL CREATININE-BSD FRML MDRD: 79 ML/MIN/1.73M2
GFR SERPL CREATININE-BSD FRML MDRD: 81 ML/MIN/1.73M2
GFR SERPL CREATININE-BSD FRML MDRD: 85 ML/MIN/1.73M2
GFR SERPL CREATININE-BSD FRML MDRD: >90 ML/MIN/1.73M2
GLUCOSE SERPL-MCNC: 100 MG/DL (ref 70–99)
GLUCOSE SERPL-MCNC: 100 MG/DL (ref 70–99)
GLUCOSE SERPL-MCNC: 104 MG/DL (ref 70–99)
GLUCOSE SERPL-MCNC: 106 MG/DL (ref 70–99)
GLUCOSE SERPL-MCNC: 106 MG/DL (ref 70–99)
GLUCOSE SERPL-MCNC: 109 MG/DL (ref 70–99)
GLUCOSE SERPL-MCNC: 111 MG/DL (ref 70–99)
GLUCOSE SERPL-MCNC: 112 MG/DL (ref 70–99)
GLUCOSE SERPL-MCNC: 113 MG/DL (ref 70–99)
GLUCOSE SERPL-MCNC: 115 MG/DL (ref 70–99)
GLUCOSE SERPL-MCNC: 119 MG/DL (ref 70–99)
GLUCOSE SERPL-MCNC: 127 MG/DL (ref 70–99)
GLUCOSE SERPL-MCNC: 78 MG/DL (ref 70–99)
GLUCOSE SERPL-MCNC: 86 MG/DL (ref 70–99)
GLUCOSE SERPL-MCNC: 91 MG/DL (ref 70–99)
GLUCOSE SERPL-MCNC: 95 MG/DL (ref 70–99)
GLUCOSE SERPL-MCNC: 97 MG/DL (ref 70–99)
GLUCOSE SERPL-MCNC: 98 MG/DL (ref 70–99)
GLUCOSE SERPL-MCNC: 98 MG/DL (ref 70–99)
GLUCOSE UR STRIP-MCNC: NEGATIVE MG/DL
GROUP A STREP BY PCR: NOT DETECTED
HCT VFR BLD AUTO: 36.1 % (ref 40–53)
HCT VFR BLD AUTO: 36.5 % (ref 40–53)
HCT VFR BLD AUTO: 36.6 % (ref 40–53)
HCT VFR BLD AUTO: 37.2 % (ref 40–53)
HCT VFR BLD AUTO: 38.4 % (ref 40–53)
HCT VFR BLD AUTO: 38.7 % (ref 40–53)
HCT VFR BLD AUTO: 39.7 % (ref 40–53)
HCT VFR BLD AUTO: 39.7 % (ref 40–53)
HCT VFR BLD AUTO: 40.4 % (ref 40–53)
HCT VFR BLD AUTO: 40.4 % (ref 40–53)
HCT VFR BLD AUTO: 40.8 % (ref 40–53)
HCT VFR BLD AUTO: 41.4 % (ref 40–53)
HCT VFR BLD AUTO: 44.8 % (ref 40–53)
HEMOCCULT STL QL: NEGATIVE
HGB BLD-MCNC: 12.1 G/DL (ref 13.3–17.7)
HGB BLD-MCNC: 12.7 G/DL (ref 13.3–17.7)
HGB BLD-MCNC: 12.7 G/DL (ref 13.3–17.7)
HGB BLD-MCNC: 12.8 G/DL (ref 13.3–17.7)
HGB BLD-MCNC: 12.9 G/DL (ref 13.3–17.7)
HGB BLD-MCNC: 13.1 G/DL (ref 13.3–17.7)
HGB BLD-MCNC: 13.4 G/DL (ref 13.3–17.7)
HGB BLD-MCNC: 13.6 G/DL (ref 13.3–17.7)
HGB BLD-MCNC: 13.8 G/DL (ref 13.3–17.7)
HGB BLD-MCNC: 13.9 G/DL (ref 13.3–17.7)
HGB BLD-MCNC: 14.1 G/DL (ref 13.3–17.7)
HGB BLD-MCNC: 14.1 G/DL (ref 13.3–17.7)
HGB BLD-MCNC: 14.4 G/DL (ref 13.3–17.7)
HGB BLD-MCNC: 15.3 G/DL (ref 13.3–17.7)
HGB UR QL STRIP: ABNORMAL
HGB UR QL STRIP: ABNORMAL
HGB UR QL STRIP: NEGATIVE
HGB UR QL STRIP: NEGATIVE
HOLD SPECIMEN: NORMAL
IMM GRANULOCYTES # BLD: 0 10E3/UL
IMM GRANULOCYTES # BLD: 0.1 10E3/UL
IMM GRANULOCYTES NFR BLD: 0 %
IMM GRANULOCYTES NFR BLD: 1 %
INTERPRETATION ECG - MUSE: NORMAL
KETONES UR STRIP-MCNC: 100 MG/DL
KETONES UR STRIP-MCNC: 20 MG/DL
KETONES UR STRIP-MCNC: ABNORMAL MG/DL
KETONES UR STRIP-MCNC: NEGATIVE MG/DL
LEUKOCYTE ESTERASE UR QL STRIP: ABNORMAL
LEUKOCYTE ESTERASE UR QL STRIP: ABNORMAL
LEUKOCYTE ESTERASE UR QL STRIP: NEGATIVE
LEUKOCYTE ESTERASE UR QL STRIP: NEGATIVE
LIPASE SERPL-CCNC: 23 U/L (ref 13–60)
LYMPHOCYTES # BLD AUTO: 0.7 10E3/UL (ref 0.8–5.3)
LYMPHOCYTES # BLD AUTO: 1 10E3/UL (ref 0.8–5.3)
LYMPHOCYTES # BLD AUTO: 1 10E3/UL (ref 0.8–5.3)
LYMPHOCYTES # BLD AUTO: 1.2 10E3/UL (ref 0.8–5.3)
LYMPHOCYTES # BLD AUTO: 1.2 10E3/UL (ref 0.8–5.3)
LYMPHOCYTES # BLD AUTO: 1.3 10E3/UL (ref 0.8–5.3)
LYMPHOCYTES # BLD AUTO: 1.4 10E3/UL (ref 0.8–5.3)
LYMPHOCYTES # BLD AUTO: 1.4 10E3/UL (ref 0.8–5.3)
LYMPHOCYTES # BLD AUTO: 1.6 10E3/UL (ref 0.8–5.3)
LYMPHOCYTES # BLD MANUAL: 1.6 10E3/UL (ref 0.8–5.3)
LYMPHOCYTES NFR BLD AUTO: 12 %
LYMPHOCYTES NFR BLD AUTO: 12 %
LYMPHOCYTES NFR BLD AUTO: 15 %
LYMPHOCYTES NFR BLD AUTO: 15 %
LYMPHOCYTES NFR BLD AUTO: 17 %
LYMPHOCYTES NFR BLD AUTO: 18 %
LYMPHOCYTES NFR BLD AUTO: 18 %
LYMPHOCYTES NFR BLD AUTO: 23 %
LYMPHOCYTES NFR BLD AUTO: 24 %
LYMPHOCYTES NFR BLD AUTO: 7 %
LYMPHOCYTES NFR BLD AUTO: 8 %
LYMPHOCYTES NFR BLD MANUAL: 17 %
MAGNESIUM SERPL-MCNC: 1.8 MG/DL (ref 1.7–2.3)
MAGNESIUM SERPL-MCNC: 1.8 MG/DL (ref 1.7–2.3)
MAGNESIUM SERPL-MCNC: 1.9 MG/DL (ref 1.7–2.3)
MAGNESIUM SERPL-MCNC: 2 MG/DL (ref 1.7–2.3)
MAGNESIUM SERPL-MCNC: 2.3 MG/DL (ref 1.7–2.3)
MCH RBC QN AUTO: 32 PG (ref 26.5–33)
MCH RBC QN AUTO: 32.1 PG (ref 26.5–33)
MCH RBC QN AUTO: 32.2 PG (ref 26.5–33)
MCH RBC QN AUTO: 32.2 PG (ref 26.5–33)
MCH RBC QN AUTO: 32.3 PG (ref 26.5–33)
MCH RBC QN AUTO: 32.5 PG (ref 26.5–33)
MCH RBC QN AUTO: 32.5 PG (ref 26.5–33)
MCH RBC QN AUTO: 32.6 PG (ref 26.5–33)
MCH RBC QN AUTO: 32.9 PG (ref 26.5–33)
MCHC RBC AUTO-ENTMCNC: 33.8 G/DL (ref 31.5–36.5)
MCHC RBC AUTO-ENTMCNC: 34.1 G/DL (ref 31.5–36.5)
MCHC RBC AUTO-ENTMCNC: 34.2 G/DL (ref 31.5–36.5)
MCHC RBC AUTO-ENTMCNC: 34.4 G/DL (ref 31.5–36.5)
MCHC RBC AUTO-ENTMCNC: 34.4 G/DL (ref 31.5–36.5)
MCHC RBC AUTO-ENTMCNC: 34.6 G/DL (ref 31.5–36.5)
MCHC RBC AUTO-ENTMCNC: 34.7 G/DL (ref 31.5–36.5)
MCHC RBC AUTO-ENTMCNC: 34.8 G/DL (ref 31.5–36.5)
MCHC RBC AUTO-ENTMCNC: 34.9 G/DL (ref 31.5–36.5)
MCHC RBC AUTO-ENTMCNC: 35.1 G/DL (ref 31.5–36.5)
MCHC RBC AUTO-ENTMCNC: 35.7 G/DL (ref 31.5–36.5)
MCV RBC AUTO: 91 FL (ref 78–100)
MCV RBC AUTO: 92 FL (ref 78–100)
MCV RBC AUTO: 93 FL (ref 78–100)
MCV RBC AUTO: 94 FL (ref 78–100)
MCV RBC AUTO: 94 FL (ref 78–100)
MCV RBC AUTO: 95 FL (ref 78–100)
MCV RBC AUTO: 95 FL (ref 78–100)
MCV RBC AUTO: 96 FL (ref 78–100)
MCV RBC AUTO: 97 FL (ref 78–100)
MONOCYTES # BLD AUTO: 0.3 10E3/UL (ref 0–1.3)
MONOCYTES # BLD AUTO: 0.4 10E3/UL (ref 0–1.3)
MONOCYTES # BLD AUTO: 0.4 10E3/UL (ref 0–1.3)
MONOCYTES # BLD AUTO: 0.6 10E3/UL (ref 0–1.3)
MONOCYTES # BLD AUTO: 0.7 10E3/UL (ref 0–1.3)
MONOCYTES # BLD AUTO: 0.8 10E3/UL (ref 0–1.3)
MONOCYTES # BLD AUTO: 0.9 10E3/UL (ref 0–1.3)
MONOCYTES # BLD MANUAL: 0.7 10E3/UL (ref 0–1.3)
MONOCYTES NFR BLD AUTO: 5 %
MONOCYTES NFR BLD AUTO: 6 %
MONOCYTES NFR BLD AUTO: 7 %
MONOCYTES NFR BLD AUTO: 8 %
MONOCYTES NFR BLD AUTO: 8 %
MONOCYTES NFR BLD AUTO: 9 %
MONOCYTES NFR BLD AUTO: 9 %
MONOCYTES NFR BLD MANUAL: 7 %
MUCOUS THREADS #/AREA URNS LPF: PRESENT /LPF
NEUTROPHILS # BLD AUTO: 10.3 10E3/UL (ref 1.6–8.3)
NEUTROPHILS # BLD AUTO: 11.9 10E3/UL (ref 1.6–8.3)
NEUTROPHILS # BLD AUTO: 3.9 10E3/UL (ref 1.6–8.3)
NEUTROPHILS # BLD AUTO: 4.6 10E3/UL (ref 1.6–8.3)
NEUTROPHILS # BLD AUTO: 4.8 10E3/UL (ref 1.6–8.3)
NEUTROPHILS # BLD AUTO: 4.8 10E3/UL (ref 1.6–8.3)
NEUTROPHILS # BLD AUTO: 5.1 10E3/UL (ref 1.6–8.3)
NEUTROPHILS # BLD AUTO: 5.6 10E3/UL (ref 1.6–8.3)
NEUTROPHILS # BLD AUTO: 6.7 10E3/UL (ref 1.6–8.3)
NEUTROPHILS # BLD AUTO: 7.4 10E3/UL (ref 1.6–8.3)
NEUTROPHILS # BLD AUTO: 7.4 10E3/UL (ref 1.6–8.3)
NEUTROPHILS # BLD MANUAL: 6.4 10E3/UL (ref 1.6–8.3)
NEUTROPHILS NFR BLD AUTO: 60 %
NEUTROPHILS NFR BLD AUTO: 65 %
NEUTROPHILS NFR BLD AUTO: 67 %
NEUTROPHILS NFR BLD AUTO: 68 %
NEUTROPHILS NFR BLD AUTO: 71 %
NEUTROPHILS NFR BLD AUTO: 73 %
NEUTROPHILS NFR BLD AUTO: 76 %
NEUTROPHILS NFR BLD AUTO: 76 %
NEUTROPHILS NFR BLD AUTO: 77 %
NEUTROPHILS NFR BLD AUTO: 81 %
NEUTROPHILS NFR BLD AUTO: 84 %
NEUTROPHILS NFR BLD MANUAL: 69 %
NITRATE UR QL: NEGATIVE
NRBC # BLD AUTO: 0 10E3/UL
NRBC # BLD AUTO: 0.2 10E3/UL
NRBC BLD AUTO-RTO: 0 /100
NRBC BLD AUTO-RTO: 2 /100
P AXIS - MUSE: 19 DEGREES
PH UR STRIP: 5.5 [PH] (ref 5–7)
PH UR STRIP: 6 [PH] (ref 5–7)
PH UR STRIP: 6.5 [PH] (ref 5–7)
PH UR STRIP: 6.5 [PH] (ref 5–7)
PLAT MORPH BLD: NORMAL
PLATELET # BLD AUTO: 284 10E3/UL (ref 150–450)
PLATELET # BLD AUTO: 292 10E3/UL (ref 150–450)
PLATELET # BLD AUTO: 301 10E3/UL (ref 150–450)
PLATELET # BLD AUTO: 301 10E3/UL (ref 150–450)
PLATELET # BLD AUTO: 308 10E3/UL (ref 150–450)
PLATELET # BLD AUTO: 316 10E3/UL (ref 150–450)
PLATELET # BLD AUTO: 317 10E3/UL (ref 150–450)
PLATELET # BLD AUTO: 324 10E3/UL (ref 150–450)
PLATELET # BLD AUTO: 328 10E3/UL (ref 150–450)
PLATELET # BLD AUTO: 330 10E3/UL (ref 150–450)
PLATELET # BLD AUTO: 332 10E3/UL (ref 150–450)
PLATELET # BLD AUTO: 333 10E3/UL (ref 150–450)
PLATELET # BLD AUTO: 341 10E3/UL (ref 150–450)
PLATELET # BLD AUTO: 343 10E3/UL (ref 150–450)
PLATELET # BLD AUTO: 362 10E3/UL (ref 150–450)
PLATELET # BLD AUTO: 373 10E3/UL (ref 150–450)
PLATELET # BLD AUTO: 375 10E3/UL (ref 150–450)
POTASSIUM SERPL-SCNC: 3.2 MMOL/L (ref 3.4–5.3)
POTASSIUM SERPL-SCNC: 3.3 MMOL/L (ref 3.4–5.3)
POTASSIUM SERPL-SCNC: 3.5 MMOL/L (ref 3.4–5.3)
POTASSIUM SERPL-SCNC: 3.6 MMOL/L (ref 3.4–5.3)
POTASSIUM SERPL-SCNC: 3.6 MMOL/L (ref 3.4–5.3)
POTASSIUM SERPL-SCNC: 3.7 MMOL/L (ref 3.4–5.3)
POTASSIUM SERPL-SCNC: 3.7 MMOL/L (ref 3.4–5.3)
POTASSIUM SERPL-SCNC: 3.9 MMOL/L (ref 3.4–5.3)
POTASSIUM SERPL-SCNC: 3.9 MMOL/L (ref 3.4–5.3)
POTASSIUM SERPL-SCNC: 4 MMOL/L (ref 3.4–5.3)
POTASSIUM SERPL-SCNC: 4.2 MMOL/L (ref 3.4–5.3)
POTASSIUM SERPL-SCNC: 4.2 MMOL/L (ref 3.4–5.3)
POTASSIUM SERPL-SCNC: 4.5 MMOL/L (ref 3.4–5.3)
POTASSIUM SERPL-SCNC: 5.2 MMOL/L (ref 3.4–5.3)
PR INTERVAL - MUSE: 148 MS
PROT SERPL-MCNC: 6 G/DL (ref 6.4–8.3)
PROT SERPL-MCNC: 6.2 G/DL (ref 6.4–8.3)
PROT SERPL-MCNC: 6.4 G/DL (ref 6.4–8.3)
PROT SERPL-MCNC: 6.5 G/DL (ref 6.4–8.3)
PROT SERPL-MCNC: 6.9 G/DL (ref 6.4–8.3)
PROT SERPL-MCNC: 7.7 G/DL (ref 6.4–8.3)
PYRIDOXAL PHOS SERPL-SCNC: 46.6 NMOL/L
QRS DURATION - MUSE: 96 MS
QT - MUSE: 456 MS
QTC - MUSE: 459 MS
R AXIS - MUSE: -54 DEGREES
RBC # BLD AUTO: 3.89 10E6/UL (ref 4.4–5.9)
RBC # BLD AUTO: 3.9 10E6/UL (ref 4.4–5.9)
RBC # BLD AUTO: 3.94 10E6/UL (ref 4.4–5.9)
RBC # BLD AUTO: 3.99 10E6/UL (ref 4.4–5.9)
RBC # BLD AUTO: 4.06 10E6/UL (ref 4.4–5.9)
RBC # BLD AUTO: 4.11 10E6/UL (ref 4.4–5.9)
RBC # BLD AUTO: 4.19 10E6/UL (ref 4.4–5.9)
RBC # BLD AUTO: 4.28 10E6/UL (ref 4.4–5.9)
RBC # BLD AUTO: 4.35 10E6/UL (ref 4.4–5.9)
RBC # BLD AUTO: 4.36 10E6/UL (ref 4.4–5.9)
RBC # BLD AUTO: 4.39 10E6/UL (ref 4.4–5.9)
RBC # BLD AUTO: 4.43 10E6/UL (ref 4.4–5.9)
RBC # BLD AUTO: 4.7 10E6/UL (ref 4.4–5.9)
RBC MORPH BLD: NORMAL
RBC URINE: 1 /HPF
RBC URINE: 3 /HPF
RBC URINE: 30 /HPF
RBC URINE: 5 /HPF
SARS-COV-2 RNA RESP QL NAA+PROBE: NEGATIVE
SARS-COV-2 RNA RESP QL NAA+PROBE: NEGATIVE
SODIUM SERPL-SCNC: 137 MMOL/L (ref 136–145)
SODIUM SERPL-SCNC: 138 MMOL/L (ref 136–145)
SODIUM SERPL-SCNC: 139 MMOL/L (ref 136–145)
SODIUM SERPL-SCNC: 140 MMOL/L (ref 136–145)
SODIUM SERPL-SCNC: 141 MMOL/L (ref 136–145)
SODIUM SERPL-SCNC: 142 MMOL/L (ref 136–145)
SODIUM SERPL-SCNC: 142 MMOL/L (ref 136–145)
SODIUM SERPL-SCNC: 144 MMOL/L (ref 136–145)
SODIUM SERPL-SCNC: 147 MMOL/L (ref 136–145)
SP GR UR STRIP: 1.02 (ref 1–1.03)
SP GR UR STRIP: 1.03 (ref 1–1.03)
SYSTOLIC BLOOD PRESSURE - MUSE: NORMAL MMHG
T AXIS - MUSE: 0 DEGREES
TROPONIN T SERPL HS-MCNC: 27 NG/L
TROPONIN T SERPL HS-MCNC: 28 NG/L
TSH SERPL DL<=0.005 MIU/L-ACNC: 0.97 UIU/ML (ref 0.3–4.2)
UROBILINOGEN UR STRIP-MCNC: NORMAL MG/DL
VENTRICULAR RATE- MUSE: 61 BPM
VIT B12 SERPL-MCNC: 1170 PG/ML (ref 232–1245)
WBC # BLD AUTO: 13.4 10E3/UL (ref 4–11)
WBC # BLD AUTO: 14.1 10E3/UL (ref 4–11)
WBC # BLD AUTO: 6.4 10E3/UL (ref 4–11)
WBC # BLD AUTO: 6.7 10E3/UL (ref 4–11)
WBC # BLD AUTO: 6.7 10E3/UL (ref 4–11)
WBC # BLD AUTO: 7 10E3/UL (ref 4–11)
WBC # BLD AUTO: 7 10E3/UL (ref 4–11)
WBC # BLD AUTO: 8.2 10E3/UL (ref 4–11)
WBC # BLD AUTO: 9.1 10E3/UL (ref 4–11)
WBC # BLD AUTO: 9.2 10E3/UL (ref 4–11)
WBC # BLD AUTO: 9.2 10E3/UL (ref 4–11)
WBC # BLD AUTO: 9.3 10E3/UL (ref 4–11)
WBC # BLD AUTO: 9.9 10E3/UL (ref 4–11)
WBC URINE: 1 /HPF
WBC URINE: 19 /HPF
WBC URINE: 2 /HPF
WBC URINE: 2 /HPF

## 2022-01-01 PROCEDURE — 120N000002 HC R&B MED SURG/OB UMMC

## 2022-01-01 PROCEDURE — 258N000001 HC RX 258: Performed by: INTERNAL MEDICINE

## 2022-01-01 PROCEDURE — 80048 BASIC METABOLIC PNL TOTAL CA: CPT | Performed by: STUDENT IN AN ORGANIZED HEALTH CARE EDUCATION/TRAINING PROGRAM

## 2022-01-01 PROCEDURE — 36415 COLL VENOUS BLD VENIPUNCTURE: CPT | Performed by: STUDENT IN AN ORGANIZED HEALTH CARE EDUCATION/TRAINING PROGRAM

## 2022-01-01 PROCEDURE — 92507 TX SP LANG VOICE COMM INDIV: CPT | Mod: GN | Performed by: SPEECH-LANGUAGE PATHOLOGIST

## 2022-01-01 PROCEDURE — 83735 ASSAY OF MAGNESIUM: CPT | Performed by: INTERNAL MEDICINE

## 2022-01-01 PROCEDURE — 250N000013 HC RX MED GY IP 250 OP 250 PS 637: Performed by: PHYSICIAN ASSISTANT

## 2022-01-01 PROCEDURE — 51702 INSERT TEMP BLADDER CATH: CPT

## 2022-01-01 PROCEDURE — 85049 AUTOMATED PLATELET COUNT: CPT | Performed by: STUDENT IN AN ORGANIZED HEALTH CARE EDUCATION/TRAINING PROGRAM

## 2022-01-01 PROCEDURE — 82310 ASSAY OF CALCIUM: CPT | Performed by: ANESTHESIOLOGY

## 2022-01-01 PROCEDURE — 70450 CT HEAD/BRAIN W/O DYE: CPT | Mod: 26 | Performed by: RADIOLOGY

## 2022-01-01 PROCEDURE — U0003 INFECTIOUS AGENT DETECTION BY NUCLEIC ACID (DNA OR RNA); SEVERE ACUTE RESPIRATORY SYNDROME CORONAVIRUS 2 (SARS-COV-2) (CORONAVIRUS DISEASE [COVID-19]), AMPLIFIED PROBE TECHNIQUE, MAKING USE OF HIGH THROUGHPUT TECHNOLOGIES AS DESCRIBED BY CMS-2020-01-R: HCPCS | Performed by: INTERNAL MEDICINE

## 2022-01-01 PROCEDURE — 92507 TX SP LANG VOICE COMM INDIV: CPT | Mod: GN

## 2022-01-01 PROCEDURE — 99285 EMERGENCY DEPT VISIT HI MDM: CPT | Mod: 25

## 2022-01-01 PROCEDURE — 99232 SBSQ HOSP IP/OBS MODERATE 35: CPT | Performed by: STUDENT IN AN ORGANIZED HEALTH CARE EDUCATION/TRAINING PROGRAM

## 2022-01-01 PROCEDURE — 250N000011 HC RX IP 250 OP 636: Performed by: PHYSICIAN ASSISTANT

## 2022-01-01 PROCEDURE — 250N000013 HC RX MED GY IP 250 OP 250 PS 637: Performed by: STUDENT IN AN ORGANIZED HEALTH CARE EDUCATION/TRAINING PROGRAM

## 2022-01-01 PROCEDURE — 250N000013 HC RX MED GY IP 250 OP 250 PS 637: Performed by: NURSE PRACTITIONER

## 2022-01-01 PROCEDURE — 73552 X-RAY EXAM OF FEMUR 2/>: CPT | Mod: 26 | Performed by: RADIOLOGY

## 2022-01-01 PROCEDURE — 258N000003 HC RX IP 258 OP 636: Performed by: STUDENT IN AN ORGANIZED HEALTH CARE EDUCATION/TRAINING PROGRAM

## 2022-01-01 PROCEDURE — 250N000013 HC RX MED GY IP 250 OP 250 PS 637: Performed by: INTERNAL MEDICINE

## 2022-01-01 PROCEDURE — 92526 ORAL FUNCTION THERAPY: CPT | Mod: GN

## 2022-01-01 PROCEDURE — 999N000128 HC STATISTIC PERIPHERAL IV START W/O US GUIDANCE

## 2022-01-01 PROCEDURE — 36415 COLL VENOUS BLD VENIPUNCTURE: CPT | Performed by: ANESTHESIOLOGY

## 2022-01-01 PROCEDURE — 99213 OFFICE O/P EST LOW 20 MIN: CPT | Performed by: FAMILY MEDICINE

## 2022-01-01 PROCEDURE — 99284 EMERGENCY DEPT VISIT MOD MDM: CPT | Performed by: EMERGENCY MEDICINE

## 2022-01-01 PROCEDURE — 99232 SBSQ HOSP IP/OBS MODERATE 35: CPT | Performed by: INTERNAL MEDICINE

## 2022-01-01 PROCEDURE — 96375 TX/PRO/DX INJ NEW DRUG ADDON: CPT

## 2022-01-01 PROCEDURE — 74018 RADEX ABDOMEN 1 VIEW: CPT

## 2022-01-01 PROCEDURE — 81001 URINALYSIS AUTO W/SCOPE: CPT | Performed by: EMERGENCY MEDICINE

## 2022-01-01 PROCEDURE — 72070 X-RAY EXAM THORAC SPINE 2VWS: CPT | Mod: 26 | Performed by: RADIOLOGY

## 2022-01-01 PROCEDURE — 85018 HEMOGLOBIN: CPT | Performed by: STUDENT IN AN ORGANIZED HEALTH CARE EDUCATION/TRAINING PROGRAM

## 2022-01-01 PROCEDURE — 85004 AUTOMATED DIFF WBC COUNT: CPT | Performed by: INTERNAL MEDICINE

## 2022-01-01 PROCEDURE — 84132 ASSAY OF SERUM POTASSIUM: CPT | Performed by: HOSPITALIST

## 2022-01-01 PROCEDURE — G1010 CDSM STANSON: HCPCS

## 2022-01-01 PROCEDURE — 999N000127 HC STATISTIC PERIPHERAL IV START W US GUIDANCE

## 2022-01-01 PROCEDURE — 250N000011 HC RX IP 250 OP 636: Performed by: EMERGENCY MEDICINE

## 2022-01-01 PROCEDURE — 85004 AUTOMATED DIFF WBC COUNT: CPT | Performed by: ANESTHESIOLOGY

## 2022-01-01 PROCEDURE — 97530 THERAPEUTIC ACTIVITIES: CPT | Mod: GP

## 2022-01-01 PROCEDURE — 85007 BL SMEAR W/DIFF WBC COUNT: CPT | Performed by: PHYSICIAN ASSISTANT

## 2022-01-01 PROCEDURE — 82310 ASSAY OF CALCIUM: CPT | Performed by: STUDENT IN AN ORGANIZED HEALTH CARE EDUCATION/TRAINING PROGRAM

## 2022-01-01 PROCEDURE — 84484 ASSAY OF TROPONIN QUANT: CPT | Performed by: EMERGENCY MEDICINE

## 2022-01-01 PROCEDURE — 84132 ASSAY OF SERUM POTASSIUM: CPT | Performed by: PHYSICIAN ASSISTANT

## 2022-01-01 PROCEDURE — 250N000011 HC RX IP 250 OP 636: Performed by: STUDENT IN AN ORGANIZED HEALTH CARE EDUCATION/TRAINING PROGRAM

## 2022-01-01 PROCEDURE — 99223 1ST HOSP IP/OBS HIGH 75: CPT | Mod: AI | Performed by: STUDENT IN AN ORGANIZED HEALTH CARE EDUCATION/TRAINING PROGRAM

## 2022-01-01 PROCEDURE — 81001 URINALYSIS AUTO W/SCOPE: CPT | Performed by: STUDENT IN AN ORGANIZED HEALTH CARE EDUCATION/TRAINING PROGRAM

## 2022-01-01 PROCEDURE — 99232 SBSQ HOSP IP/OBS MODERATE 35: CPT | Performed by: FAMILY MEDICINE

## 2022-01-01 PROCEDURE — 250N000011 HC RX IP 250 OP 636

## 2022-01-01 PROCEDURE — 250N000011 HC RX IP 250 OP 636: Performed by: INTERNAL MEDICINE

## 2022-01-01 PROCEDURE — 96374 THER/PROPH/DIAG INJ IV PUSH: CPT | Mod: 59

## 2022-01-01 PROCEDURE — 82607 VITAMIN B-12: CPT | Performed by: INTERNAL MEDICINE

## 2022-01-01 PROCEDURE — 85025 COMPLETE CBC W/AUTO DIFF WBC: CPT | Performed by: STUDENT IN AN ORGANIZED HEALTH CARE EDUCATION/TRAINING PROGRAM

## 2022-01-01 PROCEDURE — 87493 C DIFF AMPLIFIED PROBE: CPT | Performed by: INTERNAL MEDICINE

## 2022-01-01 PROCEDURE — 85025 COMPLETE CBC W/AUTO DIFF WBC: CPT | Performed by: EMERGENCY MEDICINE

## 2022-01-01 PROCEDURE — 99222 1ST HOSP IP/OBS MODERATE 55: CPT | Performed by: PSYCHIATRY & NEUROLOGY

## 2022-01-01 PROCEDURE — 99233 SBSQ HOSP IP/OBS HIGH 50: CPT | Performed by: PHYSICIAN ASSISTANT

## 2022-01-01 PROCEDURE — 93005 ELECTROCARDIOGRAM TRACING: CPT

## 2022-01-01 PROCEDURE — 99214 OFFICE O/P EST MOD 30 MIN: CPT | Mod: 25 | Performed by: DERMATOLOGY

## 2022-01-01 PROCEDURE — 80053 COMPREHEN METABOLIC PANEL: CPT | Performed by: EMERGENCY MEDICINE

## 2022-01-01 PROCEDURE — 96365 THER/PROPH/DIAG IV INF INIT: CPT

## 2022-01-01 PROCEDURE — 999N000248 HC STATISTIC IV INSERT WITH US BY RN

## 2022-01-01 PROCEDURE — 999N000007 HC SITE CHECK

## 2022-01-01 PROCEDURE — 99214 OFFICE O/P EST MOD 30 MIN: CPT | Mod: 95 | Performed by: FAMILY MEDICINE

## 2022-01-01 PROCEDURE — 99207 PR APP CREDIT; MD BILLING SHARED VISIT: CPT | Performed by: PHYSICIAN ASSISTANT

## 2022-01-01 PROCEDURE — 250N000013 HC RX MED GY IP 250 OP 250 PS 637: Performed by: EMERGENCY MEDICINE

## 2022-01-01 PROCEDURE — 36415 COLL VENOUS BLD VENIPUNCTURE: CPT | Performed by: INTERNAL MEDICINE

## 2022-01-01 PROCEDURE — 250N000009 HC RX 250: Performed by: EMERGENCY MEDICINE

## 2022-01-01 PROCEDURE — 99232 SBSQ HOSP IP/OBS MODERATE 35: CPT | Mod: GC | Performed by: PSYCHIATRY & NEUROLOGY

## 2022-01-01 PROCEDURE — U0005 INFEC AGEN DETEC AMPLI PROBE: HCPCS | Performed by: INTERNAL MEDICINE

## 2022-01-01 PROCEDURE — 72070 X-RAY EXAM THORAC SPINE 2VWS: CPT

## 2022-01-01 PROCEDURE — 80053 COMPREHEN METABOLIC PANEL: CPT | Performed by: STUDENT IN AN ORGANIZED HEALTH CARE EDUCATION/TRAINING PROGRAM

## 2022-01-01 PROCEDURE — 258N000003 HC RX IP 258 OP 636: Performed by: INTERNAL MEDICINE

## 2022-01-01 PROCEDURE — 82310 ASSAY OF CALCIUM: CPT | Performed by: INTERNAL MEDICINE

## 2022-01-01 PROCEDURE — 71045 X-RAY EXAM CHEST 1 VIEW: CPT | Mod: 26 | Performed by: RADIOLOGY

## 2022-01-01 PROCEDURE — 87651 STREP A DNA AMP PROBE: CPT | Performed by: INTERNAL MEDICINE

## 2022-01-01 PROCEDURE — 36416 COLLJ CAPILLARY BLOOD SPEC: CPT | Performed by: PHYSICIAN ASSISTANT

## 2022-01-01 PROCEDURE — C9113 INJ PANTOPRAZOLE SODIUM, VIA: HCPCS | Performed by: PHYSICIAN ASSISTANT

## 2022-01-01 PROCEDURE — 95711 VEEG 2-12 HR UNMONITORED: CPT

## 2022-01-01 PROCEDURE — 87086 URINE CULTURE/COLONY COUNT: CPT | Performed by: EMERGENCY MEDICINE

## 2022-01-01 PROCEDURE — U0005 INFEC AGEN DETEC AMPLI PROBE: HCPCS | Performed by: FAMILY MEDICINE

## 2022-01-01 PROCEDURE — 73552 X-RAY EXAM OF FEMUR 2/>: CPT | Mod: RT

## 2022-01-01 PROCEDURE — G1010 CDSM STANSON: HCPCS | Mod: GC | Performed by: RADIOLOGY

## 2022-01-01 PROCEDURE — 99285 EMERGENCY DEPT VISIT HI MDM: CPT | Mod: 25 | Performed by: EMERGENCY MEDICINE

## 2022-01-01 PROCEDURE — C9113 INJ PANTOPRAZOLE SODIUM, VIA: HCPCS | Performed by: INTERNAL MEDICINE

## 2022-01-01 PROCEDURE — 84443 ASSAY THYROID STIM HORMONE: CPT | Performed by: INTERNAL MEDICINE

## 2022-01-01 PROCEDURE — 83735 ASSAY OF MAGNESIUM: CPT | Performed by: STUDENT IN AN ORGANIZED HEALTH CARE EDUCATION/TRAINING PROGRAM

## 2022-01-01 PROCEDURE — 72131 CT LUMBAR SPINE W/O DYE: CPT | Mod: ME

## 2022-01-01 PROCEDURE — 71045 X-RAY EXAM CHEST 1 VIEW: CPT

## 2022-01-01 PROCEDURE — 97535 SELF CARE MNGMENT TRAINING: CPT | Mod: GO

## 2022-01-01 PROCEDURE — 80048 BASIC METABOLIC PNL TOTAL CA: CPT | Performed by: PHYSICIAN ASSISTANT

## 2022-01-01 PROCEDURE — 250N000009 HC RX 250: Performed by: STUDENT IN AN ORGANIZED HEALTH CARE EDUCATION/TRAINING PROGRAM

## 2022-01-01 PROCEDURE — 72131 CT LUMBAR SPINE W/O DYE: CPT | Mod: 26 | Performed by: STUDENT IN AN ORGANIZED HEALTH CARE EDUCATION/TRAINING PROGRAM

## 2022-01-01 PROCEDURE — 97530 THERAPEUTIC ACTIVITIES: CPT | Mod: GO

## 2022-01-01 PROCEDURE — 99222 1ST HOSP IP/OBS MODERATE 55: CPT | Mod: GC | Performed by: NEUROLOGICAL SURGERY

## 2022-01-01 PROCEDURE — 80048 BASIC METABOLIC PNL TOTAL CA: CPT | Performed by: INTERNAL MEDICINE

## 2022-01-01 PROCEDURE — 84484 ASSAY OF TROPONIN QUANT: CPT | Mod: 91 | Performed by: EMERGENCY MEDICINE

## 2022-01-01 PROCEDURE — 84132 ASSAY OF SERUM POTASSIUM: CPT | Performed by: ANESTHESIOLOGY

## 2022-01-01 PROCEDURE — 84132 ASSAY OF SERUM POTASSIUM: CPT | Performed by: STUDENT IN AN ORGANIZED HEALTH CARE EDUCATION/TRAINING PROGRAM

## 2022-01-01 PROCEDURE — 258N000003 HC RX IP 258 OP 636: Performed by: EMERGENCY MEDICINE

## 2022-01-01 PROCEDURE — 97165 OT EVAL LOW COMPLEX 30 MIN: CPT | Mod: GO

## 2022-01-01 PROCEDURE — 72220 X-RAY EXAM SACRUM TAILBONE: CPT | Mod: 26 | Performed by: RADIOLOGY

## 2022-01-01 PROCEDURE — 96366 THER/PROPH/DIAG IV INF ADDON: CPT

## 2022-01-01 PROCEDURE — 99223 1ST HOSP IP/OBS HIGH 75: CPT | Performed by: PHYSICIAN ASSISTANT

## 2022-01-01 PROCEDURE — 85025 COMPLETE CBC W/AUTO DIFF WBC: CPT | Performed by: PHYSICIAN ASSISTANT

## 2022-01-01 PROCEDURE — 81003 URINALYSIS AUTO W/O SCOPE: CPT | Performed by: EMERGENCY MEDICINE

## 2022-01-01 PROCEDURE — 95718 EEG PHYS/QHP 2-12 HR W/VEEG: CPT | Performed by: PSYCHIATRY & NEUROLOGY

## 2022-01-01 PROCEDURE — 81003 URINALYSIS AUTO W/O SCOPE: CPT | Performed by: INTERNAL MEDICINE

## 2022-01-01 PROCEDURE — 72100 X-RAY EXAM L-S SPINE 2/3 VWS: CPT

## 2022-01-01 PROCEDURE — 99213 OFFICE O/P EST LOW 20 MIN: CPT | Mod: 95 | Performed by: NEUROLOGICAL SURGERY

## 2022-01-01 PROCEDURE — 250N000009 HC RX 250: Performed by: INTERNAL MEDICINE

## 2022-01-01 PROCEDURE — 36415 COLL VENOUS BLD VENIPUNCTURE: CPT | Performed by: EMERGENCY MEDICINE

## 2022-01-01 PROCEDURE — 99222 1ST HOSP IP/OBS MODERATE 55: CPT | Performed by: PHYSICIAN ASSISTANT

## 2022-01-01 PROCEDURE — 99232 SBSQ HOSP IP/OBS MODERATE 35: CPT | Performed by: PHYSICIAN ASSISTANT

## 2022-01-01 PROCEDURE — 99231 SBSQ HOSP IP/OBS SF/LOW 25: CPT | Performed by: PHYSICIAN ASSISTANT

## 2022-01-01 PROCEDURE — 85049 AUTOMATED PLATELET COUNT: CPT | Performed by: PHYSICIAN ASSISTANT

## 2022-01-01 PROCEDURE — 250N000009 HC RX 250: Performed by: PHYSICIAN ASSISTANT

## 2022-01-01 PROCEDURE — 82374 ASSAY BLOOD CARBON DIOXIDE: CPT | Performed by: ANESTHESIOLOGY

## 2022-01-01 PROCEDURE — 82248 BILIRUBIN DIRECT: CPT | Performed by: INTERNAL MEDICINE

## 2022-01-01 PROCEDURE — 87324 CLOSTRIDIUM AG IA: CPT | Performed by: INTERNAL MEDICINE

## 2022-01-01 PROCEDURE — 82272 OCCULT BLD FECES 1-3 TESTS: CPT | Performed by: STUDENT IN AN ORGANIZED HEALTH CARE EDUCATION/TRAINING PROGRAM

## 2022-01-01 PROCEDURE — 99214 OFFICE O/P EST MOD 30 MIN: CPT | Mod: CS | Performed by: INTERNAL MEDICINE

## 2022-01-01 PROCEDURE — G1010 CDSM STANSON: HCPCS | Mod: GC | Performed by: STUDENT IN AN ORGANIZED HEALTH CARE EDUCATION/TRAINING PROGRAM

## 2022-01-01 PROCEDURE — 99239 HOSP IP/OBS DSCHRG MGMT >30: CPT | Performed by: STUDENT IN AN ORGANIZED HEALTH CARE EDUCATION/TRAINING PROGRAM

## 2022-01-01 PROCEDURE — 97162 PT EVAL MOD COMPLEX 30 MIN: CPT | Mod: GP

## 2022-01-01 PROCEDURE — 99233 SBSQ HOSP IP/OBS HIGH 50: CPT | Performed by: STUDENT IN AN ORGANIZED HEALTH CARE EDUCATION/TRAINING PROGRAM

## 2022-01-01 PROCEDURE — 99232 SBSQ HOSP IP/OBS MODERATE 35: CPT | Mod: GC | Performed by: STUDENT IN AN ORGANIZED HEALTH CARE EDUCATION/TRAINING PROGRAM

## 2022-01-01 PROCEDURE — 92610 EVALUATE SWALLOWING FUNCTION: CPT | Mod: GN

## 2022-01-01 PROCEDURE — 11200 RMVL SKIN TAGS UP TO&INC 15: CPT | Mod: GC | Performed by: DERMATOLOGY

## 2022-01-01 PROCEDURE — 250N000009 HC RX 250

## 2022-01-01 PROCEDURE — 36415 COLL VENOUS BLD VENIPUNCTURE: CPT | Performed by: PHYSICIAN ASSISTANT

## 2022-01-01 PROCEDURE — G1010 CDSM STANSON: HCPCS | Performed by: RADIOLOGY

## 2022-01-01 PROCEDURE — 85014 HEMATOCRIT: CPT | Performed by: STUDENT IN AN ORGANIZED HEALTH CARE EDUCATION/TRAINING PROGRAM

## 2022-01-01 PROCEDURE — 74177 CT ABD & PELVIS W/CONTRAST: CPT | Mod: MG

## 2022-01-01 PROCEDURE — G0180 MD CERTIFICATION HHA PATIENT: HCPCS | Performed by: FAMILY MEDICINE

## 2022-01-01 PROCEDURE — 99356 PR PROLONGED SERV,INPATIENT,1ST HR: CPT | Performed by: PHYSICIAN ASSISTANT

## 2022-01-01 PROCEDURE — 36415 COLL VENOUS BLD VENIPUNCTURE: CPT | Performed by: HOSPITALIST

## 2022-01-01 PROCEDURE — C9803 HOPD COVID-19 SPEC COLLECT: HCPCS

## 2022-01-01 PROCEDURE — 85027 COMPLETE CBC AUTOMATED: CPT | Performed by: INTERNAL MEDICINE

## 2022-01-01 PROCEDURE — 99232 SBSQ HOSP IP/OBS MODERATE 35: CPT | Performed by: PSYCHIATRY & NEUROLOGY

## 2022-01-01 PROCEDURE — 73560 X-RAY EXAM OF KNEE 1 OR 2: CPT | Mod: RT

## 2022-01-01 PROCEDURE — 93010 ELECTROCARDIOGRAM REPORT: CPT | Performed by: EMERGENCY MEDICINE

## 2022-01-01 PROCEDURE — 96361 HYDRATE IV INFUSION ADD-ON: CPT

## 2022-01-01 PROCEDURE — 72100 X-RAY EXAM L-S SPINE 2/3 VWS: CPT | Mod: 26 | Performed by: RADIOLOGY

## 2022-01-01 PROCEDURE — 74018 RADEX ABDOMEN 1 VIEW: CPT | Mod: 26 | Performed by: RADIOLOGY

## 2022-01-01 PROCEDURE — 74177 CT ABD & PELVIS W/CONTRAST: CPT | Mod: 26 | Performed by: RADIOLOGY

## 2022-01-01 PROCEDURE — 258N000003 HC RX IP 258 OP 636: Performed by: PHYSICIAN ASSISTANT

## 2022-01-01 PROCEDURE — 99222 1ST HOSP IP/OBS MODERATE 55: CPT | Mod: 25 | Performed by: PSYCHIATRY & NEUROLOGY

## 2022-01-01 PROCEDURE — 72220 X-RAY EXAM SACRUM TAILBONE: CPT

## 2022-01-01 PROCEDURE — 99207 EEG VIDEO 2-12 HRS UNMONITORED: CPT | Performed by: PSYCHIATRY & NEUROLOGY

## 2022-01-01 PROCEDURE — 73560 X-RAY EXAM OF KNEE 1 OR 2: CPT | Mod: 26 | Performed by: RADIOLOGY

## 2022-01-01 PROCEDURE — 83690 ASSAY OF LIPASE: CPT | Performed by: EMERGENCY MEDICINE

## 2022-01-01 RX ORDER — CALCIUM CARBONATE 500 MG/1
1000 TABLET, CHEWABLE ORAL 4 TIMES DAILY PRN
Status: DISCONTINUED | OUTPATIENT
Start: 2022-01-01 | End: 2022-01-01 | Stop reason: HOSPADM

## 2022-01-01 RX ORDER — ESCITALOPRAM OXALATE 20 MG/1
20 TABLET ORAL DAILY
Qty: 90 TABLET | Refills: 0 | OUTPATIENT
Start: 2022-01-01

## 2022-01-01 RX ORDER — LIDOCAINE HYDROCHLORIDE 20 MG/ML
JELLY TOPICAL ONCE
Status: COMPLETED | OUTPATIENT
Start: 2022-01-01 | End: 2022-01-01

## 2022-01-01 RX ORDER — ATENOLOL 50 MG/1
100 TABLET ORAL DAILY
Status: DISCONTINUED | OUTPATIENT
Start: 2022-01-01 | End: 2022-01-01

## 2022-01-01 RX ORDER — HYDROMORPHONE HYDROCHLORIDE 1 MG/ML
0.5 INJECTION, SOLUTION INTRAMUSCULAR; INTRAVENOUS; SUBCUTANEOUS
Status: COMPLETED | OUTPATIENT
Start: 2022-01-01 | End: 2022-01-01

## 2022-01-01 RX ORDER — LIDOCAINE HYDROCHLORIDE 20 MG/ML
SOLUTION OROPHARYNGEAL
Status: COMPLETED
Start: 2022-01-01 | End: 2022-01-01

## 2022-01-01 RX ORDER — LORAZEPAM 2 MG/ML
0.25 CONCENTRATE ORAL EVERY 8 HOURS PRN
Status: DISCONTINUED | OUTPATIENT
Start: 2022-01-01 | End: 2022-01-01 | Stop reason: HOSPADM

## 2022-01-01 RX ORDER — POTASSIUM CHLORIDE 1.5 G/1.58G
40 POWDER, FOR SOLUTION ORAL ONCE
Status: COMPLETED | OUTPATIENT
Start: 2022-01-01 | End: 2022-01-01

## 2022-01-01 RX ORDER — VANCOMYCIN HYDROCHLORIDE 125 MG/1
125 CAPSULE ORAL 4 TIMES DAILY
Status: DISCONTINUED | OUTPATIENT
Start: 2022-01-01 | End: 2022-01-01

## 2022-01-01 RX ORDER — ONDANSETRON 2 MG/ML
4 INJECTION INTRAMUSCULAR; INTRAVENOUS EVERY 6 HOURS PRN
Status: DISCONTINUED | OUTPATIENT
Start: 2022-01-01 | End: 2022-01-01 | Stop reason: HOSPADM

## 2022-01-01 RX ORDER — LORAZEPAM 2 MG/ML
0.25 CONCENTRATE ORAL EVERY 6 HOURS PRN
Status: DISCONTINUED | OUTPATIENT
Start: 2022-01-01 | End: 2022-01-01

## 2022-01-01 RX ORDER — POTASSIUM CHLORIDE 1.5 G/1.58G
20 POWDER, FOR SOLUTION ORAL ONCE
Status: COMPLETED | OUTPATIENT
Start: 2022-01-01 | End: 2022-01-01

## 2022-01-01 RX ORDER — OXYCODONE HCL 20 MG/ML
2.5 CONCENTRATE, ORAL ORAL EVERY 6 HOURS
Status: DISCONTINUED | OUTPATIENT
Start: 2022-01-01 | End: 2022-01-01

## 2022-01-01 RX ORDER — CEFTRIAXONE 1 G/1
1 INJECTION, POWDER, FOR SOLUTION INTRAMUSCULAR; INTRAVENOUS ONCE
Status: COMPLETED | OUTPATIENT
Start: 2022-01-01 | End: 2022-01-01

## 2022-01-01 RX ORDER — HYDROMORPHONE HYDROCHLORIDE 1 MG/ML
0.5 INJECTION, SOLUTION INTRAMUSCULAR; INTRAVENOUS; SUBCUTANEOUS
Status: DISCONTINUED | OUTPATIENT
Start: 2022-01-01 | End: 2022-01-01 | Stop reason: HOSPADM

## 2022-01-01 RX ORDER — CALCIPOTRIENE 50 UG/G
CREAM TOPICAL
Qty: 60 G | Refills: 11 | Status: SHIPPED | OUTPATIENT
Start: 2022-01-01

## 2022-01-01 RX ORDER — ESCITALOPRAM OXALATE 20 MG/1
20 TABLET ORAL DAILY
Status: DISCONTINUED | OUTPATIENT
Start: 2022-01-01 | End: 2022-01-01 | Stop reason: HOSPADM

## 2022-01-01 RX ORDER — KETOCONAZOLE 20 MG/G
CREAM TOPICAL DAILY
Status: DISCONTINUED | OUTPATIENT
Start: 2022-01-01 | End: 2022-01-01 | Stop reason: HOSPADM

## 2022-01-01 RX ORDER — CALCIPOTRIENE 50 UG/G
CREAM TOPICAL 2 TIMES DAILY
Status: DISCONTINUED | OUTPATIENT
Start: 2022-01-01 | End: 2022-01-01 | Stop reason: HOSPADM

## 2022-01-01 RX ORDER — TACROLIMUS 1 MG/G
OINTMENT TOPICAL
Qty: 60 G | Refills: 2 | Status: SHIPPED | OUTPATIENT
Start: 2022-01-01 | End: 2022-01-01

## 2022-01-01 RX ORDER — AMLODIPINE BESYLATE 10 MG/1
10 TABLET ORAL DAILY
Status: DISCONTINUED | OUTPATIENT
Start: 2022-01-01 | End: 2022-01-01 | Stop reason: HOSPADM

## 2022-01-01 RX ORDER — CALCIPOTRIENE 0.05 MG/ML
SOLUTION TOPICAL
Status: ON HOLD | COMMUNITY
Start: 2022-01-01 | End: 2022-01-01

## 2022-01-01 RX ORDER — SENNOSIDES 8.6 MG
2 TABLET ORAL
Status: DISCONTINUED | OUTPATIENT
Start: 2022-01-01 | End: 2022-01-01 | Stop reason: HOSPADM

## 2022-01-01 RX ORDER — LORAZEPAM 2 MG/ML
0.5 INJECTION INTRAMUSCULAR EVERY 6 HOURS PRN
Status: DISCONTINUED | OUTPATIENT
Start: 2022-01-01 | End: 2022-01-01

## 2022-01-01 RX ORDER — SIMVASTATIN 40 MG
40 TABLET ORAL AT BEDTIME
Qty: 90 TABLET | Refills: 0 | Status: CANCELLED | OUTPATIENT
Start: 2022-01-01

## 2022-01-01 RX ORDER — ALCLOMETASONE DIPROPIONATE 0.5 MG/G
OINTMENT TOPICAL
Qty: 1 G | Refills: 0 | OUTPATIENT
Start: 2022-01-01

## 2022-01-01 RX ORDER — SIMVASTATIN 40 MG
40 TABLET ORAL AT BEDTIME
Qty: 90 TABLET | Refills: 0 | Status: SHIPPED | OUTPATIENT
Start: 2022-01-01 | End: 2022-01-01

## 2022-01-01 RX ORDER — OLANZAPINE 10 MG/1
5 INJECTION, POWDER, LYOPHILIZED, FOR SOLUTION INTRAMUSCULAR ONCE
Status: COMPLETED | OUTPATIENT
Start: 2022-01-01 | End: 2022-01-01

## 2022-01-01 RX ORDER — MEMANTINE HYDROCHLORIDE 10 MG/1
10 TABLET ORAL 2 TIMES DAILY
Qty: 180 TABLET | Refills: 3 | Status: SHIPPED | OUTPATIENT
Start: 2022-01-01

## 2022-01-01 RX ORDER — NALOXONE HYDROCHLORIDE 0.4 MG/ML
0.4 INJECTION, SOLUTION INTRAMUSCULAR; INTRAVENOUS; SUBCUTANEOUS
Status: DISCONTINUED | OUTPATIENT
Start: 2022-01-01 | End: 2022-01-01 | Stop reason: HOSPADM

## 2022-01-01 RX ORDER — CALCIUM CARBONATE 500 MG/1
1 TABLET, CHEWABLE ORAL 4 TIMES DAILY PRN
Qty: 120 TABLET | Refills: 0 | Status: SHIPPED | OUTPATIENT
Start: 2022-01-01

## 2022-01-01 RX ORDER — DESONIDE 0.5 MG/G
OINTMENT TOPICAL
Qty: 60 G | Refills: 3 | Status: SHIPPED | OUTPATIENT
Start: 2022-01-01

## 2022-01-01 RX ORDER — LIDOCAINE HYDROCHLORIDE 20 MG/ML
JELLY TOPICAL EVERY 4 HOURS PRN
Status: DISCONTINUED | OUTPATIENT
Start: 2022-01-01 | End: 2022-01-01 | Stop reason: HOSPADM

## 2022-01-01 RX ORDER — LIDOCAINE 4 G/G
1 PATCH TOPICAL
Status: DISCONTINUED | OUTPATIENT
Start: 2022-01-01 | End: 2022-01-01 | Stop reason: HOSPADM

## 2022-01-01 RX ORDER — DONEPEZIL HYDROCHLORIDE 5 MG/1
5 TABLET, FILM COATED ORAL DAILY
Status: DISCONTINUED | OUTPATIENT
Start: 2022-01-01 | End: 2022-01-01 | Stop reason: HOSPADM

## 2022-01-01 RX ORDER — TIZANIDINE 2 MG/1
2-4 TABLET ORAL EVERY 6 HOURS PRN
Status: DISCONTINUED | OUTPATIENT
Start: 2022-01-01 | End: 2022-01-01

## 2022-01-01 RX ORDER — QUETIAPINE FUMARATE 25 MG/1
25 TABLET, FILM COATED ORAL 2 TIMES DAILY
Qty: 30 TABLET | Refills: 4 | Status: SHIPPED | OUTPATIENT
Start: 2022-01-01 | End: 2022-01-01

## 2022-01-01 RX ORDER — POTASSIUM CHLORIDE 7.45 MG/ML
10 INJECTION INTRAVENOUS
Status: COMPLETED | OUTPATIENT
Start: 2022-01-01 | End: 2022-01-01

## 2022-01-01 RX ORDER — LORAZEPAM 2 MG/ML
0.5 CONCENTRATE ORAL EVERY 6 HOURS PRN
Status: DISCONTINUED | OUTPATIENT
Start: 2022-01-01 | End: 2022-01-01

## 2022-01-01 RX ORDER — ATENOLOL 50 MG/1
50 TABLET ORAL DAILY
Qty: 90 TABLET | Refills: 3 | Status: SHIPPED | OUTPATIENT
Start: 2022-01-01 | End: 2022-01-01

## 2022-01-01 RX ORDER — HYDROMORPHONE HYDROCHLORIDE 1 MG/ML
0.5 INJECTION, SOLUTION INTRAMUSCULAR; INTRAVENOUS; SUBCUTANEOUS
Status: DISCONTINUED | OUTPATIENT
Start: 2022-01-01 | End: 2022-01-01

## 2022-01-01 RX ORDER — MULTIVITAMIN,THERAPEUTIC
1 TABLET ORAL DAILY
Qty: 30 TABLET | Refills: 0 | Status: SHIPPED | OUTPATIENT
Start: 2022-01-01

## 2022-01-01 RX ORDER — DIAZEPAM 10 MG/2ML
2.5 INJECTION, SOLUTION INTRAMUSCULAR; INTRAVENOUS EVERY 8 HOURS PRN
Status: DISCONTINUED | OUTPATIENT
Start: 2022-01-01 | End: 2022-01-01

## 2022-01-01 RX ORDER — BISACODYL 10 MG
10 SUPPOSITORY, RECTAL RECTAL DAILY PRN
Status: DISCONTINUED | OUTPATIENT
Start: 2022-01-01 | End: 2022-01-01 | Stop reason: HOSPADM

## 2022-01-01 RX ORDER — TAMSULOSIN HYDROCHLORIDE 0.4 MG/1
CAPSULE ORAL
OUTPATIENT
Start: 2022-01-01

## 2022-01-01 RX ORDER — ACETAMINOPHEN 325 MG/10.15ML
975 LIQUID ORAL EVERY 8 HOURS
Status: DISCONTINUED | OUTPATIENT
Start: 2022-01-01 | End: 2022-01-01 | Stop reason: HOSPADM

## 2022-01-01 RX ORDER — DIAZEPAM 10 MG/2ML
2.5 INJECTION, SOLUTION INTRAMUSCULAR; INTRAVENOUS ONCE
Status: DISCONTINUED | OUTPATIENT
Start: 2022-01-01 | End: 2022-01-01

## 2022-01-01 RX ORDER — MEMANTINE HYDROCHLORIDE 10 MG/1
10 TABLET ORAL 2 TIMES DAILY
Qty: 180 TABLET | Refills: 3 | Status: SHIPPED | OUTPATIENT
Start: 2022-01-01 | End: 2022-01-01

## 2022-01-01 RX ORDER — ACETAMINOPHEN 325 MG/1
975 TABLET ORAL 3 TIMES DAILY
Status: DISCONTINUED | OUTPATIENT
Start: 2022-01-01 | End: 2022-01-01

## 2022-01-01 RX ORDER — OXYCODONE HCL 20 MG/ML
2.5 CONCENTRATE, ORAL ORAL EVERY 4 HOURS
Status: DISCONTINUED | OUTPATIENT
Start: 2022-01-01 | End: 2022-01-01

## 2022-01-01 RX ORDER — CEFTRIAXONE 1 G/1
1 INJECTION, POWDER, FOR SOLUTION INTRAMUSCULAR; INTRAVENOUS EVERY 24 HOURS
Status: DISCONTINUED | OUTPATIENT
Start: 2022-01-01 | End: 2022-01-01

## 2022-01-01 RX ORDER — AMLODIPINE BESYLATE 5 MG/1
5 TABLET ORAL DAILY
Status: DISCONTINUED | OUTPATIENT
Start: 2022-01-01 | End: 2022-01-01

## 2022-01-01 RX ORDER — DIAZEPAM 10 MG/2ML
2.5 INJECTION, SOLUTION INTRAMUSCULAR; INTRAVENOUS ONCE
Status: COMPLETED | OUTPATIENT
Start: 2022-01-01 | End: 2022-01-01

## 2022-01-01 RX ORDER — QUETIAPINE FUMARATE 25 MG/1
12.5 TABLET, FILM COATED ORAL DAILY PRN
Qty: 30 TABLET | Refills: 4 | Status: SHIPPED | OUTPATIENT
Start: 2022-01-01

## 2022-01-01 RX ORDER — KETOROLAC TROMETHAMINE 15 MG/ML
15 INJECTION, SOLUTION INTRAMUSCULAR; INTRAVENOUS EVERY 8 HOURS
Status: COMPLETED | OUTPATIENT
Start: 2022-01-01 | End: 2022-01-01

## 2022-01-01 RX ORDER — ESCITALOPRAM OXALATE 10 MG/1
TABLET ORAL
Qty: 150 TABLET | Refills: 0 | Status: SHIPPED | OUTPATIENT
Start: 2022-01-01 | End: 2022-01-01

## 2022-01-01 RX ORDER — LORAZEPAM 2 MG/ML
1 INJECTION INTRAMUSCULAR ONCE
Status: COMPLETED | OUTPATIENT
Start: 2022-01-01 | End: 2022-01-01

## 2022-01-01 RX ORDER — BUPROPION HYDROCHLORIDE 150 MG/1
300 TABLET ORAL EVERY MORNING
Qty: 180 TABLET | Refills: 0 | Status: SHIPPED | OUTPATIENT
Start: 2022-01-01 | End: 2022-01-01 | Stop reason: ALTCHOICE

## 2022-01-01 RX ORDER — LIDOCAINE 40 MG/G
CREAM TOPICAL
Status: DISCONTINUED | OUTPATIENT
Start: 2022-01-01 | End: 2022-01-01 | Stop reason: HOSPADM

## 2022-01-01 RX ORDER — TACROLIMUS 1 MG/G
OINTMENT TOPICAL 2 TIMES DAILY
Qty: 60 G | Refills: 2 | Status: SHIPPED | OUTPATIENT
Start: 2022-01-01 | End: 2022-01-01

## 2022-01-01 RX ORDER — ACETAMINOPHEN 500 MG
1000 TABLET ORAL ONCE
Status: COMPLETED | OUTPATIENT
Start: 2022-01-01 | End: 2022-01-01

## 2022-01-01 RX ORDER — MAGNESIUM 30 MG
30 TABLET ORAL AT BEDTIME
Status: DISCONTINUED | OUTPATIENT
Start: 2022-01-01 | End: 2022-01-01

## 2022-01-01 RX ORDER — VANCOMYCIN HYDROCHLORIDE 50 MG/ML
125 KIT ORAL 4 TIMES DAILY
Qty: 30 ML | Refills: 0 | Status: SHIPPED | OUTPATIENT
Start: 2022-01-01 | End: 2022-01-01

## 2022-01-01 RX ORDER — CALCIPOTRIENE 50 UG/G
CREAM TOPICAL
Refills: 11 | OUTPATIENT
Start: 2022-01-01

## 2022-01-01 RX ORDER — FINASTERIDE 5 MG/1
5 TABLET, FILM COATED ORAL DAILY
Qty: 30 TABLET | Refills: 0 | Status: SHIPPED | OUTPATIENT
Start: 2022-01-01

## 2022-01-01 RX ORDER — NAPROXEN 250 MG/1
250 TABLET ORAL 2 TIMES DAILY WITH MEALS
Status: COMPLETED | OUTPATIENT
Start: 2022-01-01 | End: 2022-01-01

## 2022-01-01 RX ORDER — POTASSIUM CHLORIDE 1.5 G/1.58G
40 POWDER, FOR SOLUTION ORAL ONCE
Status: DISCONTINUED | OUTPATIENT
Start: 2022-01-01 | End: 2022-01-01

## 2022-01-01 RX ORDER — ESCITALOPRAM OXALATE 10 MG/1
TABLET ORAL
Qty: 150 TABLET | Refills: 0 | Status: CANCELLED | OUTPATIENT
Start: 2022-01-01 | End: 2022-01-01

## 2022-01-01 RX ORDER — VANCOMYCIN HYDROCHLORIDE 50 MG/ML
125 KIT ORAL 4 TIMES DAILY
Status: DISCONTINUED | OUTPATIENT
Start: 2022-01-01 | End: 2022-01-01 | Stop reason: HOSPADM

## 2022-01-01 RX ORDER — OXYCODONE HYDROCHLORIDE 5 MG/1
5 TABLET ORAL EVERY 6 HOURS PRN
Qty: 8 TABLET | Refills: 0 | Status: SHIPPED | OUTPATIENT
Start: 2022-01-01

## 2022-01-01 RX ORDER — OXYCODONE HCL 20 MG/ML
2.5 CONCENTRATE, ORAL ORAL
Status: DISCONTINUED | OUTPATIENT
Start: 2022-01-01 | End: 2022-01-01

## 2022-01-01 RX ORDER — BUPRENORPHINE 5 UG/H
1 PATCH TRANSDERMAL WEEKLY
Qty: 12 PATCH | Refills: 0 | Status: SHIPPED | OUTPATIENT
Start: 2022-01-01

## 2022-01-01 RX ORDER — SODIUM CHLORIDE, SODIUM LACTATE, POTASSIUM CHLORIDE, CALCIUM CHLORIDE 600; 310; 30; 20 MG/100ML; MG/100ML; MG/100ML; MG/100ML
INJECTION, SOLUTION INTRAVENOUS CONTINUOUS
Status: DISCONTINUED | OUTPATIENT
Start: 2022-01-01 | End: 2022-01-01

## 2022-01-01 RX ORDER — MORPHINE SULFATE 20 MG/ML
5 SOLUTION ORAL
Status: DISCONTINUED | OUTPATIENT
Start: 2022-01-01 | End: 2022-01-01

## 2022-01-01 RX ORDER — HYDROXYZINE HYDROCHLORIDE 25 MG/1
25 TABLET, FILM COATED ORAL EVERY 6 HOURS PRN
Status: DISCONTINUED | OUTPATIENT
Start: 2022-01-01 | End: 2022-01-01

## 2022-01-01 RX ORDER — LEVOFLOXACIN 25 MG/ML
500 SOLUTION ORAL DAILY
Status: DISCONTINUED | OUTPATIENT
Start: 2022-01-01 | End: 2022-01-01

## 2022-01-01 RX ORDER — ESCITALOPRAM OXALATE 20 MG/1
20 TABLET ORAL DAILY
Qty: 90 TABLET | Refills: 1 | Status: SHIPPED | OUTPATIENT
Start: 2022-01-01

## 2022-01-01 RX ORDER — IOPAMIDOL 755 MG/ML
130 INJECTION, SOLUTION INTRAVASCULAR ONCE
Status: COMPLETED | OUTPATIENT
Start: 2022-01-01 | End: 2022-01-01

## 2022-01-01 RX ORDER — KETOCONAZOLE 20 MG/ML
SHAMPOO TOPICAL
Qty: 240 ML | Refills: 11 | Status: SHIPPED | OUTPATIENT
Start: 2022-01-01

## 2022-01-01 RX ORDER — SIMVASTATIN 40 MG
40 TABLET ORAL AT BEDTIME
Status: DISCONTINUED | OUTPATIENT
Start: 2022-01-01 | End: 2022-01-01

## 2022-01-01 RX ORDER — LORAZEPAM 2 MG/ML
0.5 CONCENTRATE ORAL EVERY 4 HOURS PRN
Status: DISCONTINUED | OUTPATIENT
Start: 2022-01-01 | End: 2022-01-01

## 2022-01-01 RX ORDER — OXYCODONE HCL 20 MG/ML
5 CONCENTRATE, ORAL ORAL EVERY 4 HOURS
Status: DISCONTINUED | OUTPATIENT
Start: 2022-01-01 | End: 2022-01-01

## 2022-01-01 RX ORDER — TAMSULOSIN HYDROCHLORIDE 0.4 MG/1
0.4 CAPSULE ORAL DAILY
Qty: 90 CAPSULE | Refills: 3 | Status: SHIPPED | OUTPATIENT
Start: 2022-01-01

## 2022-01-01 RX ORDER — OXYCODONE HCL 20 MG/ML
2.5 CONCENTRATE, ORAL ORAL EVERY 4 HOURS PRN
Status: DISCONTINUED | OUTPATIENT
Start: 2022-01-01 | End: 2022-01-01 | Stop reason: HOSPADM

## 2022-01-01 RX ORDER — TACROLIMUS 1 MG/G
OINTMENT TOPICAL
COMMUNITY

## 2022-01-01 RX ORDER — POTASSIUM CHLORIDE 7.45 MG/ML
10 INJECTION INTRAVENOUS
Status: DISCONTINUED | OUTPATIENT
Start: 2022-01-01 | End: 2022-01-01

## 2022-01-01 RX ORDER — OXYCODONE HCL 20 MG/ML
5 CONCENTRATE, ORAL ORAL 2 TIMES DAILY PRN
Status: DISCONTINUED | OUTPATIENT
Start: 2022-01-01 | End: 2022-01-01

## 2022-01-01 RX ORDER — HALOPERIDOL 5 MG/ML
2 INJECTION INTRAMUSCULAR EVERY 6 HOURS PRN
Status: DISCONTINUED | OUTPATIENT
Start: 2022-01-01 | End: 2022-01-01 | Stop reason: HOSPADM

## 2022-01-01 RX ORDER — MEMANTINE HYDROCHLORIDE 10 MG/1
10 TABLET ORAL 2 TIMES DAILY
OUTPATIENT
Start: 2022-01-01

## 2022-01-01 RX ORDER — NALOXONE HYDROCHLORIDE 0.4 MG/ML
0.2 INJECTION, SOLUTION INTRAMUSCULAR; INTRAVENOUS; SUBCUTANEOUS
Status: DISCONTINUED | OUTPATIENT
Start: 2022-01-01 | End: 2022-01-01 | Stop reason: HOSPADM

## 2022-01-01 RX ORDER — KETOROLAC TROMETHAMINE 15 MG/ML
15 INJECTION, SOLUTION INTRAMUSCULAR; INTRAVENOUS EVERY 6 HOURS PRN
Status: DISPENSED | OUTPATIENT
Start: 2022-01-01 | End: 2022-01-01

## 2022-01-01 RX ORDER — TAMSULOSIN HYDROCHLORIDE 0.4 MG/1
CAPSULE ORAL
Status: CANCELLED | OUTPATIENT
Start: 2022-01-01

## 2022-01-01 RX ORDER — OXYCODONE HCL 20 MG/ML
2.5 CONCENTRATE, ORAL ORAL EVERY 6 HOURS
Status: COMPLETED | OUTPATIENT
Start: 2022-01-01 | End: 2022-01-01

## 2022-01-01 RX ORDER — AMOXICILLIN 250 MG
1 CAPSULE ORAL 2 TIMES DAILY PRN
Status: DISCONTINUED | OUTPATIENT
Start: 2022-01-01 | End: 2022-01-01

## 2022-01-01 RX ORDER — ATENOLOL 100 MG/1
100 TABLET ORAL DAILY
Qty: 90 TABLET | Refills: 3 | Status: ON HOLD | OUTPATIENT
Start: 2022-01-01 | End: 2022-01-01

## 2022-01-01 RX ORDER — HALOPERIDOL 5 MG/ML
2 INJECTION INTRAMUSCULAR EVERY 6 HOURS PRN
Status: DISCONTINUED | OUTPATIENT
Start: 2022-01-01 | End: 2022-01-01

## 2022-01-01 RX ORDER — BUPROPION HYDROCHLORIDE 150 MG/1
300 TABLET ORAL EVERY MORNING
Qty: 180 TABLET | Refills: 0 | Status: SHIPPED | OUTPATIENT
Start: 2022-01-01 | End: 2022-01-01

## 2022-01-01 RX ORDER — ESCITALOPRAM OXALATE 20 MG/1
20 TABLET ORAL DAILY
Qty: 90 TABLET | Refills: 0 | Status: SHIPPED | OUTPATIENT
Start: 2022-01-01 | End: 2022-01-01

## 2022-01-01 RX ORDER — LORAZEPAM 2 MG/ML
0.25 CONCENTRATE ORAL EVERY 8 HOURS PRN
Qty: 30 ML | Refills: 0 | Status: SHIPPED | OUTPATIENT
Start: 2022-01-01

## 2022-01-01 RX ORDER — HYDROCORTISONE 25 MG/G
OINTMENT TOPICAL
Qty: 30 G | Refills: 11 | Status: SHIPPED | OUTPATIENT
Start: 2022-01-01

## 2022-01-01 RX ORDER — DONEPEZIL HYDROCHLORIDE 5 MG/1
5 TABLET, FILM COATED ORAL DAILY
Qty: 90 TABLET | Refills: 11 | Status: SHIPPED | OUTPATIENT
Start: 2022-01-01

## 2022-01-01 RX ORDER — LORAZEPAM 2 MG/ML
.5-1 CONCENTRATE ORAL EVERY 6 HOURS PRN
Status: DISCONTINUED | OUTPATIENT
Start: 2022-01-01 | End: 2022-01-01

## 2022-01-01 RX ORDER — MEMANTINE HYDROCHLORIDE 10 MG/1
10 TABLET ORAL 2 TIMES DAILY
Status: DISCONTINUED | OUTPATIENT
Start: 2022-01-01 | End: 2022-01-01 | Stop reason: HOSPADM

## 2022-01-01 RX ORDER — MULTIVITAMIN,THERAPEUTIC
1 TABLET ORAL DAILY
Status: DISCONTINUED | OUTPATIENT
Start: 2022-01-01 | End: 2022-01-01 | Stop reason: HOSPADM

## 2022-01-01 RX ORDER — BUPROPION HYDROCHLORIDE 150 MG/1
300 TABLET ORAL EVERY MORNING
Qty: 180 TABLET | Refills: 0 | COMMUNITY
Start: 2022-01-01 | End: 2022-01-01

## 2022-01-01 RX ORDER — TAMSULOSIN HYDROCHLORIDE 0.4 MG/1
0.4 CAPSULE ORAL DAILY
Status: DISCONTINUED | OUTPATIENT
Start: 2022-01-01 | End: 2022-01-01 | Stop reason: HOSPADM

## 2022-01-01 RX ORDER — TACROLIMUS 1 MG/G
OINTMENT TOPICAL
Qty: 60 G | Refills: 2 | Status: ON HOLD | OUTPATIENT
Start: 2022-01-01 | End: 2022-01-01

## 2022-01-01 RX ORDER — MORPHINE SULFATE 20 MG/ML
10 SOLUTION ORAL EVERY 6 HOURS
Status: DISCONTINUED | OUTPATIENT
Start: 2022-01-01 | End: 2022-01-01

## 2022-01-01 RX ORDER — ONDANSETRON 4 MG/1
4 TABLET, ORALLY DISINTEGRATING ORAL EVERY 6 HOURS PRN
Status: DISCONTINUED | OUTPATIENT
Start: 2022-01-01 | End: 2022-01-01 | Stop reason: HOSPADM

## 2022-01-01 RX ORDER — SIMVASTATIN 40 MG
40 TABLET ORAL AT BEDTIME
Qty: 90 TABLET | Refills: 0 | Status: SHIPPED | OUTPATIENT
Start: 2022-01-01 | End: 2023-01-01

## 2022-01-01 RX ORDER — ESCITALOPRAM OXALATE 20 MG/1
20 TABLET ORAL DAILY
Qty: 30 TABLET | Refills: 1 | Status: SHIPPED | OUTPATIENT
Start: 2022-01-01 | End: 2022-01-01

## 2022-01-01 RX ORDER — OXYCODONE HYDROCHLORIDE 5 MG/1
5 TABLET ORAL EVERY 6 HOURS PRN
Qty: 8 TABLET | Refills: 0 | Status: ON HOLD | OUTPATIENT
Start: 2022-01-01 | End: 2022-01-01

## 2022-01-01 RX ORDER — BUPRENORPHINE 5 UG/H
1 PATCH TRANSDERMAL WEEKLY
Status: DISCONTINUED | OUTPATIENT
Start: 2022-01-01 | End: 2022-01-01 | Stop reason: HOSPADM

## 2022-01-01 RX ORDER — QUETIAPINE FUMARATE 25 MG/1
25 TABLET, FILM COATED ORAL 2 TIMES DAILY
Status: COMPLETED | OUTPATIENT
Start: 2022-01-01 | End: 2022-01-01

## 2022-01-01 RX ORDER — OXYCODONE HCL 20 MG/ML
2.5-5 CONCENTRATE, ORAL ORAL EVERY 4 HOURS
Status: DISCONTINUED | OUTPATIENT
Start: 2022-01-01 | End: 2022-01-01

## 2022-01-01 RX ORDER — DEXTROSE, SODIUM CHLORIDE, SODIUM LACTATE, POTASSIUM CHLORIDE, AND CALCIUM CHLORIDE 5; .6; .31; .03; .02 G/100ML; G/100ML; G/100ML; G/100ML; G/100ML
INJECTION, SOLUTION INTRAVENOUS CONTINUOUS
Status: DISCONTINUED | OUTPATIENT
Start: 2022-01-01 | End: 2022-01-01

## 2022-01-01 RX ORDER — TAMSULOSIN HYDROCHLORIDE 0.4 MG/1
CAPSULE ORAL
COMMUNITY
Start: 2021-12-20 | End: 2022-01-01

## 2022-01-01 RX ORDER — HYDROXYZINE HYDROCHLORIDE 50 MG/1
50 TABLET, FILM COATED ORAL EVERY 6 HOURS PRN
Status: DISCONTINUED | OUTPATIENT
Start: 2022-01-01 | End: 2022-01-01

## 2022-01-01 RX ORDER — SODIUM CHLORIDE, SODIUM LACTATE, POTASSIUM CHLORIDE, CALCIUM CHLORIDE 600; 310; 30; 20 MG/100ML; MG/100ML; MG/100ML; MG/100ML
INJECTION, SOLUTION INTRAVENOUS CONTINUOUS
Status: ACTIVE | OUTPATIENT
Start: 2022-01-01 | End: 2022-01-01

## 2022-01-01 RX ORDER — SIMVASTATIN 40 MG
40 TABLET ORAL AT BEDTIME
Qty: 90 TABLET | Refills: 0 | OUTPATIENT
Start: 2022-01-01

## 2022-01-01 RX ORDER — AMLODIPINE BESYLATE 10 MG/1
10 TABLET ORAL DAILY
Qty: 30 TABLET | Refills: 0 | Status: SHIPPED | OUTPATIENT
Start: 2022-01-01

## 2022-01-01 RX ORDER — HEPARIN SODIUM 5000 [USP'U]/.5ML
5000 INJECTION, SOLUTION INTRAVENOUS; SUBCUTANEOUS EVERY 12 HOURS
Status: DISCONTINUED | OUTPATIENT
Start: 2022-01-01 | End: 2022-01-01 | Stop reason: HOSPADM

## 2022-01-01 RX ORDER — FINASTERIDE 5 MG/1
5 TABLET, FILM COATED ORAL DAILY
Status: DISCONTINUED | OUTPATIENT
Start: 2022-01-01 | End: 2022-01-01 | Stop reason: HOSPADM

## 2022-01-01 RX ADMIN — HEPARIN SODIUM 5000 UNITS: 5000 INJECTION, SOLUTION INTRAVENOUS; SUBCUTANEOUS at 21:18

## 2022-01-01 RX ADMIN — DONEPEZIL HYDROCHLORIDE 5 MG: 5 TABLET, FILM COATED ORAL at 11:54

## 2022-01-01 RX ADMIN — HYDROMORPHONE HYDROCHLORIDE 0.5 MG: 1 INJECTION, SOLUTION INTRAMUSCULAR; INTRAVENOUS; SUBCUTANEOUS at 04:56

## 2022-01-01 RX ADMIN — HYDROMORPHONE HYDROCHLORIDE 0.5 MG: 1 INJECTION, SOLUTION INTRAMUSCULAR; INTRAVENOUS; SUBCUTANEOUS at 21:35

## 2022-01-01 RX ADMIN — LIDOCAINE PATCH 4% 1 PATCH: 40 PATCH TOPICAL at 08:56

## 2022-01-01 RX ADMIN — CALCIPOTRIENE: 50 CREAM TOPICAL at 09:42

## 2022-01-01 RX ADMIN — ACETAMINOPHEN 975 MG: 325 TABLET, FILM COATED ORAL at 10:11

## 2022-01-01 RX ADMIN — ESCITALOPRAM OXALATE 20 MG: 20 TABLET ORAL at 09:03

## 2022-01-01 RX ADMIN — ATENOLOL 100 MG: 50 TABLET ORAL at 08:55

## 2022-01-01 RX ADMIN — HEPARIN SODIUM 5000 UNITS: 5000 INJECTION, SOLUTION INTRAVENOUS; SUBCUTANEOUS at 09:28

## 2022-01-01 RX ADMIN — VANCOMYCIN HYDROCHLORIDE 125 MG: KIT at 19:46

## 2022-01-01 RX ADMIN — SODIUM CHLORIDE, SODIUM LACTATE, POTASSIUM CHLORIDE, CALCIUM CHLORIDE AND DEXTROSE MONOHYDRATE: 5; 600; 310; 30; 20 INJECTION, SOLUTION INTRAVENOUS at 23:31

## 2022-01-01 RX ADMIN — DEXTROSE MONOHYDRATE AND SODIUM CHLORIDE: 5; .9 INJECTION, SOLUTION INTRAVENOUS at 19:19

## 2022-01-01 RX ADMIN — ACETAMINOPHEN 975 MG: 325 TABLET, FILM COATED ORAL at 08:02

## 2022-01-01 RX ADMIN — TIZANIDINE 4 MG: 2 TABLET ORAL at 13:46

## 2022-01-01 RX ADMIN — HALOPERIDOL LACTATE 2 MG: 5 INJECTION, SOLUTION INTRAMUSCULAR at 09:30

## 2022-01-01 RX ADMIN — HEPARIN SODIUM 5000 UNITS: 5000 INJECTION, SOLUTION INTRAVENOUS; SUBCUTANEOUS at 08:31

## 2022-01-01 RX ADMIN — OXYCODONE HYDROCHLORIDE 2.5 MG: 100 SOLUTION ORAL at 16:00

## 2022-01-01 RX ADMIN — HYDROMORPHONE HYDROCHLORIDE 0.5 MG: 1 INJECTION, SOLUTION INTRAMUSCULAR; INTRAVENOUS; SUBCUTANEOUS at 02:56

## 2022-01-01 RX ADMIN — ACETAMINOPHEN 325 MG: 325 TABLET, FILM COATED ORAL at 10:59

## 2022-01-01 RX ADMIN — HEPARIN SODIUM 5000 UNITS: 5000 INJECTION, SOLUTION INTRAVENOUS; SUBCUTANEOUS at 20:56

## 2022-01-01 RX ADMIN — SODIUM CHLORIDE, POTASSIUM CHLORIDE, SODIUM LACTATE AND CALCIUM CHLORIDE: 600; 310; 30; 20 INJECTION, SOLUTION INTRAVENOUS at 21:13

## 2022-01-01 RX ADMIN — DONEPEZIL HYDROCHLORIDE 5 MG: 5 TABLET, FILM COATED ORAL at 08:02

## 2022-01-01 RX ADMIN — OXYCODONE HYDROCHLORIDE 2.5 MG: 100 SOLUTION ORAL at 01:27

## 2022-01-01 RX ADMIN — SODIUM CHLORIDE, POTASSIUM CHLORIDE, SODIUM LACTATE AND CALCIUM CHLORIDE: 600; 310; 30; 20 INJECTION, SOLUTION INTRAVENOUS at 16:17

## 2022-01-01 RX ADMIN — ACETAMINOPHEN 975 MG: 325 TABLET, FILM COATED ORAL at 14:45

## 2022-01-01 RX ADMIN — OXYCODONE HYDROCHLORIDE 2.5 MG: 100 SOLUTION ORAL at 12:06

## 2022-01-01 RX ADMIN — LIDOCAINE: 40 CREAM TOPICAL at 12:09

## 2022-01-01 RX ADMIN — OXYCODONE HYDROCHLORIDE 2.5 MG: 100 SOLUTION ORAL at 06:22

## 2022-01-01 RX ADMIN — DONEPEZIL HYDROCHLORIDE 5 MG: 5 TABLET, FILM COATED ORAL at 09:04

## 2022-01-01 RX ADMIN — ACETAMINOPHEN 975 MG: 325 TABLET, FILM COATED ORAL at 20:34

## 2022-01-01 RX ADMIN — VANCOMYCIN HYDROCHLORIDE 125 MG: KIT at 17:12

## 2022-01-01 RX ADMIN — SODIUM CHLORIDE, POTASSIUM CHLORIDE, SODIUM LACTATE AND CALCIUM CHLORIDE: 600; 310; 30; 20 INJECTION, SOLUTION INTRAVENOUS at 05:49

## 2022-01-01 RX ADMIN — HALOPERIDOL LACTATE 2 MG: 5 INJECTION, SOLUTION INTRAMUSCULAR at 21:21

## 2022-01-01 RX ADMIN — ATENOLOL 100 MG: 50 TABLET ORAL at 11:20

## 2022-01-01 RX ADMIN — VALPROIC ACID 250 MG: 250 SOLUTION ORAL at 20:42

## 2022-01-01 RX ADMIN — ESCITALOPRAM OXALATE 20 MG: 20 TABLET ORAL at 09:21

## 2022-01-01 RX ADMIN — Medication 5 MG: at 21:01

## 2022-01-01 RX ADMIN — CEFTRIAXONE SODIUM 1 G: 1 INJECTION, POWDER, FOR SOLUTION INTRAMUSCULAR; INTRAVENOUS at 13:24

## 2022-01-01 RX ADMIN — MEMANTINE 10 MG: 10 TABLET ORAL at 09:04

## 2022-01-01 RX ADMIN — SODIUM CHLORIDE, SODIUM LACTATE, POTASSIUM CHLORIDE, CALCIUM CHLORIDE AND DEXTROSE MONOHYDRATE: 5; 600; 310; 30; 20 INJECTION, SOLUTION INTRAVENOUS at 18:13

## 2022-01-01 RX ADMIN — MEMANTINE 10 MG: 10 TABLET ORAL at 08:58

## 2022-01-01 RX ADMIN — VANCOMYCIN HYDROCHLORIDE 125 MG: KIT at 08:01

## 2022-01-01 RX ADMIN — NAPROXEN 250 MG: 250 TABLET ORAL at 18:24

## 2022-01-01 RX ADMIN — TAMSULOSIN HYDROCHLORIDE 0.4 MG: 0.4 CAPSULE ORAL at 10:46

## 2022-01-01 RX ADMIN — CALCIPOTRIENE: 50 CREAM TOPICAL at 18:15

## 2022-01-01 RX ADMIN — OXYCODONE HYDROCHLORIDE 2.5 MG: 100 SOLUTION ORAL at 17:01

## 2022-01-01 RX ADMIN — HYDROMORPHONE HYDROCHLORIDE 0.5 MG: 1 INJECTION, SOLUTION INTRAMUSCULAR; INTRAVENOUS; SUBCUTANEOUS at 06:00

## 2022-01-01 RX ADMIN — VALPROIC ACID 250 MG: 250 SOLUTION ORAL at 09:07

## 2022-01-01 RX ADMIN — POTASSIUM CHLORIDE 20 MEQ: 1.5 POWDER, FOR SOLUTION ORAL at 12:36

## 2022-01-01 RX ADMIN — OXYCODONE HYDROCHLORIDE 2.5 MG: 100 SOLUTION ORAL at 17:48

## 2022-01-01 RX ADMIN — MEMANTINE 10 MG: 10 TABLET ORAL at 21:13

## 2022-01-01 RX ADMIN — CALCIPOTRIENE: 50 CREAM TOPICAL at 08:52

## 2022-01-01 RX ADMIN — MORPHINE SULFATE 5 MG: 100 SOLUTION ORAL at 03:22

## 2022-01-01 RX ADMIN — OXYCODONE HYDROCHLORIDE 2.5 MG: 100 SOLUTION ORAL at 20:14

## 2022-01-01 RX ADMIN — DIAZEPAM 2.5 MG: 5 INJECTION, SOLUTION INTRAMUSCULAR; INTRAVENOUS at 23:03

## 2022-01-01 RX ADMIN — ATENOLOL 100 MG: 50 TABLET ORAL at 11:54

## 2022-01-01 RX ADMIN — MORPHINE SULFATE 10 MG: 100 SOLUTION ORAL at 13:01

## 2022-01-01 RX ADMIN — LIDOCAINE PATCH 4% 1 PATCH: 40 PATCH TOPICAL at 08:31

## 2022-01-01 RX ADMIN — VALPROIC ACID 250 MG: 250 SOLUTION ORAL at 08:01

## 2022-01-01 RX ADMIN — MEMANTINE 10 MG: 10 TABLET ORAL at 11:53

## 2022-01-01 RX ADMIN — DONEPEZIL HYDROCHLORIDE 5 MG: 5 TABLET, FILM COATED ORAL at 10:11

## 2022-01-01 RX ADMIN — AMLODIPINE BESYLATE 10 MG: 10 TABLET ORAL at 14:56

## 2022-01-01 RX ADMIN — HYDROMORPHONE HYDROCHLORIDE 0.5 MG: 1 INJECTION, SOLUTION INTRAMUSCULAR; INTRAVENOUS; SUBCUTANEOUS at 05:49

## 2022-01-01 RX ADMIN — OXYCODONE HYDROCHLORIDE 2.5 MG: 100 SOLUTION ORAL at 12:10

## 2022-01-01 RX ADMIN — VALPROIC ACID 250 MG: 250 SOLUTION ORAL at 03:29

## 2022-01-01 RX ADMIN — HYDROMORPHONE HYDROCHLORIDE 0.5 MG: 1 INJECTION, SOLUTION INTRAMUSCULAR; INTRAVENOUS; SUBCUTANEOUS at 07:56

## 2022-01-01 RX ADMIN — POTASSIUM CHLORIDE 10 MEQ: 7.46 INJECTION, SOLUTION INTRAVENOUS at 12:41

## 2022-01-01 RX ADMIN — SIMVASTATIN 40 MG: 40 TABLET, FILM COATED ORAL at 22:44

## 2022-01-01 RX ADMIN — HEPARIN SODIUM 5000 UNITS: 5000 INJECTION, SOLUTION INTRAVENOUS; SUBCUTANEOUS at 08:20

## 2022-01-01 RX ADMIN — HEPARIN SODIUM 5000 UNITS: 5000 INJECTION, SOLUTION INTRAVENOUS; SUBCUTANEOUS at 20:42

## 2022-01-01 RX ADMIN — ACETAMINOPHEN 975 MG: 325 SOLUTION ORAL at 21:28

## 2022-01-01 RX ADMIN — VANCOMYCIN HYDROCHLORIDE 125 MG: KIT at 13:17

## 2022-01-01 RX ADMIN — NAPROXEN 250 MG: 250 TABLET ORAL at 12:09

## 2022-01-01 RX ADMIN — VANCOMYCIN HYDROCHLORIDE 125 MG: KIT at 16:13

## 2022-01-01 RX ADMIN — VANCOMYCIN HYDROCHLORIDE 125 MG: KIT at 17:01

## 2022-01-01 RX ADMIN — OXYCODONE HYDROCHLORIDE 2.5 MG: 100 SOLUTION ORAL at 05:02

## 2022-01-01 RX ADMIN — POTASSIUM CHLORIDE 10 MEQ: 7.46 INJECTION, SOLUTION INTRAVENOUS at 11:19

## 2022-01-01 RX ADMIN — CALCIPOTRIENE: 50 CREAM TOPICAL at 08:25

## 2022-01-01 RX ADMIN — ACETAMINOPHEN 975 MG: 325 TABLET, FILM COATED ORAL at 11:53

## 2022-01-01 RX ADMIN — OXYCODONE HYDROCHLORIDE 2.5 MG: 100 SOLUTION ORAL at 15:38

## 2022-01-01 RX ADMIN — POTASSIUM CHLORIDE 10 MEQ: 7.46 INJECTION, SOLUTION INTRAVENOUS at 21:27

## 2022-01-01 RX ADMIN — POTASSIUM CHLORIDE 40 MEQ: 1.5 POWDER, FOR SOLUTION ORAL at 14:45

## 2022-01-01 RX ADMIN — DONEPEZIL HYDROCHLORIDE 5 MG: 5 TABLET, FILM COATED ORAL at 14:56

## 2022-01-01 RX ADMIN — ESCITALOPRAM OXALATE 20 MG: 20 TABLET ORAL at 14:56

## 2022-01-01 RX ADMIN — MEMANTINE 10 MG: 10 TABLET ORAL at 20:42

## 2022-01-01 RX ADMIN — OXYCODONE HYDROCHLORIDE 5 MG: 100 SOLUTION ORAL at 05:37

## 2022-01-01 RX ADMIN — FINASTERIDE 5 MG: 5 TABLET, FILM COATED ORAL at 17:58

## 2022-01-01 RX ADMIN — TAMSULOSIN HYDROCHLORIDE 0.4 MG: 0.4 CAPSULE ORAL at 11:54

## 2022-01-01 RX ADMIN — KETOROLAC TROMETHAMINE 15 MG: 15 INJECTION, SOLUTION INTRAMUSCULAR; INTRAVENOUS at 21:12

## 2022-01-01 RX ADMIN — OXYCODONE HYDROCHLORIDE 2.5 MG: 100 SOLUTION ORAL at 05:31

## 2022-01-01 RX ADMIN — OXYCODONE HYDROCHLORIDE 2.5 MG: 100 SOLUTION ORAL at 09:00

## 2022-01-01 RX ADMIN — OLANZAPINE 5 MG: 10 INJECTION, POWDER, FOR SOLUTION INTRAMUSCULAR at 00:05

## 2022-01-01 RX ADMIN — MEMANTINE 10 MG: 10 TABLET ORAL at 11:20

## 2022-01-01 RX ADMIN — POTASSIUM CHLORIDE 10 MEQ: 7.46 INJECTION, SOLUTION INTRAVENOUS at 14:06

## 2022-01-01 RX ADMIN — DONEPEZIL HYDROCHLORIDE 5 MG: 5 TABLET, FILM COATED ORAL at 11:20

## 2022-01-01 RX ADMIN — AMLODIPINE BESYLATE 10 MG: 10 TABLET ORAL at 10:11

## 2022-01-01 RX ADMIN — MEMANTINE 10 MG: 10 TABLET ORAL at 14:56

## 2022-01-01 RX ADMIN — POTASSIUM CHLORIDE 10 MEQ: 7.46 INJECTION, SOLUTION INTRAVENOUS at 17:12

## 2022-01-01 RX ADMIN — CALCIPOTRIENE: 50 CREAM TOPICAL at 20:14

## 2022-01-01 RX ADMIN — DONEPEZIL HYDROCHLORIDE 5 MG: 5 TABLET, FILM COATED ORAL at 08:58

## 2022-01-01 RX ADMIN — POTASSIUM CHLORIDE 10 MEQ: 7.46 INJECTION, SOLUTION INTRAVENOUS at 10:26

## 2022-01-01 RX ADMIN — KETOROLAC TROMETHAMINE 15 MG: 15 INJECTION, SOLUTION INTRAMUSCULAR; INTRAVENOUS at 14:43

## 2022-01-01 RX ADMIN — TAMSULOSIN HYDROCHLORIDE 0.4 MG: 0.4 CAPSULE ORAL at 14:51

## 2022-01-01 RX ADMIN — TAMSULOSIN HYDROCHLORIDE 0.4 MG: 0.4 CAPSULE ORAL at 09:00

## 2022-01-01 RX ADMIN — OXYCODONE HYDROCHLORIDE 2.5 MG: 100 SOLUTION ORAL at 06:33

## 2022-01-01 RX ADMIN — LORAZEPAM 1 MG: 2 INJECTION INTRAMUSCULAR at 10:23

## 2022-01-01 RX ADMIN — HALOPERIDOL LACTATE 2 MG: 5 INJECTION, SOLUTION INTRAMUSCULAR at 22:54

## 2022-01-01 RX ADMIN — TAMSULOSIN HYDROCHLORIDE 0.4 MG: 0.4 CAPSULE ORAL at 15:50

## 2022-01-01 RX ADMIN — VANCOMYCIN HYDROCHLORIDE 125 MG: KIT at 09:02

## 2022-01-01 RX ADMIN — ACETAMINOPHEN 975 MG: 325 TABLET, FILM COATED ORAL at 08:20

## 2022-01-01 RX ADMIN — PANTOPRAZOLE SODIUM 40 MG: 40 INJECTION, POWDER, FOR SOLUTION INTRAVENOUS at 08:25

## 2022-01-01 RX ADMIN — MEMANTINE 10 MG: 10 TABLET ORAL at 21:52

## 2022-01-01 RX ADMIN — VANCOMYCIN HYDROCHLORIDE 125 MG: KIT at 13:51

## 2022-01-01 RX ADMIN — OXYCODONE HYDROCHLORIDE 2.5 MG: 100 SOLUTION ORAL at 04:17

## 2022-01-01 RX ADMIN — HEPARIN SODIUM 5000 UNITS: 5000 INJECTION, SOLUTION INTRAVENOUS; SUBCUTANEOUS at 09:05

## 2022-01-01 RX ADMIN — OXYCODONE HYDROCHLORIDE 5 MG: 100 SOLUTION ORAL at 17:43

## 2022-01-01 RX ADMIN — ACETAMINOPHEN 975 MG: 325 TABLET, FILM COATED ORAL at 08:56

## 2022-01-01 RX ADMIN — HEPARIN SODIUM 5000 UNITS: 5000 INJECTION, SOLUTION INTRAVENOUS; SUBCUTANEOUS at 19:45

## 2022-01-01 RX ADMIN — DEXTROSE MONOHYDRATE AND SODIUM CHLORIDE: 5; .9 INJECTION, SOLUTION INTRAVENOUS at 12:39

## 2022-01-01 RX ADMIN — POTASSIUM CHLORIDE 10 MEQ: 7.46 INJECTION, SOLUTION INTRAVENOUS at 15:47

## 2022-01-01 RX ADMIN — DONEPEZIL HYDROCHLORIDE 5 MG: 5 TABLET, FILM COATED ORAL at 08:55

## 2022-01-01 RX ADMIN — ESCITALOPRAM OXALATE 20 MG: 20 TABLET ORAL at 08:54

## 2022-01-01 RX ADMIN — HEPARIN SODIUM 5000 UNITS: 5000 INJECTION, SOLUTION INTRAVENOUS; SUBCUTANEOUS at 21:10

## 2022-01-01 RX ADMIN — SODIUM CHLORIDE, POTASSIUM CHLORIDE, SODIUM LACTATE AND CALCIUM CHLORIDE: 600; 310; 30; 20 INJECTION, SOLUTION INTRAVENOUS at 18:28

## 2022-01-01 RX ADMIN — OXYCODONE HYDROCHLORIDE 2.5 MG: 100 SOLUTION ORAL at 16:13

## 2022-01-01 RX ADMIN — OXYCODONE HYDROCHLORIDE 2.5 MG: 100 SOLUTION ORAL at 17:29

## 2022-01-01 RX ADMIN — ACETAMINOPHEN 1000 MG: 500 TABLET ORAL at 19:43

## 2022-01-01 RX ADMIN — OXYCODONE HYDROCHLORIDE 5 MG: 100 SOLUTION ORAL at 12:26

## 2022-01-01 RX ADMIN — CALCIPOTRIENE: 50 CREAM TOPICAL at 20:39

## 2022-01-01 RX ADMIN — VALPROIC ACID 500 MG: 250 SOLUTION ORAL at 21:52

## 2022-01-01 RX ADMIN — HYDROXYZINE HYDROCHLORIDE 25 MG: 25 TABLET, FILM COATED ORAL at 11:47

## 2022-01-01 RX ADMIN — HALOPERIDOL LACTATE 2 MG: 5 INJECTION, SOLUTION INTRAMUSCULAR at 16:58

## 2022-01-01 RX ADMIN — ESCITALOPRAM OXALATE 20 MG: 20 TABLET ORAL at 08:02

## 2022-01-01 RX ADMIN — OXYCODONE HYDROCHLORIDE 5 MG: 100 SOLUTION ORAL at 21:17

## 2022-01-01 RX ADMIN — VANCOMYCIN HYDROCHLORIDE 125 MG: KIT at 21:52

## 2022-01-01 RX ADMIN — LIDOCAINE PATCH 4% 1 PATCH: 40 PATCH TOPICAL at 10:22

## 2022-01-01 RX ADMIN — MEMANTINE 10 MG: 10 TABLET ORAL at 19:46

## 2022-01-01 RX ADMIN — QUETIAPINE FUMARATE 25 MG: 25 TABLET ORAL at 10:22

## 2022-01-01 RX ADMIN — KETAMINE HYDROCHLORIDE 5 MG/HR: 100 INJECTION, SOLUTION, CONCENTRATE INTRAMUSCULAR; INTRAVENOUS at 15:52

## 2022-01-01 RX ADMIN — ESCITALOPRAM OXALATE 20 MG: 20 TABLET ORAL at 10:11

## 2022-01-01 RX ADMIN — VANCOMYCIN HYDROCHLORIDE 125 MG: KIT at 16:06

## 2022-01-01 RX ADMIN — MEMANTINE 10 MG: 10 TABLET ORAL at 14:50

## 2022-01-01 RX ADMIN — KETOROLAC TROMETHAMINE 15 MG: 15 INJECTION, SOLUTION INTRAMUSCULAR; INTRAVENOUS at 05:37

## 2022-01-01 RX ADMIN — AMLODIPINE BESYLATE 5 MG: 5 TABLET ORAL at 13:23

## 2022-01-01 RX ADMIN — HEPARIN SODIUM 5000 UNITS: 5000 INJECTION, SOLUTION INTRAVENOUS; SUBCUTANEOUS at 23:02

## 2022-01-01 RX ADMIN — VALPROIC ACID 250 MG: 250 SOLUTION ORAL at 04:18

## 2022-01-01 RX ADMIN — ESCITALOPRAM OXALATE 20 MG: 20 TABLET ORAL at 14:04

## 2022-01-01 RX ADMIN — OXYCODONE HYDROCHLORIDE 2.5 MG: 100 SOLUTION ORAL at 23:51

## 2022-01-01 RX ADMIN — CALCIPOTRIENE: 50 CREAM TOPICAL at 08:03

## 2022-01-01 RX ADMIN — POTASSIUM CHLORIDE 10 MEQ: 7.46 INJECTION, SOLUTION INTRAVENOUS at 08:42

## 2022-01-01 RX ADMIN — PANTOPRAZOLE SODIUM 40 MG: 40 INJECTION, POWDER, FOR SOLUTION INTRAVENOUS at 11:20

## 2022-01-01 RX ADMIN — PANTOPRAZOLE SODIUM 40 MG: 40 INJECTION, POWDER, FOR SOLUTION INTRAVENOUS at 12:09

## 2022-01-01 RX ADMIN — MEMANTINE 10 MG: 10 TABLET ORAL at 20:03

## 2022-01-01 RX ADMIN — LIDOCAINE PATCH 4% 1 PATCH: 40 PATCH TOPICAL at 12:53

## 2022-01-01 RX ADMIN — SODIUM CHLORIDE, POTASSIUM CHLORIDE, SODIUM LACTATE AND CALCIUM CHLORIDE: 600; 310; 30; 20 INJECTION, SOLUTION INTRAVENOUS at 10:29

## 2022-01-01 RX ADMIN — HEPARIN SODIUM 5000 UNITS: 5000 INJECTION, SOLUTION INTRAVENOUS; SUBCUTANEOUS at 21:43

## 2022-01-01 RX ADMIN — OXYCODONE HYDROCHLORIDE 2.5 MG: 100 SOLUTION ORAL at 22:41

## 2022-01-01 RX ADMIN — DONEPEZIL HYDROCHLORIDE 5 MG: 5 TABLET, FILM COATED ORAL at 08:20

## 2022-01-01 RX ADMIN — HEPARIN SODIUM 5000 UNITS: 5000 INJECTION, SOLUTION INTRAVENOUS; SUBCUTANEOUS at 21:14

## 2022-01-01 RX ADMIN — VALPROIC ACID 250 MG: 250 SOLUTION ORAL at 10:08

## 2022-01-01 RX ADMIN — Medication 1 MG: at 23:03

## 2022-01-01 RX ADMIN — HYDROMORPHONE HYDROCHLORIDE 0.5 MG: 1 INJECTION, SOLUTION INTRAMUSCULAR; INTRAVENOUS; SUBCUTANEOUS at 10:38

## 2022-01-01 RX ADMIN — HEPARIN SODIUM 5000 UNITS: 5000 INJECTION, SOLUTION INTRAVENOUS; SUBCUTANEOUS at 08:56

## 2022-01-01 RX ADMIN — MEMANTINE 10 MG: 10 TABLET ORAL at 20:34

## 2022-01-01 RX ADMIN — DONEPEZIL HYDROCHLORIDE 5 MG: 5 TABLET, FILM COATED ORAL at 15:50

## 2022-01-01 RX ADMIN — DONEPEZIL HYDROCHLORIDE 5 MG: 5 TABLET, FILM COATED ORAL at 11:52

## 2022-01-01 RX ADMIN — POTASSIUM CHLORIDE 10 MEQ: 7.46 INJECTION, SOLUTION INTRAVENOUS at 10:01

## 2022-01-01 RX ADMIN — OXYCODONE HYDROCHLORIDE 2.5 MG: 100 SOLUTION ORAL at 12:11

## 2022-01-01 RX ADMIN — SIMVASTATIN 40 MG: 40 TABLET, FILM COATED ORAL at 23:03

## 2022-01-01 RX ADMIN — CALCIPOTRIENE: 50 CREAM TOPICAL at 20:42

## 2022-01-01 RX ADMIN — MORPHINE SULFATE 10 MG: 100 SOLUTION ORAL at 01:21

## 2022-01-01 RX ADMIN — HEPARIN SODIUM 5000 UNITS: 5000 INJECTION, SOLUTION INTRAVENOUS; SUBCUTANEOUS at 21:52

## 2022-01-01 RX ADMIN — DEXTROSE MONOHYDRATE AND SODIUM CHLORIDE: 5; .9 INJECTION, SOLUTION INTRAVENOUS at 05:54

## 2022-01-01 RX ADMIN — HALOPERIDOL LACTATE 2 MG: 5 INJECTION, SOLUTION INTRAMUSCULAR at 23:31

## 2022-01-01 RX ADMIN — DEXTROSE MONOHYDRATE AND SODIUM CHLORIDE: 5; .9 INJECTION, SOLUTION INTRAVENOUS at 05:38

## 2022-01-01 RX ADMIN — THERA TABS 1 TABLET: TAB at 11:54

## 2022-01-01 RX ADMIN — NAPROXEN 250 MG: 250 TABLET ORAL at 09:21

## 2022-01-01 RX ADMIN — DONEPEZIL HYDROCHLORIDE 5 MG: 5 TABLET, FILM COATED ORAL at 11:47

## 2022-01-01 RX ADMIN — DEXTROSE MONOHYDRATE AND SODIUM CHLORIDE: 5; .9 INJECTION, SOLUTION INTRAVENOUS at 16:49

## 2022-01-01 RX ADMIN — VANCOMYCIN HYDROCHLORIDE 125 MG: KIT at 20:42

## 2022-01-01 RX ADMIN — VALPROIC ACID 250 MG: 250 SOLUTION ORAL at 20:15

## 2022-01-01 RX ADMIN — LORAZEPAM 0.25 MG: 2 LIQUID ORAL at 11:46

## 2022-01-01 RX ADMIN — CALCIPOTRIENE: 50 CREAM TOPICAL at 21:53

## 2022-01-01 RX ADMIN — TAMSULOSIN HYDROCHLORIDE 0.4 MG: 0.4 CAPSULE ORAL at 09:12

## 2022-01-01 RX ADMIN — ATENOLOL 100 MG: 50 TABLET ORAL at 15:49

## 2022-01-01 RX ADMIN — ACETAMINOPHEN 975 MG: 325 SOLUTION ORAL at 14:08

## 2022-01-01 RX ADMIN — CALCIPOTRIENE: 50 CREAM TOPICAL at 21:14

## 2022-01-01 RX ADMIN — POTASSIUM CHLORIDE 10 MEQ: 7.46 INJECTION, SOLUTION INTRAVENOUS at 13:03

## 2022-01-01 RX ADMIN — MEMANTINE 10 MG: 10 TABLET ORAL at 20:15

## 2022-01-01 RX ADMIN — LIDOCAINE PATCH 4% 1 PATCH: 40 PATCH TOPICAL at 09:02

## 2022-01-01 RX ADMIN — LIDOCAINE HYDROCHLORIDE: 20 JELLY TOPICAL at 09:31

## 2022-01-01 RX ADMIN — DONEPEZIL HYDROCHLORIDE 5 MG: 5 TABLET, FILM COATED ORAL at 09:21

## 2022-01-01 RX ADMIN — OXYCODONE HYDROCHLORIDE 2.5 MG: 100 SOLUTION ORAL at 08:16

## 2022-01-01 RX ADMIN — OXYCODONE HYDROCHLORIDE 2.5 MG: 100 SOLUTION ORAL at 23:34

## 2022-01-01 RX ADMIN — MORPHINE SULFATE 10 MG: 100 SOLUTION ORAL at 21:10

## 2022-01-01 RX ADMIN — MEMANTINE 10 MG: 10 TABLET ORAL at 13:56

## 2022-01-01 RX ADMIN — CALCIPOTRIENE: 50 CREAM TOPICAL at 13:43

## 2022-01-01 RX ADMIN — MEMANTINE 10 MG: 10 TABLET ORAL at 08:21

## 2022-01-01 RX ADMIN — ATENOLOL 100 MG: 50 TABLET ORAL at 09:20

## 2022-01-01 RX ADMIN — HYDROMORPHONE HYDROCHLORIDE 0.5 MG: 1 INJECTION, SOLUTION INTRAMUSCULAR; INTRAVENOUS; SUBCUTANEOUS at 20:57

## 2022-01-01 RX ADMIN — TAMSULOSIN HYDROCHLORIDE 0.4 MG: 0.4 CAPSULE ORAL at 08:21

## 2022-01-01 RX ADMIN — ESCITALOPRAM OXALATE 20 MG: 20 TABLET ORAL at 08:58

## 2022-01-01 RX ADMIN — OXYCODONE HYDROCHLORIDE 2.5 MG: 100 SOLUTION ORAL at 10:42

## 2022-01-01 RX ADMIN — OXYCODONE HYDROCHLORIDE 2.5 MG: 100 SOLUTION ORAL at 17:31

## 2022-01-01 RX ADMIN — AMLODIPINE BESYLATE 10 MG: 10 TABLET ORAL at 08:01

## 2022-01-01 RX ADMIN — ACETAMINOPHEN 975 MG: 325 TABLET, FILM COATED ORAL at 14:27

## 2022-01-01 RX ADMIN — AMLODIPINE BESYLATE 10 MG: 10 TABLET ORAL at 11:51

## 2022-01-01 RX ADMIN — OXYCODONE HYDROCHLORIDE 2.5 MG: 100 SOLUTION ORAL at 09:13

## 2022-01-01 RX ADMIN — ESCITALOPRAM OXALATE 20 MG: 20 TABLET ORAL at 08:20

## 2022-01-01 RX ADMIN — SODIUM CHLORIDE, POTASSIUM CHLORIDE, SODIUM LACTATE AND CALCIUM CHLORIDE: 600; 310; 30; 20 INJECTION, SOLUTION INTRAVENOUS at 05:42

## 2022-01-01 RX ADMIN — VANCOMYCIN HYDROCHLORIDE 125 MG: 125 CAPSULE ORAL at 16:16

## 2022-01-01 RX ADMIN — HALOPERIDOL LACTATE 2 MG: 5 INJECTION, SOLUTION INTRAMUSCULAR at 22:11

## 2022-01-01 RX ADMIN — HEPARIN SODIUM 5000 UNITS: 5000 INJECTION, SOLUTION INTRAVENOUS; SUBCUTANEOUS at 21:34

## 2022-01-01 RX ADMIN — MORPHINE SULFATE 10 MG: 100 SOLUTION ORAL at 06:52

## 2022-01-01 RX ADMIN — DEXTROSE AND SODIUM CHLORIDE: 5; 450 INJECTION, SOLUTION INTRAVENOUS at 09:35

## 2022-01-01 RX ADMIN — DIAZEPAM 2.5 MG: 5 INJECTION, SOLUTION INTRAMUSCULAR; INTRAVENOUS at 13:16

## 2022-01-01 RX ADMIN — OXYCODONE HYDROCHLORIDE 2.5 MG: 100 SOLUTION ORAL at 11:43

## 2022-01-01 RX ADMIN — OXYCODONE HYDROCHLORIDE 2.5 MG: 100 SOLUTION ORAL at 03:24

## 2022-01-01 RX ADMIN — CALCIPOTRIENE: 50 CREAM TOPICAL at 08:38

## 2022-01-01 RX ADMIN — BUPRENORPHINE 1 PATCH: 5 PATCH TRANSDERMAL at 14:46

## 2022-01-01 RX ADMIN — POTASSIUM CHLORIDE 10 MEQ: 7.46 INJECTION, SOLUTION INTRAVENOUS at 10:53

## 2022-01-01 RX ADMIN — ACETAMINOPHEN 975 MG: 325 TABLET, FILM COATED ORAL at 21:28

## 2022-01-01 RX ADMIN — VANCOMYCIN HYDROCHLORIDE 125 MG: KIT at 20:32

## 2022-01-01 RX ADMIN — HYDROMORPHONE HYDROCHLORIDE 0.5 MG: 1 INJECTION, SOLUTION INTRAMUSCULAR; INTRAVENOUS; SUBCUTANEOUS at 18:56

## 2022-01-01 RX ADMIN — THERA TABS 1 TABLET: TAB at 08:01

## 2022-01-01 RX ADMIN — TIZANIDINE 2 MG: 2 TABLET ORAL at 14:27

## 2022-01-01 RX ADMIN — OXYCODONE HYDROCHLORIDE 5 MG: 100 SOLUTION ORAL at 22:33

## 2022-01-01 RX ADMIN — CALCIPOTRIENE: 50 CREAM TOPICAL at 09:47

## 2022-01-01 RX ADMIN — SODIUM CHLORIDE, SODIUM LACTATE, POTASSIUM CHLORIDE, CALCIUM CHLORIDE AND DEXTROSE MONOHYDRATE: 5; 600; 310; 30; 20 INJECTION, SOLUTION INTRAVENOUS at 08:56

## 2022-01-01 RX ADMIN — THERA TABS 1 TABLET: TAB at 08:20

## 2022-01-01 RX ADMIN — SODIUM CHLORIDE, POTASSIUM CHLORIDE, SODIUM LACTATE AND CALCIUM CHLORIDE: 600; 310; 30; 20 INJECTION, SOLUTION INTRAVENOUS at 12:04

## 2022-01-01 RX ADMIN — DONEPEZIL HYDROCHLORIDE 5 MG: 5 TABLET, FILM COATED ORAL at 14:51

## 2022-01-01 RX ADMIN — HEPARIN SODIUM 5000 UNITS: 5000 INJECTION, SOLUTION INTRAVENOUS; SUBCUTANEOUS at 20:15

## 2022-01-01 RX ADMIN — MEMANTINE 10 MG: 10 TABLET ORAL at 23:04

## 2022-01-01 RX ADMIN — POTASSIUM CHLORIDE 10 MEQ: 7.46 INJECTION, SOLUTION INTRAVENOUS at 09:05

## 2022-01-01 RX ADMIN — VALPROIC ACID 250 MG: 250 SOLUTION ORAL at 15:32

## 2022-01-01 RX ADMIN — OXYCODONE HYDROCHLORIDE 2.5 MG: 100 SOLUTION ORAL at 13:10

## 2022-01-01 RX ADMIN — MEMANTINE 10 MG: 10 TABLET ORAL at 11:54

## 2022-01-01 RX ADMIN — OXYCODONE HYDROCHLORIDE 2.5 MG: 100 SOLUTION ORAL at 21:52

## 2022-01-01 RX ADMIN — VALPROIC ACID 250 MG: 250 SOLUTION ORAL at 21:32

## 2022-01-01 RX ADMIN — SODIUM CHLORIDE, SODIUM LACTATE, POTASSIUM CHLORIDE, CALCIUM CHLORIDE AND DEXTROSE MONOHYDRATE: 5; 600; 310; 30; 20 INJECTION, SOLUTION INTRAVENOUS at 14:39

## 2022-01-01 RX ADMIN — OXYCODONE HYDROCHLORIDE 2.5 MG: 100 SOLUTION ORAL at 08:17

## 2022-01-01 RX ADMIN — VANCOMYCIN HYDROCHLORIDE 125 MG: KIT at 12:40

## 2022-01-01 RX ADMIN — OXYCODONE HYDROCHLORIDE 2.5 MG: 100 SOLUTION ORAL at 12:09

## 2022-01-01 RX ADMIN — KETAMINE HYDROCHLORIDE 5 MG/HR: 100 INJECTION, SOLUTION, CONCENTRATE INTRAMUSCULAR; INTRAVENOUS at 16:58

## 2022-01-01 RX ADMIN — OXYCODONE HYDROCHLORIDE 2.5 MG: 100 SOLUTION ORAL at 12:39

## 2022-01-01 RX ADMIN — FINASTERIDE 5 MG: 5 TABLET, FILM COATED ORAL at 11:55

## 2022-01-01 RX ADMIN — POTASSIUM CHLORIDE 10 MEQ: 7.46 INJECTION, SOLUTION INTRAVENOUS at 21:57

## 2022-01-01 RX ADMIN — VALPROIC ACID 250 MG: 250 SOLUTION ORAL at 20:46

## 2022-01-01 RX ADMIN — MEMANTINE 10 MG: 10 TABLET ORAL at 11:47

## 2022-01-01 RX ADMIN — HEPARIN SODIUM 5000 UNITS: 5000 INJECTION, SOLUTION INTRAVENOUS; SUBCUTANEOUS at 20:46

## 2022-01-01 RX ADMIN — TAMSULOSIN HYDROCHLORIDE 0.4 MG: 0.4 CAPSULE ORAL at 10:11

## 2022-01-01 RX ADMIN — MEMANTINE 10 MG: 10 TABLET ORAL at 22:41

## 2022-01-01 RX ADMIN — VALPROIC ACID 250 MG: 250 SOLUTION ORAL at 10:34

## 2022-01-01 RX ADMIN — ACETAMINOPHEN 975 MG: 325 TABLET, FILM COATED ORAL at 20:55

## 2022-01-01 RX ADMIN — VANCOMYCIN HYDROCHLORIDE 125 MG: KIT at 19:12

## 2022-01-01 RX ADMIN — CEFTRIAXONE SODIUM 1 G: 1 INJECTION, POWDER, FOR SOLUTION INTRAMUSCULAR; INTRAVENOUS at 12:56

## 2022-01-01 RX ADMIN — OXYCODONE HYDROCHLORIDE 2.5 MG: 100 SOLUTION ORAL at 03:29

## 2022-01-01 RX ADMIN — OXYCODONE HYDROCHLORIDE 2.5 MG: 100 SOLUTION ORAL at 13:59

## 2022-01-01 RX ADMIN — NAPROXEN 250 MG: 250 TABLET ORAL at 17:49

## 2022-01-01 RX ADMIN — ACETAMINOPHEN 975 MG: 325 TABLET, FILM COATED ORAL at 09:03

## 2022-01-01 RX ADMIN — KETOROLAC TROMETHAMINE 15 MG: 15 INJECTION, SOLUTION INTRAMUSCULAR; INTRAVENOUS at 13:01

## 2022-01-01 RX ADMIN — MEMANTINE 10 MG: 10 TABLET ORAL at 20:54

## 2022-01-01 RX ADMIN — VANCOMYCIN HYDROCHLORIDE 125 MG: KIT at 20:15

## 2022-01-01 RX ADMIN — HYDROMORPHONE HYDROCHLORIDE 0.5 MG: 1 INJECTION, SOLUTION INTRAMUSCULAR; INTRAVENOUS; SUBCUTANEOUS at 01:02

## 2022-01-01 RX ADMIN — Medication 5 MG: at 21:52

## 2022-01-01 RX ADMIN — ACETAMINOPHEN 975 MG: 325 TABLET, FILM COATED ORAL at 14:57

## 2022-01-01 RX ADMIN — HYDROMORPHONE HYDROCHLORIDE 0.5 MG: 1 INJECTION, SOLUTION INTRAMUSCULAR; INTRAVENOUS; SUBCUTANEOUS at 04:49

## 2022-01-01 RX ADMIN — Medication 2.5 MG: at 11:19

## 2022-01-01 RX ADMIN — POTASSIUM CHLORIDE 10 MEQ: 7.46 INJECTION, SOLUTION INTRAVENOUS at 08:12

## 2022-01-01 RX ADMIN — Medication 5 MG: at 20:42

## 2022-01-01 RX ADMIN — MEMANTINE 10 MG: 10 TABLET ORAL at 08:02

## 2022-01-01 RX ADMIN — ACETAMINOPHEN 975 MG: 325 TABLET, FILM COATED ORAL at 13:51

## 2022-01-01 RX ADMIN — HYDROMORPHONE HYDROCHLORIDE 0.5 MG: 1 INJECTION, SOLUTION INTRAMUSCULAR; INTRAVENOUS; SUBCUTANEOUS at 16:04

## 2022-01-01 RX ADMIN — DEXTROSE MONOHYDRATE AND SODIUM CHLORIDE: 5; .9 INJECTION, SOLUTION INTRAVENOUS at 16:20

## 2022-01-01 RX ADMIN — ACETAMINOPHEN 975 MG: 325 TABLET, FILM COATED ORAL at 19:46

## 2022-01-01 RX ADMIN — ACETAMINOPHEN 975 MG: 325 TABLET, FILM COATED ORAL at 09:15

## 2022-01-01 RX ADMIN — ACETAMINOPHEN 975 MG: 325 TABLET, FILM COATED ORAL at 21:10

## 2022-01-01 RX ADMIN — FINASTERIDE 5 MG: 5 TABLET, FILM COATED ORAL at 10:11

## 2022-01-01 RX ADMIN — POTASSIUM CHLORIDE 10 MEQ: 7.46 INJECTION, SOLUTION INTRAVENOUS at 13:16

## 2022-01-01 RX ADMIN — OXYCODONE HYDROCHLORIDE 2.5 MG: 100 SOLUTION ORAL at 14:49

## 2022-01-01 RX ADMIN — LIDOCAINE PATCH 4% 1 PATCH: 40 PATCH TOPICAL at 08:19

## 2022-01-01 RX ADMIN — DIAZEPAM 2.5 MG: 5 INJECTION, SOLUTION INTRAMUSCULAR; INTRAVENOUS at 01:06

## 2022-01-01 RX ADMIN — CALCIPOTRIENE: 50 CREAM TOPICAL at 10:48

## 2022-01-01 RX ADMIN — MEMANTINE 10 MG: 10 TABLET ORAL at 08:56

## 2022-01-01 RX ADMIN — SIMVASTATIN 40 MG: 40 TABLET, FILM COATED ORAL at 22:41

## 2022-01-01 RX ADMIN — OXYCODONE HYDROCHLORIDE 2.5 MG: 100 SOLUTION ORAL at 00:12

## 2022-01-01 RX ADMIN — DICLOFENAC SODIUM 4 G: 10 GEL TOPICAL at 08:45

## 2022-01-01 RX ADMIN — OXYCODONE HYDROCHLORIDE 2.5 MG: 100 SOLUTION ORAL at 19:38

## 2022-01-01 RX ADMIN — VANCOMYCIN HYDROCHLORIDE 125 MG: KIT at 10:30

## 2022-01-01 RX ADMIN — OXYCODONE HYDROCHLORIDE 2.5 MG: 100 SOLUTION ORAL at 22:51

## 2022-01-01 RX ADMIN — CALCIPOTRIENE: 50 CREAM TOPICAL at 07:45

## 2022-01-01 RX ADMIN — HEPARIN SODIUM 5000 UNITS: 5000 INJECTION, SOLUTION INTRAVENOUS; SUBCUTANEOUS at 08:25

## 2022-01-01 RX ADMIN — POTASSIUM CHLORIDE 10 MEQ: 7.46 INJECTION, SOLUTION INTRAVENOUS at 19:46

## 2022-01-01 RX ADMIN — PANTOPRAZOLE SODIUM 40 MG: 40 INJECTION, POWDER, FOR SOLUTION INTRAVENOUS at 09:21

## 2022-01-01 RX ADMIN — DIAZEPAM 2.5 MG: 5 INJECTION, SOLUTION INTRAMUSCULAR; INTRAVENOUS at 16:34

## 2022-01-01 RX ADMIN — CALCIPOTRIENE: 50 CREAM TOPICAL at 09:21

## 2022-01-01 RX ADMIN — ACETAMINOPHEN 975 MG: 325 TABLET, FILM COATED ORAL at 23:03

## 2022-01-01 RX ADMIN — CALCIPOTRIENE: 50 CREAM TOPICAL at 21:23

## 2022-01-01 RX ADMIN — OXYCODONE HYDROCHLORIDE 2.5 MG: 100 SOLUTION ORAL at 14:33

## 2022-01-01 RX ADMIN — MEMANTINE 10 MG: 10 TABLET ORAL at 21:30

## 2022-01-01 RX ADMIN — VANCOMYCIN HYDROCHLORIDE 125 MG: KIT at 13:55

## 2022-01-01 RX ADMIN — OXYCODONE HYDROCHLORIDE 5 MG: 100 SOLUTION ORAL at 17:36

## 2022-01-01 RX ADMIN — TAMSULOSIN HYDROCHLORIDE 0.4 MG: 0.4 CAPSULE ORAL at 11:20

## 2022-01-01 RX ADMIN — OXYCODONE HYDROCHLORIDE 2.5 MG: 100 SOLUTION ORAL at 04:00

## 2022-01-01 RX ADMIN — OXYCODONE HYDROCHLORIDE 2.5 MG: 100 SOLUTION ORAL at 02:12

## 2022-01-01 RX ADMIN — LIDOCAINE PATCH 4% 1 PATCH: 40 PATCH TOPICAL at 09:28

## 2022-01-01 RX ADMIN — THERA TABS 1 TABLET: TAB at 14:56

## 2022-01-01 RX ADMIN — POTASSIUM CHLORIDE 10 MEQ: 7.46 INJECTION, SOLUTION INTRAVENOUS at 00:40

## 2022-01-01 RX ADMIN — LIDOCAINE HYDROCHLORIDE: 20 SOLUTION ORAL; TOPICAL at 09:31

## 2022-01-01 RX ADMIN — LIDOCAINE PATCH 4% 1 PATCH: 40 PATCH TOPICAL at 08:00

## 2022-01-01 RX ADMIN — TAMSULOSIN HYDROCHLORIDE 0.4 MG: 0.4 CAPSULE ORAL at 13:46

## 2022-01-01 RX ADMIN — CALCIPOTRIENE: 50 CREAM TOPICAL at 21:05

## 2022-01-01 RX ADMIN — OXYCODONE HYDROCHLORIDE 2.5 MG: 100 SOLUTION ORAL at 16:58

## 2022-01-01 RX ADMIN — DICLOFENAC SODIUM 4 G: 10 GEL TOPICAL at 09:55

## 2022-01-01 RX ADMIN — LORAZEPAM 0.5 MG: 2 LIQUID ORAL at 11:05

## 2022-01-01 RX ADMIN — ACETAMINOPHEN 975 MG: 325 TABLET, FILM COATED ORAL at 20:03

## 2022-01-01 RX ADMIN — POTASSIUM CHLORIDE 10 MEQ: 7.46 INJECTION, SOLUTION INTRAVENOUS at 18:12

## 2022-01-01 RX ADMIN — OXYCODONE HYDROCHLORIDE 2.5 MG: 100 SOLUTION ORAL at 04:46

## 2022-01-01 RX ADMIN — OXYCODONE HYDROCHLORIDE 5 MG: 100 SOLUTION ORAL at 02:00

## 2022-01-01 RX ADMIN — DIAZEPAM 2.5 MG: 5 INJECTION, SOLUTION INTRAMUSCULAR; INTRAVENOUS at 21:32

## 2022-01-01 RX ADMIN — DONEPEZIL HYDROCHLORIDE 5 MG: 5 TABLET, FILM COATED ORAL at 13:56

## 2022-01-01 RX ADMIN — OXYCODONE HYDROCHLORIDE 2.5 MG: 100 SOLUTION ORAL at 18:34

## 2022-01-01 RX ADMIN — OXYCODONE HYDROCHLORIDE 5 MG: 100 SOLUTION ORAL at 10:02

## 2022-01-01 RX ADMIN — ACETAMINOPHEN 975 MG: 325 TABLET, FILM COATED ORAL at 13:28

## 2022-01-01 RX ADMIN — LIDOCAINE HYDROCHLORIDE: 20 JELLY TOPICAL at 15:41

## 2022-01-01 RX ADMIN — ATENOLOL 100 MG: 50 TABLET ORAL at 08:20

## 2022-01-01 RX ADMIN — OXYCODONE HYDROCHLORIDE 2.5 MG: 100 SOLUTION ORAL at 19:11

## 2022-01-01 RX ADMIN — TAMSULOSIN HYDROCHLORIDE 0.4 MG: 0.4 CAPSULE ORAL at 08:54

## 2022-01-01 RX ADMIN — HALOPERIDOL LACTATE 2 MG: 5 INJECTION, SOLUTION INTRAMUSCULAR at 23:49

## 2022-01-01 RX ADMIN — POTASSIUM CHLORIDE 10 MEQ: 7.46 INJECTION, SOLUTION INTRAVENOUS at 23:45

## 2022-01-01 RX ADMIN — ESCITALOPRAM OXALATE 20 MG: 20 TABLET ORAL at 11:47

## 2022-01-01 RX ADMIN — Medication 5 MG: at 19:46

## 2022-01-01 RX ADMIN — THERA TABS 1 TABLET: TAB at 09:20

## 2022-01-01 RX ADMIN — HYDROMORPHONE HYDROCHLORIDE 0.5 MG: 1 INJECTION, SOLUTION INTRAMUSCULAR; INTRAVENOUS; SUBCUTANEOUS at 15:42

## 2022-01-01 RX ADMIN — OXYCODONE HYDROCHLORIDE 2.5 MG: 100 SOLUTION ORAL at 09:30

## 2022-01-01 RX ADMIN — OXYCODONE HYDROCHLORIDE 2.5 MG: 100 SOLUTION ORAL at 00:41

## 2022-01-01 RX ADMIN — HYDROMORPHONE HYDROCHLORIDE 0.5 MG: 1 INJECTION, SOLUTION INTRAMUSCULAR; INTRAVENOUS; SUBCUTANEOUS at 08:55

## 2022-01-01 RX ADMIN — FINASTERIDE 5 MG: 5 TABLET, FILM COATED ORAL at 13:53

## 2022-01-01 RX ADMIN — HEPARIN SODIUM 5000 UNITS: 5000 INJECTION, SOLUTION INTRAVENOUS; SUBCUTANEOUS at 11:20

## 2022-01-01 RX ADMIN — SIMVASTATIN 40 MG: 40 TABLET, FILM COATED ORAL at 22:54

## 2022-01-01 RX ADMIN — HYDROMORPHONE HYDROCHLORIDE 0.5 MG: 1 INJECTION, SOLUTION INTRAMUSCULAR; INTRAVENOUS; SUBCUTANEOUS at 18:40

## 2022-01-01 RX ADMIN — LIDOCAINE: 40 CREAM TOPICAL at 08:34

## 2022-01-01 RX ADMIN — OXYCODONE HYDROCHLORIDE 2.5 MG: 100 SOLUTION ORAL at 20:58

## 2022-01-01 RX ADMIN — ACETAMINOPHEN 975 MG: 325 SOLUTION ORAL at 05:59

## 2022-01-01 RX ADMIN — HYDROMORPHONE HYDROCHLORIDE 0.5 MG: 1 INJECTION, SOLUTION INTRAMUSCULAR; INTRAVENOUS; SUBCUTANEOUS at 10:31

## 2022-01-01 RX ADMIN — CALCIPOTRIENE: 50 CREAM TOPICAL at 23:08

## 2022-01-01 RX ADMIN — DEXTROSE AND SODIUM CHLORIDE: 5; 450 INJECTION, SOLUTION INTRAVENOUS at 22:00

## 2022-01-01 RX ADMIN — ACETAMINOPHEN 650 MG: 325 SOLUTION ORAL at 13:14

## 2022-01-01 RX ADMIN — DEXTROSE MONOHYDRATE AND SODIUM CHLORIDE: 5; .9 INJECTION, SOLUTION INTRAVENOUS at 10:45

## 2022-01-01 RX ADMIN — OXYCODONE HYDROCHLORIDE 2.5 MG: 100 SOLUTION ORAL at 22:42

## 2022-01-01 RX ADMIN — PANTOPRAZOLE SODIUM 40 MG: 40 INJECTION, POWDER, FOR SOLUTION INTRAVENOUS at 08:19

## 2022-01-01 RX ADMIN — HYDROMORPHONE HYDROCHLORIDE 0.5 MG: 1 INJECTION, SOLUTION INTRAMUSCULAR; INTRAVENOUS; SUBCUTANEOUS at 12:33

## 2022-01-01 RX ADMIN — CALCIPOTRIENE: 50 CREAM TOPICAL at 08:32

## 2022-01-01 RX ADMIN — HYDROMORPHONE HYDROCHLORIDE 0.5 MG: 1 INJECTION, SOLUTION INTRAMUSCULAR; INTRAVENOUS; SUBCUTANEOUS at 19:40

## 2022-01-01 RX ADMIN — OXYCODONE HYDROCHLORIDE 2.5 MG: 100 SOLUTION ORAL at 04:39

## 2022-01-01 RX ADMIN — VALPROIC ACID 250 MG: 250 SOLUTION ORAL at 13:47

## 2022-01-01 RX ADMIN — SODIUM CHLORIDE, POTASSIUM CHLORIDE, SODIUM LACTATE AND CALCIUM CHLORIDE: 600; 310; 30; 20 INJECTION, SOLUTION INTRAVENOUS at 05:46

## 2022-01-01 RX ADMIN — HYDROMORPHONE HYDROCHLORIDE 0.5 MG: 1 INJECTION, SOLUTION INTRAMUSCULAR; INTRAVENOUS; SUBCUTANEOUS at 08:15

## 2022-01-01 RX ADMIN — LIDOCAINE: 40 CREAM TOPICAL at 11:09

## 2022-01-01 RX ADMIN — CALCIPOTRIENE: 50 CREAM TOPICAL at 20:03

## 2022-01-01 RX ADMIN — KETOROLAC TROMETHAMINE 15 MG: 15 INJECTION, SOLUTION INTRAMUSCULAR; INTRAVENOUS at 21:10

## 2022-01-01 RX ADMIN — HEPARIN SODIUM 5000 UNITS: 5000 INJECTION, SOLUTION INTRAVENOUS; SUBCUTANEOUS at 20:33

## 2022-01-01 RX ADMIN — POTASSIUM CHLORIDE 10 MEQ: 7.46 INJECTION, SOLUTION INTRAVENOUS at 01:31

## 2022-01-01 RX ADMIN — OXYCODONE HYDROCHLORIDE 5 MG: 100 SOLUTION ORAL at 11:32

## 2022-01-01 RX ADMIN — HEPARIN SODIUM 5000 UNITS: 5000 INJECTION, SOLUTION INTRAVENOUS; SUBCUTANEOUS at 22:29

## 2022-01-01 RX ADMIN — VANCOMYCIN HYDROCHLORIDE 125 MG: KIT at 08:39

## 2022-01-01 RX ADMIN — MEMANTINE 10 MG: 10 TABLET ORAL at 10:11

## 2022-01-01 RX ADMIN — POTASSIUM CHLORIDE 10 MEQ: 7.46 INJECTION, SOLUTION INTRAVENOUS at 02:28

## 2022-01-01 RX ADMIN — ESCITALOPRAM OXALATE 20 MG: 20 TABLET ORAL at 11:54

## 2022-01-01 RX ADMIN — ESCITALOPRAM OXALATE 20 MG: 20 TABLET ORAL at 13:23

## 2022-01-01 RX ADMIN — Medication 5 MG: at 20:14

## 2022-01-01 RX ADMIN — LIDOCAINE: 40 CREAM TOPICAL at 12:27

## 2022-01-01 RX ADMIN — POTASSIUM CHLORIDE 10 MEQ: 7.46 INJECTION, SOLUTION INTRAVENOUS at 13:33

## 2022-01-01 RX ADMIN — TAMSULOSIN HYDROCHLORIDE 0.4 MG: 0.4 CAPSULE ORAL at 09:04

## 2022-01-01 RX ADMIN — OXYCODONE HYDROCHLORIDE 2.5 MG: 100 SOLUTION ORAL at 11:44

## 2022-01-01 RX ADMIN — VALPROIC ACID 250 MG: 250 SOLUTION ORAL at 16:21

## 2022-01-01 RX ADMIN — TAMSULOSIN HYDROCHLORIDE 0.4 MG: 0.4 CAPSULE ORAL at 08:02

## 2022-01-01 RX ADMIN — OXYCODONE HYDROCHLORIDE 2.5 MG: 100 SOLUTION ORAL at 13:46

## 2022-01-01 RX ADMIN — KETAMINE HYDROCHLORIDE 5 MG/HR: 100 INJECTION, SOLUTION, CONCENTRATE INTRAMUSCULAR; INTRAVENOUS at 10:50

## 2022-01-01 RX ADMIN — ACETAMINOPHEN 975 MG: 325 TABLET, FILM COATED ORAL at 08:54

## 2022-01-01 RX ADMIN — ATENOLOL 100 MG: 50 TABLET ORAL at 08:58

## 2022-01-01 RX ADMIN — SIMVASTATIN 40 MG: 40 TABLET, FILM COATED ORAL at 23:18

## 2022-01-01 RX ADMIN — ACETAMINOPHEN 975 MG: 325 TABLET, FILM COATED ORAL at 09:20

## 2022-01-01 RX ADMIN — MEMANTINE 10 MG: 10 TABLET ORAL at 19:54

## 2022-01-01 RX ADMIN — POTASSIUM CHLORIDE 10 MEQ: 7.46 INJECTION, SOLUTION INTRAVENOUS at 23:40

## 2022-01-01 RX ADMIN — KETOROLAC TROMETHAMINE 15 MG: 15 INJECTION, SOLUTION INTRAMUSCULAR; INTRAVENOUS at 17:48

## 2022-01-01 RX ADMIN — AMLODIPINE BESYLATE 10 MG: 10 TABLET ORAL at 13:52

## 2022-01-01 RX ADMIN — POTASSIUM CHLORIDE 10 MEQ: 7.46 INJECTION, SOLUTION INTRAVENOUS at 10:13

## 2022-01-01 RX ADMIN — Medication 5 MG: at 20:34

## 2022-01-01 RX ADMIN — DIAZEPAM 2.5 MG: 5 INJECTION, SOLUTION INTRAMUSCULAR; INTRAVENOUS at 19:31

## 2022-01-01 RX ADMIN — VANCOMYCIN HYDROCHLORIDE 125 MG: KIT at 10:08

## 2022-01-01 RX ADMIN — THERA TABS 1 TABLET: TAB at 10:18

## 2022-01-01 RX ADMIN — OXYCODONE HYDROCHLORIDE 2.5 MG: 100 SOLUTION ORAL at 09:18

## 2022-01-01 RX ADMIN — OXYCODONE HYDROCHLORIDE 2.5 MG: 100 SOLUTION ORAL at 19:45

## 2022-01-01 RX ADMIN — SIMVASTATIN 40 MG: 40 TABLET, FILM COATED ORAL at 21:52

## 2022-01-01 RX ADMIN — ACETAMINOPHEN 975 MG: 325 TABLET, FILM COATED ORAL at 13:46

## 2022-01-01 RX ADMIN — KETAMINE HYDROCHLORIDE 5 MG/HR: 100 INJECTION, SOLUTION, CONCENTRATE INTRAMUSCULAR; INTRAVENOUS at 06:23

## 2022-01-01 RX ADMIN — OXYCODONE HYDROCHLORIDE 2.5 MG: 100 SOLUTION ORAL at 08:19

## 2022-01-01 RX ADMIN — KETOROLAC TROMETHAMINE 15 MG: 15 INJECTION, SOLUTION INTRAMUSCULAR; INTRAVENOUS at 03:31

## 2022-01-01 RX ADMIN — OXYCODONE HYDROCHLORIDE 2.5 MG: 100 SOLUTION ORAL at 18:24

## 2022-01-01 RX ADMIN — POTASSIUM CHLORIDE 10 MEQ: 7.46 INJECTION, SOLUTION INTRAVENOUS at 09:15

## 2022-01-01 RX ADMIN — OXYCODONE HYDROCHLORIDE 5 MG: 100 SOLUTION ORAL at 09:48

## 2022-01-01 RX ADMIN — DEXTROSE AND SODIUM CHLORIDE: 5; 450 INJECTION, SOLUTION INTRAVENOUS at 10:53

## 2022-01-01 RX ADMIN — LIDOCAINE PATCH 4% 1 PATCH: 40 PATCH TOPICAL at 11:47

## 2022-01-01 RX ADMIN — HYDROMORPHONE HYDROCHLORIDE 0.5 MG: 1 INJECTION, SOLUTION INTRAMUSCULAR; INTRAVENOUS; SUBCUTANEOUS at 08:12

## 2022-01-01 RX ADMIN — OXYCODONE HYDROCHLORIDE 2.5 MG: 100 SOLUTION ORAL at 07:43

## 2022-01-01 RX ADMIN — HYDROMORPHONE HYDROCHLORIDE 0.5 MG: 1 INJECTION, SOLUTION INTRAMUSCULAR; INTRAVENOUS; SUBCUTANEOUS at 12:46

## 2022-01-01 RX ADMIN — ACETAMINOPHEN 975 MG: 325 TABLET, FILM COATED ORAL at 21:13

## 2022-01-01 RX ADMIN — OXYCODONE HYDROCHLORIDE 2.5 MG: 100 SOLUTION ORAL at 13:26

## 2022-01-01 RX ADMIN — VALPROIC ACID 250 MG: 250 SOLUTION ORAL at 14:09

## 2022-01-01 RX ADMIN — MEMANTINE 10 MG: 10 TABLET ORAL at 08:55

## 2022-01-01 RX ADMIN — DEXTROSE MONOHYDRATE AND SODIUM CHLORIDE: 5; .9 INJECTION, SOLUTION INTRAVENOUS at 15:39

## 2022-01-01 RX ADMIN — Medication 5 MG: at 21:30

## 2022-01-01 RX ADMIN — AMLODIPINE BESYLATE 10 MG: 10 TABLET ORAL at 09:03

## 2022-01-01 RX ADMIN — HYDROMORPHONE HYDROCHLORIDE 0.5 MG: 1 INJECTION, SOLUTION INTRAMUSCULAR; INTRAVENOUS; SUBCUTANEOUS at 00:19

## 2022-01-01 RX ADMIN — ACETAMINOPHEN 975 MG: 325 TABLET, FILM COATED ORAL at 21:52

## 2022-01-01 RX ADMIN — OXYCODONE HYDROCHLORIDE 2.5 MG: 100 SOLUTION ORAL at 08:20

## 2022-01-01 RX ADMIN — SODIUM CHLORIDE, POTASSIUM CHLORIDE, SODIUM LACTATE AND CALCIUM CHLORIDE: 600; 310; 30; 20 INJECTION, SOLUTION INTRAVENOUS at 07:24

## 2022-01-01 RX ADMIN — TAMSULOSIN HYDROCHLORIDE 0.4 MG: 0.4 CAPSULE ORAL at 11:51

## 2022-01-01 RX ADMIN — HYDROMORPHONE HYDROCHLORIDE 0.5 MG: 1 INJECTION, SOLUTION INTRAMUSCULAR; INTRAVENOUS; SUBCUTANEOUS at 20:37

## 2022-01-01 RX ADMIN — OXYCODONE HYDROCHLORIDE 2.5 MG: 100 SOLUTION ORAL at 16:20

## 2022-01-01 RX ADMIN — VALPROIC ACID 250 MG: 250 SOLUTION ORAL at 21:39

## 2022-01-01 RX ADMIN — HYDROMORPHONE HYDROCHLORIDE 0.5 MG: 1 INJECTION, SOLUTION INTRAMUSCULAR; INTRAVENOUS; SUBCUTANEOUS at 03:53

## 2022-01-01 RX ADMIN — IOPAMIDOL 130 ML: 755 INJECTION, SOLUTION INTRAVENOUS at 00:47

## 2022-01-01 RX ADMIN — HYDROMORPHONE HYDROCHLORIDE 0.5 MG: 1 INJECTION, SOLUTION INTRAMUSCULAR; INTRAVENOUS; SUBCUTANEOUS at 00:54

## 2022-01-01 RX ADMIN — SODIUM CHLORIDE, SODIUM LACTATE, POTASSIUM CHLORIDE, CALCIUM CHLORIDE AND DEXTROSE MONOHYDRATE: 5; 600; 310; 30; 20 INJECTION, SOLUTION INTRAVENOUS at 03:21

## 2022-01-01 RX ADMIN — OXYCODONE HYDROCHLORIDE 2.5 MG: 100 SOLUTION ORAL at 01:16

## 2022-01-01 RX ADMIN — CALCIPOTRIENE: 50 CREAM TOPICAL at 11:21

## 2022-01-01 RX ADMIN — HYDROMORPHONE HYDROCHLORIDE 0.5 MG: 1 INJECTION, SOLUTION INTRAMUSCULAR; INTRAVENOUS; SUBCUTANEOUS at 20:35

## 2022-01-01 RX ADMIN — CALCIPOTRIENE: 50 CREAM TOPICAL at 19:21

## 2022-01-01 RX ADMIN — VANCOMYCIN HYDROCHLORIDE 125 MG: KIT at 16:03

## 2022-01-01 RX ADMIN — OXYCODONE HYDROCHLORIDE 2.5 MG: 100 SOLUTION ORAL at 07:59

## 2022-01-01 RX ADMIN — VANCOMYCIN HYDROCHLORIDE 125 MG: KIT at 11:52

## 2022-01-01 RX ADMIN — OXYCODONE HYDROCHLORIDE 2.5 MG: 100 SOLUTION ORAL at 16:02

## 2022-01-01 RX ADMIN — HYDROMORPHONE HYDROCHLORIDE 0.5 MG: 1 INJECTION, SOLUTION INTRAMUSCULAR; INTRAVENOUS; SUBCUTANEOUS at 00:51

## 2022-01-01 RX ADMIN — MEMANTINE 10 MG: 10 TABLET ORAL at 15:49

## 2022-01-01 RX ADMIN — OXYCODONE HYDROCHLORIDE 5 MG: 100 SOLUTION ORAL at 13:33

## 2022-01-01 RX ADMIN — POTASSIUM CHLORIDE 10 MEQ: 7.46 INJECTION, SOLUTION INTRAVENOUS at 19:34

## 2022-01-01 RX ADMIN — MEMANTINE 10 MG: 10 TABLET ORAL at 09:21

## 2022-01-01 RX ADMIN — FINASTERIDE 5 MG: 5 TABLET, FILM COATED ORAL at 09:04

## 2022-01-01 RX ADMIN — HYDROMORPHONE HYDROCHLORIDE 0.5 MG: 1 INJECTION, SOLUTION INTRAMUSCULAR; INTRAVENOUS; SUBCUTANEOUS at 09:21

## 2022-01-01 RX ADMIN — Medication 1 MG: at 19:12

## 2022-01-01 RX ADMIN — VANCOMYCIN HYDROCHLORIDE 125 MG: KIT at 16:28

## 2022-01-01 RX ADMIN — OXYCODONE HYDROCHLORIDE 2.5 MG: 100 SOLUTION ORAL at 00:33

## 2022-01-01 RX ADMIN — VALPROIC ACID 250 MG: 250 SOLUTION ORAL at 20:47

## 2022-01-01 RX ADMIN — ACETAMINOPHEN 975 MG: 325 TABLET, FILM COATED ORAL at 16:28

## 2022-01-01 RX ADMIN — VANCOMYCIN HYDROCHLORIDE 125 MG: KIT at 13:11

## 2022-01-01 RX ADMIN — POTASSIUM CHLORIDE 10 MEQ: 7.46 INJECTION, SOLUTION INTRAVENOUS at 12:01

## 2022-01-01 RX ADMIN — FINASTERIDE 5 MG: 5 TABLET, FILM COATED ORAL at 10:46

## 2022-01-01 RX ADMIN — AMLODIPINE BESYLATE 10 MG: 10 TABLET ORAL at 14:42

## 2022-01-01 RX ADMIN — DEXTROSE MONOHYDRATE AND SODIUM CHLORIDE: 5; .9 INJECTION, SOLUTION INTRAVENOUS at 22:20

## 2022-01-01 RX ADMIN — THERA TABS 1 TABLET: TAB at 09:04

## 2022-01-01 RX ADMIN — TAMSULOSIN HYDROCHLORIDE 0.4 MG: 0.4 CAPSULE ORAL at 09:20

## 2022-01-01 RX ADMIN — OXYCODONE HYDROCHLORIDE 2.5 MG: 100 SOLUTION ORAL at 13:11

## 2022-01-01 RX ADMIN — LIDOCAINE PATCH 4% 1 PATCH: 40 PATCH TOPICAL at 09:21

## 2022-01-01 RX ADMIN — MEMANTINE 10 MG: 10 TABLET ORAL at 20:56

## 2022-01-01 RX ADMIN — SODIUM CHLORIDE, POTASSIUM CHLORIDE, SODIUM LACTATE AND CALCIUM CHLORIDE: 600; 310; 30; 20 INJECTION, SOLUTION INTRAVENOUS at 16:59

## 2022-01-01 RX ADMIN — HEPARIN SODIUM 5000 UNITS: 5000 INJECTION, SOLUTION INTRAVENOUS; SUBCUTANEOUS at 10:14

## 2022-01-01 RX ADMIN — VALPROIC ACID 250 MG: 250 SOLUTION ORAL at 20:32

## 2022-01-01 RX ADMIN — HYDROMORPHONE HYDROCHLORIDE 0.5 MG: 1 INJECTION, SOLUTION INTRAMUSCULAR; INTRAVENOUS; SUBCUTANEOUS at 11:13

## 2022-01-01 RX ADMIN — ACETAMINOPHEN 975 MG: 325 TABLET, FILM COATED ORAL at 19:13

## 2022-01-01 RX ADMIN — DONEPEZIL HYDROCHLORIDE 5 MG: 5 TABLET, FILM COATED ORAL at 08:56

## 2022-01-01 RX ADMIN — OXYCODONE HYDROCHLORIDE 2.5 MG: 100 SOLUTION ORAL at 21:55

## 2022-01-01 RX ADMIN — OXYCODONE HYDROCHLORIDE 2.5 MG: 100 SOLUTION ORAL at 20:31

## 2022-01-01 RX ADMIN — DEXTROSE MONOHYDRATE AND SODIUM CHLORIDE: 5; .9 INJECTION, SOLUTION INTRAVENOUS at 01:56

## 2022-01-01 ASSESSMENT — ACTIVITIES OF DAILY LIVING (ADL)
ADLS_ACUITY_SCORE: 61
ADLS_ACUITY_SCORE: 59
ADLS_ACUITY_SCORE: 54
ADLS_ACUITY_SCORE: 61
ADLS_ACUITY_SCORE: 61
ADLS_ACUITY_SCORE: 57
ADLS_ACUITY_SCORE: 61
ADLS_ACUITY_SCORE: 59
ADLS_ACUITY_SCORE: 61
ADLS_ACUITY_SCORE: 61
ADLS_ACUITY_SCORE: 55
ADLS_ACUITY_SCORE: 61
ADLS_ACUITY_SCORE: 57
ADLS_ACUITY_SCORE: 55
ADLS_ACUITY_SCORE: 61
ADLS_ACUITY_SCORE: 43
ADLS_ACUITY_SCORE: 61
ADLS_ACUITY_SCORE: 57
ADLS_ACUITY_SCORE: 59
ADLS_ACUITY_SCORE: 59
ADLS_ACUITY_SCORE: 35
ADLS_ACUITY_SCORE: 61
ADLS_ACUITY_SCORE: 59
ADLS_ACUITY_SCORE: 61
DEPENDENT_IADLS:: CLEANING;COOKING;LAUNDRY;SHOPPING;MEAL PREPARATION;MEDICATION MANAGEMENT;MONEY MANAGEMENT;TRANSPORTATION
ADLS_ACUITY_SCORE: 61
ADLS_ACUITY_SCORE: 55
ADLS_ACUITY_SCORE: 57
ADLS_ACUITY_SCORE: 57
ADLS_ACUITY_SCORE: 61
ADLS_ACUITY_SCORE: 59
ADLS_ACUITY_SCORE: 33
ADLS_ACUITY_SCORE: 43
ADLS_ACUITY_SCORE: 55
ADLS_ACUITY_SCORE: 35
ADLS_ACUITY_SCORE: 57
ADLS_ACUITY_SCORE: 59
ADLS_ACUITY_SCORE: 43
ADLS_ACUITY_SCORE: 61
ADLS_ACUITY_SCORE: 55
ADLS_ACUITY_SCORE: 61
ADLS_ACUITY_SCORE: 53
ADLS_ACUITY_SCORE: 61
ADLS_ACUITY_SCORE: 57
ADLS_ACUITY_SCORE: 59
ADLS_ACUITY_SCORE: 53
ADLS_ACUITY_SCORE: 59
ADLS_ACUITY_SCORE: 61
ADLS_ACUITY_SCORE: 53
ADLS_ACUITY_SCORE: 57
ADLS_ACUITY_SCORE: 61
ADLS_ACUITY_SCORE: 57
ADLS_ACUITY_SCORE: 61
ADLS_ACUITY_SCORE: 53
ADLS_ACUITY_SCORE: 55
ADLS_ACUITY_SCORE: 61
ADLS_ACUITY_SCORE: 43
ADLS_ACUITY_SCORE: 61
ADLS_ACUITY_SCORE: 35
ADLS_ACUITY_SCORE: 59
ADLS_ACUITY_SCORE: 59
ADLS_ACUITY_SCORE: 55
ADLS_ACUITY_SCORE: 61
ADLS_ACUITY_SCORE: 43
ADLS_ACUITY_SCORE: 61
ADLS_ACUITY_SCORE: 61
ADLS_ACUITY_SCORE: 59
ADLS_ACUITY_SCORE: 61
ADLS_ACUITY_SCORE: 53
ADLS_ACUITY_SCORE: 61
ADLS_ACUITY_SCORE: 61
ADLS_ACUITY_SCORE: 43
ADLS_ACUITY_SCORE: 61
ADLS_ACUITY_SCORE: 43
ADLS_ACUITY_SCORE: 54
ADLS_ACUITY_SCORE: 57
ADLS_ACUITY_SCORE: 61
ADLS_ACUITY_SCORE: 59
ADLS_ACUITY_SCORE: 61
ADLS_ACUITY_SCORE: 55
ADLS_ACUITY_SCORE: 61
ADLS_ACUITY_SCORE: 54
ADLS_ACUITY_SCORE: 54
ADLS_ACUITY_SCORE: 61
ADLS_ACUITY_SCORE: 54
ADLS_ACUITY_SCORE: 35
ADLS_ACUITY_SCORE: 61
ADLS_ACUITY_SCORE: 61
ADLS_ACUITY_SCORE: 55
ADLS_ACUITY_SCORE: 61
ADLS_ACUITY_SCORE: 35
ADLS_ACUITY_SCORE: 61
ADLS_ACUITY_SCORE: 54
ADLS_ACUITY_SCORE: 53
ADLS_ACUITY_SCORE: 57
ADLS_ACUITY_SCORE: 61
ADLS_ACUITY_SCORE: 57
ADLS_ACUITY_SCORE: 61
ADLS_ACUITY_SCORE: 61
ADLS_ACUITY_SCORE: 59
ADLS_ACUITY_SCORE: 53
ADLS_ACUITY_SCORE: 61
ADLS_ACUITY_SCORE: 35
ADLS_ACUITY_SCORE: 61
ADLS_ACUITY_SCORE: 55
ADLS_ACUITY_SCORE: 61
ADLS_ACUITY_SCORE: 43
ADLS_ACUITY_SCORE: 54
ADLS_ACUITY_SCORE: 35
ADLS_ACUITY_SCORE: 54
ADLS_ACUITY_SCORE: 57
ADLS_ACUITY_SCORE: 61
ADLS_ACUITY_SCORE: 61
ADLS_ACUITY_SCORE: 57
ADLS_ACUITY_SCORE: 59
ADLS_ACUITY_SCORE: 61
ADLS_ACUITY_SCORE: 43
ADLS_ACUITY_SCORE: 54
ADLS_ACUITY_SCORE: 61
ADLS_ACUITY_SCORE: 55
ADLS_ACUITY_SCORE: 43
ADLS_ACUITY_SCORE: 57
ADLS_ACUITY_SCORE: 61
ADLS_ACUITY_SCORE: 53
ADLS_ACUITY_SCORE: 61
ADLS_ACUITY_SCORE: 59
ADLS_ACUITY_SCORE: 61
ADLS_ACUITY_SCORE: 55
ADLS_ACUITY_SCORE: 55
ADLS_ACUITY_SCORE: 61
ADLS_ACUITY_SCORE: 61
ADLS_ACUITY_SCORE: 57
ADLS_ACUITY_SCORE: 55
ADLS_ACUITY_SCORE: 61
ADLS_ACUITY_SCORE: 55
ADLS_ACUITY_SCORE: 59
ADLS_ACUITY_SCORE: 54
ADLS_ACUITY_SCORE: 61
ADLS_ACUITY_SCORE: 57
ADLS_ACUITY_SCORE: 59
ADLS_ACUITY_SCORE: 55
ADLS_ACUITY_SCORE: 59
ADLS_ACUITY_SCORE: 61
ADLS_ACUITY_SCORE: 35
ADLS_ACUITY_SCORE: 61
ADLS_ACUITY_SCORE: 54
ADLS_ACUITY_SCORE: 61
ADLS_ACUITY_SCORE: 61
ADLS_ACUITY_SCORE: 57
ADLS_ACUITY_SCORE: 61
ADLS_ACUITY_SCORE: 57
ADLS_ACUITY_SCORE: 61
ADLS_ACUITY_SCORE: 57
ADLS_ACUITY_SCORE: 61
ADLS_ACUITY_SCORE: 55
ADLS_ACUITY_SCORE: 57
ADLS_ACUITY_SCORE: 43
ADLS_ACUITY_SCORE: 53
ADLS_ACUITY_SCORE: 57
ADLS_ACUITY_SCORE: 57
ADLS_ACUITY_SCORE: 55
ADLS_ACUITY_SCORE: 59
ADLS_ACUITY_SCORE: 59
ADLS_ACUITY_SCORE: 57
ADLS_ACUITY_SCORE: 57
ADLS_ACUITY_SCORE: 61
ADLS_ACUITY_SCORE: 55
ADLS_ACUITY_SCORE: 43
ADLS_ACUITY_SCORE: 61
ADLS_ACUITY_SCORE: 61
ADLS_ACUITY_SCORE: 54
ADLS_ACUITY_SCORE: 61
ADLS_ACUITY_SCORE: 55
ADLS_ACUITY_SCORE: 61
ADLS_ACUITY_SCORE: 57

## 2022-01-01 ASSESSMENT — PATIENT HEALTH QUESTIONNAIRE - PHQ9
SUM OF ALL RESPONSES TO PHQ QUESTIONS 1-9: 3
10. IF YOU CHECKED OFF ANY PROBLEMS, HOW DIFFICULT HAVE THESE PROBLEMS MADE IT FOR YOU TO DO YOUR WORK, TAKE CARE OF THINGS AT HOME, OR GET ALONG WITH OTHER PEOPLE: EXTREMELY DIFFICULT
SUM OF ALL RESPONSES TO PHQ QUESTIONS 1-9: 6
SUM OF ALL RESPONSES TO PHQ QUESTIONS 1-9: 18
SUM OF ALL RESPONSES TO PHQ QUESTIONS 1-9: 20
SUM OF ALL RESPONSES TO PHQ QUESTIONS 1-9: 20
10. IF YOU CHECKED OFF ANY PROBLEMS, HOW DIFFICULT HAVE THESE PROBLEMS MADE IT FOR YOU TO DO YOUR WORK, TAKE CARE OF THINGS AT HOME, OR GET ALONG WITH OTHER PEOPLE: SOMEWHAT DIFFICULT
SUM OF ALL RESPONSES TO PHQ QUESTIONS 1-9: 3

## 2022-01-01 ASSESSMENT — ENCOUNTER SYMPTOMS
APPETITE CHANGE: 0
NAUSEA: 0
ABDOMINAL PAIN: 1
ACTIVITY CHANGE: 0
DIFFICULTY URINATING: 1
SLEEP DISTURBANCE: 1

## 2022-01-01 ASSESSMENT — PAIN SCALES - GENERAL: PAINLEVEL: NO PAIN (0)

## 2022-01-01 ASSESSMENT — MIFFLIN-ST. JEOR: SCORE: 1746.92

## 2022-01-05 ENCOUNTER — LAB (OUTPATIENT)
Dept: LAB | Facility: CLINIC | Age: 72
End: 2022-01-05
Payer: MEDICARE

## 2022-01-05 ENCOUNTER — HOSPITAL ENCOUNTER (OUTPATIENT)
Dept: SPEECH THERAPY | Facility: CLINIC | Age: 72
Setting detail: THERAPIES SERIES
End: 2022-01-05
Attending: PSYCHIATRY & NEUROLOGY
Payer: MEDICARE

## 2022-01-05 DIAGNOSIS — F02.818 EARLY ONSET ALZHEIMER'S DEMENTIA WITH BEHAVIORAL DISTURBANCE (H): ICD-10-CM

## 2022-01-05 DIAGNOSIS — G30.0 EARLY ONSET ALZHEIMER'S DEMENTIA WITH BEHAVIORAL DISTURBANCE (H): ICD-10-CM

## 2022-01-05 LAB
TSH SERPL DL<=0.005 MIU/L-ACNC: 0.94 UIU/ML (ref 0.3–5)
VIT B12 SERPL-MCNC: 1009 PG/ML (ref 213–816)

## 2022-01-05 PROCEDURE — 92507 TX SP LANG VOICE COMM INDIV: CPT | Mod: GN | Performed by: SPEECH-LANGUAGE PATHOLOGIST

## 2022-01-05 PROCEDURE — 84207 ASSAY OF VITAMIN B-6: CPT | Mod: 90

## 2022-01-05 PROCEDURE — 84443 ASSAY THYROID STIM HORMONE: CPT

## 2022-01-05 PROCEDURE — 36415 COLL VENOUS BLD VENIPUNCTURE: CPT

## 2022-01-05 PROCEDURE — 82607 VITAMIN B-12: CPT

## 2022-01-05 PROCEDURE — 99000 SPECIMEN HANDLING OFFICE-LAB: CPT

## 2022-01-10 NOTE — LETTER
1/10/2022       RE: Catalino Coronado  [address] [place] [address]     Dear Colleague,    Thank you for referring your patient, Catalino Coronado, to the [facility] NEUROSURGERY CLINIC [place] at [facility]. Please see a copy of my visit note below.    Service Date: 01/10/2022    Davis Aguilar MD  [address]  [address]    RE:  Catalino Coronado  MRN:  [id number]  :  1950    Dear Dr. Aguilar:    I had the pleasure to talk to Vahid and his wife today by telephone during a neurosurgical phone clinic visit.  This was initially supposed to be a video visit; however, they were traveling to their cabin up in [place], [place] and so we connected by phone.    Briefly, Vahid is a 71-year-old gentleman who has a history of an incidental ACom aneurysm.  I have been following this with serial observation over the last couple years.    He returns today for routine followup.    Over the last year, he has been doing well without any new problems related to the aneurysm.  He does have a history of Alzheimer's disease, which he is on medication for and he seems to be doing relatively well.  He remains independent and able to interact fairly well.    I did review his recent MR angiogram from 2021.  This again demonstrates an ACom aneurysm measuring 4 mm in size.  This is unchanged in comparison to his previous MR angiograms from 2021 and 2019.    At this point in time, I would recommend serial observation with a repeat MR angiogram in 1 year.  He and his wife are in agreement with this plan.  He will return in 1 year for repeat MR angiogram and followup at that time.    Thank you for the opportunity to participate in Vahid's care.  If you have any further questions or concerns, please feel free to contact me on my cell phone at [phone].    With kindest personal regards,    Chevy Duncan,  MD        D: 01/10/2022   T: 01/10/2022   MT: JOSÉ    Name:     POLLO MILTON  MRN:      [id number]        Account:      [id number]   :      1950           Service Date: 01/10/2022       Document: [id number]

## 2022-01-10 NOTE — PROGRESS NOTES
Service Date: 01/10/2022    Davis Aguilar MD  [address]  [address]    RE:  Catalino Milton  MRN:  [id number]  :  1950    Dear Dr. Aguilar:    I had the pleasure to talk to Vahid and his wife today by telephone during a neurosurgical phone clinic visit.  This was initially supposed to be a video visit; however, they were traveling to their cabin up in [place], [place] and so we connected by phone.    Briefly, Vahid is a 71-year-old gentleman who has a history of an incidental ACom aneurysm.  I have been following this with serial observation over the last couple years.    He returns today for routine followup.    Over the last year, he has been doing well without any new problems related to the aneurysm.  He does have a history of Alzheimer's disease, which he is on medication for and he seems to be doing relatively well.  He remains independent and able to interact fairly well.    I did review his recent MR angiogram from 2021.  This again demonstrates an ACom aneurysm measuring 4 mm in size.  This is unchanged in comparison to his previous MR angiograms from 2021 and 2019.    At this point in time, I would recommend serial observation with a repeat MR angiogram in 1 year.  He and his wife are in agreement with this plan.  He will return in 1 year for repeat MR angiogram and followup at that time.    Thank you for the opportunity to participate in Vahid's care.  If you have any further questions or concerns, please feel free to contact me on my cell phone at [phone].    With kindest personal regards,    Chevy Duncan MD        D: 01/10/2022   T: 01/10/2022   MT: JOSÉ    Name:     CATALINO MILTON  MRN:      [id number]        Account:      [id number]   :      1950           Service Date: 01/10/2022       Document: [id number]

## 2022-01-10 NOTE — PROGRESS NOTES
Vahid is a 71 year old who is being evaluated via a billable video visit.      Patient gave consent for the visit.    Visit duration: 10 min.    Please see dictation for visit specifics.

## 2022-01-19 NOTE — TELEPHONE ENCOUNTER
"Routing refill request to provider for review/approval because:  Labs out of range:  BP  Labs not current:  LDL  A break in medication  Medication has end date for Wellbutrin.     Last office visit on 12/15/21.     Denied Rx for Flomax, patient should already have refills on file at the same pharmacy requested.       Last Written Prescription Date:  8/3/21-11/1/21, A Break in medication/End date for prescription.   Last Fill Quantity: 180,  # refills: 0     Last Written Prescription Date:  2/4/21  Last Fill Quantity: 180,  # refills: 3         Last Written Prescription Date:  10/28/21 Simvastatin.   Last Fill Quantity: 90,  # refills: 0     Requested Prescriptions   Pending Prescriptions Disp Refills     tamsulosin (FLOMAX) 0.4 MG capsule         Alpha Blockers Failed - 1/17/2022  8:41 AM        Failed - Blood pressure under 140/90 in past 12 months     BP Readings from Last 3 Encounters:   12/22/21 (!) 156/96   12/15/21 (!) 145/70   12/01/21 (!) 146/80                 Passed - Recent (12 mo) or future (30 days) visit within the authorizing provider's specialty     Patient has had an office visit with the authorizing provider or a provider within the authorizing providers department within the previous 12 mos or has a future within next 30 days. See \"Patient Info\" tab in inbasket, or \"Choose Columns\" in Meds & Orders section of the refill encounter.              Passed - Patient does not have Tadalafil, Vardenafil, or Sildenafil on their medication list        Passed - Medication is active on med list        Passed - Patient is 18 years of age or older           buPROPion (WELLBUTRIN XL) 150 MG 24 hr tablet 180 tablet 0     Sig: Take 2 tablets (300 mg) by mouth every morning       SSRIs Protocol Failed - 1/17/2022  8:41 AM        Failed - PHQ-9 score less than 5 in past 6 months     Please review last PHQ-9 score.           Passed - Medication is Bupropion     If the medication is Bupropion (Wellbutrin), and the " "patient is taking for smoking cessation; OK to refill.          Passed - Medication is active on med list        Passed - Patient is age 18 or older        Passed - Recent (6 mo) or future (30 days) visit within the authorizing provider's specialty     Patient had office visit in the last 6 months or has a visit in the next 30 days with authorizing provider or within the authorizing provider's specialty.  See \"Patient Info\" tab in inbasket, or \"Choose Columns\" in Meds & Orders section of the refill encounter.               memantine (NAMENDA) 10 MG tablet       Si tablet (10 mg) 2 times daily       Miscellaneous Dementia Agents Passed - 2022  8:41 AM        Passed - Recent (12 mo) or future (30 days) visit within the authorizing provider's specialty     Patient has had an office visit with the authorizing provider or a provider within the authorizing providers department within the previous 12 mos or has a future within next 30 days. See \"Patient Info\" tab in inbasket, or \"Choose Columns\" in Meds & Orders section of the refill encounter.              Passed - Medication is active on med list        Passed - Patient is 18 years of age or older           simvastatin (ZOCOR) 40 MG tablet 90 tablet 0     Sig: Take 1 tablet (40 mg) by mouth At Bedtime       Statins Protocol Failed - 2022  8:41 AM        Failed - LDL on file in past 12 months     Recent Labs   Lab Test 20  1641                Passed - No abnormal creatine kinase in past 12 months     Recent Labs   Lab Test 18  1115   *                Passed - Recent (12 mo) or future (30 days) visit within the authorizing provider's specialty     Patient has had an office visit with the authorizing provider or a provider within the authorizing providers department within the previous 12 mos or has a future within next 30 days. See \"Patient Info\" tab in inbasket, or \"Choose Columns\" in Meds & Orders section of the refill encounter.  "             Passed - Medication is active on med list        Passed - Patient is age 18 or older             Margie Mcgregor RN 01/19/22 8:41 AM

## 2022-01-21 NOTE — LETTER
1/21/2022     RE: Catalino Coronado  [address] [place] [address]     Dear Colleague,    Thank you for referring your patient, Catalino Coronado, to the [facility] NEUROSPECIALTIES at [facility]. Please see a copy of my visit note below.    Mr. Catalino Coronado is a 71 year old man former  at  seen by Dr. 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 He was also evaluated by Dr. Wolfgang Edouard. Please see Dr. Kennedy's documentation for full details. In brief, he developed progressive loss in short term memory starting around 2016. This has gradually worsened to the point where he has trouble following 2 step commands with significant receptive and expressive aphasia. During his visit with me 9/2021 his wife reported dream enactment behavior. Brain MRI showed diffuse cerebral atrophy with a predilection for the dorsolateral frontoparietal cortices. No abnormal DWI or susceptibility signal. Minimal white matter disease. B12 normal (793); CMP normal other than eGFR 55; TSH normal. He has been attending speech therapy, was prescribed hearing aids, therapy cat, physical therapy for balance, Aricept and melatonin. Aricept was decreased and eventually stopped due persistent to diarrhea.    However, after stopping Aricept, he precipitously worsened. A full metabolic work-up was performed that was normal. Repeat MRI was performed that showed no acute changes but that showed interval atrophy compared to the previous brain MRI in 2018. We decided to increase Aricept back to 5 mg and are seeing him back in clinic today.    The 5 mg of Aricept seems to have gotten him back to his previous baseline.  He is taking Imodium to help prevent diarrhea. He is more attentive, alert and  picking up on the jokes of his son. Given his dream enactment behavior, I explained that the possibility of an incipient Lewy body disease is likely and is perhaps the reason why he demonstrably improves with Aricept.    He continues to have trouble swallowing his pills but with no other oral intake. His wife is considering providing the medications in [place]. We stopped his Vitamin E pills today because of the lack of evidence for slowing decline and the problem with swallowing the large sized pill.    Today, I spent 30 minutes reviewing the chart, personally assessing objective testing, direct patient care, completing documentation and billing. They also had questions about colonoscopy and the difficulties given.  Again, thank you for allowing me to participate in the care of your patient.      Sincerely,    Gabriel Hensley MD

## 2022-01-21 NOTE — PROGRESS NOTES
Mr. Catalino Coronado is a 71 year old man former  at  seen by Dr. 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 He was also evaluated by Dr. Wolfgang Edouard. Please see Dr. Kennedy's documentation for full details. In brief, he developed progressive loss in short term memory starting around 2016. This has gradually worsened to the point where he has trouble following 2 step commands with significant receptive and expressive aphasia. During his visit with me 9/2021 his wife reported dream enactment behavior. Brain MRI showed diffuse cerebral atrophy with a predilection for the dorsolateral frontoparietal cortices. No abnormal DWI or susceptibility signal. Minimal white matter disease. B12 normal (793); CMP normal other than eGFR 55; TSH normal. He has been attending speech therapy, was prescribed hearing aids, therapy cat, physical therapy for balance, Aricept and melatonin. Aricept was decreased and eventually stopped due persistent to diarrhea.    However, after stopping Aricept, he precipitously worsened. A full metabolic work-up was performed that was normal. Repeat MRI was performed that showed no acute changes but that showed interval atrophy compared to the previous brain MRI in 2018. We decided to increase Aricept back to 5 mg and are seeing him back in clinic today.    The 5 mg of Aricept seems to have gotten him back to his previous baseline.  He is taking Imodium to help prevent diarrhea. He is more attentive, alert and picking up on the jokes of his son. Given his dream enactment behavior, I explained that the possibility of an incipient Lewy body disease is likely and is perhaps the reason why he demonstrably improves with Aricept.    He continues to have trouble swallowing his pills but with no other oral  intake. His wife is considering providing the medications in [place]. We stopped his Vitamin E pills today because of the lack of evidence for slowing decline and the problem with swallowing the large sized pill.    Today, I spent 30 minutes reviewing the chart, personally assessing objective testing, direct patient care, completing documentation and billing. They also had questions about colonoscopy and the difficulties given.

## 2022-01-23 NOTE — TELEPHONE ENCOUNTER
"Outpatient Medication Detail     Disp Refills Start End CELIA   memantine (NAMENDA) 10 MG tablet 180 tablet 3 2021  No   Sig: TAKE 1 TABLET BY MOUTH TWICE A DAY   Sent to pharmacy as: memantine 10 mg tablet (NAMENDA)   E-Prescribing Status: Receipt confirmed by pharmacy (2021 11:11 AM CST)        Last office visit provider:  12/15/21     Requested Prescriptions   Pending Prescriptions Disp Refills     atenolol (TENORMIN) 50 MG tablet       Sig: Take 1 tablet (50 mg) by mouth daily       Beta-Blockers Protocol Failed - 2022 11:38 AM        Failed - Blood pressure under 140/90 in past 12 months     BP Readings from Last 3 Encounters:   22 (!) 144/97   21 (!) 156/96   12/15/21 (!) 145/70                 Passed - Patient is age 6 or older        Passed - Recent (12 mo) or future (30 days) visit within the authorizing provider's specialty     Patient has had an office visit with the authorizing provider or a provider within the authorizing providers department within the previous 12 mos or has a future within next 30 days. See \"Patient Info\" tab in inbasket, or \"Choose Columns\" in Meds & Orders section of the refill encounter.              Passed - Medication is active on med list           memantine (NAMENDA) 10 MG tablet       Si tablet (10 mg) 2 times daily       Miscellaneous Dementia Agents Passed - 2022 11:38 AM        Passed - Recent (12 mo) or future (30 days) visit within the authorizing provider's specialty     Patient has had an office visit with the authorizing provider or a provider within the authorizing providers department within the previous 12 mos or has a future within next 30 days. See \"Patient Info\" tab in inbasket, or \"Choose Columns\" in Meds & Orders section of the refill encounter.              Passed - Medication is active on med list        Passed - Patient is 18 years of age or older             John Klein RN 22 12:09 PM  "

## 2022-01-23 NOTE — TELEPHONE ENCOUNTER
"Outpatient Medication Detail     Disp Refills Start End CELIA   atenoloL (TENORMIN) 100 MG tablet 90 tablet 3 3/24/2021  No   Sig: TAKE 1 TABLET BY MOUTH EVERY DAY   Sent to pharmacy as: atenoloL 100 mg tablet (TENORMIN)   E-Prescribing Status: Receipt confirmed by pharmacy (3/24/2021  4:23 PM CDT)       atenoloL (TENORMIN) 100 MG tablet [[id number]]    Electronically signed by: John Klein RN on 03/24/21 1623 Status: Active   Ordering user: John Klein RN 03/24/21 1623 Ordering provider: Davis Aguilar MD   Authorized by: Davis Aguilar MD   Frequency:  03/24/21 - Until Discontinued Released by: John Klein RN 03/24/21 1623   Diagnoses  Essential hypertension [I10]     Routing refill request to provider for review/approval because:  BP not in range.    Last office visit provider:  12/15/21     Requested Prescriptions   Pending Prescriptions Disp Refills     atenolol (TENORMIN) 50 MG tablet       Sig: Take 1 tablet (50 mg) by mouth daily       Beta-Blockers Protocol Failed - 1/20/2022 11:38 AM        Failed - Blood pressure under 140/90 in past 12 months     BP Readings from Last 3 Encounters:   01/21/22 (!) 144/97   12/22/21 (!) 156/96   12/15/21 (!) 145/70                 Passed - Patient is age 6 or older        Passed - Recent (12 mo) or future (30 days) visit within the authorizing provider's specialty     Patient has had an office visit with the authorizing provider or a provider within the authorizing providers department within the previous 12 mos or has a future within next 30 days. See \"Patient Info\" tab in inbasket, or \"Choose Columns\" in Meds & Orders section of the refill encounter.              Passed - Medication is active on med list         Signed Prescriptions Disp Refills    memantine (NAMENDA) 10 MG tablet 180 tablet 3     Sig: Take 1 tablet (10 mg) by mouth 2 times daily       Miscellaneous Dementia Agents Passed - 1/20/2022 11:38 AM        Passed - Recent (12 mo) or future " "(30 days) visit within the authorizing provider's specialty     Patient has had an office visit with the authorizing provider or a provider within the authorizing providers department within the previous 12 mos or has a future within next 30 days. See \"Patient Info\" tab in inbasket, or \"Choose Columns\" in Meds & Orders section of the refill encounter.              Passed - Medication is active on med list        Passed - Patient is 18 years of age or older             John Klein RN 01/23/22 12:10 PM  "

## 2022-01-24 NOTE — PATIENT INSTRUCTIONS
Follow-up with Dr. Duncan in 1 year with an MRA prior to your appointment.    If you have any questions please contact me at [phone], option 3.    Amaury Church RN, CNRN  Stroke & Endovascular Care Coordinator    Thank you for choosing [facility] for your health care needs.

## 2022-03-02 NOTE — LETTER
3/2/2022     RE: Catalino Coronado  [address] [place] [address]     Dear Colleague,    Thank you for referring your patient, Catalino Coronado, to the [facility] at [facility]. Please see a copy of my visit note below.    Mr. Catalino Coronado is a 71 year old man former  at  seen by Dr. 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 He was also evaluated by Dr. Wolfgang Edouard. Please see Dr. Kennedy's documentation for full details. In brief, he developed progressive loss in short term memory starting around 2016. This has gradually worsened to the point where he has trouble following 2 step commands with significant receptive and expressive aphasia. During his visit with me 9/2021 his wife reported dream enactment behavior. Brain MRI showed diffuse cerebral atrophy with a predilection for the dorsolateral frontoparietal cortices. No abnormal DWI or susceptibility signal. Minimal white matter disease. B12 normal (793); CMP normal other than eGFR 55; TSH normal. He has been attending speech therapy, was prescribed hearing aids, therapy cat, physical therapy for balance, Aricept and melatonin. Aricept was decreased and eventually stopped due persistent to diarrhea.     However, after stopping Aricept, he precipitously worsened. A full metabolic work-up was performed that was normal. Repeat MRI was performed that showed no acute changes but that showed interval atrophy compared to the previous brain MRI in 2018. We decided to increase Aricept back to 5 mg and are seeing him back in clinic today. Restarting 5 mg of Aricept appeared to have gotten him back to his previous baseline.  He is taking Imodium to help prevent diarrhea. He is more attentive, alert  and picking up on the jokes of his son. Given his dream enactment behavior, I explained that the possibility of an incipient Lewy body disease is likely and is perhaps the reason why he demonstrably improves with Aricept.     Today he presents for consideration of antidepressant medication titration. Their cat Crystal recently passed away. She was a therapy cat and made it to 17.5 years. They have 1 other surviving cat. His current symptoms are difficult to assess due to the aphasia but he points to the sad phase on his word-picture card. Sleep has been good. I recommended that we titrate off bupropion and onto Lexapro on the following schedule:    For the next 30 days:  - Start Lexapro 10 mg daily  - Cut back bupropion to 150 mg 24 hour capsule daily    After 30 days weeks  - Go up to 20 mg of Lexapro daily   - Stop bupropion entirely    If worsening or unexpected symptoms present (like severe mood or cognitive changes), please let me know. It will be expected that there will be some degree of irritability or change in mood.    Today, I spent 30 minutes reviewing the chart, personally assessing objective testing, direct patient care, completing documentation and billing.    Again, thank you for allowing me to participate in the care of your patient.      Sincerely,    Gabriel Hensley MD

## 2022-03-02 NOTE — PATIENT INSTRUCTIONS
For the next 30 days:  - Start Lexapro 10 mg daily  - Cut back bupropion to 150 mg 24 hour capsule daily    After 30 days weeks  - Go up to 20 mg of Lexapro daily   - Stop bupropion entirely    If worsening or unexpected symptoms present (like severe mood or cognitive changes), please let me know. It will be expected that there will be some degree of irritability or change in mood.

## 2022-03-02 NOTE — PROGRESS NOTES
Mr. Catalino Coronado is a 71 year old man former  at  seen by Dr. 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 He was also evaluated by Dr. Wolfgang Edouard. Please see Dr. Kennedy's documentation for full details. In brief, he developed progressive loss in short term memory starting around 2016. This has gradually worsened to the point where he has trouble following 2 step commands with significant receptive and expressive aphasia. During his visit with me 9/2021 his wife reported dream enactment behavior. Brain MRI showed diffuse cerebral atrophy with a predilection for the dorsolateral frontoparietal cortices. No abnormal DWI or susceptibility signal. Minimal white matter disease. B12 normal (793); CMP normal other than eGFR 55; TSH normal. He has been attending speech therapy, was prescribed hearing aids, therapy cat, physical therapy for balance, Aricept and melatonin. Aricept was decreased and eventually stopped due persistent to diarrhea.     However, after stopping Aricept, he precipitously worsened. A full metabolic work-up was performed that was normal. Repeat MRI was performed that showed no acute changes but that showed interval atrophy compared to the previous brain MRI in 2018. We decided to increase Aricept back to 5 mg and are seeing him back in clinic today. Restarting 5 mg of Aricept appeared to have gotten him back to his previous baseline.  He is taking Imodium to help prevent diarrhea. He is more attentive, alert and picking up on the jokes of his son. Given his dream enactment behavior, I explained that the possibility of an incipient Lewy body disease is likely and is perhaps the reason why he demonstrably improves with Aricept.     Today he presents for consideration of antidepressant medication  titration. Their cat Crystal recently passed away. She was a therapy cat and made it to 17.5 years. They have 1 other surviving cat. His current symptoms are difficult to assess due to the aphasia but he points to the sad phase on his word-picture card. Sleep has been good. I recommended that we titrate off bupropion and onto Lexapro on the following schedule:    For the next 30 days:  - Start Lexapro 10 mg daily  - Cut back bupropion to 150 mg 24 hour capsule daily    After 30 days weeks  - Go up to 20 mg of Lexapro daily   - Stop bupropion entirely    If worsening or unexpected symptoms present (like severe mood or cognitive changes), please let me know. It will be expected that there will be some degree of irritability or change in mood.    Today, I spent 30 minutes reviewing the chart, personally assessing objective testing, direct patient care, completing documentation and billing.

## 2022-04-04 NOTE — PROGRESS NOTES
"[facility]    Outpatient Speech Language Pathology Progress Note  Patient: Catalino Coronado  : 1950    Beginning/End Dates of Reporting Period:  21 to 22    Referring Provider: Dr Aguilar     Therapy Diagnosis: Aphasia    Client Self Report: Pt arrived alert and cooperative. Pts wife accomanied him but waited in the lobby during treatment session.      Objective Measurements: Treatment goals, see below       Outcome Measures (most recent score):        Goals:  Goal Identifier 1   Goal Description Pt will complete memory game using pictures and words with 80% acc and min assist, starting with sets of 3 and increasing based on success.   Target Date 22   Date Met      Progress (detail required for progress note):  Goal not met     Goal Identifier 2   Goal Description Pt will express thoughts, ideas, wants and needs x5 with independence on initiation in 1 treatement session in 7/10 treatment sessions   Target Date 22   Date Met      Progress (detail required for progress note):  Goal not met     Goal Identifier 3.   Goal Description Pt will complete categorization activity with 80% acc and min assist   Target Date 22   Date Met      Progress (detail required for progress note):  Goal not met     Goal Identifier 4.   Goal Description Pt will complete AC of 1 step direction with 80% acc and min assist   Target Date 22   Date Met      Progress (detail required for progress note):  Goal not met         Plan:  Continue therapy per current plan of care. Pt has had a decreased treatment response due to the recent passing of his cat and increased depression. Pt was started on anti-depressants and is just starting to show benefit. 1.Goal not formally addressed today.  2. Pt had 5 independent verbal expression of thoughts and ideas during treatment session (ex. \"I am with my father ,  my " wife ,  Zarina never had it . Vahid was given 2 preferences, ex soup or salad, and identified his preference with mod cues for attention with 70% acc and mod assist. 3. Given a picture image, pt was able to ID what category the image belonged to (food vs. clothing) with 70% accuracy and mod clinician cues 4. Pt completed auditory comprehension activity of reading aloud a paragraph and answering questions. Pt demonstrated 50% accuracy given F:2. Using pts communication book, Pt reports he feels  normal . Pt participated well in therapy session.     Discharge:  No

## 2022-04-23 NOTE — TELEPHONE ENCOUNTER
"Routing refill request to provider for review/approval because:  Labs not current:  PHQ-9    Last Written Prescription Date:  3/2/22  Last Fill Quantity: 180,  # refills: 0   Last office visit provider:  12/15/21     Requested Prescriptions   Pending Prescriptions Disp Refills     buPROPion (WELLBUTRIN XL) 150 MG 24 hr tablet 180 tablet 0     Sig: Take 2 tablets (300 mg) by mouth every morning       SSRIs Protocol Failed - 4/20/2022  7:26 AM        Failed - PHQ-9 score less than 5 in past 6 months     Please review last PHQ-9 score.           Passed - Medication is Bupropion     If the medication is Bupropion (Wellbutrin), and the patient is taking for smoking cessation; OK to refill.          Passed - Medication is active on med list        Passed - Patient is age 18 or older        Passed - Recent (6 mo) or future (30 days) visit within the authorizing provider's specialty     Patient had office visit in the last 6 months or has a visit in the next 30 days with authorizing provider or within the authorizing provider's specialty.  See \"Patient Info\" tab in inbasket, or \"Choose Columns\" in Meds & Orders section of the refill encounter.                 Virginia Odell 04/22/22 7:27 PM  "

## 2022-04-26 NOTE — TELEPHONE ENCOUNTER
escitalopram (LEXAPRO) 10 MG      Last Written Prescription Date:  3/2/22  Last Fill Quantity: 150,   # refills: 0  Last Office Visit : 3/2/22  Future Office visit:  7/22/22  Routing refill request to provider for review/approval because:   Limited RF   Do you want to discontinue med?

## 2022-04-27 NOTE — TELEPHONE ENCOUNTER
"At the visit with  3/2/2022  \"For the next 30 days:  - Start Lexapro 10 mg daily  - Cut back bupropion to 150 mg 24 hour capsule daily     After 30 days weeks  - Go up to 20 mg of Lexapro daily   - Stop bupropion entirely \"      Call placed to patient wife to confirm how the the changes went.  Leia notes that the increase to 20 mg went well with no apparent issue.  We discussed that the medication also comes in a 20 mg size tab, Leia would prefer the larger size tablet, so that there are fewer pills to take.      Refill queued and routed to   "

## 2022-05-25 NOTE — TELEPHONE ENCOUNTER
escitalopram (LEXAPRO) 20 MG tablet      Last Written Prescription Date:  4/27/22  Last Fill Quantity: 30,   # refills: 1  Last Office Visit : 3/2/22  Future Office visit:  7/22/22    See refill note from 4/22/22  Tolerated increase of Lexapro 20 mg..  90 day kike refill sent to the pharmacy

## 2022-06-04 NOTE — TELEPHONE ENCOUNTER
"Routing refill request to provider for review/approval because:  Labs not current:      Last Written Prescription Date:  4/26/22  Last Fill Quantity: 90,  # refills: 0   Last office visit provider:  12/15/*21     Requested Prescriptions   Pending Prescriptions Disp Refills     simvastatin (ZOCOR) 40 MG tablet 90 tablet 0     Sig: Take 1 tablet (40 mg) by mouth At Bedtime       Statins Protocol Failed - 6/2/2022 10:32 AM        Failed - LDL on file in past 12 months     Recent Labs   Lab Test 12/31/20  1641                Passed - No abnormal creatine kinase in past 12 months     Recent Labs   Lab Test 01/04/18  1115   *                Passed - Recent (12 mo) or future (30 days) visit within the authorizing provider's specialty     Patient has had an office visit with the authorizing provider or a provider within the authorizing providers department within the previous 12 mos or has a future within next 30 days. See \"Patient Info\" tab in inbasket, or \"Choose Columns\" in Meds & Orders section of the refill encounter.              Passed - Medication is active on med list        Passed - Patient is age 18 or older             Maritza Gallardo, RN 06/03/22 7:29 PM  "

## 2022-06-28 NOTE — TELEPHONE ENCOUNTER
CALCIPOTRIENE 0.005% CREAM  NEED UPDATED DIAGNOSIS CODE IN ORDER TO BE COVERED.    Bernadette Horton RN  Central Triage Red Flags/Med Refills

## 2022-06-28 NOTE — TELEPHONE ENCOUNTER
Received refill request for calcipotriene cream as MONICO. Chart (including notes and pertinent labs) reviewed, refill appropriate.    Last seen by Dr. Chen 10/2021 and was continued on calcipotriene 0.005% cream daily for treatment of sebopsoriasis. Planned f/u in 3 months however patient cancelled. Will CC clinic coordinators to help facilitate follow-up appointment.    Attending Dr. Chen CC'd as an FYI.      Jasbir Mejia MD (PGY-2)  Dermatology Resident

## 2022-06-30 NOTE — TELEPHONE ENCOUNTER
CALCIPOTRIENE 0.005% CREAM  Please send prior authorization for this med    Bernadette Horton RN  Central Triage Red Flags/Med Refills

## 2022-07-01 NOTE — TELEPHONE ENCOUNTER
Central Prior Authorization Team   Phone: [phone]    PA Initiation    Medication: calcipotriene (DOVONOX) 0.005 % external cream  Insurance Company: [employer] - Phone [phone] Fax [phone]  Pharmacy Filling the Rx: [id number] - [place], [place] - [address] [place] [address]  Filling Pharmacy Phone: [phone]  Filling Pharmacy Fax:    Start Date: 7/1/2022

## 2022-07-05 NOTE — TELEPHONE ENCOUNTER
Prior Authorization Approval    Authorization Effective Date: 4/3/2022  Authorization Expiration Date: 7/2/2023  Medication: calcipotriene (DOVONOX) 0.005 % external cream  Approved Dose/Quantity:   Reference #:     Insurance Company: [employer] - Phone [phone] Fax [phone]  Expected CoPay:       CoPay Card Available:      Foundation Assistance Needed:    Which Pharmacy is filling the prescription (Not needed for infusion/clinic administered): [id number] - [place], [place] - [address]  Pharmacy Notified: Yes  Patient Notified: Yes

## 2022-07-20 NOTE — PROGRESS NOTES
[facility] Dermatology Note  Encounter Date: Jul 20, 2022  Office Visit     Dermatology Problem List:  1. Sebopsoriasis/seborrheic dermatitis  - Current tx: ketoconazole 2% shampoo to face and scalp, clobeatasol soln PRN to scalp, calcipotriene cream qAM and Protopic qPM to face/ears  - Previous tx: calcipotriene soln, desonide oint   3. Onychodystrophy  - Current tx: OTC 20% urea cream    ____________________________________________    Assessment & Plan:    # Seborrheic dermatitis/sebopsoriasis. Chronic, flare/not at goal.   Well controlled on the scalp with shampoos, flaring on the face/ears today likely in the setting of only using calcipotriene and no anti-inflammatory or anti-fungal agent.  - Face/ears              - Continue calcipotriene 0.005% cream qAM   - Start Protopic oint qPM              - Start ketoconazole 2% shampoo as a face/ear wash  - For scalp:               - Continue clobetasol 0.05% solution spot tx PRN              - Continue ketoconazole 2% shampoo three times per week  - Consider otezla at next follow-up if flaring    # Inflamed skin tag, L upper arm  Benign etiology reviewed. Given bothersome and symptomatic, reasonable to treat with cryotherapy today.  - See cryotherapy procedure note below    Procedures Performed:   - Cryotherapy procedure note, location(s): L upper arm. After verbal consent and discussion of risks and benefits including, but not limited to, dyspigmentation/scar, blister, and pain, 1 lesion(s) was(were) treated with 1-2 mm freeze border for 1-2 cycles with liquid nitrogen. Post cryotherapy instructions were provided.    Follow-up: 3 month(s) in-person, or earlier for new or changing lesions    Staff, Resident and Scribe:     Scribe Disclosure:  PETER NUÑEZ, am serving as a scribe to document services personally performed by Roverto Chen MD based on data collection and the provider's statements to me.     Raiza Hernandez MD  Dermatology  Resident PGY3    Provider Disclosure:   The documentation recorded by the scribe accurately reflects the services I personally performed and the decisions made by me.    Staff Physician Comments:   I saw and evaluated the patient with the resident and I agree with the assessment and plan.  I was present for the entire minor procedure and examination.    Roverto Chen MD  Pronouns: he/him/his    Department of Dermatology  [facility]: Phone: [phone], Fax:[phone]  [facility]: Phone: [phone] Fax: [phone]  ____________________________________________    CC: Derm Problem (Vahid is here for calcipotriene medication follow-up for eczema. States eczema is worsening. )    HPI:  Mr. Catalino Coronado is a(n) 72 year old male who presents today as a return patient for seb derm. Last seen in dermatology by myself on 10/20/21 at which point patient was advised to treat seborrheic dermatitis with topicals. We also discussed treating onychodystrophy with urea and potential future treatments of oral medications. Today, his scalp is much better with rare scaly areas but is face is worse particularly over the eyebrows, ears, melolabial folds. For the scalp they do ketoconazole shampoo a few times weekly and spot treating scaly areas with clobetasol soln. For the face/ears they are just doing calcipotriene cream for treatment and nothing else.    Patient is otherwise feeling well, without additional skin concerns.    Labs Reviewed:  N/A    Physical Exam:  Vitals: There were no vitals taken for this visit.  SKIN: Focused examination of the head/face/ears was performed.  - In a seborrheic distribution (bilateral eyebrows, glabella, conchal bowls, and melolabial folds) there is loose, yellowish, greasy scale on a background of erythema c/w seb derm  - Scalp appears relatively clear with a couple  of small scaly plaques on the occiput  - On the R inner upper arm there is a pink pedunculated irritated appearing papule  - No other lesions of concern on areas examined.     Medications:  Current Outpatient Medications   Medication     atenolol (TENORMIN) 100 MG tablet     calcipotriene (DOVONOX) 0.005 % external cream     clobetasol (TEMOVATE) 0.05 % external solution     desonide (DESOWEN) 0.05 % external ointment     donepezil (ARICEPT) 5 MG tablet     escitalopram (LEXAPRO) 20 MG tablet     ketoconazole (NIZORAL) 2 % external shampoo     magnesium 250 MG tablet     MELATONIN PO     memantine (NAMENDA) 10 MG tablet     simvastatin (ZOCOR) 40 MG tablet     tamsulosin (FLOMAX) 0.4 MG capsule     No current facility-administered medications for this visit.      Past Medical History:   Patient Active Problem List   Diagnosis     Sebaceous cyst     Late onset Alzheimer's disease without behavioral disturbance (H)     Allergic rhinitis     Alzheimer's disease (H)     Benign prostatic hyperplasia with urinary frequency     Seborrhea     Essential hypertension     Healthcare maintenance     Intracranial aneurysm     Peyronie's disease     Pure hypercholesterolemia     Steatohepatitis     Chronic kidney disease, stage 3a (H)     Past Medical History:   Diagnosis Date     BPH (benign prostatic hyperplasia)      Cataracts, bilateral      Cerebral aneurysm, nonruptured      Dementia (H)      Hypercholesterolemia      Hypertension

## 2022-07-20 NOTE — NURSING NOTE
Dermatology Rooming Note    Catalino Coronado's goals for this visit include:   Chief Complaint   Patient presents with     Derm Problem     Vahid is here for calcipotriene medication follow-up for eczema. States eczema is worsening.      Antonio Briggs, EMT

## 2022-07-20 NOTE — PATIENT INSTRUCTIONS
For the scalp  - Continue ketoconazole shampoo roughly three times weekly  - It is fine spot treat with clobetasol solution as needed    For the face/ears  - Use ketoconazole shampoo as a face wash daily especially focusing on the eyebrows and beard (or at least each time you shower)  - Continue calcipotriene in the morning  - Start Protopic in the evening    STOP desonide    ------    Cryotherapy Instructions     For the areas treated with liquid nitrogen (cryotherapy or freezing) today, they are expected to get pink, puffy, and perhaps even blister. The area should then crust up and fall off and the goal is to have nice new skin underneath. There is nothing special that you need to do for these areas. You can wash them as you do normal skin.     Sometimes a blister develops; if a blister does develop do NOT pop it. However, if it breaks open on its own, be sure to wash it with soap and water daily and put plain vasaline or petroleum jelly and a bandaid on it until the skin is healed.     Please call the clinic if you have any questions or concerns.

## 2022-07-20 NOTE — LETTER
7/20/2022       RE: Catalino Coronado  [address] [place] [address]     Dear Colleague,    Thank you for referring your patient, Catalino Coronado, to the [facility] [place] at [facility]. Please see a copy of my visit note below.    [facility] Dermatology Note  Encounter Date: Jul 20, 2022  Office Visit     Dermatology Problem List:  1. Sebopsoriasis/seborrheic dermatitis  - Current tx: ketoconazole 2% shampoo to face and scalp, clobeatasol soln PRN to scalp, calcipotriene cream qAM and Protopic qPM to face/ears  - Previous tx: calcipotriene soln, desonide oint   3. Onychodystrophy  - Current tx: OTC 20% urea cream    ____________________________________________    Assessment & Plan:    # Seborrheic dermatitis/sebopsoriasis. Chronic, flare/not at goal.   Well controlled on the scalp with shampoos, flaring on the face/ears today likely in the setting of only using calcipotriene and no anti-inflammatory or anti-fungal agent.  - Face/ears              - Continue calcipotriene 0.005% cream qAM   - Start Protopic oint qPM              - Start ketoconazole 2% shampoo as a face/ear wash  - For scalp:               - Continue clobetasol 0.05% solution spot tx PRN              - Continue ketoconazole 2% shampoo three times per week  - Consider otezla at next follow-up if flaring    # Inflamed skin tag, L upper arm  Benign etiology reviewed. Given bothersome and symptomatic, reasonable to treat with cryotherapy today.  - See cryotherapy procedure note below    Procedures Performed:   - Cryotherapy procedure note, location(s): L upper arm. After verbal consent and discussion of risks and benefits including, but not limited to, dyspigmentation/scar, blister, and pain, 1 lesion(s) was(were) treated with 1-2 mm freeze border for 1-2 cycles with liquid nitrogen. Post cryotherapy instructions were provided.    Follow-up: 3  month(s) in-person, or earlier for new or changing lesions    Staff, Resident and Scribe:     Scribe Disclosure:  I, PETER CALDERON, am serving as a scribe to document services personally performed by Roverto Chen MD based on data collection and the provider's statements to me.     Raiza Hernandez MD  Dermatology Resident PGY3    Provider Disclosure:   The documentation recorded by the scribe accurately reflects the services I personally performed and the decisions made by me.    Staff Physician Comments:   I saw and evaluated the patient with the resident and I agree with the assessment and plan.  I was present for the entire minor procedure and examination.    Roverto Chen MD  Pronouns: he/him/his    Department of Dermatology  [facility][note 3]: Phone: [phone], Fax:[phone]  [facility]: Phone: [phone] Fax: [phone]  ____________________________________________    CC: Derm Problem (Vahid is here for calcipotriene medication follow-up for eczema. States eczema is worsening. )    HPI:  Mr. Catalino Coronado is a(n) 72 year old male who presents today as a return patient for seb derm. Last seen in dermatology by myself on 10/20/21 at which point patient was advised to treat seborrheic dermatitis with topicals. We also discussed treating onychodystrophy with urea and potential future treatments of oral medications. Today, his scalp is much better with rare scaly areas but is face is worse particularly over the eyebrows, ears, melolabial folds. For the scalp they do ketoconazole shampoo a few times weekly and spot treating scaly areas with clobetasol soln. For the face/ears they are just doing calcipotriene cream for treatment and nothing else.    Patient is otherwise feeling well, without additional skin concerns.    Labs Reviewed:  N/A    Physical Exam:  Vitals: There were no  vitals taken for this visit.  SKIN: Focused examination of the head/face/ears was performed.  - In a seborrheic distribution (bilateral eyebrows, glabella, conchal bowls, and melolabial folds) there is loose, yellowish, greasy scale on a background of erythema c/w seb derm  - Scalp appears relatively clear with a couple of small scaly plaques on the occiput  - On the R inner upper arm there is a pink pedunculated irritated appearing papule  - No other lesions of concern on areas examined.     Medications:  Current Outpatient Medications   Medication     atenolol (TENORMIN) 100 MG tablet     calcipotriene (DOVONOX) 0.005 % external cream     clobetasol (TEMOVATE) 0.05 % external solution     desonide (DESOWEN) 0.05 % external ointment     donepezil (ARICEPT) 5 MG tablet     escitalopram (LEXAPRO) 20 MG tablet     ketoconazole (NIZORAL) 2 % external shampoo     magnesium 250 MG tablet     MELATONIN PO     memantine (NAMENDA) 10 MG tablet     simvastatin (ZOCOR) 40 MG tablet     tamsulosin (FLOMAX) 0.4 MG capsule     No current facility-administered medications for this visit.      Past Medical History:   Patient Active Problem List   Diagnosis     Sebaceous cyst     Late onset Alzheimer's disease without behavioral disturbance (H)     Allergic rhinitis     Alzheimer's disease (H)     Benign prostatic hyperplasia with urinary frequency     Seborrhea     Essential hypertension     Healthcare maintenance     Intracranial aneurysm     Peyronie's disease     Pure hypercholesterolemia     Steatohepatitis     Chronic kidney disease, stage 3a (H)     Past Medical History:   Diagnosis Date     BPH (benign prostatic hyperplasia)      Cataracts, bilateral      Cerebral aneurysm, nonruptured      Dementia (H)      Hypercholesterolemia      Hypertension

## 2022-07-22 NOTE — PROGRESS NOTES
Depression Response    Patient completed the PHQ-9 assessment for depression and scored >9? Yes  Question 9 on the PHQ-9 was positive for suicidality? No  Does patient have current mental health provider? No    Is this a virtual visit? No    I personally notified the following: visit provider       HPI:  Mr. Catalino Coronado is a 71 year old man former  at  seen by Dr. 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 He was also evaluated by Dr. Wolfgang Edouard. Please see Dr. Kennedy's documentation for full details. In brief, he developed progressive loss in short term memory starting around 2016. This has gradually worsened to the point where he has trouble following 2 step commands with significant receptive and expressive aphasia. During his visit with me 9/2021 his wife reported dream enactment behavior. Brain MRI showed diffuse cerebral atrophy with a predilection for the dorsolateral frontoparietal cortices. No abnormal DWI or susceptibility signal. Minimal white matter disease. B12 normal (793); CMP normal other than eGFR 55; TSH normal. He has been attending speech therapy, was prescribed hearing aids, therapy cat, physical therapy for balance, Aricept and melatonin. Aricept was decreased and eventually stopped due persistent to diarrhea, but then he worsened and so it was restarted at a low dose.     During his past visit, he presented for consideration of antidepressant medication titration. Their cat Crystal had passed away. She was a therapy cat and made it to 17.5 years. They have 1 other surviving cat who is a constant source of support. She sits by him and accompanies him around the house. His depressive symptoms were difficult to assess due to the aphasia but he pointed to the sad phase on his word-picture  card. Sleep has been good. I recommended that we titrate off bupropion and onto Lexapro.    Cognitive: Continued marked aphasia. More caregiving needed.   Mood/behavior: Lexapro change from bupropion worked well. He can  Sleep: No day-night reversal.  Motor: More stooped. Mor imbalance. No falls. Rare incontinence (once a week). He is taking 3 Imodium per week that seems to be the right dose. Normal bowel movements now. No swallowing problems. He eats and general movements are slower.   Background medical problems: Swelling of the ankles bilaterally but R worse than L. Has gained weight.     Exam:  Alert, awake, appropriate behavior. Difficulty following any verbal instructions, but is generally able to follow body language instructions and he understands the tone of the conversation and laughs when his wife tells a funny story. Able to stand, walk and sit independently. Mildly stooped posture and shorter stride length. R worse than L ankle and foot edema.     A/P  - In home PT given limitations with cognition, mobility, and severity of dementia  - providing Caregiving resources  - 6 month follow-up    Today, I spent 26 minutes reviewing the chart, personally assessing objective testing, direct patient care, completing documentation and billing.

## 2022-07-22 NOTE — PATIENT INSTRUCTIONS
Caregiver Support Program that helps to line up the right services for your and your loved one:  [email or web address]  Speak with an Advisor to learn how Caregiver Assurance can help you: [phone]([phone])    PT at your home to help with balance (I will need to go through several steps to make this happen)    6 month follow-up

## 2022-07-22 NOTE — LETTER
7/22/2022     RE: Catalino Coronado  [address] [place] [address]     Dear Colleague,    Thank you for referring your patient, Catalino Coronado, to the [facility] at [facility]. Please see a copy of my visit note below.    Depression Response    Patient completed the PHQ-9 assessment for depression and scored >9? Yes  Question 9 on the PHQ-9 was positive for suicidality? No  Does patient have current mental health provider? No    Is this a virtual visit? No    I personally notified the following: visit provider       HPI:  Mr. Catalino Coronado is a 71 year old man former  at  seen by Dr. 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 He was also evaluated by Dr. Wolfgang Edouard. Please see Dr. Kennedy's documentation for full details. In brief, he developed progressive loss in short term memory starting around 2016. This has gradually worsened to the point where he has trouble following 2 step commands with significant receptive and expressive aphasia. During his visit with me 9/2021 his wife reported dream enactment behavior. Brain MRI showed diffuse cerebral atrophy with a predilection for the dorsolateral frontoparietal cortices. No abnormal DWI or susceptibility signal. Minimal white matter disease. B12 normal (793); CMP normal other than eGFR 55; TSH normal. He has been attending speech therapy, was prescribed hearing aids, therapy cat, physical therapy for balance, Aricept and melatonin. Aricept was decreased and eventually stopped due persistent to diarrhea, but then he worsened and so it was restarted at a low dose.     During his past visit, he presented for consideration of antidepressant medication titration. Their cat Crystal had passed away.  She was a therapy cat and made it to 17.5 years. They have 1 other surviving cat who is a constant source of support. She sits by him and accompanies him around the house. His depressive symptoms were difficult to assess due to the aphasia but he pointed to the sad phase on his word-picture card. Sleep has been good. I recommended that we titrate off bupropion and onto Lexapro.    Cognitive: Continued marked aphasia. More caregiving needed.   Mood/behavior: Lexapro change from bupropion worked well. He can  Sleep: No day-night reversal.  Motor: More stooped. Mor imbalance. No falls. Rare incontinence (once a week). He is taking 3 Imodium per week that seems to be the right dose. Normal bowel movements now. No swallowing problems. He eats and general movements are slower.   Background medical problems: Swelling of the ankles bilaterally but R worse than L. Has gained weight.     Exam:  Alert, awake, appropriate behavior. Difficulty following any verbal instructions, but is generally able to follow body language instructions and he understands the tone of the conversation and laughs when his wife tells a funny story. Able to stand, walk and sit independently. Mildly stooped posture and shorter stride length. R worse than L ankle and foot edema.     A/P  - In home PT given limitations with cognition, mobility, and severity of dementia  - providing Caregiving resources  - 6 month follow-up    Today, I spent 26 minutes reviewing the chart, personally assessing objective testing, direct patient care, completing documentation and billing.    Again, thank you for allowing me to participate in the care of your patient.      Sincerely,    Gabriel Hensley MD

## 2022-07-26 NOTE — PROGRESS NOTES
Assessment/ Plan  1. Edema of right lower extremity  Mild to moderate  1 month duration  No calf tenderness, negative Homans' sign  Trace edema left sided  Weight recently actually down, overall up somewhat  No shortness of breath    Discussed possibility of DVT right lower extremity  Offered ultrasound to rule this out, discussed risk to patient's spouse who is a nurse and understands well this risk-she elected not to pursue this problem.    I do not think the edema itself needs intervention right now.  Skin does not appear at risk.    2. Late onset Alzheimer's disease without behavioral disturbance (H)  Appears stable, following with neurology     Body mass index is 26.25 kg/m .    Subjective  CC:  chief complaint  HPI:  History of Present Illness   Chief complaint is 1 month, gradual onset, right lower extremity swelling.  Perhaps a little bit of swelling on the left side as well.  No significant shortness of breath.    Mental Health Follow-up:  Patient presents to follow-up on Depression.Patient's depression since last visit has been:  Medium  The patient is not having other symptoms associated with depression.      Any significant life events: health concerns  Patient is not feeling anxious or having panic attacks.  Patient has no concerns about alcohol or drug use.    Hypertension: He presents for follow up of hypertension.  He does not check blood pressure  regularly outside of the clinic. Outside blood pressures have been over 140/90. He does not follow a low salt diet.     Vascular Disease:  He presents for follow up of vascular disease.  He never takes nitroglycerin. He is not taking daily aspirin.     Today's PHQ-9         PHQ-9 Total Score: 3    PHQ-9 Q9 Thoughts of better off dead/self-harm past 2 weeks :   Not at all    How difficult have these problems made it for you to do your work, take care of things at home, or get along with other people: Somewhat difficult  Patient Active Problem List    Diagnosis     Sebaceous cyst     Late onset Alzheimer's disease without behavioral disturbance (H)     Allergic rhinitis     Alzheimer's disease (H)     Benign prostatic hyperplasia with urinary frequency     Seborrhea     Essential hypertension     Healthcare maintenance     Intracranial aneurysm     Peyronie's disease     Pure hypercholesterolemia     Steatohepatitis     Chronic kidney disease, stage 3a (H)     Current medications reviewed as follows:  atenolol (TENORMIN) 100 MG tablet, Take 1 tablet (100 mg) by mouth daily  calcipotriene (DOVONOX) 0.005 % external cream, Apply once daily to the ears and face  calcipotriene (DOVONOX) 0.005 % external solution, APPLY 10-15 DROPS AT NIGHTTIME BEFORE BED FOR THE SCALP  clobetasol (TEMOVATE) 0.05 % external solution, Apply topically 2 times daily To scaly and itchy areas on scalp until no rash nor itch. Hold until patient requests.  donepezil (ARICEPT) 5 MG tablet, Take 5 mg by mouth daily  escitalopram (LEXAPRO) 20 MG tablet, Take 1 tablet (20 mg) by mouth daily  ketoconazole (NIZORAL) 2 % external shampoo, To entire wet scalp and then wash off after 5 minutes three times a week. Hold until patient requests.  magnesium 250 MG tablet, Take 1 tablet by mouth At Bedtime   MELATONIN PO, Take by mouth At Bedtime  memantine (NAMENDA) 10 MG tablet, Take 1 tablet (10 mg) by mouth 2 times daily  simvastatin (ZOCOR) 40 MG tablet, Take 1 tablet (40 mg) by mouth At Bedtime  tacrolimus (PROTOPIC) 0.1 % external ointment,   tacrolimus (PROTOPIC) 0.1 % external ointment, Apply 1-2 times daily to face and ears as needed.  tamsulosin (FLOMAX) 0.4 MG capsule, Take 1 capsule (0.4 mg) by mouth daily    No current facility-administered medications on file prior to visit.     History   Smoking Status     Former Smoker     Types: Cigarettes     Quit date: 1/1/2011   Smokeless Tobacco     Never Used     Comment: quit 5 yrs ago     Social History     Social History Narrative     Not on  file     Patient Care Team:  Davis Aguilar MD as PCP - General (Family Practice)  Luther Augustin MD as MD (Neurology)  Elio Manzo MD as MD (Ophthalmology)  Davis Aguilar MD as Assigned PCP  Evelyne Benoit AuD as Audiologist (Audiology)  Katie Mack AuD as Audiologist (Audiology)  Brooklynn Dale AuD as Audiologist (Audiology)  Gabriel Hensley MD as Assigned Neuroscience Provider  Roverto Chen MD as Assigned Surgical Provider  ROS  As above      Objective  Physical Exam  Vitals:    07/26/22 1511   BP: 110/69   BP Location: Left arm   Patient Position: Sitting   Cuff Size: Adult Large   Pulse: 59   Temp: 98.3  F (36.8  C)   TempSrc: Temporal   SpO2: 96%   Weight: 95.3 kg (210 lb)     Negative Homans' sign, no calf tenderness, 1-2+ slightly pitting right lower extremity edema with some hyperemia.  No overt warmth.  Left side has just a little bit of swelling as well  Diagnostics  None    Please note: Voice recognition software was used in this dictation.  It may therefore contain typographical errors.      Wt Readings from Last 3 Encounters:   07/26/22 95.3 kg (210 lb)   07/22/22 97.3 kg (214 lb 9.6 oz)   03/02/22 88.9 kg (196 lb)     .  ..  Answers for HPI/ROS submitted by the patient on 7/20/2022  If you checked off any problems, how difficult have these problems made it for you to do your work, take care of things at home, or get along with other people?: Somewhat difficult  PHQ9 TOTAL SCORE: 3  Do you check your blood pressure regularly outside of the clinic?: No  Are your blood pressures ever more than 140 on the top number (systolic) OR more than 90 on the bottom number (diastolic)? (For example, greater than 140/90): Yes  Are you following a low salt diet?: No  Depression/Anxiety: Depression  Nitroglycerin use:: never  Do you take an aspirin every day?: No  Status since last visit:: medium  Other associated symptoms of depression:: No  Significant life event: :  health concerns  Anxious:: No  Current substance use:: No    Addendum 9/8/22  This serves as a face-to-face visit for home health eval  A portion of today's face to face visit was dedicated assessing pts need.  Medical diagnoses that justify above service/equipment alzheimers with behavioral disturbacne  Brief narrative explaining to justify placing order: crisis at home, wandering problems with adls, needs help with meds, need SW eval for options

## 2022-08-29 NOTE — TELEPHONE ENCOUNTER
Needs to be denoted that he home bound to get any coverage from Medicare and is hoping that it's for a few times week

## 2022-08-30 NOTE — TELEPHONE ENCOUNTER
"Routing refill request to provider for review/approval because:  Labs not current:  ldl  ckt    Last Written Prescription Date:  6/7/22  Last Fill Quantity: 90,  # refills: 0   Last office visit provider:  7/26/22     Requested Prescriptions   Pending Prescriptions Disp Refills     simvastatin (ZOCOR) 40 MG tablet 90 tablet 0     Sig: Take 1 tablet (40 mg) by mouth At Bedtime       Statins Protocol Failed - 8/30/2022 11:19 AM        Failed - LDL on file in past 12 months     Recent Labs   Lab Test 12/31/20  1641                Passed - No abnormal creatine kinase in past 12 months     Recent Labs   Lab Test 01/04/18  1115   *                Passed - Recent (12 mo) or future (30 days) visit within the authorizing provider's specialty     Patient has had an office visit with the authorizing provider or a provider within the authorizing providers department within the previous 12 mos or has a future within next 30 days. See \"Patient Info\" tab in inbasket, or \"Choose Columns\" in Meds & Orders section of the refill encounter.              Passed - Medication is active on med list        Passed - Patient is age 18 or older             Maritza Gallardo RN 08/30/22 5:11 PM  "

## 2022-08-31 NOTE — PROGRESS NOTES
ASSESSMENT AND PLAN:      ICD-10-CM    1. Pain in the coccyx  M53.3 Miscellaneous Order for DME - ONLY FOR DME     XR Sacrum and Coccyx 2 Views   2. Alzheimer's disease (H)  G30.9     F02.80    3. Fall, initial encounter  W19.XXXA Miscellaneous Order for DME - ONLY FOR DME     XR Sacrum and Coccyx 2 Views   4. Closed head injury, initial encounter  S09.90XA    5. Post-nasal drip  R09.82    6. Throat pain  R07.0 Streptococcus A Rapid Screen w/Reflex to PCR - Clinic Collect     Symptomatic; Yes; 8/31/2022 COVID-19 Virus (Coronavirus) by PCR Nose       Also, he has additional complaints of something stuck in throat.  Redness posterior pharynx uvula noted with postnasal drip recommend salt water gargles  COVID and strep screening done.  Wife will observe and recheck if symptoms progress or other concerns develop  PLAN:      Patient Instructions   Donut cushion  Ice pack 15 minutes 4 x day  Tylenol or ibuprofen     Physical Therapy if needed.  Has planned home therapy intake session    Xray -shows alignment of sacrum.  No obvious fracture.  Radiology review pending    Symptoms of closed head injury to watch for and handout below          No follow-ups on file.        Estephania Leung MD  [facility]    Subjective     Catalino Coronado is a 72 year old who presents for Patient presents with:  Urgent Care  Fall: FALL 8/30, HEAD INJURY, NO LOSS OF CONCIOUSNESS, PATIENT REPORTS PAIN ON LOWER BACK, HALLUCINATION STOPPED AFTER FALLING, NO DIZZINESS, NO VOMITTING, TYLENOL GIVEN TODAY AT 2PM, GAVE BENDRYL AT 1:00AM 8/31    [facility]    History given by wife.  Patient poor historian due to Alzheimer's    Last evening, having sundowning hallucinations from Alzheimer's  At 2 am this morning, fell & hit top of head  No symptoms of head injury  No change in alertness    But complains of pain of buttock  Painful sitting.  Therapies to improve symptoms include: Tylenol    Refer to  PECARN Calculator        Review of Systems   Constitutional: Negative for activity change and appetite change.   Gastrointestinal: Negative for nausea.   Neurological:        Sometimes pain on top of head.   Psychiatric/Behavioral: Positive for sleep disturbance.           Objective    BP 96/58   Pulse 58   Temp 98.9  F (37.2  C) (Oral)   Resp 12   Wt 95.3 kg (210 lb)   SpO2 96%   BMI 26.25 kg/m    Physical Exam  Vitals reviewed.   Constitutional:       Appearance: Normal appearance.      Comments: History per wife   HENT:      Head:      Comments: Minimal swelling ecchymosis noted top parietal     Mouth/Throat:      Mouth: Mucous membranes are moist.      Pharynx: Posterior oropharyngeal erythema (posterior pharynx/ulvula) present. No oropharyngeal exudate.   Musculoskeletal:      Comments: Examination  No pain over thoracic or lumbar spine.  Pain reproduced with palpation over sacrum and coccyx.  No ecchymosis noted.  Buttock exam bilaterally is normal   Skin:     Comments: Minimal swelling ecchymosis noted top parietal   Neurological:      Mental Status: He is alert.            Xray - Reviewed and interpreted by me.  Sacrum appears in alignment.  No fracture noted

## 2022-08-31 NOTE — PATIENT INSTRUCTIONS
Donut cushion  Ice pack 15 minutes 4 x day  Tylenol or ibuprofen     Physical Therapy if needed.  Has planned home therapy intake session    Xray -shows alignment of sacrum.  No obvious fracture.  Radiology review pending    Symptoms of closed head injury to watch for and handout below

## 2022-09-01 NOTE — TELEPHONE ENCOUNTER
[facility] Call Center    Phone Message    May a detailed message be left on voicemail: yes     Reason for Call: Other: Patient wife Leia is requesting a call back to discuss Urgent message I left yesterday re: potential for prescription med to deal with my 's hallucinations and extreme anxiety?  (Also a renewal prescription for Donepezil 5mg q day?) That's what I wanted an appt for if need be.  Could you find a different appt for my  the first week in November instead of in October?   Please call Leia at [phone] to advise    Action Taken: Other: [facility]    Travel Screening: Not Applicable

## 2022-09-02 NOTE — TELEPHONE ENCOUNTER
donepezil (ARICEPT) 5 MG tablet      Last Written Prescription Date:  historical    Last Office Visit : 7-22-22  Future Office visit:  10-26-22    Routing refill request to provider for review/approval because:  Medication is reported/historical

## 2022-09-06 NOTE — TELEPHONE ENCOUNTER
Catalino Coronado's wife is calling in regards to message below. Caller states that patient has been experiencing delusions, crying, pacing and acting out. Caller would like to know if there is anything that patient can be prescribed to help with this. Patient is scheduled for an appointment with provider on 11/02/22    Requesting a call back as soon as possible.

## 2022-09-08 NOTE — TELEPHONE ENCOUNTER
Marcelino Alejandra,  I prescribed quetiapine 12.5mg bid prn.  Please let the patient's wife know.  She is also requesting information about the home health.  Thank you.  Katina Wilkerson MD

## 2022-09-09 NOTE — TELEPHONE ENCOUNTER
Reason for Call:  Other Verbal order question    Detailed comments: Lata home care nurse from [facility] called and said pt PT is delayed for start date on 9/13/2022. Is it okay for delay. Please call and advise. Phone number is [phone], ext. # is [phone]. Thank you!    Phone Number Patient can be reached at: Home number on file [phone] (home)    Best Time: Any    Can we leave a detailed message on this number? YES    Call taken on 9/9/2022 at 9:05 AM by Juanita Branch

## 2022-09-09 NOTE — TELEPHONE ENCOUNTER
Call placed to Leia (per  request) to make sure she was aware of the prescription having been sent, and to find out if there were additional questions about the seroquel.    Leia reported that she picked up the seroquel yesterday and gave Vahid a dose last night.  She reports it worked really well and Vahid slept well    We discussed that the referral was sent, and Leia noted she has been speaking with staff here getting updates.  She is waiting to hear from the home service    No other questions at this time

## 2022-09-09 NOTE — TELEPHONE ENCOUNTER
Called and gave orders to wilfredo no further action   Patient returned to emergency department via stretcher accompanied by tech.

## 2022-09-13 NOTE — TELEPHONE ENCOUNTER
Reason for Call:  Home Health Care    Everardo with [facility] called regarding (reason for call): Verbal orders     Orders are needed for this patient. Pt and Skilled Nursing    PT: twice a week for 3 weeks and then 1 times a week for 6 weeks for focus and ambulation     OT: isidro    Skilled Nursing: Behavorial nursing, evaluate and treat for mental health. PHQ-4 and off and on breakdowns     Pt Provider:      Phone Number Homecare Nurse can be reached at: [phone]    Can we leave a detailed message on this number? YES    Phone number patient can be reached at: Home number on file [phone] (home)    Best Time: anytime     Call taken on 9/13/2022 at 2:57 PM by Fransisco Sloan

## 2022-09-19 NOTE — TELEPHONE ENCOUNTER
Verbal consent given Wife Leia to speak with Jenniffer From [facility]     Requesting more behavioral health visits 1 times every week for 7 weeks for dementia    Routed to Dr Aguilar's team.    Julieta Bradley RN on 9/19/2022 at 4:31 PM      Additional Information    Negative: Nursing judgment    Negative: Nursing judgment    Negative: Nursing judgment    Negative: Information only question and nurse able to answer    Negative: Nursing judgment    Protocols used: INFORMATION ONLY CALL - NO TRIAGE-A-OH

## 2022-09-21 NOTE — TELEPHONE ENCOUNTER
Julieta Bradley, Davis Gifford MD 2 days ago       Home care Nurse Jenniffer requesting order       More behavioral health visits 1 times every week for 7 weeks for dementia     Call-ok to leave  Jenniffer       RN attempt to call Jenniffer back regarding Dr. Aguilar's message below. NO answer, left detailed  with call back number.    If Jenniffer calls back, please relay Dr. Aguilar's message below to Jenniffer. Thanks.    Closing encounter.    LELIA Hernandez, RN   [facility]

## 2022-09-29 NOTE — TELEPHONE ENCOUNTER
Reason for Call:  Appointment cancel    Patient requesting this type of appt:  Reschedule appt on 9/29/22 with LAMINE WESTFALL    Requested provider: LAMINE WESTFALL    Reason patient unable to be scheduled: patient will not be available    When does patient want to be seen/preferred time: 3-7 days    Comments: Patient wife Leia call requesting to reschedule patient appt with  on 9/30/2022.    Could we send this information to you in [email or web address] or would you prefer to receive a phone call?:   Patient would prefer a phone call   Okay to leave a detailed message?: Yes at Cell number on file:    Telephone Information:   Mobile [phone]       Call taken on 9/29/2022 at 2:56 PM by Melia Henriquez

## 2022-09-30 NOTE — PROGRESS NOTES
Clinic Care Coordination Contact    CC KHOI called and was able to get a hold of pt's wife Leia. CC KHOI introduced herself to Leia and explained why she was calling. Leia reported that right now was not a good time to do the call and asked to reschedule the call to 10/3 @ 2pm. SW confirmed the appt time and asked Leia to connect back if something comes between now and the appt.    FLIP Juárez  [facility]

## 2022-10-03 NOTE — PATIENT INSTRUCTIONS

## 2022-10-03 NOTE — PROGRESS NOTES
"Clinic Care Coordination Contact    Clinic Care Coordination Contact  OUTREACH    Referral Information:  Referral Source: PCP    ELISE WESTFALL called and connected with pt's spouse Leia. Leia remembered who ELISE WESTFALL was, so KHOI did not need to reintroduce self or explain why she was calling. Leia told SW some background on pt and how he was a very creative man and is intelligent, but with the alzheimer's it affects him and she's observed changes with his personality, for instance pt is described now as more of an introvert. Leia also reported that she notices pt gets increasingly stressed out and combative with people when she is not around and feels like she cannot be gone from him for an extended period of time. Leia asked ELISE WESTFALL about options for pt such as adult  and was also curious about occupational therapy for him as well. Leia reports that pt is currently receiving PT and has been doing so for the past 2 weeks. Pt is working on walking and climbing stairs primarily as that is the area of concern for him since he is sedentary a lot. Leia also reports that a behavioral health nurse comes to the home once a week. She states that pt has what she calls \"spells\" where he is very combative. He also has hallucinations that someone is trying to kill him and that can increase his combativeness. Leia also reports to SW that pt has been told that he has alzheimer's but he often forgets that he does and she has to remind him.    ELISE WESTFALL was able to complete the CCC assessment questions from the conversation with Leia. Leia states that she is looking for adult  services or something like that for pt as she feels that he could benefit from it. Leia also talked about her experiences with an alzheimer support group and mentioned that she was open to joining another one as currently they only meet once a month and she would like to meet at least twice a month. KHOI confirmed that Leia can receive things via pt's " angelo and she confirmed. KHOI let Leia know that she would send her information on caregiver respite, adult , as well additional support group information. Leia thanked CC KHOI for her time and went to tend to pt as he was wondering where she went.    Chief Complaint   Patient presents with     Clinic Care Coordination - Initial        Universal Utilization:  Clinic Utilization  Difficulty keeping appointments:: No  Compliance Concerns: No  No-Show Concerns: No  No PCP office visit in Past Year: No  Utilization    Hospital Admissions  0             ED Visits  0             No Show Count (past year)  0                Current as of: 10/1/2022 12:36 PM              Clinical Concerns:  Current Medical Concerns:  Alzheimer's Disease   Current Behavioral Concerns: Per spouse emotional outbursts,     Education Provided to patient: Adult , caregiver respite, alzheimer's support groups  Pain  Pain (GOAL):: No  Health Maintenance Reviewed: Due/Overdue   Never   Done MICROALBUMIN (Yearly)        Never   Done DEPRESSION ACTION PLAN (Once)       Never   Done HEPATITIS C SCREENING (Once)       Never   Done LUNG CANCER SCREENING (Yearly)       Never   Done AORTIC ANEURYSM SCREENING (SYSTEM ASSIGNED) (Once)       MAR 18   2021 DTAP/TDAP/TD IMMUNIZATION (4 - Td or Tdap)  Last completed: Mar 18, 2011     SEP 1   2021 MEDICARE ANNUAL WELLNESS VISIT (Yearly)  Last completed: Sep 1, 2020     SEP 1   2021 LIPID (Yearly)  Last completed: Sep 1, 2020     JIMMIE 16   2022 COVID-19 Vaccine (5 - Booster for Moderna series)  Last completed: Apr 21, 2022       Clinical Pathway: None    Medication Management:  Medication review status: Medications reviewed and no changes reported per patient.             Functional Status:  Dependent ADLs:: Eating  Dependent IADLs:: Laundry, Shopping, Cooking, Cleaning, Transportation  Bed or wheelchair confined:: No  Mobility Status: Dependent/Assisted by Another  Fallen 2 or more times in the past  year?: Yes  Any fall with injury in the past year?: Yes (no broken bones or fractures, hurt his butt per wife)    Living Situation:  Current living arrangement:: I live in a private home with spouse  Type of residence:: Private home - stairs    Lifestyle & Psychosocial Needs:    Social Determinants of Health     Tobacco Use: Medium Risk     Smoking Tobacco Use: Former Smoker     Smokeless Tobacco Use: Never Used   Alcohol Use: Not on file   Financial Resource Strain: Not on file   Food Insecurity: Not on file   Transportation Needs: Not on file   Physical Activity: Not on file   Stress: Not on file   Social Connections: Not on file   Intimate Partner Violence: Not on file   Depression: At risk     PHQ-2 Score: 6   Housing Stability: Not on file     Diet:: Regular  Inadequate nutrition (GOAL):: No  Tube Feeding: No  Inadequate activity/exercise (GOAL):: Yes  Significant changes in sleep pattern (GOAL): Yes  Transportation means:: Regular car, Family     Pentecostalism or spiritual beliefs that impact treatment:: No  Mental health DX:: Yes  Mental health DX how managed:: Medication, Behavior Health Home  Mental health management concern (GOAL):: No  Chemical Dependency Status: No Current Concerns  Informal Support system:: Children, Spouse      Resources and Interventions:  Current Resources:   Skilled Home Care Services: Physicial Therapy, Skilled Nursing  Community Resources: Home Care  Supplies Currently Used at Home: Incontinence Supplies  Equipment Currently Used at Home: tub bench (bed protection)  Employment Status: retired         Advance Care Plan/Directive  Advanced Care Plans/Directives on file:: No  Advanced Care Plan/Directive Status: Considering Options    Referrals Placed: None     Care Plan:  Care Plan: Social Support     Problem: Inadequate social support     Goal: Learn more about caregiver supports/resources     Start Date: 10/3/2022 Expected End Date: 1/3/2023    Note:     Barriers: Mental health,  mobility  Strengths: Social Support system, spouse/family  Patient expressed understanding of goal: Yes (pt's spouse)  Action steps to achieve this goal:  1. My spouse will connect with ELISE WESTFALL at least once a month to discuss resources/support that either myself or my family could benefit from  2. My spouse will take a look at the resources sent by ELISE WESTFALL and apply/reach out to programs/agencies that are relevant and are best suited for us                        Patient/Caregiver understanding: Yes    Outreach Frequency: monthly  Future Appointments              In 1 month Gabriel Hensley MD [facility] NEUROSPECIALTIES, [facility]    In 1 month Davis Aguilar MD [facility]    In 2 months Roverto Chen [facility]    In 3 months Gabriel Hensley MD [facility] NEUROSPECIALTIES, [facility]          Plan: ELISE WESTFALL to connect with pt's spouse Leia in 4 weeks to assess goal progression

## 2022-10-24 NOTE — TELEPHONE ENCOUNTER
Last Clinic Visit: 7/20/2022  [facility] 12/27/22  Refilled per derm protocol process #2

## 2022-11-02 NOTE — PATIENT INSTRUCTIONS
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 Verilux) in the morning in cases of grey days.  - Attempt music therapy (playing music that the patient may know well from their past)  - Attempt to control sources of pain and/or discomfort  - Massage or aroma therapy may be beneficial to allevate discomfort and improve symptoms  - Having family members call in and leave messages on an audio or video recording    - Refill sent for Seroquel    - Follow-up open ended    His tremor is called \"essential tremor\". You can call or MyChart message me if tremors worsen, as they can be treated (but the treatments cause sedation, so only will treat if symptoms are severe).  "

## 2022-11-02 NOTE — LETTER
11/2/2022     RE: Catalino Coronado  [address] [place] [address]     Dear Colleague,    Thank you for referring your patient, Catalino Coronado, to the [facility] NEUROSPECIALTIES at [facility]. Please see a copy of my visit note below.    Mr. Catalino Coronado is a 72 year old man former  at  seen by Dr. 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 He was also evaluated by Dr. Wolfgang Edoaurd. Please see Dr. Kennedy's documentation for full details. In brief, he developed progressive loss in short term memory starting around 2016. This has gradually worsened to the point where he has trouble following 2 step commands with significant receptive and expressive aphasia. During his visit with me 9/2021 his wife reported dream enactment behavior. Brain MRI showed diffuse cerebral atrophy with a predilection for the dorsolateral frontoparietal cortices. No abnormal DWI or susceptibility signal. Minimal white matter disease. B12 normal (793); CMP normal other than eGFR 55; TSH normal. He has been attending speech therapy, was prescribed hearing aids, therapy cat, physical therapy for balance, Aricept, Lexapro, and melatonin. Aricept was decreased and eventually stopped due persistent to diarrhea, but then he worsened and so it was restarted at a low dose and he takes Imodium. More recently, Vahid has been pacing, acting out, crying, and having increasing threatening hallucinations that he was fending off (hitting himself in the face), so Seroquel PRN was initiated. He has one surviving therapy cat to provide emotional support.    Cognitive: He has   Mood/behavior: Over labor day, started to become more aggressive. The first dose of Seroquel made him sleep 6 hours. He has a  "behavioral care nurse coming once per week. Able to laugh at his son's subtle sense of humor.  Sleep: Sleep is \"mostly great\". No known dream enactment behavior.  Motor: He had 2 falls; he fell at the [facility] and this resulted in a black eye. He also fell while fighting his wife, falling on the tile floor. He has home-care PT come, who helps him get out of bed and stand up better.   Background medical problems: Still on anti-diarrheal agent for explosive diarrhea, the latter happening about once per day. He makes a moaning sound while in his sleep to use the bathroom.    Exam:  Intermittently able to follow 1 step commands. Able to say 1 word, usually in response to the word being spoken by the examiner in the context of an instruction. Able to stand, walk and sit independently. Mild parkinsonism while walking. Mild-moderate paratonia of the upper and lower limbs. Normal axial tone. Moderate bilateral postural tremor.    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 Verilux) in the morning in cases of grey days.  - Attempt music therapy (playing music that the patient may know well from their past)  - Attempt to control sources of pain and/or discomfort  - Massage or aroma therapy may be beneficial to allevate discomfort and improve symptoms  - Having family members call in and leave messages on an audio or video recording    - Refill sent for Seroquel    His tremor is called \"essential tremor\". You can call or MyChart message me if tremors worsen, as they can be treated (but the treatments cause sedation, so only will treat if symptoms are severe).    Today, I spent 30 minutes reviewing the chart, personally assessing " objective testing, direct patient care, completing documentation and billing.    Again, thank you for allowing me to participate in the care of your patient.      Sincerely,    Gabriel Hensley MD

## 2022-11-02 NOTE — PROGRESS NOTES
"Mr. Catalino Coronado is a 72 year old man former  at  seen by Dr. 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 He was also evaluated by Dr. Wolfgang Edouard. Please see Dr. Kennedy's documentation for full details. In brief, he developed progressive loss in short term memory starting around 2016. This has gradually worsened to the point where he has trouble following 2 step commands with significant receptive and expressive aphasia. During his visit with me 9/2021 his wife reported dream enactment behavior. Brain MRI showed diffuse cerebral atrophy with a predilection for the dorsolateral frontoparietal cortices. No abnormal DWI or susceptibility signal. Minimal white matter disease. B12 normal (793); CMP normal other than eGFR 55; TSH normal. He has been attending speech therapy, was prescribed hearing aids, therapy cat, physical therapy for balance, Aricept, Lexapro, and melatonin. Aricept was decreased and eventually stopped due persistent to diarrhea, but then he worsened and so it was restarted at a low dose and he takes Imodium. More recently, Vahid has been pacing, acting out, crying, and having increasing threatening hallucinations that he was fending off (hitting himself in the face), so Seroquel PRN was initiated. He has one surviving therapy cat to provide emotional support.    Cognitive: He has   Mood/behavior: Over labor day, started to become more aggressive. The first dose of Seroquel made him sleep 6 hours. He has a behavioral care nurse coming once per week. Able to laugh at his son's subtle sense of humor.  Sleep: Sleep is \"mostly great\". No known dream enactment behavior.  Motor: He had 2 falls; he fell at the [facility] and this resulted in a black eye. He also fell while fighting his wife, falling on the " "tile floor. He has home-care PT come, who helps him get out of bed and stand up better.   Background medical problems: Still on anti-diarrheal agent for explosive diarrhea, the latter happening about once per day. He makes a moaning sound while in his sleep to use the bathroom.    Exam:  Intermittently able to follow 1 step commands. Able to say 1 word, usually in response to the word being spoken by the examiner in the context of an instruction. Able to stand, walk and sit independently. Mild parkinsonism while walking. Mild-moderate paratonia of the upper and lower limbs. Normal axial tone. Moderate bilateral postural tremor.    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 Verilux) in the morning in cases of grey days.  - Attempt music therapy (playing music that the patient may know well from their past)  - Attempt to control sources of pain and/or discomfort  - Massage or aroma therapy may be beneficial to allevate discomfort and improve symptoms  - Having family members call in and leave messages on an audio or video recording    - Refill sent for Seroquel      His tremor is called \"essential tremor\". You can call or MyChart message me if tremors worsen, as they can be treated (but the treatments cause sedation, so only will treat if symptoms are severe).    Today, I spent 30 minutes reviewing the chart, personally assessing objective testing, direct patient care, completing documentation and billing.              "

## 2022-11-10 NOTE — TELEPHONE ENCOUNTER
Reason for Call:  Home Health Care    Everardo with [facility] called regarding (reason for call): VO    Orders are needed for this patient. PT    PT: 1 time a week for 4 weeks for [place] training     OT: N/A    Skilled Nursing: N/A    Pt Provider:      Phone Number Homecare Nurse can be reached at: [phone]    Can we leave a detailed message on this number? YES    Phone number patient can be reached at: Home number on file [phone] (home)    Best Time: Any    Call taken on 11/10/2022 at 3:05 PM by Chucho Valle  .

## 2022-11-10 NOTE — TELEPHONE ENCOUNTER
Reason for Call:  Home Health Care    Michell with [facility] called regarding (reason for call): verbal order    Or[facility]; 1 time every week for 9 weeks and ; 1 time visit for treatment and evaluation.     PT: LOGAN    OT: LOGAN    Skilled Nursing: NA    Pt Provider: Dr. clayton    Phone Number Homecare Nurse can be reached at: [phone]    Can we leave a detailed message on this number? YES    Phone number patient can be reached at: Home number on file [phone] (home)    Best Time: Any    Call taken on 11/10/2022 at 4:25 PM by Juanita Branch

## 2022-11-11 NOTE — TELEPHONE ENCOUNTER
Attempted to call [facility] to give verbal orders. Number provided is out of service. Please relay okay for orders upon return call

## 2022-11-13 NOTE — ED TRIAGE NOTES
BIBA from home. Fall today, no LOC, no thinners. Endorses leg/thigh pain, generalized weakness.  VSS en route for EMS.   Hx: alzheimers.

## 2022-11-13 NOTE — ED TRIAGE NOTES
Pt arrives per EMS for c/o fall resulting in back pain. Pt normally is able to walk at home per wife. Per spouse, pt had to be picked up per EMS and placed on cot as pt was only able to take one step. Pt is slightly anxious.

## 2022-11-13 NOTE — ED PROVIDER NOTES
ED Provider Note  [facility]      History     Chief Complaint   Patient presents with     Back Pain     f     Fall     HPI  Catalino Coronado is a 72 year old male with past medical history of late onset Alzheimer's disease, hypertension, hypercholesterolemia, CKD 3 who presents with his wife for chief complaint of ground-level fall.  At 4:30 PM today at home, wife states that he tripped on a vacuum.  He was not having any complaints or concerns prior to that and was in his normal state of health.  Initially he was saying that he fell on his left side of his body and rolled onto his back, however in the emergency department he is complaining of right-sided pain.  He states that he did hit his head.  There was no loss of consciousness.  He is not on anticoagulation.  Wife states that it took about 15 minutes to get him up to standing because he was agitated and combative after the fall, she gave him 12.5 mg Seroquel which is used as needed for combativeness.  He was yelling about back pain.  Currently complains of right thigh and knee pain.  Wife notes that he was having difficulty walking with EMS and using his arms to point to his bilateral lower back.  They deny any neck pain, difficulty breathing, dizziness, vision changes, headache, numbness, tingling, new weakness, recent illness, fevers.  Wife gave him four ibuprofen.  He thinks he may have recent dysuria, wife was unaware of this.    Past Medical History  Past Medical History:   Diagnosis Date     BPH (benign prostatic hyperplasia)      Cataracts, bilateral      Cerebral aneurysm, nonruptured      Dementia (H)      Hypercholesterolemia      Hypertension      Past Surgical History:   Procedure Laterality Date     APPENDECTOMY       BIOPSY OF SKIN LESION       EXCISE MASS BACK  2/8/2013    Procedure: EXCISE MASS BACK;  Excision of Sebaceous Back Cyst ;  Surgeon: Sean Randhawa MD;  Location: [facility] OR     PHACOEMULSIFICATION CLEAR  CORNEA WITH STANDARD INTRAOCULAR LENS IMPLANT Right 10/22/2018    Procedure: Right Eye Phacoemulsification with Standard Intraocular Lens Placement;  Surgeon: Elio Manzo MD;  Location: [facility] OR     PHACOEMULSIFICATION WITH STANDARD INTRAOCULAR LENS IMPLANT Left 10/8/2018    Procedure: PHACOEMULSIFICATION WITH STANDARD INTRAOCULAR LENS IMPLANT;  Left Eye Phacoemulsification with Intraocular Lens;  Surgeon: Elio Manzo MD;  Location: [facility] OR     pilonidal cyst removal        atenolol (TENORMIN) 100 MG tablet  calcipotriene (DOVONOX) 0.005 % external cream  calcipotriene (DOVONOX) 0.005 % external solution  clobetasol (TEMOVATE) 0.05 % external solution  donepezil (ARICEPT) 5 MG tablet  escitalopram (LEXAPRO) 20 MG tablet  ketoconazole (NIZORAL) 2 % external shampoo  magnesium 250 MG tablet  MELATONIN PO  memantine (NAMENDA) 10 MG tablet  QUEtiapine (SEROQUEL) 25 MG tablet  simvastatin (ZOCOR) 40 MG tablet  tacrolimus (PROTOPIC) 0.1 % external ointment  tacrolimus (PROTOPIC) 0.1 % external ointment  tamsulosin (FLOMAX) 0.4 MG capsule      No Known Allergies  Family History  Family History   Problem Relation Age of Onset     Heart Disease Father      Dementia Mother      Cancer No family hx of         no skin cancer     Social History   Social History     Tobacco Use     Smoking status: Former     Types: Cigarettes     Quit date: 2011     Years since quittin.8     Smokeless tobacco: Never     Tobacco comments:     quit 5 yrs ago   Substance Use Topics     Alcohol use: Yes     Comment: 2 mixed/night     Drug use: No      Past medical history, past surgical history, medications, allergies, family history, and social history were reviewed with the patient. No additional pertinent items.       Review of Systems  A complete review of systems was attempted but limited due to memory deficit.    Physical Exam   BP: 139/78  Pulse: 64  Temp: 98.2  F (36.8  C)  Resp: 18  SpO2: 98 %  Physical Exam  General:  no acute distress.  Sitting in wheelchair.  HENT: Normocephalic and atraumatic.  No oropharyngeal exudate.  No signs of basilar skull fracture.  Eyes: EOMI, conjunctivae normal.   Cardiovascular:  Normal rate and regular rhythm.   No murmur heard.  Pulmonary:  No respiratory distress. Normal breath sounds.   Abdominal: no distension.  Abdomen is soft. There is no mass. There is no abdominal tenderness.   Musculoskeletal:    No swelling or tenderness.  Moving all extremities spontaneously.  2+ pulses.  Mild tenderness of right mid and distal femur and entire anterior right knee without deformity or weakness.  No pelvic tenderness or pain with lower extremity rotation.  5 out of 5 strength bilateral upper and lower extremities.  Says yes to tenderness over midline thoracic and sacral spine, no bony step-off.  Says yes to lumbar paraspinal muscular tenderness bilaterally.  No bruising.  Negative CVA tenderness.  Able to squeeze buttock musculature together.    Skin: warm and dry  Neurological:  Difficulty following general commands.  No focal neurologic deficits.  Grossly normal sensation and strength.    Psychiatric:    normal affect, somewhat aphasic      ED Course      Procedures            EKG Interpretation:      Interpreted by Ana Sloan MD  Time reviewed: 7:46 PM  Symptoms at time of EKG: Low back pain  Rhythm: normal sinus   Rate: Normal  Axis: Left Axis Deviation  Ectopy: none  Conduction: left anterior fasciclar block  ST Segments/ T Waves: T wave inversion III  Q Waves: none  Comparison to prior: No old EKG available    Clinical Impression: non-specific EKG                          Results for orders placed or performed during the hospital encounter of 11/12/22   CBC with platelets differential     Status: None ()    Narrative    The following orders were created for panel order CBC with platelets differential.  Procedure                               Abnormality         Status                      ---------                               -----------         ------                     CBC with platelets and d...[[id number]]                                                   Please view results for these tests on the individual orders.     Medications   acetaminophen (TYLENOL) tablet 1,000 mg (has no administration in time range)        Assessments & Plan (with Medical Decision Making)   Patient arrives to the emergency department with his wife for ground-level fall.  On exam patient is alert and in no acute distress, he has mild tenderness to thoracic and sacral spine, not lumbar.  Positive paralumbar musculature.  Tender to right distal femur and right knee Without deformity .  no focal neurologic deficits.    I provided Tylenol.  CT head negative for acute pathology.  X-rays of the spine, right femur and knee negative for fracture.  Blood work revealed WBC 14.1, UA with 30 RBCs, which is new for him.  CT abdomen obtained to evaluate for renal trauma.  Patient was unable to perform CT abdomen multiple times due to agitation, I provided him Zyprexa 5 mg and CT imaging was able to be obtained, negative for acute pathology.  EKG with normal sinus rhythm, left anterior fascicular block.  No previous EKGs to compare to.  Initial troponin 27, 2-hour troponin 28.  On reevaluation, patient was sleeping comfortably saying that his pain had improved after receiving 1 g of Tylenol.    His wife wanted to take him home and felt very comfortable as they have a lot of support at home, a lot of family members living nearby and in the house.  In addition they declined any PT evaluation as he already has home PT and they will come tomorrow.  Her questions were answered and they are discharged in hemodynamically stable condition.    I have reviewed the nursing notes. I have reviewed the findings, diagnosis, plan and need for follow up with the patient.    New Prescriptions    No medications on file       Final diagnoses:   None        --  DO SHER Saldaña [facility] EMERGENCY DEPARTMENT  11/12/2022     Ana Sloan MD  11/13/22 0216

## 2022-11-13 NOTE — DISCHARGE INSTRUCTIONS
You may take Tylenol, ibuprofen as directed for pain, heat packs and ice pads, and gentle stretching.    Get plenty of rest and walk carefully, avoiding stepping over objects.  Continue your physical therapy at home.  Return to the emergency department for any new or worsening symptoms, recurrent falls or if you have concerns.

## 2022-11-14 NOTE — TELEPHONE ENCOUNTER
Call placed to wife - voice message left with call back number  Call placed to Home Care Staff - voice message left wit call back number

## 2022-11-14 NOTE — TELEPHONE ENCOUNTER
What is the concern that needs to be addressed by a nurse?     Jenniffer, with [facility] is calling requesting a call back. Caller states that patient went to the ER for a fall on 11/12/22, since being home patient has been combative and agitated with family. Patient is not allowing cares. Patient did allow home care nurse to do cares and vitals.     Please follow up with Jenniffer at [phone]    May a detailed message be left on voicemail? YES    Date of last office visit:     Message routed to: MINCEP RN

## 2022-11-14 NOTE — TELEPHONE ENCOUNTER
What is the concern that needs to be addressed by a nurse? Patient fell this weekend and it has triggered a full psychotic break/episode. Patient spouse unsure what to do?    May a detailed message be left on voicemail? Yes    Date of last office visit: 11/2/22    Message routed to: Ranjit Gramajo and Dr. Hensley

## 2022-11-14 NOTE — TELEPHONE ENCOUNTER
RN called patient's wife regarding the mychart message.     Patient's home care nurse answered the phone.  She gave the writer the background information regarding the call and connected me to speak with the wife.     According to patient's wife, patient fell and went to ED on 11/12/2022. Patient has been very combatitive and and did not want to eat. Patient is currently on Seroquel 12.5 mg as needed.    RN noticed patient's appointment was cancelled and asked patient's wife for the reason. Patient's wife stated that patient did not want to walk. RN offered patient's wife to change the appointment to be video visit.      Appointments in Next    Nov 15, 2022 12:00 PM  (Arrive by 11:40 AM)  Provider Visit with Davis Aguilar MD  [facility] ([facility] ) [phone]        Patient's wife verbalized understanding and agreed with the plan.        Zayda Rodgers RN  [facility]

## 2022-11-14 NOTE — TELEPHONE ENCOUNTER
Call returned by Leia  ER visit took about 8 hours, and it was very noisy and     Refuses to walk or stand  Currently will yell, scream and hit if family start to help him.  Patient says he hurts when he tries to move    Tylenol and Ibuprofen.  Seroquel 12.5 given at noon, has helped a little with mentation.    ---     Discussed with  by phone  Continue with routine/scheduled tylenol and ibuprofen.  Create a calming environment for patient.  If a single point of pain is identified, then a lidocaine patch could be considered.  Give one dose of Seroquel 25 mg this evening to help with settling for sleep.  Follow up with PCP for ongoing pain management.    ---    Call returned to Leia and discussed plans.  She has a video appointment scheduled with the PCP for tomorrow at noon.    No other questions at this time.

## 2022-11-15 NOTE — PROGRESS NOTES
Vahid is a 72 year old who is being evaluated via a billable video visit.      How would you like to obtain your AVS? MyChart  If the video visit is dropped, the invitation should be resent by: Text to cell phone: [phone]  Will anyone else be joining your video visit? No  1. Fall, subsequent encounter  11/12/2022  Seen in the emergency room.  Head CT, thoracic and lumbar spine x-rays, CT abdomen all negative.  There was some blood in the urine, no sign of renal trauma on x-ray.  Primary concern now is ongoing pain with movement.  Had been moving pretty well, walking the halls.  Patient had a home health nurse visiting once a week,  had just visited, physical therapy once week.  Things are going pretty well with dementia.  Then the fall.  Now he is confined to bed.    Family recently spoke with neurologist.  Neurologist strongly recommended avoiding opiates for treatment of pain associated with the fall.    Plan  -Hospital bed requested by family which seems very reasonable.  Likely would have been needed without the fall given dementia and needs for cares.  Now with the fall, and anticipating that symptoms may go on beyond a month, it is necessary.  -Agree with suggestion to have physical therapy assess movement.  Increase from once weekly to 3 times weekly  -Tylenol primarily for pain  -Ibuprofen for more severe pain but should check renal function.    - Home Care Referral  - HOSPITAL BED DME    2. Pain  Recommend over-the-counter lidocaine patch, Tylenol/ibuprofen  - Home Care Referral  - HOSPITAL BED DME    3. Gait disturbance  Physical therapy-assessment  - HOSPITAL BED DME    4. Chronic kidney disease, stage 3a (H)  Wavering creatinine over the last few panels.  Not taking ibuprofen-probably reasonable risk since opiates are relatively contraindicated with dementia.  Will check BMP  - Basic metabolic panel; Future      Video-Visit Details    Video Start Time: 12:16 PM    Type of service:  Video  "Visit    Video End Time:12:43 PM    Originating Location (pt. Location): Home    Distant Location (provider location):  On-site    Platform used for Video Visit: Ana Paula    Patient answers are not available for this visit.  DME (Durable Medical Equipment) Orders and Documentation  Orders Placed This Encounter   Procedures     HOSPITAL BED DME      The patient was assessed and it was determined the patient is in need of the following listed DME Supplies/Equipment. Please complete supporting documentation below to demonstrate medical necessity.      Hospital Bed/Accessories Documentation  Hospital bed is required for body positioning, to allow for safe transfers to wheelchair and standing and frequent changes in body position, not feasible in an ordinary bed     NOTE: Patient must have a \"Yes\" in one of the four following questions to qualify for a hospital bed.    1. Does the patient require positioning of the body in ways not feasible with an ordinary bed due to a medical condition that is expected to last at least 1 month?  No    2. Does the patient require, for the alleviation of pain, positioning of the body in ways not feasible with an ordinary bed? Yes needs assistance with repositioning, trapezius,     3. Does the patient require the head of the bed to be elevated more than 30 degrees most of the time due to congestive heart failure, chronic pulmonary disease, or aspiration? No    4. Does the patient require traction that can only be attached to a hospital bed? No    Additional Criteria:    Does the patient require frequent changes in body position and/or have an immediate need for change in body position? Yes - Patient qualifies for Semi Electric Bed     Trapeze Criteria:  (Patient must meet standard hospital bed criteria also)   1. Does patient need this device to sit up because of a respiratory condition, for change in body position for other medical reasons, or to get in or out of bed? Yes (Please explain): " movement needs to be collaboration between pt and he;aylin          Answers for HPI/ROS submitted by the patient on 11/15/2022  If you checked off any problems, how difficult have these problems made it for you to do your work, take care of things at home, or get along with other people?: Extremely difficult  PHQ9 TOTAL SCORE: 20  Your back pain is: new  What do you think is the original cause of your back pain?: a fall  When did you first notice your back pain? : in the last week  How would you describe your back pain? : burning, sharp, stabbing  How often do you feel your back pain? : daily  Where is your back pain located? : right lower back, left lower back, right middle of back, left middle of back, right upper back, left upper back, right hip  Where does your back pain spread? : right shoulder, left shoulder  Since you noticed your back pain, how has it changed? : always present, but gets better and worse  Does your back pain interfere with your job?: Not applicable  On a scale of 1-10 (10 being the worst), how strong is your back pain?: 10  What makes your back pain worse? : bending, certain positions, lying down, sitting, standing, twisting  Acupuncture:: not tried  Acetaminophen: helpful  Activity or Exercise: not tried  Chiropractor: not tried  Cold: not tried  Heat: not tried  Muscle relaxants : not tried  NSAIDS (Ibuprofen, Naproxen) : helpful  Opioids: not tried  Physical Therapy: not tried  Rest: not helpful  Steroid Injection: not tried  Stretching : not tried  Surgery: not tried  TENS Unit: not tried  Topical pain relievers : not tried  Do you see any other healthcare providers for your back pain? : None  How many servings of fruits and vegetables do you eat daily?: 2-3  On average, how many sweetened beverages do you drink each day (Examples: soda, juice, sweet tea, etc.  Do NOT count diet or artificially sweetened beverages)?: 1  How many minutes a day do you exercise enough to make your heart beat  faster?: 9 or less  How many days a week do you exercise enough to make your heart beat faster?: 3 or less  How many days per week do you miss taking your medication?: 0    A total of 30 minutes was spent on this visit reviewing previous notes, counseling patient, ordering tests , adjusting meds (see below) and documenting the findings in this note

## 2022-11-21 NOTE — TELEPHONE ENCOUNTER
Alejandra Sprague MD [facility] Team Pool 19 minutes ago (12:43 PM)       Short supply of oxycodone take to use as needed only for pain, possible side effects could be drowsiness, sleepiness , constipation etc.  Follow-up with Dr. Aguilar as scheduled For long-term management of his pain.

## 2022-11-21 NOTE — TELEPHONE ENCOUNTER
Patient's home care nurse, Jenniffer called to check on the status of this request. She stated that patient is very combative and did not sleep last night at all. Please send RX to pharmacy if appropriate.

## 2022-11-21 NOTE — TELEPHONE ENCOUNTER
Pt wife Leia called and would like to get some oxycodone for pt as her and Dr. Aguilar has talked about on Friday. Pt is in a lot of pain, pt is scheduled tomorrow for a virtual visit with PCP. But pt wife would also like some oxycodone for today if possible. Please send RX to pharmacy on file if appropriate. PCP not in today, routing to DOD. Thanks!

## 2022-11-21 NOTE — TELEPHONE ENCOUNTER
RN called the home care nurse and patient's wife to relay Dr. Sprague's message below.       Patient's wife verbalized understanding and agreed with the plan.     Patient has an appointment with Dr. Aguilar tomorrow to follow up.  Appointments in Next    Nov 22, 2022  5:40 PM  (Arrive by 5:20 PM)  Provider Visit with Davis Aguilar MD  [facility] ([facility] ) [phone]       Patient's wife also asked about the hospital bed. RN will fax the DME order to [facility] and will send the [employer] message to patient's wife regarding the [employer] [facility] [employer]'s phone number.        RN will route this encounter to  as CHICHO.        Zyada Rodgers RN  [facility]

## 2022-11-22 PROBLEM — E86.0 DEHYDRATION: Status: ACTIVE | Noted: 2022-01-01

## 2022-11-22 PROBLEM — R33.9 URINARY RETENTION: Status: ACTIVE | Noted: 2022-01-01

## 2022-11-22 PROBLEM — N30.00 ACUTE CYSTITIS WITHOUT HEMATURIA: Status: ACTIVE | Noted: 2022-01-01

## 2022-11-22 PROBLEM — M54.50 ACUTE LOW BACK PAIN WITHOUT SCIATICA, UNSPECIFIED BACK PAIN LATERALITY: Status: ACTIVE | Noted: 2022-01-01

## 2022-11-22 PROBLEM — R29.6 FALLS FREQUENTLY: Status: ACTIVE | Noted: 2022-01-01

## 2022-11-22 PROBLEM — F03.918 DEMENTIA WITH AGGRESSIVE BEHAVIOR (H): Status: ACTIVE | Noted: 2022-01-01

## 2022-11-22 PROBLEM — S32.009D CLOSED FRACTURE OF TRANSVERSE PROCESS OF LUMBAR VERTEBRA WITH ROUTINE HEALING, SUBSEQUENT ENCOUNTER: Status: ACTIVE | Noted: 2022-01-01

## 2022-11-22 NOTE — CONSULTS
Care Management Initial Consult    General Information  Assessment completed with: Spouse or significant other, Children, Family,    Type of CM/SW Visit: Initial Assessment    Primary Care Provider verified and updated as needed: Yes   Readmission within the last 30 days: previous discharge plan unsuccessful   Return Category: Medically unsuccessful treatment plan  Reason for Consult: community resources, discharge planning  Advance Care Planning:          Communication Assessment  Patient's communication style:          Cognitive  Cognitive/Neuro/Behavioral: .WDL except  Level of Consciousness: confused     Orientation: disoriented x 4 (pt has Alzheimers)             Living Environment:   People in home: child(blanka), adult, grandchild(blanka), spouse     Current living Arrangements: house      Able to return to prior arrangements:  (TBD)       Family/Social Support:  Care provided by: spouse/significant other, child(blanka)  Provides care for: no one, unable/limited ability to care for self  Marital Status:   Significant Other, Children (Grandchildren)          Description of Support System: Involved, Supportive    Support Assessment: Adequate social supports, Caregiver difficulty providing physical care/safety    Current Resources:   Patient receiving home care services: Yes  Skilled Home Care Services: Physicial Therapy, Skilled Nursing  Community Resources: Home Care  Equipment currently used at home: tub bench  Supplies currently used at home: Incontinence Supplies    Employment/Financial:  Employment Status: retired        Financial Concerns: No concerns identified   Referral to Financial Worker: No     Lifestyle & Psychosocial Needs:  Social Determinants of Health     Tobacco Use: Medium Risk     Smoking Tobacco Use: Former     Smokeless Tobacco Use: Never     Passive Exposure: Not on file   Alcohol Use: Not on file   Financial Resource Strain: Not on file   Food Insecurity: Not on file   Transportation  "Needs: Not on file   Physical Activity: Not on file   Stress: Not on file   Social Connections: Not on file   Intimate Partner Violence: Not on file   Depression: At risk     PHQ-2 Score: 6   Housing Stability: Not on file       Functional Status:  Prior to admission patient needed assistance:   Dependent ADLs:: Eating  Dependent IADLs:: Laundry, Shopping, Cooking, Cleaning, Transportation       Mental Health Status:  Mental Health Status: Other (see comment) (Delirium management\)       Chemical Dependency Status:       Values/Beliefs:  Spiritual, Cultural Beliefs, Mandaen Practices, Values that affect care: no             Additional Information:  Chart reviewed. Case discussed with bedside RN and medical provider.    Writer introduced self, discussed role and went over writer's availability. Writer met with pt, pt's spouse, Leia, pt's daughter and granddaughter.  Pt lives at home with his wife, 3 of his adult children and an adult grandchild. Pt always has some family member with him in the home providing support/assistance. Pt is also open to [facility] services. Spouse reports that pt has a LTC insurance policy that she is thinking about activating in order to allow pt to have coverage for private pay caregivers. Writer fielded numerous questions on pt's LTC and encouraged spouse/daughter to follow up with pt's LTC policy and provided them with some supplementary questions to ask to help ensure they fully understand the benefits. Discussed costs of private pay home health aides. Discussed other potential discharge options for pt such as SNF, if pt's machuca cannot be managed.     Writer was called back to meet with family in the evening. Spouse reports that pt has a \"broke vertabra that was missed.\" Family was very teary eyed and reported feeling bad that they had been in pain \"all week\" and that they were \"unaware\". Writer provided emotional support - supportive listening, validation, and encouragement. Writer " offered the number for patient relations.    Family inquired about palliative care and hospice. Writer provided education on what palliative can do and provide assistance with. Family expressed openness to meet with a palliative provider to provide assistance with ongoing goals of care conversations.    Family reported that pt has been coughing while eating and struggling with swallowing pills. Writer updated ED MD and defers to providers on appropriate follow up.  ________________    LISA Boo, [facility]  ED/Observation   [facility]  Phone: [phone]  Pager: [phone]  Fax: [phone]    On-call pager, [phone], 4:00pm to midnight

## 2022-11-22 NOTE — ED PROVIDER NOTES
[place] EMERGENCY DEPARTMENT ([facility])  November 22, 2022     History     Chief Complaint   Patient presents with     Flank Pain     Urinary Retention     HPI  Catalino Coronado is a 72 year old male with a past medical history including Alzheimer's disease, dementia, stage 3a CKD, benign prostatic hyperplasia with urinary frequency, HTN who presents to the Emergency Department for evaluation of urinary retention.  Per EMS, patient is an Alzheimer's patient with severe abdominal pain and rigidity in all four quarters. The patient's wife accompanies him and helps him report. She states that the patient has been unable to void since about 5 PM yesterday, approximately 16 hours ago.     The patient has had Alzheimer's for the last four years. A week ago Saturday (11/12/22) the patient had an unwitnessed fall at home. His wife states that she found him writhing on the floor, so she brought him in to the ER. X-rays and CTs of head and abdomen were negative. However, she states that the patient was given a straight catheter, and she notes that the catheter was difficult to get in because of the patient's enlarged prostate. The patient was able to urinate with the catheter, and they noticed blood in his urine. Since leaving the ER and having the catheter removed, the patient had been able to urinate normally until yesterday. His wife notes that he has had problems with urinary retention in the past, though it is normally not difficult to get him to void. He is currently on Flomax, which she last gave him at around 4 AM today, along with 12.5 mg Seroquel and 5 mg prescribed oxycodone.    Past Medical History  Past Medical History:   Diagnosis Date     BPH (benign prostatic hyperplasia)      Cataracts, bilateral      Cerebral aneurysm, nonruptured      Dementia (H)      Hypercholesterolemia      Hypertension      Past Surgical History:   Procedure Laterality Date     APPENDECTOMY       BIOPSY OF SKIN LESION        EXCISE MASS BACK  2013    Procedure: EXCISE MASS BACK;  Excision of Sebaceous Back Cyst ;  Surgeon: Sean Randhawa MD;  Location: [facility] OR     PHACOEMULSIFICATION CLEAR CORNEA WITH STANDARD INTRAOCULAR LENS IMPLANT Right 10/22/2018    Procedure: Right Eye Phacoemulsification with Standard Intraocular Lens Placement;  Surgeon: Elio Manzo MD;  Location: [facility] OR     PHACOEMULSIFICATION WITH STANDARD INTRAOCULAR LENS IMPLANT Left 10/8/2018    Procedure: PHACOEMULSIFICATION WITH STANDARD INTRAOCULAR LENS IMPLANT;  Left Eye Phacoemulsification with Intraocular Lens;  Surgeon: Elio Manzo MD;  Location: [facility] OR     pilonidal cyst removal        amLODIPine (NORVASC) 10 MG tablet  buprenorphine (BUTRANS) 5 MCG/HR WK patch  calcipotriene (DOVONOX) 0.005 % external cream  calcium carbonate (TUMS) 500 MG chewable tablet  clobetasol (TEMOVATE) 0.05 % external solution  diclofenac (VOLTAREN) 1 % topical gel  donepezil (ARICEPT) 5 MG tablet  escitalopram (LEXAPRO) 20 MG tablet  finasteride (PROSCAR) 5 MG tablet  ketoconazole (NIZORAL) 2 % external shampoo  LORazepam (ATIVAN) 2 MG/ML (HIGH CONC) oral solution  magnesium 250 MG tablet  melatonin 5 MG sublingual tablet  MELATONIN PO  memantine (NAMENDA) 10 MG tablet  multivitamin, therapeutic (THERA-VIT) TABS tablet  oxyCODONE (ROXICODONE) 5 MG tablet  QUEtiapine (SEROQUEL) 25 MG tablet  tamsulosin (FLOMAX) 0.4 MG capsule  valproic acid (DEPAKENE) 250 MG/5ML syrup  desonide (DESOWEN) 0.05 % external ointment  hydrocortisone 2.5 % ointment  simvastatin (ZOCOR) 40 MG tablet  tacrolimus (PROTOPIC) 0.1 % external ointment      No Known Allergies  Family History  Family History   Problem Relation Age of Onset     Heart Disease Father      Dementia Mother      Cancer No family hx of         no skin cancer     Social History   Social History     Tobacco Use     Smoking status: Former     Types: Cigarettes     Quit date: 2011     Years since quittin.0      Smokeless tobacco: Never     Tobacco comments:     quit 5 yrs ago   Substance Use Topics     Alcohol use: Yes     Comment: 2 mixed/night     Drug use: No      Past medical history, past surgical history, medications, allergies, family history, and social history were reviewed with the patient. No additional pertinent items.       Review of Systems   Gastrointestinal: Positive for abdominal pain.   Genitourinary: Positive for difficulty urinating.   All other systems reviewed and are negative.    A complete review of systems was performed with pertinent positives and negatives noted in the HPI, and all other systems negative.    Physical Exam   BP: (!) 133/100  Pulse: 75  Temp: 99.4  F (37.4  C)  Resp: 16  Weight: 97.8 kg (215 lb 9.6 oz)  SpO2: 95 %  Physical Exam  Vitals and nursing note reviewed.   Constitutional:       General: He is in acute distress.   Cardiovascular:      Rate and Rhythm: Regular rhythm.      Heart sounds: Normal heart sounds.   Musculoskeletal:      Cervical back: Neck supple. Spasms present. Decreased range of motion.        Back:    Skin:     Coloration: Skin is pale.   Neurological:      Mental Status: He is alert.         ED Course     8:42 AM  The patient was seen and examined by Ar Manjarrez MD in Room ED20.    Procedures              Results for orders placed or performed during the hospital encounter of 11/22/22   CT Head w/o Contrast     Status: None    Narrative    CT HEAD W/O CONTRAST 11/22/2022 3:20 PM    History: Mental status change after fall   Comparison: 11/12/2022    Technique: Using multidetector thin collimation helical acquisition  technique, axial, coronal and sagittal CT images from the skull base  to the vertex were obtained without intravenous contrast.   (topogram) image(s) also obtained and reviewed.    Findings: There is no intracranial hemorrhage, mass effect, or midline  shift. Moderate generalized cerebral volume loss. Mild confluent and  scattered  periventricular hypodensity similar to prior most suggestive  of chronic small vessel ischemic disease. No acute loss of gray-white  differentiation. Ventricles are proportionate to the cerebral sulci.  The basal cisterns are clear. Incidental arachnoid cyst versus  subdural hygroma in the right posterior fossa, unchanged.    No acute calvarial fracture. Paranasal sinuses and mastoid air cells  are relatively clear where visualized. Bilateral pseudophakia.      Impression    Impression:    1. No acute intracranial pathology.  2. Unchanged periventricular white matter changes suggestive of  chronic small vessel ischemic disease.   3. Moderate cerebral atrophy.    I have personally reviewed the examination and initial interpretation  and I agree with the findings.    LAMONT VILLAFANA MD         SYSTEM ID:  [id number]   CT Lumbar Spine w/o Contrast     Status: None    Narrative    CT LUMBAR SPINE W/O CONTRAST 11/22/2022 3:58 PM    History: pain after fall one week ago.  Comparison: CT abdomen 11/13/2022.    Technique: Using multidetector thin collimation helical acquisition  technique, axial, coronal and sagittal CT images through the lumbar  spine were obtained without intravenous contrast.     Findings: There are 5 lumbar-type vertebra. Straightening of the  lordotic curvature with retrolisthesis L4. Moderate disc height  narrowing at L4-5. Subacute fracture of the left L4 transverse process  which was seen on prior. Multifocal anterior vertebral body  ossification. Findings on a level by level basis are as follows:    T12-L1: Bilateral facet arthropathy with ligamentum flavum  hypertrophy. Posterior disc osteophyte complex. Mild to moderate  spinal canal stenosis. Mild bilateral neural foraminal stenosis..    L1-L2: Bilateral facet arthropathy with ligamentum flavum hypertrophy.  Mild bilateral neural foraminal stenosis. Mild to moderate spinal  canal stenosis.    L2-L3: Bilateral facet arthropathy right greater than  left. Narrowing  of the right lateral recess and ligamentum flavum hypertrophy moderate  right and mild left neural foraminal stenosis. Moderate spinal canal  stenosis.    L3-L4: Bilateral facet arthropathy with calcified ligamentum flavum  hypertrophy. Moderate right and mild left neural foraminal stenosis.  Moderate severe spinal canal stenosis.    L4-L5: Bilateral facet arthropathy and ligamentum flavum hypertrophy.  Mild to moderate right and moderate left neural foraminal stenosis.  Mild spinal canal stenosis.    L5-S1: Bilateral facet arthropathy and ligamentum flavum hypertrophy.  Mild bilateral neural foraminal stenosis. No spinal canal stenosis.    The visualized adjacent paraspinous tissues are grossly within normal  limits. Calcification of the thoracic aorta and splenic artery.      Impression    Impression:    1.  Subacute left L4 transverse process fracture which was seen on  11/13/2022 after retrospective second look. This could be related to  recent trauma when correlated with history.  2. Multilevel lumbar spondylosis detailed above. Most significant  finding at L3-4 with degenerative changes causing moderate severe  spinal canal stenosis.    I have personally reviewed the examination and initial interpretation  and I agree with the findings.    SIDNEY STEWART MD         SYSTEM ID:  [id number]   XR Chest Port 1 View     Status: None    Narrative    Exam: XR CHEST PORT 1 VIEW, 11/29/2022 9:59 AM    Indication: AMS    Comparison: None    Findings:   Cardiomediastinal silhouette appears within normal limits. Calcified  granuloma in the right midlung zone. Mild bibasilar atelectasis.  Otherwise no focal infectious consolidation suspected. No pneumothorax  or effusions.      Impression    Impression: Right basilar atelectasis. No focal infectious  consolidations.    I have personally reviewed the examination and initial interpretation  and I agree with the findings.    TRAVIS AWAD MD         SYSTEM ID:   [id number]   XR Abdomen Port 1 View     Status: None    Narrative    Portable abdomen 1 view    INDICATION: Blunting for evidence of stool burden    COMPARISON: CT abdomen pelvis 11/13/2022    FINDINGS: A few nondistended air-filled loops of small bowel are  present with large bowel visualized, indicating suggestion of minimal  enteritis. Associated retained fecal material without. There are  degenerative changes in lumbar spine.      Impression    IMPRESSION: Lumbar spondylosis. Small bowel enteritis. No evidence of  prominent retention of fecal material within the intestinal tract    LORETA CORREIA MD         SYSTEM ID:  [id number]   Comprehensive metabolic panel     Status: Abnormal   Result Value Ref Range    Sodium 147 (H) 136 - 145 mmol/L    Potassium 4.2 3.4 - 5.3 mmol/L    Chloride 108 (H) 98 - 107 mmol/L    Carbon Dioxide (CO2) 20 (L) 22 - 29 mmol/L    Anion Gap 19 (H) 7 - 15 mmol/L    Urea Nitrogen 45.0 (H) 8.0 - 23.0 mg/dL    Creatinine 1.19 (H) 0.67 - 1.17 mg/dL    Calcium 9.8 8.8 - 10.2 mg/dL    Glucose 104 (H) 70 - 99 mg/dL    Alkaline Phosphatase 66 40 - 129 U/L    AST 32 10 - 50 U/L    ALT 33 10 - 50 U/L    Protein Total 7.7 6.4 - 8.3 g/dL    Albumin 4.3 3.5 - 5.2 g/dL    Bilirubin Total 1.0 <=1.2 mg/dL    GFR Estimate 65 >60 mL/min/1.73m2   Lipase     Status: Normal   Result Value Ref Range    Lipase 23 13 - 60 U/L   UA with Microscopic reflex to Culture     Status: Abnormal    Specimen: Urine, Catheter   Result Value Ref Range    Color Urine Yellow Colorless, Straw, Light Yellow, Yellow    Appearance Urine Clear Clear    Glucose Urine Negative Negative mg/dL    Bilirubin Urine Negative Negative    Ketones Urine Negative Negative mg/dL    Specific Gravity Urine 1.035 1.003 - 1.035    Blood Urine Negative Negative    pH Urine 5.5 5.0 - 7.0    Protein Albumin Urine 30 (A) Negative mg/dL    Urobilinogen Urine Normal Normal, 2.0 mg/dL    Nitrite Urine Negative Negative    Leukocyte Esterase Urine Trace  (A) Negative    Mucus Urine Present (A) None Seen /LPF    RBC Urine 3 (H) <=2 /HPF    WBC Urine 19 (H) <=5 /HPF    Narrative    Urine Culture ordered based on laboratory criteria   Mineral Draw     Status: None    Narrative    The following orders were created for panel order [place] Draw.  Procedure                               Abnormality         Status                     ---------                               -----------         ------                     Extra Blue Top Tube[[id number]]                              Final result               Extra Red Top Tube[[id number]]                               Final result                 Please view results for these tests on the individual orders.   CBC with platelets and differential     Status: Abnormal   Result Value Ref Range    WBC Count 13.4 (H) 4.0 - 11.0 10e3/uL    RBC Count 4.70 4.40 - 5.90 10e6/uL    Hemoglobin 15.3 13.3 - 17.7 g/dL    Hematocrit 44.8 40.0 - 53.0 %    MCV 95 78 - 100 fL    MCH 32.6 26.5 - 33.0 pg    MCHC 34.2 31.5 - 36.5 g/dL    RDW 12.1 10.0 - 15.0 %    Platelet Count 373 150 - 450 10e3/uL    % Neutrophils 77 %    % Lymphocytes 12 %    % Monocytes 6 %    % Eosinophils 4 %    % Basophils 1 %    % Immature Granulocytes 0 %    NRBCs per 100 WBC 0 <1 /100    Absolute Neutrophils 10.3 (H) 1.6 - 8.3 10e3/uL    Absolute Lymphocytes 1.6 0.8 - 5.3 10e3/uL    Absolute Monocytes 0.7 0.0 - 1.3 10e3/uL    Absolute Eosinophils 0.5 0.0 - 0.7 10e3/uL    Absolute Basophils 0.1 0.0 - 0.2 10e3/uL    Absolute Immature Granulocytes 0.1 <=0.4 10e3/uL    Absolute NRBCs 0.0 10e3/uL   Extra Blue Top Tube     Status: None   Result Value Ref Range    Hold Specimen JI    Extra Red Top Tube     Status: None   Result Value Ref Range    Hold Specimen [facility]    Asymptomatic COVID-19 Virus (Coronavirus) by PCR Nasopharyngeal     Status: Normal    Specimen: Nasopharyngeal; Swab   Result Value Ref Range    SARS CoV2 PCR Negative Negative    Narrative    Testing was performed  using the Xpert Xpress SARS-CoV-2 Assay on the Cepheid Gene-Xpert Instrument Systems. Additional information about this Emergency Use Authorization (EUA) assay can be found via the Lab Guide. This test should be ordered for the detection of SARS-CoV-2 in individuals who meet SARS-CoV-2 clinical and/or epidemiological criteria as well as from individuals without symptoms or other reasons to suspect COVID-19. Test performance for asymptomatic patients has only been established in anterior nasal swab specimens. This test is for in vitro diagnostic use under the FDA EUA for laboratories certified under CLIA to perform high complexity testing. This test has not been FDA cleared or approved. A negative result does not rule out the presence of PCR inhibitors in the specimen or target RNA concentration below the limit of detection for the assay. The possibility of a false negative should be considered if the patient's recent exposure or clinical presentation suggests COVID-19. This test was validated by [facility]. These Laboratories are certified under the Clinical Laboratory Improvement Amendments (CLIA) as qualified to perform high complexity testing.     Basic metabolic panel     Status: Abnormal   Result Value Ref Range    Sodium 144 136 - 145 mmol/L    Potassium 3.9 3.4 - 5.3 mmol/L    Chloride 109 (H) 98 - 107 mmol/L    Carbon Dioxide (CO2) 22 22 - 29 mmol/L    Anion Gap 13 7 - 15 mmol/L    Urea Nitrogen 43.8 (H) 8.0 - 23.0 mg/dL    Creatinine 1.01 0.67 - 1.17 mg/dL    Calcium 9.1 8.8 - 10.2 mg/dL    Glucose 86 70 - 99 mg/dL    GFR Estimate 79 >60 mL/min/1.73m2   CBC with platelets and differential     Status: Abnormal   Result Value Ref Range    WBC Count 9.2 4.0 - 11.0 10e3/uL    RBC Count 4.11 (L) 4.40 - 5.90 10e6/uL    Hemoglobin 13.4 13.3 - 17.7 g/dL    Hematocrit 39.7 (L) 40.0 - 53.0 %    MCV 97 78 - 100 fL    MCH 32.6 26.5 - 33.0 pg    MCHC 33.8 31.5 - 36.5 g/dL    RDW 12.5 10.0 - 15.0 %     Platelet Count 324 150 - 450 10e3/uL    % Neutrophils 71 %    % Lymphocytes 15 %    % Monocytes 7 %    % Eosinophils 5 %    % Basophils 1 %    % Immature Granulocytes 1 %    NRBCs per 100 WBC 0 <1 /100    Absolute Neutrophils 6.7 1.6 - 8.3 10e3/uL    Absolute Lymphocytes 1.4 0.8 - 5.3 10e3/uL    Absolute Monocytes 0.6 0.0 - 1.3 10e3/uL    Absolute Eosinophils 0.5 0.0 - 0.7 10e3/uL    Absolute Basophils 0.1 0.0 - 0.2 10e3/uL    Absolute Immature Granulocytes 0.1 <=0.4 10e3/uL    Absolute NRBCs 0.0 10e3/uL   Basic metabolic panel     Status: Abnormal   Result Value Ref Range    Sodium 144 136 - 145 mmol/L    Potassium 3.7 3.4 - 5.3 mmol/L    Chloride 110 (H) 98 - 107 mmol/L    Carbon Dioxide (CO2) 18 (L) 22 - 29 mmol/L    Anion Gap 16 (H) 7 - 15 mmol/L    Urea Nitrogen 44.4 (H) 8.0 - 23.0 mg/dL    Creatinine 0.99 0.67 - 1.17 mg/dL    Calcium 8.9 8.8 - 10.2 mg/dL    Glucose 78 70 - 99 mg/dL    GFR Estimate 81 >60 mL/min/1.73m2   CBC with platelets     Status: Abnormal   Result Value Ref Range    WBC Count 9.1 4.0 - 11.0 10e3/uL    RBC Count 4.06 (L) 4.40 - 5.90 10e6/uL    Hemoglobin 13.1 (L) 13.3 - 17.7 g/dL    Hematocrit 38.4 (L) 40.0 - 53.0 %    MCV 95 78 - 100 fL    MCH 32.3 26.5 - 33.0 pg    MCHC 34.1 31.5 - 36.5 g/dL    RDW 11.9 10.0 - 15.0 %    Platelet Count 341 150 - 450 10e3/uL   Magnesium     Status: Normal   Result Value Ref Range    Magnesium 2.3 1.7 - 2.3 mg/dL   Basic metabolic panel     Status: Abnormal   Result Value Ref Range    Sodium 144 136 - 145 mmol/L    Potassium 3.5 3.4 - 5.3 mmol/L    Chloride 109 (H) 98 - 107 mmol/L    Carbon Dioxide (CO2) 21 (L) 22 - 29 mmol/L    Anion Gap 14 7 - 15 mmol/L    Urea Nitrogen 41.1 (H) 8.0 - 23.0 mg/dL    Creatinine 0.95 0.67 - 1.17 mg/dL    Calcium 8.6 (L) 8.8 - 10.2 mg/dL    Glucose 97 70 - 99 mg/dL    GFR Estimate 85 >60 mL/min/1.73m2   Platelet count     Status: Normal   Result Value Ref Range    Platelet Count 292 150 - 450 10e3/uL   Basic metabolic panel      Status: Abnormal   Result Value Ref Range    Sodium 141 136 - 145 mmol/L    Potassium 3.2 (L) 3.4 - 5.3 mmol/L    Chloride 106 98 - 107 mmol/L    Carbon Dioxide (CO2) 21 (L) 22 - 29 mmol/L    Anion Gap 14 7 - 15 mmol/L    Urea Nitrogen 26.9 (H) 8.0 - 23.0 mg/dL    Creatinine 0.86 0.67 - 1.17 mg/dL    Calcium 8.5 (L) 8.8 - 10.2 mg/dL    Glucose 95 70 - 99 mg/dL    GFR Estimate >90 >60 mL/min/1.73m2   Occult blood stool     Status: Normal   Result Value Ref Range    Occult Blood Negative Negative   Hemoglobin     Status: Abnormal   Result Value Ref Range    Hemoglobin 12.1 (L) 13.3 - 17.7 g/dL   UA reflex to Microscopic and Culture     Status: Abnormal    Specimen: Urine, Clean Catch   Result Value Ref Range    Color Urine Yellow Colorless, Straw, Light Yellow, Yellow    Appearance Urine Clear Clear    Glucose Urine Negative Negative mg/dL    Bilirubin Urine Negative Negative    Ketones Urine 100 (A) Negative mg/dL    Specific Gravity Urine 1.028 1.003 - 1.035    Blood Urine Trace (A) Negative    pH Urine 6.5 5.0 - 7.0    Protein Albumin Urine 10 (A) Negative mg/dL    Urobilinogen Urine Normal Normal, 2.0 mg/dL    Nitrite Urine Negative Negative    Leukocyte Esterase Urine Negative Negative    Mucus Urine Present (A) None Seen /LPF    RBC Urine 5 (H) <=2 /HPF    WBC Urine 2 <=5 /HPF    Narrative    Urine Culture not indicated   Basic metabolic panel     Status: Abnormal   Result Value Ref Range    Sodium 141 136 - 145 mmol/L    Potassium 3.6 3.4 - 5.3 mmol/L    Chloride 109 (H) 98 - 107 mmol/L    Carbon Dioxide (CO2) 18 (L) 22 - 29 mmol/L    Anion Gap 14 7 - 15 mmol/L    Urea Nitrogen 23.4 (H) 8.0 - 23.0 mg/dL    Creatinine 0.78 0.67 - 1.17 mg/dL    Calcium 8.2 (L) 8.8 - 10.2 mg/dL    Glucose 98 70 - 99 mg/dL    GFR Estimate >90 >60 mL/min/1.73m2   CBC with platelets and differential     Status: Abnormal   Result Value Ref Range    WBC Count 9.2 4.0 - 11.0 10e3/uL    RBC Count 3.99 (L) 4.40 - 5.90 10e6/uL     Hemoglobin 12.9 (L) 13.3 - 17.7 g/dL    Hematocrit 36.1 (L) 40.0 - 53.0 %    MCV 91 78 - 100 fL    MCH 32.3 26.5 - 33.0 pg    MCHC 35.7 31.5 - 36.5 g/dL    RDW 11.9 10.0 - 15.0 %    Platelet Count 330 150 - 450 10e3/uL    % Neutrophils 81 %    % Lymphocytes 8 %    % Monocytes 6 %    % Eosinophils 5 %    % Basophils 0 %    % Immature Granulocytes 0 %    NRBCs per 100 WBC 0 <1 /100    Absolute Neutrophils 7.4 1.6 - 8.3 10e3/uL    Absolute Lymphocytes 0.7 (L) 0.8 - 5.3 10e3/uL    Absolute Monocytes 0.6 0.0 - 1.3 10e3/uL    Absolute Eosinophils 0.4 0.0 - 0.7 10e3/uL    Absolute Basophils 0.0 0.0 - 0.2 10e3/uL    Absolute Immature Granulocytes 0.0 <=0.4 10e3/uL    Absolute NRBCs 0.0 10e3/uL   Platelet count     Status: Normal   Result Value Ref Range    Platelet Count 317 150 - 450 10e3/uL   Basic metabolic panel     Status: Abnormal   Result Value Ref Range    Sodium 141 136 - 145 mmol/L    Potassium 3.2 (L) 3.4 - 5.3 mmol/L    Chloride 109 (H) 98 - 107 mmol/L    Carbon Dioxide (CO2) 21 (L) 22 - 29 mmol/L    Anion Gap 11 7 - 15 mmol/L    Urea Nitrogen 19.2 8.0 - 23.0 mg/dL    Creatinine 0.74 0.67 - 1.17 mg/dL    Calcium 8.2 (L) 8.8 - 10.2 mg/dL    Glucose 109 (H) 70 - 99 mg/dL    GFR Estimate >90 >60 mL/min/1.73m2   Potassium     Status: Normal   Result Value Ref Range    Potassium 3.9 3.4 - 5.3 mmol/L   Basic metabolic panel     Status: Abnormal   Result Value Ref Range    Sodium 140 136 - 145 mmol/L    Potassium 3.3 (L) 3.4 - 5.3 mmol/L    Chloride 108 (H) 98 - 107 mmol/L    Carbon Dioxide (CO2) 19 (L) 22 - 29 mmol/L    Anion Gap 13 7 - 15 mmol/L    Urea Nitrogen 17.1 8.0 - 23.0 mg/dL    Creatinine 0.74 0.67 - 1.17 mg/dL    Calcium 8.6 (L) 8.8 - 10.2 mg/dL    Glucose 115 (H) 70 - 99 mg/dL    GFR Estimate >90 >60 mL/min/1.73m2   Magnesium     Status: Normal   Result Value Ref Range    Magnesium 1.8 1.7 - 2.3 mg/dL   Hepatic panel     Status: Normal   Result Value Ref Range    Protein Total 6.4 6.4 - 8.3 g/dL    Albumin  3.5 3.5 - 5.2 g/dL    Bilirubin Total 0.6 <=1.2 mg/dL    Alkaline Phosphatase 90 40 - 129 U/L    AST 40 10 - 50 U/L    ALT 41 10 - 50 U/L    Bilirubin Direct <0.20 0.00 - 0.30 mg/dL   Extra Tube     Status: None    Narrative    The following orders were created for panel order Extra Tube.  Procedure                               Abnormality         Status                     ---------                               -----------         ------                     Extra Green Top (Lithium...[[id number]]                      Final result                 Please view results for these tests on the individual orders.   Extra Green Top (Lithium Heparin) Tube     Status: None   Result Value Ref Range    Hold Specimen JI    Potassium     Status: Normal   Result Value Ref Range    Potassium 4.0 3.4 - 5.3 mmol/L   Basic metabolic panel     Status: Abnormal   Result Value Ref Range    Sodium 141 136 - 145 mmol/L    Potassium 3.3 (L) 3.4 - 5.3 mmol/L    Chloride 109 (H) 98 - 107 mmol/L    Carbon Dioxide (CO2) 20 (L) 22 - 29 mmol/L    Anion Gap 12 7 - 15 mmol/L    Urea Nitrogen 20.6 8.0 - 23.0 mg/dL    Creatinine 0.77 0.67 - 1.17 mg/dL    Calcium 8.5 (L) 8.8 - 10.2 mg/dL    Glucose 106 (H) 70 - 99 mg/dL    GFR Estimate >90 >60 mL/min/1.73m2   CBC with platelets and differential     Status: Abnormal   Result Value Ref Range    WBC Count 9.9 4.0 - 11.0 10e3/uL    RBC Count 3.94 (L) 4.40 - 5.90 10e6/uL    Hemoglobin 12.7 (L) 13.3 - 17.7 g/dL    Hematocrit 36.6 (L) 40.0 - 53.0 %    MCV 93 78 - 100 fL    MCH 32.2 26.5 - 33.0 pg    MCHC 34.7 31.5 - 36.5 g/dL    RDW 12.6 10.0 - 15.0 %    Platelet Count 343 150 - 450 10e3/uL    % Neutrophils 76 %    % Lymphocytes 12 %    % Monocytes 9 %    % Eosinophils 3 %    % Basophils 0 %    % Immature Granulocytes 0 %    NRBCs per 100 WBC 0 <1 /100    Absolute Neutrophils 7.4 1.6 - 8.3 10e3/uL    Absolute Lymphocytes 1.2 0.8 - 5.3 10e3/uL    Absolute Monocytes 0.9 0.0 - 1.3 10e3/uL    Absolute  Eosinophils 0.3 0.0 - 0.7 10e3/uL    Absolute Basophils 0.0 0.0 - 0.2 10e3/uL    Absolute Immature Granulocytes 0.0 <=0.4 10e3/uL    Absolute NRBCs 0.0 10e3/uL   Magnesium     Status: Normal   Result Value Ref Range    Magnesium 1.8 1.7 - 2.3 mg/dL   TSH with free T4 reflex     Status: Normal   Result Value Ref Range    TSH 0.97 0.30 - 4.20 uIU/mL   Vitamin B12     Status: Normal   Result Value Ref Range    Vitamin B12 1,170 232 - 1,245 pg/mL   Platelet count     Status: Normal   Result Value Ref Range    Platelet Count 328 150 - 450 10e3/uL   Basic metabolic panel     Status: Abnormal   Result Value Ref Range    Sodium 142 136 - 145 mmol/L    Potassium 3.3 (L) 3.4 - 5.3 mmol/L    Chloride 109 (H) 98 - 107 mmol/L    Carbon Dioxide (CO2) 19 (L) 22 - 29 mmol/L    Anion Gap 14 7 - 15 mmol/L    Urea Nitrogen 17.6 8.0 - 23.0 mg/dL    Creatinine 0.80 0.67 - 1.17 mg/dL    Calcium 8.3 (L) 8.8 - 10.2 mg/dL    Glucose 91 70 - 99 mg/dL    GFR Estimate >90 >60 mL/min/1.73m2   Magnesium     Status: Normal   Result Value Ref Range    Magnesium 1.9 1.7 - 2.3 mg/dL   Potassium     Status: Abnormal   Result Value Ref Range    Potassium 3.2 (L) 3.4 - 5.3 mmol/L   Basic metabolic panel     Status: Abnormal   Result Value Ref Range    Sodium 140 136 - 145 mmol/L    Potassium 3.2 (L) 3.4 - 5.3 mmol/L    Chloride 107 98 - 107 mmol/L    Carbon Dioxide (CO2) 20 (L) 22 - 29 mmol/L    Anion Gap 13 7 - 15 mmol/L    Urea Nitrogen 12.3 8.0 - 23.0 mg/dL    Creatinine 0.82 0.67 - 1.17 mg/dL    Calcium 8.2 (L) 8.8 - 10.2 mg/dL    Glucose 100 (H) 70 - 99 mg/dL    GFR Estimate >90 >60 mL/min/1.73m2   Potassium     Status: Abnormal   Result Value Ref Range    Potassium 3.2 (L) 3.4 - 5.3 mmol/L   Extra Tube     Status: None    Narrative    The following orders were created for panel order Extra Tube.  Procedure                               Abnormality         Status                     ---------                               -----------          ------                     Extra Purple Top Tube[[id number]]                            Final result                 Please view results for these tests on the individual orders.   Extra Purple Top Tube     Status: None   Result Value Ref Range    Hold Specimen JIC    CBC with platelets and differential     Status: Abnormal   Result Value Ref Range    WBC Count 8.2 4.0 - 11.0 10e3/uL    RBC Count 3.90 (L) 4.40 - 5.90 10e6/uL    Hemoglobin 12.7 (L) 13.3 - 17.7 g/dL    Hematocrit 36.5 (L) 40.0 - 53.0 %    MCV 94 78 - 100 fL    MCH 32.6 26.5 - 33.0 pg    MCHC 34.8 31.5 - 36.5 g/dL    RDW 12.8 10.0 - 15.0 %    Platelet Count 362 150 - 450 10e3/uL    % Neutrophils 67 %    % Lymphocytes 17 %    % Monocytes 8 %    % Eosinophils 7 %    % Basophils 1 %    % Immature Granulocytes 0 %    NRBCs per 100 WBC 0 <1 /100    Absolute Neutrophils 5.6 1.6 - 8.3 10e3/uL    Absolute Lymphocytes 1.4 0.8 - 5.3 10e3/uL    Absolute Monocytes 0.6 0.0 - 1.3 10e3/uL    Absolute Eosinophils 0.6 0.0 - 0.7 10e3/uL    Absolute Basophils 0.0 0.0 - 0.2 10e3/uL    Absolute Immature Granulocytes 0.0 <=0.4 10e3/uL    Absolute NRBCs 0.0 10e3/uL   Potassium     Status: Normal   Result Value Ref Range    Potassium 3.5 3.4 - 5.3 mmol/L   Basic metabolic panel     Status: Abnormal   Result Value Ref Range    Sodium 138 136 - 145 mmol/L    Potassium 3.5 3.4 - 5.3 mmol/L    Chloride 106 98 - 107 mmol/L    Carbon Dioxide (CO2) 20 (L) 22 - 29 mmol/L    Anion Gap 12 7 - 15 mmol/L    Urea Nitrogen 11.6 8.0 - 23.0 mg/dL    Creatinine 0.81 0.67 - 1.17 mg/dL    Calcium 7.9 (L) 8.8 - 10.2 mg/dL    Glucose 119 (H) 70 - 99 mg/dL    GFR Estimate >90 >60 mL/min/1.73m2   Magnesium     Status: Normal   Result Value Ref Range    Magnesium 2.0 1.7 - 2.3 mg/dL   Basic metabolic panel     Status: Abnormal   Result Value Ref Range    Sodium 140 136 - 145 mmol/L    Potassium 5.2 3.4 - 5.3 mmol/L    Chloride 108 (H) 98 - 107 mmol/L    Carbon Dioxide (CO2) 16 (L) 22 - 29 mmol/L     Anion Gap 16 (H) 7 - 15 mmol/L    Urea Nitrogen 13.1 8.0 - 23.0 mg/dL    Creatinine 0.79 0.67 - 1.17 mg/dL    Calcium 8.6 (L) 8.8 - 10.2 mg/dL    Glucose 106 (H) 70 - 99 mg/dL    GFR Estimate >90 >60 mL/min/1.73m2   CBC with platelets and differential     Status: Abnormal   Result Value Ref Range    WBC Count 9.3 4.0 - 11.0 10e3/uL    RBC Count 3.89 (L) 4.40 - 5.90 10e6/uL    Hemoglobin 12.8 (L) 13.3 - 17.7 g/dL    Hematocrit 37.2 (L) 40.0 - 53.0 %    MCV 96 78 - 100 fL    MCH 32.9 26.5 - 33.0 pg    MCHC 34.4 31.5 - 36.5 g/dL    RDW 12.8 10.0 - 15.0 %    Platelet Count 375 150 - 450 10e3/uL    NRBCs per 100 WBC 2 (H) <1 /100    Absolute NRBCs 0.2 10e3/uL   Manual Differential     Status: None   Result Value Ref Range    % Neutrophils 69 %    % Lymphocytes 17 %    % Monocytes 7 %    % Eosinophils 7 %    % Basophils 0 %    Absolute Neutrophils 6.4 1.6 - 8.3 10e3/uL    Absolute Lymphocytes 1.6 0.8 - 5.3 10e3/uL    Absolute Monocytes 0.7 0.0 - 1.3 10e3/uL    Absolute Eosinophils 0.7 0.0 - 0.7 10e3/uL    Absolute Basophils 0.0 0.0 - 0.2 10e3/uL    RBC Morphology Confirmed RBC Indices     Platelet Assessment  Automated Count Confirmed. Platelet morphology is normal.     Automated Count Confirmed. Platelet morphology is normal.   Potassium     Status: Abnormal   Result Value Ref Range    Potassium 3.3 (L) 3.4 - 5.3 mmol/L   Extra Tube     Status: None    Narrative    The following orders were created for panel order Extra Tube.  Procedure                               Abnormality         Status                     ---------                               -----------         ------                     Extra Purple Top Tube[[id number]]                            Final result                 Please view results for these tests on the individual orders.   Extra Purple Top Tube     Status: None   Result Value Ref Range    Hold Specimen JIC    UA reflex to Microscopic and Culture     Status: Abnormal    Specimen: Urine, Clean  Catch   Result Value Ref Range    Color Urine Yellow Colorless, Straw, Light Yellow, Yellow    Appearance Urine Clear Clear    Glucose Urine Negative Negative mg/dL    Bilirubin Urine Negative Negative    Ketones Urine 20 (A) Negative mg/dL    Specific Gravity Urine 1.025 1.003 - 1.035    Blood Urine Negative Negative    pH Urine 6.5 5.0 - 7.0    Protein Albumin Urine 10 (A) Negative mg/dL    Urobilinogen Urine Normal Normal, 2.0 mg/dL    Nitrite Urine Negative Negative    Leukocyte Esterase Urine Trace (A) Negative    RBC Urine 1 <=2 /HPF    WBC Urine 1 <=5 /HPF    Narrative    Urine Culture not indicated   Comprehensive metabolic panel     Status: Abnormal   Result Value Ref Range    Sodium 142 136 - 145 mmol/L    Potassium 3.5 3.4 - 5.3 mmol/L    Chloride 108 (H) 98 - 107 mmol/L    Carbon Dioxide (CO2) 23 22 - 29 mmol/L    Anion Gap 11 7 - 15 mmol/L    Urea Nitrogen 7.1 (L) 8.0 - 23.0 mg/dL    Creatinine 0.71 0.67 - 1.17 mg/dL    Calcium 8.2 (L) 8.8 - 10.2 mg/dL    Glucose 127 (H) 70 - 99 mg/dL    Alkaline Phosphatase 85 40 - 129 U/L    AST 26 10 - 50 U/L    ALT 20 10 - 50 U/L    Protein Total 6.0 (L) 6.4 - 8.3 g/dL    Albumin 3.3 (L) 3.5 - 5.2 g/dL    Bilirubin Total 0.4 <=1.2 mg/dL    GFR Estimate >90 >60 mL/min/1.73m2   CBC with platelets and differential     Status: Abnormal   Result Value Ref Range    WBC Count 7.0 4.0 - 11.0 10e3/uL    RBC Count 4.28 (L) 4.40 - 5.90 10e6/uL    Hemoglobin 13.8 13.3 - 17.7 g/dL    Hematocrit 39.7 (L) 40.0 - 53.0 %    MCV 93 78 - 100 fL    MCH 32.2 26.5 - 33.0 pg    MCHC 34.8 31.5 - 36.5 g/dL    RDW 12.8 10.0 - 15.0 %    Platelet Count 333 150 - 450 10e3/uL    % Neutrophils 68 %    % Lymphocytes 18 %    % Monocytes 8 %    % Eosinophils 5 %    % Basophils 0 %    % Immature Granulocytes 1 %    NRBCs per 100 WBC 0 <1 /100    Absolute Neutrophils 4.8 1.6 - 8.3 10e3/uL    Absolute Lymphocytes 1.3 0.8 - 5.3 10e3/uL    Absolute Monocytes 0.6 0.0 - 1.3 10e3/uL    Absolute Eosinophils  0.3 0.0 - 0.7 10e3/uL    Absolute Basophils 0.0 0.0 - 0.2 10e3/uL    Absolute Immature Granulocytes 0.1 <=0.4 10e3/uL    Absolute NRBCs 0.0 10e3/uL   Platelet count     Status: Normal   Result Value Ref Range    Platelet Count 332 150 - 450 10e3/uL   Basic metabolic panel     Status: Abnormal   Result Value Ref Range    Sodium 144 136 - 145 mmol/L    Potassium 3.7 3.4 - 5.3 mmol/L    Chloride 107 98 - 107 mmol/L    Carbon Dioxide (CO2) 19 (L) 22 - 29 mmol/L    Anion Gap 18 (H) 7 - 15 mmol/L    Urea Nitrogen 8.4 8.0 - 23.0 mg/dL    Creatinine 0.77 0.67 - 1.17 mg/dL    Calcium 8.8 8.8 - 10.2 mg/dL    Glucose 98 70 - 99 mg/dL    GFR Estimate >90 >60 mL/min/1.73m2   CBC with platelets and differential     Status: Abnormal   Result Value Ref Range    WBC Count 6.4 4.0 - 11.0 10e3/uL    RBC Count 4.39 (L) 4.40 - 5.90 10e6/uL    Hemoglobin 14.1 13.3 - 17.7 g/dL    Hematocrit 40.8 40.0 - 53.0 %    MCV 93 78 - 100 fL    MCH 32.1 26.5 - 33.0 pg    MCHC 34.6 31.5 - 36.5 g/dL    RDW 13.1 10.0 - 15.0 %    Platelet Count 308 150 - 450 10e3/uL    % Neutrophils 60 %    % Lymphocytes 24 %    % Monocytes 9 %    % Eosinophils 5 %    % Basophils 1 %    % Immature Granulocytes 1 %    NRBCs per 100 WBC 0 <1 /100    Absolute Neutrophils 3.9 1.6 - 8.3 10e3/uL    Absolute Lymphocytes 1.6 0.8 - 5.3 10e3/uL    Absolute Monocytes 0.6 0.0 - 1.3 10e3/uL    Absolute Eosinophils 0.3 0.0 - 0.7 10e3/uL    Absolute Basophils 0.0 0.0 - 0.2 10e3/uL    Absolute Immature Granulocytes 0.0 <=0.4 10e3/uL    Absolute NRBCs 0.0 10e3/uL   Basic metabolic panel     Status: Abnormal   Result Value Ref Range    Sodium 141 136 - 145 mmol/L    Potassium 3.2 (L) 3.4 - 5.3 mmol/L    Chloride 106 98 - 107 mmol/L    Carbon Dioxide (CO2) 23 22 - 29 mmol/L    Anion Gap 12 7 - 15 mmol/L    Urea Nitrogen 12.3 8.0 - 23.0 mg/dL    Creatinine 0.75 0.67 - 1.17 mg/dL    Calcium 8.7 (L) 8.8 - 10.2 mg/dL    Glucose 111 (H) 70 - 99 mg/dL    GFR Estimate >90 >60 mL/min/1.73m2   CBC  with platelets and differential     Status: Abnormal   Result Value Ref Range    WBC Count 6.7 4.0 - 11.0 10e3/uL    RBC Count 4.19 (L) 4.40 - 5.90 10e6/uL    Hemoglobin 13.6 13.3 - 17.7 g/dL    Hematocrit 38.7 (L) 40.0 - 53.0 %    MCV 92 78 - 100 fL    MCH 32.5 26.5 - 33.0 pg    MCHC 35.1 31.5 - 36.5 g/dL    RDW 12.7 10.0 - 15.0 %    Platelet Count 301 150 - 450 10e3/uL    % Neutrophils 73 %    % Lymphocytes 18 %    % Monocytes 6 %    % Eosinophils 3 %    % Basophils 0 %    % Immature Granulocytes 0 %    NRBCs per 100 WBC 0 <1 /100    Absolute Neutrophils 4.8 1.6 - 8.3 10e3/uL    Absolute Lymphocytes 1.2 0.8 - 5.3 10e3/uL    Absolute Monocytes 0.4 0.0 - 1.3 10e3/uL    Absolute Eosinophils 0.2 0.0 - 0.7 10e3/uL    Absolute Basophils 0.0 0.0 - 0.2 10e3/uL    Absolute Immature Granulocytes 0.0 <=0.4 10e3/uL    Absolute NRBCs 0.0 10e3/uL   Potassium     Status: Normal   Result Value Ref Range    Potassium 3.5 3.4 - 5.3 mmol/L   Comprehensive metabolic panel     Status: Abnormal   Result Value Ref Range    Sodium 141 136 - 145 mmol/L    Potassium 3.6 3.4 - 5.3 mmol/L    Chloride 106 98 - 107 mmol/L    Carbon Dioxide (CO2) 25 22 - 29 mmol/L    Anion Gap 10 7 - 15 mmol/L    Urea Nitrogen 16.5 8.0 - 23.0 mg/dL    Creatinine 0.73 0.67 - 1.17 mg/dL    Calcium 8.6 (L) 8.8 - 10.2 mg/dL    Glucose 112 (H) 70 - 99 mg/dL    Alkaline Phosphatase 93 40 - 129 U/L    AST 30 10 - 50 U/L    ALT 23 10 - 50 U/L    Protein Total 6.2 (L) 6.4 - 8.3 g/dL    Albumin 3.4 (L) 3.5 - 5.2 g/dL    Bilirubin Total 0.5 <=1.2 mg/dL    GFR Estimate >90 >60 mL/min/1.73m2   CBC with platelets and differential     Status: Abnormal   Result Value Ref Range    WBC Count 6.7 4.0 - 11.0 10e3/uL    RBC Count 4.35 (L) 4.40 - 5.90 10e6/uL    Hemoglobin 13.9 13.3 - 17.7 g/dL    Hematocrit 40.4 40.0 - 53.0 %    MCV 93 78 - 100 fL    MCH 32.0 26.5 - 33.0 pg    MCHC 34.4 31.5 - 36.5 g/dL    RDW 13.0 10.0 - 15.0 %    Platelet Count 284 150 - 450 10e3/uL    %  Neutrophils 76 %    % Lymphocytes 15 %    % Monocytes 5 %    % Eosinophils 3 %    % Basophils 0 %    % Immature Granulocytes 1 %    NRBCs per 100 WBC 0 <1 /100    Absolute Neutrophils 5.1 1.6 - 8.3 10e3/uL    Absolute Lymphocytes 1.0 0.8 - 5.3 10e3/uL    Absolute Monocytes 0.3 0.0 - 1.3 10e3/uL    Absolute Eosinophils 0.2 0.0 - 0.7 10e3/uL    Absolute Basophils 0.0 0.0 - 0.2 10e3/uL    Absolute Immature Granulocytes 0.1 <=0.4 10e3/uL    Absolute NRBCs 0.0 10e3/uL   CBC with platelets and differential     Status: Abnormal   Result Value Ref Range    WBC Count 7.0 4.0 - 11.0 10e3/uL    RBC Count 4.36 (L) 4.40 - 5.90 10e6/uL    Hemoglobin 14.1 13.3 - 17.7 g/dL    Hematocrit 40.4 40.0 - 53.0 %    MCV 93 78 - 100 fL    MCH 32.3 26.5 - 33.0 pg    MCHC 34.9 31.5 - 36.5 g/dL    RDW 13.1 10.0 - 15.0 %    Platelet Count 301 150 - 450 10e3/uL    % Neutrophils 65 %    % Lymphocytes 23 %    % Monocytes 6 %    % Eosinophils 5 %    % Basophils 1 %    % Immature Granulocytes 0 %    NRBCs per 100 WBC 0 <1 /100    Absolute Neutrophils 4.6 1.6 - 8.3 10e3/uL    Absolute Lymphocytes 1.6 0.8 - 5.3 10e3/uL    Absolute Monocytes 0.4 0.0 - 1.3 10e3/uL    Absolute Eosinophils 0.4 0.0 - 0.7 10e3/uL    Absolute Basophils 0.0 0.0 - 0.2 10e3/uL    Absolute Immature Granulocytes 0.0 <=0.4 10e3/uL    Absolute NRBCs 0.0 10e3/uL   Comprehensive metabolic panel     Status: Abnormal   Result Value Ref Range    Sodium 137 136 - 145 mmol/L    Potassium 4.5 3.4 - 5.3 mmol/L    Chloride 107 98 - 107 mmol/L    Carbon Dioxide (CO2) 20 (L) 22 - 29 mmol/L    Anion Gap 10 7 - 15 mmol/L    Urea Nitrogen 19.0 8.0 - 23.0 mg/dL    Creatinine 0.66 (L) 0.67 - 1.17 mg/dL    Calcium 8.7 (L) 8.8 - 10.2 mg/dL    Glucose 100 (H) 70 - 99 mg/dL    Alkaline Phosphatase 92 40 - 129 U/L    AST 53 (H) 10 - 50 U/L    ALT 30 10 - 50 U/L    Protein Total 6.5 6.4 - 8.3 g/dL    Albumin 3.0 (L) 3.5 - 5.2 g/dL    Bilirubin Total 0.5 <=1.2 mg/dL    GFR Estimate >90 >60 mL/min/1.73m2    Urine Culture     Status: None    Specimen: Urine, Catheter   Result Value Ref Range    Culture <10,000 CFU/mL Urogenital tatum    Urine Culture     Status: None    Specimen: Urine, Catheter   Result Value Ref Range    Culture <10,000 CFU/mL Urogenital tatum    C. difficile Toxin B PCR with reflex to C. difficile Antigen and Toxins A/B EIA     Status: Abnormal    Specimen: Per Rectum; Stool   Result Value Ref Range    C Difficile Toxin B by PCR Positive (A) Negative    Narrative    The [employer] Xpert C. difficile Assay, performed on the [employer], is a qualitative in vitro diagnostic test for rapid detection of toxin B gene sequences from unformed (liquid or soft) stool specimens collected from patients suspected of having Clostridioides difficile infection (CDI). The test utilizes automated real-time polymerase chain reaction (PCR) to detect toxin gene sequences associated with toxin producing C. difficile. The Xpert C. difficile Assay is intended as an aid in the diagnosis of CDI.   C. difficile Antigen and Toxins A/B by Enzyme Immunoassay     Status: Abnormal    Specimen: Per Rectum; Stool   Result Value Ref Range    C. difficile GDH Antigen Positive (A) Negative    C. difficile Toxin Positive (A) Negative    Narrative    C. difficile GDH antigen and C. difficile toxin were detected by enzyme immunoassay. Results must be interpreted based on clinical findings and are supportive of C. difficile infection.   CBC with platelets differential     Status: Abnormal    Narrative    The following orders were created for panel order CBC with platelets differential.  Procedure                               Abnormality         Status                     ---------                               -----------         ------                     CBC with platelets and d...[[id number]]  Abnormal            Final result                 Please view results for these tests on the individual orders.   CBC  with platelets differential     Status: Abnormal    Narrative    The following orders were created for panel order CBC with platelets differential.  Procedure                               Abnormality         Status                     ---------                               -----------         ------                     CBC with platelets and d...[[id number]]  Abnormal            Final result                 Please view results for these tests on the individual orders.   CBC with Platelets & Differential     Status: Abnormal    Narrative    The following orders were created for panel order CBC with Platelets & Differential.  Procedure                               Abnormality         Status                     ---------                               -----------         ------                     CBC with platelets and d...[[id number]]  Abnormal            Final result                 Please view results for these tests on the individual orders.   CBC with Platelets & Differential     Status: Abnormal    Narrative    The following orders were created for panel order CBC with Platelets & Differential.  Procedure                               Abnormality         Status                     ---------                               -----------         ------                     CBC with platelets and d...[[id number]]  Abnormal            Final result                 Please view results for these tests on the individual orders.   CBC with Platelets & Differential     Status: Abnormal    Narrative    The following orders were created for panel order CBC with Platelets & Differential.  Procedure                               Abnormality         Status                     ---------                               -----------         ------                     CBC with platelets and d...[[id number]]  Abnormal            Final result                 Please view results for these tests on the individual orders.   CBC with Platelets &  Differential     Status: Abnormal    Narrative    The following orders were created for panel order CBC with Platelets & Differential.  Procedure                               Abnormality         Status                     ---------                               -----------         ------                     CBC with platelets and d...[[id number]]  Abnormal            Final result                 Please view results for these tests on the individual orders.   CBC with Platelets & Differential     Status: Abnormal    Narrative    The following orders were created for panel order CBC with Platelets & Differential.  Procedure                               Abnormality         Status                     ---------                               -----------         ------                     CBC with platelets and d...[[id number]]  Abnormal            Final result                 Please view results for these tests on the individual orders.   CBC with Platelets & Differential *Canceled*     Status: None ()    Narrative    The following orders were created for panel order CBC with Platelets & Differential.  Procedure                               Abnormality         Status                     ---------                               -----------         ------                       Please view results for these tests on the individual orders.   CBC with Platelets & Differential     Status: Abnormal    Narrative    The following orders were created for panel order CBC with Platelets & Differential.  Procedure                               Abnormality         Status                     ---------                               -----------         ------                     CBC with platelets and d...[[id number]]  Abnormal            Final result                 Please view results for these tests on the individual orders.   CBC with Platelets & Differential     Status: Abnormal    Narrative    The following orders were created for  panel order CBC with Platelets & Differential.  Procedure                               Abnormality         Status                     ---------                               -----------         ------                     CBC with platelets and d...[[id number]]  Abnormal            Final result                 Please view results for these tests on the individual orders.   CBC with Platelets & Differential     Status: Abnormal    Narrative    The following orders were created for panel order CBC with Platelets & Differential.  Procedure                               Abnormality         Status                     ---------                               -----------         ------                     CBC with platelets and d...[[id number]]  Abnormal            Final result                 Please view results for these tests on the individual orders.   CBC with platelets differential     Status: Abnormal    Narrative    The following orders were created for panel order CBC with platelets differential.  Procedure                               Abnormality         Status                     ---------                               -----------         ------                     CBC with platelets and d...[[id number]]  Abnormal            Final result                 Please view results for these tests on the individual orders.   EEG Video 2-12 HRS Ummonitored     Status: None    Narrative    EEG Video 2-12 HRS Ummonitored Result    VIDEO EEG DATE: 2022  VIDEO EEG [id number]  VIDEO EEG DAY#: 1  VIDEO EEG SOURCE FILE DURATION: 3 hours 4 minutes    PATIENT HISTORY: 72-year old with diagnosis of Alzheimer's disease.  He   presents now with altered mental status (increased delirium with acute   decline).  Concern is raised that seizures may be responsible for a change   in mental status.  Episodes of jerking are also described and there is   concern that these may represent seizures.    TECHNICAL SUMMARY: This is a video-EEG  monitoring study. EEG was recorded   from 23 scalp electrodes placed according to the 10-20 international   system. Additional electrodes were utilized for referencing, artifact   detection, and recording from other cerebral regions. Qualified   technicians attached EEG electrodes, set up the study, monitored and   reviewed EEG recordings, and disconnected EEG electrodes when appropriate.   Video was continuously recorded. Video was reviewed for clinical   correlation and to assist with EEG interpretations.     BACKGROUND ACTIVITY: Occasional 5 to 10  V theta, poorly sustained.  10 to   25  V bifrontal delta, poorly formed.  EEG is generally attenuated.  No   posterior dominant rhythm in available samples.  No clear sleep-wake   changes.  No clear focal activity.    ACTIVATION PROCEDURE: No clear change with stimulation.    OTHER INTERICTAL ABNORMALITIES: No epileptiform discharges.    ICTAL ABNORMALITIES: 2 spells of tremor were marked.  3 to 4 Hz tremor   with the left arm centered at the left shoulder was recorded at 1:16 PM.    Face and leg were not involved.  Period of 3 to 4 Hz right arm tremor with   some slapping of the thigh was recorded at 1:40 PM.  This too was centered   at the right shoulder with little involvement of other joints which were   held steady.  Face and leg were not involved.  Bouts of tremor lasted   about 10 seconds.  Clinically these did not have the appearance of   myoclonus or focal epilepsy.  EEG during these periods of time showed a   movement artifact in the posterior leads that was time locked to the   tremor when analyzed in slow motion.  Seizure discharge was not seen.    IMPRESSION: Abnormal.  1) background activity consistent with moderate to diffuse encephalopathy.    This is nonspecific and can be seen in a variety of etiologies including   toxic, metabolic, and degenerative among others.  2) there was no indication of seizures or seizure tendency.  No indication   of  nonconvulsive status epilepticus.  3) 2 bouts of arm tremor were recorded.  These did not have the clinical   appearance of seizures.  EEG during this period of time did not show   seizure activity.    Shaheen Hess MD  EPILEPSY STAFF        Medications   lactated ringers infusion (0 mLs Intravenous Stopped 11/23/22 1442)   ketorolac (TORADOL) injection 15 mg (15 mg Intravenous Given 12/2/22 1748)   lidocaine (viscous) (XYLOCAINE) 2 % solution (  Given 11/22/22 0931)   lidocaine (XYLOCAINE) 2 % external gel ( Urethral Given 11/22/22 0931)   QUEtiapine (SEROquel) tablet 25 mg (25 mg Oral Given 11/22/22 1022)   LORazepam (ATIVAN) injection 1 mg (1 mg Intravenous Given 11/22/22 1023)   cefTRIAXone (ROCEPHIN) 1 g vial to attach to  mL bag for ADULTS or NS 50 mL bag for PEDS (0 g Intravenous Stopped 11/22/22 1456)   HYDROmorphone (PF) (DILAUDID) injection 0.5 mg (0.5 mg Intravenous Given 11/22/22 2037)   ketorolac (TORADOL) injection 15 mg (15 mg Intravenous Given 11/24/22 0537)   diazepam (VALIUM) injection 2.5 mg (2.5 mg Intravenous Given 11/23/22 1931)   ketorolac (TORADOL) injection 15 mg (15 mg Intravenous Given 11/25/22 0331)   naproxen (NAPROSYN) tablet 250 mg (250 mg Oral Given 11/26/22 1749)   pantoprazole (PROTONIX) IV push injection 40 mg (40 mg Intravenous Given 11/26/22 0921)   potassium chloride (KLOR-CON) Packet 40 mEq (40 mEq Oral or Feeding Tube Given 11/26/22 1445)   potassium chloride 10 mEq in 100 mL sterile water infusion (10 mEq Intravenous New Bag 11/28/22 1201)   potassium chloride 10 mEq in 100 mL sterile water infusion (10 mEq Intravenous New Bag 11/29/22 1316)   potassium chloride 10 mEq in 100 mL sterile water infusion (10 mEq Intravenous New Bag 11/30/22 1406)   potassium chloride 10 mEq in 100 mL sterile water infusion (10 mEq Intravenous New Bag 12/1/22 1333)   potassium chloride 10 mEq in 100 mL sterile water infusion (10 mEq Intravenous New Bag 12/2/22 0117)   potassium  chloride 10 mEq in 100 mL sterile water infusion (10 mEq Intravenous New Bag 12/2/22 9217)   potassium chloride (KLOR-CON) Packet 20 mEq (20 mEq Oral Given 12/5/22 1236)   oxyCODONE (ROXICODONE INTENSOL) 20 mg/mL (HIGH CONC) solution 2.5 mg (2.5 mg Sublingual Given 12/9/22 1602)   potassium chloride 10 mEq in 100 mL sterile water infusion (10 mEq Intravenous New Bag 12/8/22 3255)        Assessments & Plan (with Medical Decision Making)   Gentleman with dementia brought in by his wife for altered mental status and complaints of pain of unknown etiology.  About a week ago he had fallen and was evaluated no traumatic injuries were found.  He did complain of back pain we did do a CT of his lumbar spine showed a transverse process fracture he also has a urinary infection which was diagnosed and initiated treatment.  He will be admitted to ED observation for management of his acute cystitis and pain management of his lumbar spine injury.    I have reviewed the nursing notes. I have reviewed the findings, diagnosis, plan and need for follow up with the patient.    Discharge Medication List as of 12/10/2022  1:20 PM      START taking these medications    Details   amLODIPine (NORVASC) 10 MG tablet Take 1 tablet (10 mg) by mouth daily, Disp-30 tablet, R-0, E-Prescribe      buprenorphine (BUTRANS) 5 MCG/HR WK patch Place 1 patch onto the skin once a week, Disp-12 patch, R-0, E-Prescribe      calcium carbonate (TUMS) 500 MG chewable tablet Take 1 tablet (500 mg) by mouth 4 times daily as needed for heartburn, Disp-120 tablet, R-0, E-Prescribe      diclofenac (VOLTAREN) 1 % topical gel Apply 4 g topically 4 times daily as needed for moderate pain (4-6), Disp-100 g, R-0, E-Prescribe      finasteride (PROSCAR) 5 MG tablet Take 1 tablet (5 mg) by mouth daily, Disp-30 tablet, R-0, E-Prescribe      LORazepam (ATIVAN) 2 MG/ML (HIGH CONC) oral solution Take 0.125 mLs (0.25 mg) by mouth every 8 hours as needed for anxiety, Disp-30 mL,  R-0, E-Prescribe      melatonin 5 MG sublingual tablet Place 1 tablet (5 mg) under the tongue every evening, Disp-30 tablet, R-0, E-Prescribe      multivitamin, therapeutic (THERA-VIT) TABS tablet Take 1 tablet by mouth daily, Disp-30 tablet, R-0, E-Prescribe      valproic acid (DEPAKENE) 250 MG/5ML syrup Take 10 mLs (500 mg) by mouth daily, Disp-473 mL, R-0, E-Prescribe      vancomycin (FIRVANQ) 50 MG/ML oral solution Take 2.5 mLs (125 mg) by mouth 4 times daily for 3 days, Disp-30 mL, R-0, E-Prescribe             Final diagnoses:   Urinary retention   Acute cystitis without hematuria   Falls frequently   Dementia with aggressive behavior   Dehydration   Closed fracture of transverse process of lumbar vertebra with routine healing, subsequent encounter - L4   Acute low back pain without sciatica, unspecified back pain laterality     I, Arabella Castillo, am serving as a trained medical scribe to document services personally performed by Ar Miguel MD, based on the provider's statements to me.   IAr MD, was physically present and have reviewed and verified the accuracy of this note documented by Arabella Castillo.     --    [facility] EMERGENCY DEPARTMENT  11/22/2022     Ar Miguel MD  11/28/22 5982       Ar Miguel MD  12/02/22 8601       Ar Miguel MD  01/16/23 1170

## 2022-11-22 NOTE — TELEPHONE ENCOUNTER
FYI - Status Update    Who is Calling: Leia membreno's wife    Update: Pt wife called in to let  know that pt is currently in the hospital. Pt has an appt this evening with  at 5:40 and would like the to still keep the appt as wife would like to speak with him via phone. (C2c)    Does caller want a call/response back: Yes     Could we send this information to you in [employer] or would you prefer to receive a phone call?:   Patient would prefer a phone call   Okay to leave a detailed message?: Yes at Home number on file [phone] (home)

## 2022-11-22 NOTE — TELEPHONE ENCOUNTER
Wife Leia is calling and states that Vahid has significant dementia and has been delirious for the last 24 hours.  Now Vahid is no able to urinate.  Patient last urinated at 5:00 pm yesterday.  Wife states that his urine is dark in color.  Wife states that Vahid is bed ridden and will have to phone 911.      Reason for Disposition    Sounds like a life-threatening emergency to the triager    Additional Information    Negative: Shock suspected (e.g., cold/pale/clammy skin, too weak to stand, low BP, rapid pulse)    Protocols used: URINARY SYMPTOMS-A-OH

## 2022-11-22 NOTE — TELEPHONE ENCOUNTER
Pt wife called in wanted to know why pt appointment was cancelled this evening 11/22/22 with . Pt is currently in the hospital and wife still wanted the appointment. Please follow up with wife [phone]. Thanks!

## 2022-11-22 NOTE — TELEPHONE ENCOUNTER
Called and informed patient's wife Dr. Aguilar will be calling her but because he is admitted at the hospital he cannot do an appointment for Vahid

## 2022-11-23 NOTE — PROGRESS NOTES
Care Management Follow Up    Length of Stay (days): 1    Expected Discharge Date: 11/24/2022     Concerns to be Addressed: all concerns addressed in this encounter     Patient plan of care discussed at interdisciplinary rounds: No    Anticipated Discharge Disposition:  Home     Anticipated Discharge Services:    Anticipated Discharge DME: Bed    Patient/family educated on Medicare website which has current facility and service quality ratings:  (N/A)  Education Provided on the Discharge Plan:    Patient/Family in Agreement with the Plan: yes    Referrals Placed by CM/SW:  [facility] (P: [phone])    Private pay costs discussed: Not applicable    Additional Information:  Chart reviewed. Case discussed with bedside RN and medical provider. Requested SLP consult based on concerns of pt aspirating on food and pills.     Writer met with pt, who was somnolent throughout the day, and pt's spouse. Pt's spouse was tearful, and appeared still anxious about plans discussing what may be needed for pt. Writer provided emotional support - supportive listening, validation, and encouragement. Writer reminded spouse it may be better to stay focused on the here and now and not get too wrapped up in the details. Writer provided spouse with reassurance that the care team can make sure he needs are met at home if the appropriate place is for pt is to return home. Spouse repots that because she is a retired RN, she would be willing to try to cath pt if she needs to, and to give pt meds via IV if needed.     Spouse reports they were working with pt's PCP to have a hospital bed ordered. SW/RNCC can work with whomever the bed is being ordered through to ensure the bed is delivered to pt's home prior to returning home.    Pt is open to [facility].    Spouse requested medical transportation be arranged at discharge for pt. They have 3-4 stairs outside. Depending on pt's mentation and mobility, a stretcher may need to be arranged for pt. Discussed  potential costs and expenses for w/c and stretcher medical transportation. Spouse is agreeable to costs if they are needed.  ________________    LISA Boo, [facility]  ED/Observation   [facility]  Phone: [phone]  Pager: [phone]  Fax: [phone]    On-call pager, [phone], 4:00pm to midnight

## 2022-11-23 NOTE — PROVIDER NOTIFICATION
[facility] 7 attending text paged:    Pt is getting agitated, unable to tolerate sitting up for PO AM meds/PO seroquel. Can pt have PRN IV med?     Addendum: Pt became very agitated around 0915, writer paged [facility] 7 to see patient at bedside and add medication for agitation. Provider saw pt at bedside and added haldol and was made aware that pt was unable to take PO AM meds or PO PRN meds.

## 2022-11-23 NOTE — PROGRESS NOTES
[facility]    Medicine Progress Note - Hospitalist Service, GOLD TEAM 7    Date of Admission:  11/22/2022    Assessment & Plan    Catalino Coronado is a 72-year-old man with Alzheimer dementia here for pain and weakness, found to have L4 transverse spinous process fracture and urinary retention with urinary tract infection    Today:   - Delirious and agitated. Haloperidol ordered this morning, still insufficient.   - Palliative consulted, updated goals of care. DNR/DNI. Added medications for symptom control.   - Continue ceftriaxone and Muhammad for UTI and retention pending urine culture results    #Delirium due to pain and acute infection in the setting of dementia  Acutely worsened agitation in the hospital.   - Appreciate palliative care consultation  - Haloperidol, quetiapine, sublingual morphine as needed  - Palliative discussed risks/benefits of Toradol with patient's family and they were in agreement with risks to attempt benefit from pain control .     # Acute back pain secondary to L4 transverse spinous process fracture from a fall at home  CT lumbar spine revealed an acute L4 fracture, no retropulsion.   -PT/OT consulted for mobility  - Neurosurgery consulted for fracture (updated from ortho surgery), recommending conservative treatment.   - Appreciate palliative assistance with pain and delirium control. See their note and above.      #Urinary retention likely secondary to urinary tract infection  He straight caths at home and currently has a Muhammad catheter in place  -He was started on Ceftriaxone in the ER and will continue on this until urine culture returns     #Stage IIIa kidney disease  -Cr is mildly elevated at 1.19. Sodium is mildly elevated at 147. He is receiving 1L of fluids overnight and his UTI is being treated.    -Continue strict I/O, dose adjusting medications  - Trend BMP     #BPH  -Continue home regimen of Flomax 0.4mg once daily     #Alzheimer's  "disease   #Generalized weakness with frequent falls  -Continue home regimen of Aricept 5mg once daily  -Continue Lexapro 20mg once daily  -Continue home regimen of Namenda 10mg by mouth twice daily  -Continue Seroquel 12.5mg by mouth once daily  -PT/OT consult to assess safety to return home and provide recommendations for strength training     #Hypertension  -Continue home regimen of Atenolol 100mg once daily     # Hyperlipidemia  -Continue home regimen of Zocor 40mg by mouth once daily at bedtime       Diet: Combination Diet Regular Diet Adult    DVT Prophylaxis: Heparin SQ  Muhammad Catheter: PRESENT, indication:    Central Lines: None  Cardiac Monitoring: None  Code Status: No CPR- Do NOT Intubate      Disposition Plan     Expected Discharge Date: 11/24/2022                The patient's care was discussed with the Bedside Nurse, Patient's Family and palliative Consultant.    Juan Jose Holley MD  Hospitalist Service, [address]  Securely message with the Vocera Web Console (learn more here)  Text page via [employer] Paging/Directory   Please see signed in provider for up to date coverage information      Clinically Significant Risk Factors Present on Admission         # Hypernatremia: Highest Na = 147 mmol/L (Ref range: 136-145) in last 2 days, will monitor as appropriate     # Anion Gap Metabolic Acidosis: Highest Anion Gap = 19 mmol/L (Ref range: 7-15) in last 2 days, will monitor and treat as appropriate        # Dementia: noted on problem list   # Overweight: Estimated body mass index is 26.42 kg/m  as calculated from the following:    Height as of 11/2/22: 1.905 m (6' 3\").    Weight as of 11/2/22: 95.9 kg (211 lb 6.4 oz).           ______________________________________________________________________    Interval History   Vahid became quite agitated this morning and I was paged to the bedside. He was crying out in pain when they attempted to sit him up and " seemed more comfortable laying supine. He was unable to sustain attention to answer questions. He was also not able to voice any specific concerns.   His wife stated that he talks in full sentences when he is delirious but otherwise limited due to dementia. He usually ambulates at home and she helps take care of him but she is worried about her ability to care for him with any worsening. His oral intake has been decreased the entire week with this current episode but per discussion with palliative, feeding tube is not within their goals of care given advanced dementia.     Data reviewed today: I reviewed all medications, new labs and imaging results over the last 24 hours.     Physical Exam   Vital Signs: Temp: 97.8  F (36.6  C) Temp src: Axillary BP: (!) 146/73 Pulse: 65   Resp: 18 SpO2: 97 % O2 Device: None (Room air)    Weight: 0 lbs 0 oz  Physical Exam  Vitals reviewed.   Constitutional:       Comments: Elderly, supine in bed with multiple staff at bedside, intermittently moaning.    HENT:      Head: Atraumatic.      Right Ear: External ear normal.      Left Ear: External ear normal.      Nose: Nose normal.      Mouth/Throat:      Mouth: Mucous membranes are moist.   Eyes:      General: No scleral icterus.        Right eye: No discharge.         Left eye: No discharge.      Conjunctiva/sclera: Conjunctivae normal.   Cardiovascular:      Rate and Rhythm: Normal rate and regular rhythm.      Pulses: Normal pulses.      Heart sounds: No murmur heard.  Pulmonary:      Effort: Pulmonary effort is normal. No respiratory distress.      Breath sounds: No wheezing or rales.   Abdominal:      General: Bowel sounds are normal. There is no distension.      Palpations: Abdomen is soft.      Tenderness: There is no abdominal tenderness. There is no guarding.   Genitourinary:     Comments: Muhammad catheter in place  Musculoskeletal:         General: No tenderness.      Cervical back: Neck supple.      Right lower leg: No edema.       Left lower leg: No edema.   Skin:     General: Skin is warm.      Capillary Refill: Capillary refill takes less than 2 seconds.      Findings: No rash.   Neurological:      Comments: Very confused. Not able to engage in conversation or answer orientation questions. Moving all extremities.    Psychiatric:      Comments: Acutely confused and agitated.          Data   Recent Labs   Lab 11/23/22  0556 11/22/22  0942   WBC 9.2 13.4*   HGB 13.4 15.3   MCV 97 95    373    147*   POTASSIUM 3.9 4.2   CHLORIDE 109* 108*   CO2 22 20*   BUN 43.8* 45.0*   CR 1.01 1.19*   ANIONGAP 13 19*   CANDE 9.1 9.8   GLC 86 104*   ALBUMIN  --  4.3   PROTTOTAL  --  7.7   BILITOTAL  --  1.0   ALKPHOS  --  66   ALT  --  33   AST  --  32   LIPASE  --  23     No results found for this or any previous visit (from the past 24 hour(s)).

## 2022-11-23 NOTE — PROGRESS NOTES
[facility]  Palliative Care Social Work Note:    Patient Info:  Catalino Coronado is a 72 year old male with past medical history including Alzheimer's disease, dementia, stage IIIa kidney disease, BPH, hypertension whose wife is his primary caregiver and reports that he fell at home one week ago, causing back pain that caused him to get progressively agitated at home this past week and then today, he had not urinated for 16hr and she was worried his urinary retention was making his pain/agitation worse and called 911 at home. EMS evaluated him and was concern for a rigid abdomen.      Upon arrival to [facility] ER he was found to be hemodynamically stable, afebrileWith vital signs including a temp 99.8 Fahrenheit, heart rate 81 and regular, /72, 95% on room air.  Lab work was remarkable for a sodium of 147, creatinine 1.19, white blood cell count 13.4.  UA was checked and positive for 19 white blood cells, positive leukocyte esterase and 30+ protein. He was started on Ceftriaxone for treatment of a UTI and urine culture is pending. CT scan of his lumbar spine revealed an acute L4 fracture, without retropulsion. He denied any chest pain, shortness of breath, fevers, chills, incontinence or loss of bowels. He was noted to be intermittently agitated in the ER and was reqiring a 1:1, however, once he was given Dilaudid 0.5mg IV x one dose and Mr. Coronado was finally comfortable and able to rest. He is being admitted to the [facility] service for further care of an acute L4 fracture causing pain in a patient with dementia along with treatment for a UTI.    Brief summary of visit: Palliative care PA and SW met with pt's wife Leia and dtr Nabila outside of pt's room in ED this morning. Leia is clear that the plan is for Vahid to return home with herself in the primary care-giving role. Family with previous home care services and state they likely need to increase those services beyond  what insurance covers, likely extended hours.    Primary focus of family today is for better symptom and pain control. Reviewed plan for hospital admission, pain control and continued assessment of resource needs at home.    Wife Leia noted that one of the biggest comforts for Vahid at home is their cat; PCSW provided maryana Dupree with a comfort stuffed animal kitten for Vahid.    Date of Admission: 11/22/2022    Reason for consult: Patient and family support    Sources of information: Family member maryana Dupree and melissa Dahl    Recommendations & Plan:  Palliative will continue to follow during hospitalization for continued assessment of support needs, coping.     These recommendations have been discussed with pt's family.    Symptoms & Concerns Addressed Today:  Emotional support needs will continue to be assessed; family confident and comfortable with plan to bring Vahid back home following hospitalization.    Strengths Identified:    Good family support; access to resources    Relationships & Support:  Aspects of relationships and support assessed today:    Identified family members: maryana Dupree, melissa Dahl, three adult children total who live locally    Professional supports: [facility]    Family coping: Adequate    Bereavement Risk concerns: Low    Coping, Mental Health & Adjustment to Illness:   Other family coping well; pt with increased agitation likely due to UTI and L4 fracture    Goals, Decision Making & Advance Care Planning:   Prognosis, Goals, and/or Advance Care Planning were assessed today: Yes  If yes, brief summary of discussion: Brief discussion around code status, DNR DNI  Preferred language: English  Patient's decision making preferences: not assessed  I have concerns about the patient/family's health literacy today: No  Patient has a completed Health Care Directive: Yes, and on file.  Code status per chart review: No CPR / No Intubation    Clinical Social Work Interventions:   Assessment of  palliative specific issues    Introduction of Palliative clinical social work interventions  Goals of care discussion/facilitation      Theodora Patterson [facility]  Palliative Care   Pager [phone]    [facility] Inpatient Team Consult pager [phone] (M-F 8-4:30)  After-hours Answering Service [phone]

## 2022-11-23 NOTE — ED PROVIDER NOTES
Patient 72-year-old male seen initially by Dr. Miguel For evaluation of urinary retention with dementia agitated behavior.  Patient also had fallen 10 days ago has some ongoing back pain had head CT which did not show any acute changes CT scan lumbar spine was ordered and signed out to me to follow-up on.  Concerns at this point patient has UTI hydration needs to be admitted most likely to ED observation for placement issues also.  Patient's labs as noted reviewed with some hyponatremia dehydration along with this concerns of UTI patient treated with IV antibiotics previously also.  CT scan lumbar spine did reveal L4 left transverse process fracture that was probably most likely seen on initial fall 10 days ago most likely subacute.  There is also some spinal stenosis also.  Talk to family regarding these discussed with ED observation who felt patient is more complexity to be admitted to medicine along with  agreed.  Talk to neurosurgery also just to make sure there was not any concerns of the spinal stenosis which is moderate to severe at L3-4 causing urinary symptoms the patient has a BPH UTI more likely causes of this as he also was straight cath with his initial fall 10 days ago per wife.  At this point patient more comfortable pain control etc.  Will be admitted under medicine for complex issues ongoing at this point.    Family is aware of the findings etc. discussed if any other considerations patient would possibly need an MRI scan but at this point no indications for any acute cauda equina-like syndrome as other reasonable causes to explain patient's urinary retention etc.     Wilbur Oneal MD  11/22/22 1207

## 2022-11-23 NOTE — H&P
[facility]    History and Physical - Hospitalist Service, GOLD TEAM        Date of Admission:  11/22/2022    Chief Complaint   Urinary retention, confusion    History of Present Illness   Catalino Coronado is a 72 year old male admitted on 11/22/2022. He has a past medical history including Alzheimer's disease, dementia, stage IIIa kidney disease, BPH, hypertension whose wife is his primary caregiver and reports that he fell at home one week ago, causing back pain that caused him to get progressively agitated at home this past week and then today, he had not urinated for 16hr and she was worried his urinary retention was making his pain/agitation worse and called 911 at home. EMS evaluated him and was concern for a rigid abdomen.     Upon arrival to [facility] ER he was found to be hemodynamically stable, afebrileWith vital signs including a temp 99.8 Fahrenheit, heart rate 81 and regular, /72, 95% on room air.  Lab work was remarkable for a sodium of 147, creatinine 1.19, white blood cell count 13.4.  UA was checked and positive for 19 white blood cells, positive leukocyte esterase and 30+ protein. He was started on Ceftriaxone for treatment of a UTI and urine culture is pending. CT scan of his lumbar spine revealed an acute L4 fracture, without retropulsion. He denied any chest pain, shortness of breath, fevers, chills, incontinence or loss of bowels. He was noted to be intermittently agitated in the ER and was reqiring a 1:1, however, once he was given Dilaudid 0.5mg IV x one dose and Mr. Coronado was finally comfortable and able to rest. He is being admitted to the [facility] service for further care of an acute L4 fracture causing pain in a patient with dementia along with treatment for a UTI.    Assessment & Plan   # Acute back pain secondary to L4 fracture from a fall at home  -CT lumbar spine reveals an acute L4 fracture, no retropulsion.   -PT/OT and  Ortho Surgery consult to evaluate fracture and provide recs for treatment, bracing (possible TLSO) and strength training  -Tylenol 975mg TID, Dilaudid 0.5mg IV prn, Lidocaine patch 5% - can transition to an oral regimen tomorrow once patient's pain   -He is on a 1:1 because the pain has caused him to get agitated from time to time. Now that pain is better controlled with low dose dilaudid, he is resting comfortably    #Urinary retention likely secondary to urinary tract infection  -He was started on Ceftriaxone in the ER and will continue on this until urine culture returns  -He straight caths at home and currently has a Muhammad catheter in place with clear orange urine present    #Stage IIIa kidney disease  -Cr is mildly elevated at 1.19. Sodium is mildly elevated at 147. He is receiving 1L of fluids overnight and his UTI is being treated. Will plan to recheck BMP tomorrow.   -Continue strict I/O, dose adjusting medications    #BPH  -Continue home regimen of Flomax 0.4mg once daily    #Alzheimer's disease   #Generalized weakness with frequent falls  -Continue home regimen of Aricept 5mg once daily  -Continue Lexapro 20mg once daily  -Continue home regimen of Namenda 10mg by mouth twice daily  -Continue Seroquel 12.5mg by mouth once daily  -PT/OT consult to assess safety to return home and provide recommendations for strength training    #Hypertension  -Continue home regimen of Atenolol 100mg once daily    # Hyperlipidemia  -Continue home regimen of Zocor 40mg by mouth once daily at bedtime       Diet: Combination Diet Regular Diet Adult    DVT Prophylaxis:   Muhammad Catheter: PRESENT, indication:    Central Lines: None  Cardiac Monitoring: None  Code Status: Full Code      Clinically Significant Risk Factors Present on Admission         # Hypernatremia: Highest Na = 147 mmol/L (Ref range: 136-145) in last 2 days, will monitor as appropriate     # Anion Gap Metabolic Acidosis: Highest Anion Gap = 19 mmol/L (Ref range:  "7-15) in last 2 days, will monitor and treat as appropriate        # Dementia: noted on problem list   # Overweight: Estimated body mass index is 26.42 kg/m  as calculated from the following:    Height as of 11/2/22: 1.905 m (6' 3\").    Weight as of 11/2/22: 95.9 kg (211 lb 6.4 oz).           Disposition Plan  From home with his wife and will likely return to home with his wife once he is medically stable.     Expected Discharge Date: 11/24/2022                SHARAN Albert  Hospitalist Service, [facility]  Securely message with the Vocera Web Console (learn more here)  Text page via [facility] Paging/Directory   Please see signed in provider for up to date coverage information  _____________________________________________________________________    Review of Systems    The 10 point Review of Systems is negative other than noted in the HPI or here.     Past Medical History    I have reviewed this patient's medical history and updated it with pertinent information if needed.   Past Medical History:   Diagnosis Date    BPH (benign prostatic hyperplasia)     Cataracts, bilateral     Cerebral aneurysm, nonruptured     Dementia (H)     Hypercholesterolemia     Hypertension        Past Surgical History   I have reviewed this patient's surgical history and updated it with pertinent information if needed.  Past Surgical History:   Procedure Laterality Date    APPENDECTOMY      BIOPSY OF SKIN LESION      EXCISE MASS BACK  2/8/2013    Procedure: EXCISE MASS BACK;  Excision of Sebaceous Back Cyst ;  Surgeon: Sean Randhawa MD;  Location: [facility] OR    PHACOEMULSIFICATION CLEAR CORNEA WITH STANDARD INTRAOCULAR LENS IMPLANT Right 10/22/2018    Procedure: Right Eye Phacoemulsification with Standard Intraocular Lens Placement;  Surgeon: Elio Manzo MD;  Location:  OR    PHACOEMULSIFICATION WITH STANDARD INTRAOCULAR LENS IMPLANT Left 10/8/2018    Procedure: " PHACOEMULSIFICATION WITH STANDARD INTRAOCULAR LENS IMPLANT;  Left Eye Phacoemulsification with Intraocular Lens;  Surgeon: Elio Manzo MD;  [facility] OR    pilonidal cyst removal          Social History   I have reviewed this patient's social history and updated it with pertinent information if needed.  Social History     Tobacco Use    Smoking status: Former     Types: Cigarettes     Quit date: 2011     Years since quittin.8    Smokeless tobacco: Never    Tobacco comments:     quit 5 yrs ago   Substance Use Topics    Alcohol use: Yes     Comment: 2 mixed/night    Drug use: No       Family History   I have reviewed this patient's family history and updated it with pertinent information if needed.  Family History   Problem Relation Age of Onset    Heart Disease Father     Dementia Mother     Cancer No family hx of         no skin cancer       Prior to Admission Medications   Prior to Admission Medications   Prescriptions Last Dose Informant Patient Reported? Taking?   MELATONIN PO   Yes No   Sig: Take by mouth At Bedtime   QUEtiapine (SEROQUEL) 25 MG tablet   No No   Sig: Take 0.5 tablets (12.5 mg) by mouth daily as needed (Agitation that is non-redirectable)   atenolol (TENORMIN) 100 MG tablet   No No   Sig: Take 1 tablet (100 mg) by mouth daily   calcipotriene (DOVONOX) 0.005 % external cream   No No   Sig: Apply once daily to the ears and face   calcipotriene (DOVONOX) 0.005 % external solution   Yes No   Sig: APPLY 10-15 DROPS AT NIGHTTIME BEFORE BED FOR THE SCALP   clobetasol (TEMOVATE) 0.05 % external solution   No No   Sig: Apply topically 2 times daily To scaly and itchy areas on scalp until no rash nor itch. Hold until patient requests.   donepezil (ARICEPT) 5 MG tablet   No No   Sig: Take 1 tablet (5 mg) by mouth daily   escitalopram (LEXAPRO) 20 MG tablet   No No   Sig: Take 1 tablet (20 mg) by mouth daily   ketoconazole (NIZORAL) 2 % external shampoo   No No   Sig: To entire wet  scalp and then wash off after 5 minutes three times a week. Hold until patient requests.   magnesium 250 MG tablet   Yes No   Sig: Take 1 tablet by mouth At Bedtime    memantine (NAMENDA) 10 MG tablet   No No   Sig: Take 1 tablet (10 mg) by mouth 2 times daily   oxyCODONE (ROXICODONE) 5 MG tablet   No No   Sig: Take 1 tablet (5 mg) by mouth every 6 hours as needed for moderate pain (4-6) or moderate to severe pain   simvastatin (ZOCOR) 40 MG tablet   No No   Sig: Take 1 tablet (40 mg) by mouth At Bedtime   tacrolimus (PROTOPIC) 0.1 % external ointment   Yes No   tacrolimus (PROTOPIC) 0.1 % external ointment   No No   Sig: Apply 1-2 times daily to face and ears as needed.   tamsulosin (FLOMAX) 0.4 MG capsule   No No   Sig: Take 1 capsule (0.4 mg) by mouth daily      Facility-Administered Medications: None     Allergies   No Known Allergies    Physical Exam   Vital Signs: Temp: 99.1  F (37.3  C) Temp src: Axillary BP: (!) 140/81 Pulse: 73   Resp: 18 SpO2: 97 % O2 Device: None (Room air)    Weight: 0 lbs 0 oz    General Appearance: 72 year old gentleman resting in bed, flat, comfortable at present  HEENT: normocephalic, atraumatic, mouth breather and snoring when sleeping  Respiratory: breathing comfortably on room air, no adventitious sounds to bilateral auscultation  Cardiovascular: regular rate and rhythm, no appreciable murmurs, rubs or gallops  GI: bowel sounds present, soft, non-tender to palpation throughout, no rebound or guarding  Gu: machuca catheter draining yellow urine  Skin: warm, dry, no open sores, lesions or ulcerations  Musculoskeletal: signifcant pain over L4 on his spine, very painful passively moving his lower extremities  Neurologic: no focal neurologic deficits, moving all extremtiies  Psych: aphasia    Data   Data reviewed today: I reviewed all medications, new labs and imaging results over the last 24 hours.     Recent Labs   Lab 11/22/22  0942   WBC 13.4*   HGB 15.3   MCV 95      *    POTASSIUM 4.2   CHLORIDE 108*   CO2 20*   BUN 45.0*   CR 1.19*   ANIONGAP 19*   CANDE 9.8   *   ALBUMIN 4.3   PROTTOTAL 7.7   BILITOTAL 1.0   ALKPHOS 66   ALT 33   AST 32   LIPASE 23

## 2022-11-23 NOTE — CONSULTS
[facility]  Palliative Care Consultation Note    Patient: Catalino Coronado  Date of Admission:  11/22/2022    Requesting Clinician / Team: Hospitalist  Reason for consult: Goals of care    Recommendations:  GOALS OF CARE/PALLIATIVE CARE ENCOUNTER    Please see palliative care summary below    Spoke with patient's wife Leia. Family wish to continue to care for Vahid at home, but do have concerns about managing his pain and other needs. They do have resources to call for additional help at home to supplement home care by [facility]. They would like to continue to have the option to bring him to the hospital for acute issues and are not at this point considering hospice, but are aware of the natural progression of Alzheimer's disease. They do not want a feeding tube.    Code Status: Per wife Leia, patient has a HCD completed many years ago and she states he wishes to be DNR/DNI. POLST completed and code status updated to DNR/DNI.    ADVANCE CARE PLANNING     Patient has a completed Health Care Directive: Per family has completed one. They will bring in a copy for our records.    Surrogate decision maker: Wife Leia    SYMPTOM MANAGEMENT    Pain: Family noticed increased pain/agitation for about 1 week after a fall. Imaging shows L4 TP fracture. NSG not recommending surgery nor brace as may potentially cause more agitation. Currently on Dilaudid 0.5 mg iv q2h prn and using every 2 hours. Family and sitter notice patient has what appears to be spasms causing pain and agitation.  o Discussed risks and benefits of Toradol IV with wife who is an RN. She is agreeable. Will schedule 15 mg iv every 8 hours for next 24 hours and monitor Cr (which has improved since admission to 1.01)  o Will schedule concentrated morphine solution SL (not taking po meds) 10 mg q6h. Additional 5 mg q2h prn available. Dilaudid iv only if not able to administer SL morphine. Hold if any  coughing or concerns of aspiration as head of bed not able to be elevated due to pain.  o Valium 2.5 mg iv q8h prn spasms. Monitor for oversedation. If able to take oral medications, would recommend Zanaflex 2-4 mg po q8h prn and avoiding benzodiazepines  o Agree with scheduled tylenol, although patient not taking any oral medications right now. Could consider tylenol suppository prn if iv tylenol not available    Agitation: Not taking PTA oral seroquel.     Haldol 2 mg iv q6h prn available.     Would continue to monitor for pain and treat to prevent agitation.    Consider neurology consult (wife also asked if this would be of benefit)    At risk for constipation:     Did not schedule a bowel regimen as not taking oral medications and wife reports ongoing diarrhea at home secondary to medications    Monitor for constipation    Bisacodyl suppository prn    PSYCHOSOCIAL/SPIRITUAL    Appreciate palliative SW support of family    These recommendations have been discussed with Dr. Holley.      Thank you for the opportunity to participate in the care of this patient and family. Our team: will continue to follow.     During regular M-F work hours -- if you are not sure who specifically to contact -- please contact us by sending a text page to our team consult pager at [phone].    After regular work hours and on weekends/holidays, you can call our answering service at [phone]. Also, who's on call for us is available in [facility] Smart Web.     [facility]  Contact information available via [facility] Paging/Directory    Total time spent was 85 minutes,  >50% of time was spent counseling and/or coordination of care regarding.    Assessments:  Catalino Coronado is a 72 year old male with PMH Alzheimer's disease, stage IIIa kidney disease, BPH, hypertension who was brought to ED for evaluation of urinary retention and dementia with agitated behavior.  Found to have L4 fracture and UTI.     Today, the patient was seen for:  Alzheimer's dementia  UTI  L4 fracture  Goals of care    Prognosis, Goals, & Planning:      Functional Status just prior to hospitalization: 3 (Capable of only limited self-care; needs help with ADLs; in bed/chair >50% of waking hours)       Prognosis, Goals, and/or Advance Care Planning were addressed today: Yes      Patient's decision making preferences: unable to assess          Patient has decision-making capacity today for complex decisions: No            I have concerns about the patient/family's health literacy today: No           Patient has a completed Health Care Directive: Reportedly yes, but not available to us currently.      Code status: Full Code      Coping, Meaning, & Spirituality:   Mood, coping, and/or meaning in the context of serious illness were addressed today: Yes  Summary/Comments: Patient's wife Leia and daughter Nabila distressed by patient's agitation in ED. They shared how difficult it was to find out about the fracture and that he was in pain without their knowledge. Shared concerns about his care needs being met at home.    Social:     Living situation: Lives at home with wife. Son also lives at home with them    Key family / caregivers: Wife Leia. 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 Vahid asleep. Leia shared that patient lives at home. They have been receiving home care through [facility] who have provided PT, behavioral nurse for depression. However,  home care has stated they cannot provide the frequency of care that he is needing. They have provided family a list of other resources. Leia's main concern is how he will be able to go home--how to manage his pain if he is not taking oral medications, how to prevent him from removing machuca if he needs it, etc. Leia shared that for the past week since his fall, patient has not been eating much--he did eat some scalloped potatoes once this past week. He also has been having problems with urinating/urine retention. This was not his first fall. He was seen in ED after the fall but no fractures were found. Vahid started having memory problems in 2016 and was diagnosed with Alzheimer's dementia about 2017?. Their hope is to continue to care for him at home--they remodeled the house in order to be able to care for him. He has a cat he is very attached to, and they feel that home would be the best place for him as he would be in familiar surroundings with family. Ciera states that Vahid has an advanced directive and that it states he wishes to be DNR/DNI. She thought one was on file, although not currently in our EMR records. She states she is his POA. She or her daughter will bring in a copy for our records. Upon further discussion, they understand that Alzheimer's is a progressive disease. POLST completed. Leia expressed he would not want a feeding tube. Discussed that goal of this hospitalization would be to treat acute issues--UTI, pain, and see if his agitation improves.     Visited Vahid later in ED. He was awake and agitated after RN tried to administer IV dilaudid. Bedside sitter and family stated that Vahid appears to have spasms--he clenches his fists and cries out in pain at frequent intervals. Vahid not able to verbalize where his pain is currently. In his agitation he is combative and yells out. Per RN, haldol cannot be given for another 3 hours. Unable initially to reach hospitalist, so valium 2.5 mg iv ordered  for spasms and agitation after discussion with Leia of incrased risk of death with increased dose of anti-psychotics in setting of dementia, of which she was aware.  Discussed trial of iv toradol with wife. Discussed risk of GI bleeding due to interaction with Lexapro and concerns about renal impairment given UTI, although Cr improved and GFR WNL. Called and discussed with hospitalist and then with Leia recommendations to treat his pain as above. Discussed with Leia that depending on his course of hospitalization, we may need to re-address goals.      ROS:  Unable to obtain due to delirium in setting of dementia with expressive aphasia.     Past Medical History:  Past Medical History:   Diagnosis Date     BPH (benign prostatic hyperplasia)      Cataracts, bilateral      Cerebral aneurysm, nonruptured      Dementia (H)      Hypercholesterolemia      Hypertension         Past Surgical History:  Past Surgical History:   Procedure Laterality Date     APPENDECTOMY       BIOPSY OF SKIN LESION       EXCISE MASS BACK  2/8/2013    Procedure: EXCISE MASS BACK;  Excision of Sebaceous Back Cyst ;  Surgeon: Sean Randhawa MD;  Location: [facility] OR     PHACOEMULSIFICATION CLEAR CORNEA WITH STANDARD INTRAOCULAR LENS IMPLANT Right 10/22/2018    Procedure: Right Eye Phacoemulsification with Standard Intraocular Lens Placement;  Surgeon: Elio Manzo MD;  [facility]: [facility] OR     PHACOEMULSIFICATION WITH STANDARD INTRAOCULAR LENS IMPLANT Left 10/8/2018    Procedure: PHACOEMULSIFICATION WITH STANDARD INTRAOCULAR LENS IMPLANT;  Left Eye Phacoemulsification with Intraocular Lens;  Surgeon: Elio Manoz MD;  Location: [facility] OR     pilonidal cyst removal            Family History:  Family History   Problem Relation Age of Onset     Heart Disease Father      Dementia Mother      Cancer No family hx of         no skin cancer        Allergies:  No Known Allergies     Medications:  I have reviewed this patient's medication  profile and medications from this hospitalization.   Noted:  Dilaudid 0.5 mg iv q2h prn, total 3.5 mg yesterday  APAP 975 mg TID (not taking)    Physical Exam:  Vital Signs: Temp: 97.8  F (36.6  C) Temp src: Axillary BP: (!) (P) 146/73 Pulse: (P) 65   Resp: 18 SpO2: 97 % O2 Device: (P) None (Room air)    Weight: 0 lbs 0 oz  Gen: lying in bed, appears stated age  HEENT: NCAT.   Resp: No increased work of breathing  Abd: soft, nt, nd  Msk: no gross deformity  Skin:  Warm, dry  Ext: warm, well perfused.  Neuro: Rouses during iv medication administration and becomes agitated, yelling, moving all limbs spontaneously and resisting cares  Mental status/Psych: Agitated      Data reviewed:  Recent imaging reviewed, my comments on pertinents:   CT Lumbar spine 11/22:  Impression:     1.  Subacute left L4 transverse process fracture which was seen on  11/13/2022 after retrospective second look. This could be related to  recent trauma when correlated with history.  2. Multilevel lumbar spondylosis detailed above. Most significant  finding at L3-4 with degenerative changes causing moderate severe  spinal canal stenosis.     CT Head 11/22:  Impression:     1.  Subacute left L4 transverse process fracture which was seen on  11/13/2022 after retrospective second look. This could be related to  recent trauma when correlated with history.  2. Multilevel lumbar spondylosis detailed above. Most significant  finding at L3-4 with degenerative changes causing moderate severe  spinal canal stenosis.     CT A/P 11/12:  IMPRESSION: No CT evidence of acute trauma or other acute abnormality in the abdomen or pelvis.     Recent lab data reviewed, my comments on pertinents:   Cr 1.19->1.01  WBC 13.4->9.2  Hgb 13.4  Plt 324k

## 2022-11-24 NOTE — PLAN OF CARE
Goal Outcome Evaluation:      Plan of Care Reviewed With: patient    Outcome Evaluation: Admitted to 5A from [facility] ED 11/23, laying flat in bed all shift, stable on RA, continuous pulse ox in place SPO2 93-95%, HTN, disoriented to situation, place, and time, mumbled speech, severe aphasia, and on going aggitation, 1:1 sitter in place d/t to pt safety concern for line pulling and combative to staff and self, soft wrist restraints ordered this shift d/t combative towards staff and self and strong enough to roll out of bed, discontinued at 0645 d/t pt sleeping comfortably in bed, PRN valium 1x, PRN haldol 1x, PRN morphine 1x for pain, and scheduled morphine given as ordered, pt seems to have more pain when having bowel movements and then gets very agitated, LBM 11/24, incontinent to bowel 2x overnight, machuca in place d/t urine retention and UTI, on IV abx, pt on regular diet however not swallowing safely d/t coughing with swallows of water, all PO meds held in ED and on floor, provider made aware, pt wife at bedside overnight (ANS approved d/t pt status), frequent rounding done

## 2022-11-24 NOTE — PROVIDER NOTIFICATION
"Provider notified  \"Pt has been hitting himself and has used all PRNs available. Pls advise.\"       "

## 2022-11-24 NOTE — PROVIDER NOTIFICATION
MD notified that pt has been coughing and clearing throat w/ liquids. All PO meds were being held in ED and have been held on floor. Pt is currently on regular diet. Requested SLP consult.

## 2022-11-24 NOTE — PROGRESS NOTES
11/24/22 0857   Appointment Info   Signing Clinician's Name / Credentials (SLP) Mae Gonsalves MS CCC-SLP   General Information   Onset of Illness/Injury or Date of Surgery 11/22/22   Referring Physician Juan Jose Holley MD   Patient/Family Therapy Goal Statement (SLP) None stated   Pertinent History of Current Problem Catalino Coronado is a 72-year-old man with Alzheimer dementia here for pain and weakness, found to have L4 transverse spinous process fracture and urinary retention with urinary tract infection. Per discussion with pt's wife and palliative note, they are currently interested in treating reversible conditions. Pt would not want a TF. Clinical swallow eval completed per MD orders to further assess oropharyngeal swallow function.   Type of Evaluation   Type of Evaluation Swallow Evaluation   Oral Motor   Oral Musculature generally intact   Structural Abnormalities none present   Mucosal Quality dry   Dentition (Oral Motor)   Dentition (Oral Motor) adequate dentition   Facial Symmetry (Oral Motor)   Facial Symmetry (Oral Motor) unable/difficult to assess   Lip Function (Oral Motor)   Lip Range of Motion (Oral Motor) unable/difficult to assess   Tongue Function (Oral Motor)   Tongue ROM (Oral Motor) unable/difficult to assess   Jaw Function (Oral Motor)   Jaw Function (Oral Motor) WNL   Cough/Swallow/Gag Reflex (Oral Motor)   Volitional Throat Clear/Cough (Oral Motor) reduced strength   Vocal Quality/Secretion Management (Oral Motor)   Vocal Quality (Oral Motor) WFL   Secretion Management (Oral Motor) WNL   General Swallowing Observations   Past History of Dysphagia No hx of dysphagia per chart review. Of note, pt saw OP speech 4/2022 for speech and language tx for aphasia. Per wife, pt with some trouble swallowing the past few days, otherwise generally does OK with eating and drinking. Wife mostly noticed bolus holding. However RN notes indicate coughing/choking with PO intake.   Comment, General  Swallowing Observations Pt alert and intermittently participating in conversation.   Respiratory Support (General Swallowing Observations) none   Current Diet/Method of Nutritional Intake (General Swallowing Observations, NIS) NPO   Swallowing Evaluation Clinical swallow evaluation   Clinical Swallow Evaluation   Feeding Assistance dependent   Clinical Swallow Evaluation Textures Trialed thin liquids;pureed;solid foods   Clinical Swallow Eval: Thin Liquid Texture Trial   Mode of Presentation, Thin Liquids cup;spoon;fed by clinician   Volume of Liquid or Food Presented 3 ice chips, 5 oz   Oral Phase of Swallow WFL   Pharyngeal Phase of Swallow intact   Diagnostic Statement No overt s/sx of aspiration   Clinical Swallow Evaluation: Puree Solid Texture Trial   Mode of Presentation, Puree spoon;fed by clinician   Volume of Puree Presented 4 tbsp   Oral Phase, Puree   (bolus holding)   Pharyngeal Phase, Puree intact   Diagnostic Statement No overt s/sx of aspiration. Intermittent bolus holding which required verbal cues to swallow.   Clinical Swallow Evaluation: Solid Food Texture Trial   Mode of Presentation fed by clinician   Volume Presented 1 tbsp   Oral Phase impaired mastication  (pt did not attempt to masticate)   Oral Residue mid posterior tongue   Pharyngeal Phase impaired;no awareness of problems   Diagnostic Statement Attempted regular solid texture, however pt did not attempt to masticate despite max cues. Bolus eventually removed from oral cavity by SLP.   Esophageal Phase of Swallow   Patient reports or presents with symptoms of esophageal dysphagia No   Swallowing Recommendations   Diet Consistency Recommendations thin liquids (level 0);pureed (level 4)   Supervision Level for Intake 1:1 supervision needed   Mode of Delivery Recommendations bolus size, small;food moistened;slow rate of intake   Swallowing Maneuver Recommendations alternate food and liquid intake   Monitoring/Assistance Required  (Eating/Swallowing) stop eating activities when fatigue is present;monitor for cough or change in vocal quality with intake;cue for finger/lingual sweep if oral pocketing present;optimize oral intake to minimize need for tube feeding   Recommended Feeding/Eating Techniques (Swallow Eval) maintain upright sitting position for eating;maintain upright posture during/after eating for 30 minutes;minimize distractions during oral intake;moisten oral mucosa prior to intake;provide assist with feeding;provide oral hygiene prior to intake;provide 6 smaller meals throughout day;set-up and prepare tray   Medication Administration Recommendations, Swallowing (SLP) consider serving PO meds in puree   Instrumental Assessment Recommendations instrumental evaluation not recommended at this time   General Therapy Interventions   Planned Therapy Interventions Dysphagia Treatment   Dysphagia treatment Instruction of safe swallow strategies;Compensatory strategies for swallowing;Modified diet education   Clinical Impression   Criteria for Skilled Therapeutic Interventions Met (SLP Eval) Yes, treatment indicated   SLP Diagnosis mild oropharyngeal dysphagia   Risks & Benefits of therapy have been explained evaluation/treatment results reviewed;care plan/treatment goals reviewed;risks/benefits reviewed;current/potential barriers reviewed;participants voiced agreement with care plan;participants included;patient;spouse/significant other   Clinical Impression Comments Clinical swallow eval completed per MD orders. Pt presents with mild oropharyngeal dysphagia in the setting of baseline cognitive impairment and resolving AMS. Pt fully alert and intermittently interactive (baseline aphasia per wife report). Oral mech exam unremarkable. Pt tolerated ice chips, thin liquids via cup, and pureed textures with no overt s/sx of aspiration. Pt with intermittent bolus holding and required verbal cues to swallow. Attempted regular solid textures,  however pt did not attempt to masticate bolus despite max cues. Bolus eventually removed from oral cavity by SLP. Recommend pt initiate pureed diet (4) and thin liquids (0) with 1:1 supervision. Consider serving PO meds in small amount of puree. Caregivers to ensure pt is fully upright and alert for all PO, take small sips/bites, slow pacing, and alternate between consistencies. ST to continue to follow to assess diet tolerance and advancement as appropriate. Pt may benefit from ST follow-up at discharge pending progress.   SLP Total Evaluation Time   Eval: oral/pharyngeal swallow function, clinical swallow Minutes (52447) 17   SLP Goals   Therapy Frequency (SLP Eval) 5 times/wk   SLP Predicted Duration/Target Date for Goal Attainment 12/30/22   SLP Goals Swallow   SLP: Safely tolerate diet without signs/symptoms of aspiration Regular diet;Thin liquids;With use of swallow precautions;With assistance/supervision   SLP Discharge Planning   SLP Plan diet tolerance, advancement as appropriate, train strategies   SLP Discharge Recommendation home with home care speech therapy   SLP Rationale for DC Rec pt below baseline oropharyngeal swallowing skills   SLP Brief overview of current status  Recommend pt initiate pureed diet (4) and thin liquids (0) with 1:1 supervision. Consider serving PO meds in small amount of puree. Caregivers to ensure pt is fully upright and alert for all PO, take small sips/bites, slow pacing, and alternate between consistencies.   Total Session Time   Total Session Time (sum of timed and untimed services) 17

## 2022-11-24 NOTE — PROGRESS NOTES
[facility]    Medicine Progress Note - Hospitalist Service, GOLD TEAM 7    Date of Admission:  11/22/2022    Assessment & Plan    Catalino Coronado is a 72-year-old man with Alzheimer dementia here for pain and weakness, found to have L4 transverse spinous process fracture and urinary retention with urinary tract infection    Today:   - Much calmer. Able to take oral medications. Leave as-needed medications for pain, anxiety, and agitation as ordered.   - Urine culture from before antibiotics negative. Stopped ceftriaxone.   - Orders to remove Muhammad catheter and bladder scan. Lidocaine gel as-needed prior to straight cath to improve tolerance.   - Encourage mobility and oral intake as possible. Discussed with family at bedside (wife, daughters, granddaughter)- hoping to care for him at home with additional supports.   - Renew Toradol for a second day due to improvement in pain and stable/improving kidney function.     #Delirium due to pain and acute infection in the setting of dementia, improving  Acutely worsened agitation in the hospital. Minimally verbal at recent baseline at home but he is usually able to communicate with his wife by gestures about bathroom needs. He had been incontinent for a few days prior to this admission.   - Appreciate palliative care consultation  - Haloperidol, quetiapine, sublingual morphine as needed  - Renew Toradol for another day. I discussed risks/benefits with family (bleeding, kidney injury vs less sedating pain control) and they are in agreement especially given that he is tolerating well.     # Acute back pain secondary to L4 transverse spinous process fracture from a fall at home  CT lumbar spine revealed an acute L4 fracture, no retropulsion.   -PT/OT consulted for mobility  - Neurosurgery consulted for fracture (updated from ortho surgery), recommending conservative treatment and no brace.   - Appreciate palliative assistance with  pain and delirium control.   - Highly likely to need more DME and services at home than he had prior to this admission. Family hoping for better supports at home rather than a facility if possible.      #Urinary retention with concern for UTI  Urine culture negative  He has been catheterized in the past but not regularly at home and does not have supplies. In the past, he does better if topical lidocaine is administered first.   - Stop ceftriaxone due to negative culture collected prior to antibiotic administration.   - Remove Muhammad catheter today. Bladder scan and straight cath if needed. Topical lidocaine to improve tolerance of straight cath.      #Stage IIIa kidney disease  Slightly low bicarbonate  Creatinine improved with fluids.   -Continue strict I/O, dose adjusting medications  - Trend BMP  - Continue MIVF for now.      #BPH  -Continue home regimen of Flomax 0.4mg once daily     #Alzheimer's disease   #Generalized weakness with frequent falls  -Continue home regimen of Aricept 5mg once daily  -Continue Lexapro 20mg once daily  -Continue home regimen of Namenda 10mg by mouth twice daily  -Continue Seroquel 12.5mg by mouth once daily  -PT/OT consult to assess safety to return home and provide recommendations for strength training     #Hypertension  -Continue home regimen of Atenolol 100mg once daily     # Hyperlipidemia  -Continue home regimen of Zocor 40mg by mouth once daily at bedtime       Diet: Pureed Diet (level 4) Thin Liquids (level 0)    DVT Prophylaxis: Heparin SQ  Muhammad Catheter: PRESENT, indication: Retention  Central Lines: None  Cardiac Monitoring: None  Code Status: No CPR- Do NOT Intubate      Disposition Plan      Expected Discharge Date: 11/28/2022      Destination: home with family;home with help/services  Discharge Comments: Pending clinical course, PT, OT, SLP. Likely to need increased cares at home vs placement. Family willing to pay for additional support at home.        The patient's  "care was discussed with the patient's family and bedside nurse. The patient was present for discussion but not able to fully engage in conversation due to dementia at baseline.     Juan Jose Holley MD  Hospitalist Service, [address]  Securely message with the Vocera Web Console (learn more here)  Text page via [facility] Paging/Directory   Please see signed in provider for up to date coverage information      Clinically Significant Risk Factors                        # Overweight: Estimated body mass index is 26.42 kg/m  as calculated from the following:    Height as of 11/2/22: 1.905 m (6' 3\").    Weight as of 11/2/22: 95.9 kg (211 lb 6.4 oz)., PRESENT ON ADMISSION         ______________________________________________________________________    Interval History   Vahid continued to have agitation throughout the day and evening yesterday but he does seem to respond well to as-needed medications as ordered. Today I saw him when his wife, daughters, and granddaughter were at bedside. He was calm and cooperative in bed, occasionally making eye contact. He did not answer any of my questions. Per family this is close to his neurologic baseline in terms of speech and attention. His mobility remains much less than it was 2 weeks ago prior to his fall. Before the fall, he was able to walk around the house without physical assistance, but his wife would need to show him where to go (to the bathroom, etc.). He was able to take oral medications today. He had a bowel movement (was incontinent).     Data reviewed today: I reviewed all medications, new labs and imaging results over the last 24 hours.     Physical Exam   Vital Signs: Temp: 98.9  F (37.2  C) Temp src: Axillary BP: (!) 153/64 Pulse: 69   Resp: 17 SpO2: 95 % O2 Device: None (Room air)    Weight: 0 lbs 0 oz  Physical Exam  Vitals reviewed.   Constitutional:       Comments: Elderly, supine in bed. Awake and " alert, calm. Makes eye contact briefly.    HENT:      Head: Atraumatic.      Right Ear: External ear normal.      Left Ear: External ear normal.      Nose: Nose normal.      Mouth/Throat:      Mouth: Mucous membranes are moist.   Eyes:      General: No scleral icterus.        Right eye: No discharge.         Left eye: No discharge.      Conjunctiva/sclera: Conjunctivae normal.   Cardiovascular:      Rate and Rhythm: Normal rate and regular rhythm.      Pulses: Normal pulses.      Heart sounds: No murmur heard.  Pulmonary:      Effort: Pulmonary effort is normal. No respiratory distress.      Breath sounds: No wheezing or rales.   Abdominal:      General: Bowel sounds are normal. There is no distension.      Palpations: Abdomen is soft.      Tenderness: There is no abdominal tenderness. There is no guarding.   Genitourinary:     Comments: Muhammad catheter in place  Musculoskeletal:         General: No tenderness.      Cervical back: Neck supple.      Right lower leg: No edema.      Left lower leg: No edema.   Skin:     General: Skin is warm.      Capillary Refill: Capillary refill takes less than 2 seconds.      Findings: No rash.   Neurological:      Comments: He answered a few questions with one word at a time but not consistently engaging in answering reliably or at all.    Psychiatric:      Comments: With significant memory impairment but alert and calm today.        Data   Recent Labs   Lab 11/24/22  0551 11/23/22  0556 11/22/22  0942   WBC 9.1 9.2 13.4*   HGB 13.1* 13.4 15.3   MCV 95 97 95    324 373    144 147*   POTASSIUM 3.7 3.9 4.2   CHLORIDE 110* 109* 108*   CO2 18* 22 20*   BUN 44.4* 43.8* 45.0*   CR 0.99 1.01 1.19*   ANIONGAP 16* 13 19*   CANDE 8.9 9.1 9.8   GLC 78 86 104*   ALBUMIN  --   --  4.3   PROTTOTAL  --   --  7.7   BILITOTAL  --   --  1.0   ALKPHOS  --   --  66   ALT  --   --  33   AST  --   --  32   LIPASE  --   --  23     No results found for this or any previous visit (from the past  24 hour(s)).

## 2022-11-24 NOTE — PLAN OF CARE
Goal Outcome Evaluation:      Plan of Care Reviewed With: patient    Overall Patient Progress: improving     Outcome Evaluation: Pt disoriented x4. VSS ex. HTN. SLP cleared pt to take oral medications, diet changed to purred food and thin liquids. Pt able to take oral meds one at a time in pudding, tolerated well. Pt able to respond to some questions and commands, however inconsistent. Nonverbal indicators of pain present with movement - Scheduled tylenol, morphine, and toradol given. Lidocaine patch in place on lower back. Incontinent of bowel x2. Pt tolerated incontinence cares and repositioning. Muhammad removed at 1345. Pt slightly agitated with Muhammad removal otherwise calm/cooperative during shift. No no's remain in place for line pulling. No restraints needed during shift. Sitter remains at bedside for intermittent agitation. Family present at bedside.

## 2022-11-24 NOTE — PROGRESS NOTES
SPIRITUAL HEALTH SERVICES Progress Note  [facility] ([place]) 5A    Saw pt Catalino Coronado per admission request, present was wife Leia.      Patient/Family Understanding of Illness and Goals of Care - Leia related details of Vahid's current admission as well as alzheimer's disease over the past five years. She spoke about a fracture after Vahid fell that caused him a lot of pain. Leia shared that she desires for Vahid to heal to a point that he can return home and be cared for by family.      Distress and Loss - Leia named that yesterday was particularly challenging for Vahid and for two of their children. She expressed a need for sleep to recover but that she is doing much better today.       Strengths, Coping, and Resources - Leia described a robust support system including former hospital colleagues, Christian friends, a zoom support group, their three children, and two grandchildren. 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       Meaning, Beliefs, and Spirituality - Leia is connected to a Gnosticist Christian in [place] as well as a former  colleague that support her spiritually. She prioritizes connection with family for Vahid and is mindful of continuing conversation around goals of care.      Plan of Care - I offered supportive presence and normative conversation to Leia and Vahid.    Jesus Drake  Chaplain Resident  Pager [phone]    * [facility] remains available 24/7 for emergent requests/referrals, either by having the switchboard page the on-call  or by entering an ASAP/STAT consult in Epic (this will also page the on-call ). Routine Epic consults receive an initial response within 24 hours.*

## 2022-11-25 NOTE — PROGRESS NOTES
"   11/25/22 1400   Appointment Info   Signing Clinician's Name / Credentials (PT) PERLA Mendieta   Student Supervision Direct supervision provided;Therapy services provided with the co-signing licensed therapist guiding and directing the services, and providing the skilled judgement and assessment throughout the session   Rehab Comments (PT) Co-treat with OT, subjective provided by adult daughter   Living Environment   People in Home child(blanka), adult;spouse   Current Living Arrangements house   Home Accessibility stairs to enter home;stairs within home   Number of Stairs, Main Entrance 5   Stair Railings, Main Entrance railings safe and in good condition   Number of Stairs, Within Home, Primary greater than 10 stairs  (pt stays on main level, does not use these stairs.)   Stair Railings, Within Home, Primary railings safe and in good condition   Transportation Anticipated family or friend will provide;agency   Living Environment Comments Pt unable to answer questions but dtr at bedside to provide information. Pt lives in house with wife and adult son, approx 4-5 DWAIN & stairs inside that pt does not use. Pt has bedside commode, wheelchair, walker, and is in the process of getting a hospital bed. Pt has WIS with bench, gb's in shower and near toilet. Pt also has bedrails on bed.   Self-Care   Usual Activity Tolerance fair   Current Activity Tolerance poor   Regular Exercise No   Equipment Currently Used at Home wheelchair, manual;walker, standard;grab bar, toilet;grab bar, tub/shower;shower chair;commode chair  (in the process of getting hospital bed  )   Fall history within last six months yes   Number of times patient has fallen within last six months 4   Activity/Exercise/Self-Care Comment Per dtr, pt mod-total A for all ADL's at baseline \"depending on his cooperation\". Does not complete IADLs. Reports that pt is able to walk without AD prior to recent fall. Has an array of medical equipment in the home and " recieves assist such as HHPT and behavioral health.   General Information   Onset of Illness/Injury or Date of Surgery 11/22/22   Referring Physician Vandana Dillon PA   Patient/Family Therapy Goals Statement (PT) Return to home with 24/7 care from family and HH care with therapy.   Pertinent History of Current Problem (include personal factors and/or comorbidities that impact the POC) Catalino Coronado is a 72-year-old man with Alzheimer dementia here for pain and weakness, found to have L4 transverse spinous process fracture and urinary retention with urinary tract infection.   Existing Precautions/Restrictions fall   Cognition   Affect/Mental Status (Cognition) agitated;anxious;confused;low arousal/lethargic   Orientation Status (Cognition) unable/difficult to assess   Cognitive Status Comments Pt drowsy and required repeated tactile and verbal arousal to get attention. Pt unresponsive to most questions and agitated with movement 2/2 pain.   Pain Assessment   Patient Currently in Pain Yes, see Vital Sign flowsheet   Posture    Posture Forward head position;Protracted shoulders   Range of Motion (ROM)   ROM Comment unable to assess   Strength (Manual Muscle Testing)   Strength Comments unable to fully assess. Pt demonstrated intermittent UE and LE contractions during movement that were strong.   Bed Mobility   Comment, (Bed Mobility) Log roll and supine>sit with maxAx2.   Transfers   Comment, (Transfers) not able to complete sit<>stand with maxAx2   Gait/Stairs (Locomotion)   Comment, (Gait/Stairs) unable to safely assess, pt not following commands   Balance   Balance Comments unable to safely assess in standing, needs support to maintain EOB sitting   Sensory Examination   Sensory Perception Comments unable to respond to sensation screen appropriately.   Clinical Impression   Criteria for Skilled Therapeutic Intervention Yes, treatment indicated   PT Diagnosis (PT) impaired functional mobility   Influenced by  the following impairments cognition/delerium, pain, deconditioning   Functional limitations due to impairments bed mobility, transfers, ambulation, ADLs   Clinical Presentation (PT Evaluation Complexity) Evolving/Changing   Clinical Presentation Rationale clinical judgement   Clinical Decision Making (Complexity) moderate complexity   Planned Therapy Interventions (PT) bed mobility training;gait training;home exercise program;neuromuscular re-education;patient/family education;postural re-education;strengthening;stair training;transfer training;progressive activity/exercise   Anticipated Equipment Needs at Discharge (PT)   (tbd)   Risk & Benefits of therapy have been explained evaluation/treatment results reviewed;care plan/treatment goals reviewed;risks/benefits reviewed;current/potential barriers reviewed   Clinical Impression Comments Pt is a 72-year-old man with Alzheimer's dementia presenting with pain and weakness, found to have L4 transverse spinous process fracture and urinary retention with urinary tract infection. Pt is intermittently responsive to tactile/verbal arousal but not able to answer subjective questions, which were provided by family. Pt performing bed mobility with maxA but unable to complete transfers or ambulate currently.   PT Total Evaluation Time   PT Desmond, Moderate Complexity Minutes (94416) 15   Physical Therapy Goals   PT Frequency 4x/week   PT Predicted Duration/Target Date for Goal Attainment 12/09/22   PT Goals Bed Mobility;Transfers;Gait;Stairs   PT: Bed Mobility Independent;Supine to/from sit;Rolling;Bridging   PT: Transfers Modified independent;Sit to/from stand;Bed to/from chair;Assistive device;Supervision/stand-by assist   PT: Gait Modified independent;Rolling walker;Supervision/stand-by assist;50 feet   PT: Stairs Moderate assist;Assistive device;5 stairs   PT Discharge Planning   PT Plan bed mobility, EOB sitting tolerance, OH lift to recliner   PT Discharge Recommendation  (DC Rec) Transitional Care Facility;Long term care facility   PT Rationale for DC Rec Pt is significantly below baseline functional mobility, currently requiring maxAx2 for bed mobility and unable to complete assisted transfers or ambulation. Pt ambulating with handhold assist at baseline. Recommend TCU for continued therapy to improve functional mobility to return home with 24/7 assist. However, if progress back toward PLOF is hindered by progressing dementia, family is unlikely to be able to provide care needed in the home and LTC would be recommended.   PT Brief overview of current status OH lift for transfers   Total Session Time   Total Session Time (sum of timed and untimed services) 15

## 2022-11-25 NOTE — PROGRESS NOTES
[facility]    Medicine Progress Note - Hospitalist Service, GOLD TEAM 7    Date of Admission:  11/22/2022    Assessment & Plan    Catalino Coronado is a 72-year-old man with Alzheimer dementia here for pain and weakness, found to have L4 transverse spinous process fracture and urinary retention with urinary tract infection    Today:   - Concern for pruritis with oral morphine. Palliative saw, switched to liquid oxycodone. Will try oral muscle relaxant. Naproxen for 2 days.   - Oral intake remains low. Appreciate dietician assistance, encourage supplements as much as possible. Calorie count started. Continue IV hydration for now.   - No urinary retention since Muhammad removal. Having incontinence.   - I saw patient before PT evaluation today. Heavy assistance required. Will need to discuss again with family regarding placement options.     #Delirium due to pain and acute infection in the setting of dementia, improving  Acutely worsened agitation in the hospital. Minimally verbal at recent baseline at home but he is usually able to communicate with his wife by gestures about bathroom needs. He had been incontinent for a few days prior to this admission.   - Appreciate palliative care consultation  - Haloperidol, quetiapine as needed.   - Pain regimen: switched to oxycodone from morphine to avoid pruritis. Naproxen for 2 days with PPI.     # Acute back pain secondary to L4 transverse spinous process fracture from a fall at home  CT lumbar spine revealed an acute L4 fracture, no retropulsion.   -PT/OT consulted for mobility  - Neurosurgery consulted for fracture (updated from ortho surgery), recommending conservative treatment and no brace.   - Appreciate palliative assistance with pain and delirium control.   - Will need to further discuss disposition. Family hoping for care at home but this might be too challenging given his difficulty moving with PT today.      #Urinary  retention, resolved  Urine culture negative  Initial concern for UTI but culture negative. He has been catheterized in the past but not regularly at home and does not have supplies. In the past, he does better if topical lidocaine is administered first.   - Monitor urine output. If no output >6 hours during the day or 8 hours overnight, perform bladder scan.      #Stage IIIa kidney disease  Slightly low bicarbonate  Creatinine improved with fluids.   -Continue strict I/O, dose adjusting medications  - Trend BMP  - Continue MIVF for now.      #BPH  -Continue home regimen of Flomax 0.4mg once daily     #Alzheimer's disease   #Generalized weakness with frequent falls  -Continue home regimen of Aricept 5mg once daily  -Continue Lexapro 20mg once daily  -Continue home regimen of Namenda 10mg by mouth twice daily  -Continue Seroquel 12.5mg by mouth once daily  -PT/OT consult to assess safety to return home      #Hypertension  -Continue home regimen of Atenolol 100mg once daily     # Hyperlipidemia  -Continue home regimen of Zocor 40mg by mouth once daily at bedtime       Diet: Pureed Diet (level 4) Thin Liquids (level 0)  Snacks/Supplements Adult: Other; Ensure Enlive, Magic Cup; Between Meals  Calorie Counts    DVT Prophylaxis: Heparin SQ  Muhammad Catheter: Not present  Central Lines: None  Cardiac Monitoring: None  Code Status: No CPR- Do NOT Intubate      Disposition Plan      Expected Discharge Date: 11/28/2022      Destination: home with family;home with help/services  Discharge Comments: Pending clinical course, PT, OT, SLP. Likely to need increased cares at home vs placement. Family willing to pay for additional support at home. Needs additional home support, final PT/OT recs.        The patient's care was discussed with the patient's family, bedside nurse, palliative consultant, and RN care coordinator. The patient was present for discussion but not able to fully engage in conversation due to dementia at baseline.  "    Juan Jose Holley MD  Hospitalist Service, GOLD TEAM 7  [facility]  Securely message with the [employer] Web Console (learn more here)  Text page via [facility] Paging/Directory   Please see signed in provider for up to date coverage information      Clinically Significant Risk Factors                        # Overweight: Estimated body mass index is 26.42 kg/m  as calculated from the following:    Height as of 11/2/22: 1.905 m (6' 3\").    Weight as of 11/2/22: 95.9 kg (211 lb 6.4 oz)., PRESENT ON ADMISSION         ______________________________________________________________________    Interval History   Vahid has been mostly calm today. No urinary retention since Muhammad removal but he has been incontinent. As of when I saw him, he hadn't been out of bed. No fevers or chills. Appears comfortable per his wife. Very limited mobility since admission.   RN coordinated to administer pain medication prior to PT session as pain has been a significant limiting factor for him.      Data reviewed today: I reviewed all medications, new labs and imaging results over the last 24 hours.     Physical Exam   Vital Signs: Temp: (!) 96.6  F (35.9  C) Temp src: Axillary BP: (!) 156/59 Pulse: 97   Resp: 15 SpO2: 96 % O2 Device: None (Room air)    Weight: 0 lbs 0 oz  Physical Exam  Vitals reviewed.   Constitutional:       Comments: Elderly, reclined in bed with head of bed elevated. Awake and alert, calm. Makes eye contact and smiles intermittently.    HENT:      Head: Atraumatic.      Right Ear: External ear normal.      Left Ear: External ear normal.      Nose: Nose normal.      Mouth/Throat:      Mouth: Mucous membranes are moist.   Eyes:      General: No scleral icterus.        Right eye: No discharge.         Left eye: No discharge.      Conjunctiva/sclera: Conjunctivae normal.   Cardiovascular:      Rate and Rhythm: Normal rate and regular rhythm.      Pulses: Normal pulses.      Heart " sounds: No murmur heard.  Pulmonary:      Effort: Pulmonary effort is normal. No respiratory distress.      Breath sounds: No wheezing or rales.   Abdominal:      General: Bowel sounds are normal. There is no distension.      Palpations: Abdomen is soft.      Tenderness: There is no abdominal tenderness. There is no guarding.   Musculoskeletal:         General: No tenderness.      Cervical back: Neck supple.      Right lower leg: No edema.      Left lower leg: No edema.   Skin:     General: Skin is warm.      Capillary Refill: Capillary refill takes less than 2 seconds.      Findings: No rash.   Neurological:      Comments: Makes eye contact briefly, follows with eyes. Sometimes gives a brief one-word answer but does not reliably engage in conversation- this is per baseline per family   Psychiatric:      Comments: With significant memory impairment but alert and calm today.        Data   Recent Labs   Lab 11/25/22  0541 11/24/22  0551 11/23/22  0556 11/22/22  0942   WBC  --  9.1 9.2 13.4*   HGB  --  13.1* 13.4 15.3   MCV  --  95 97 95    341 324 373    144 144 147*   POTASSIUM 3.5 3.7 3.9 4.2   CHLORIDE 109* 110* 109* 108*   CO2 21* 18* 22 20*   BUN 41.1* 44.4* 43.8* 45.0*   CR 0.95 0.99 1.01 1.19*   ANIONGAP 14 16* 13 19*   CANDE 8.6* 8.9 9.1 9.8   GLC 97 78 86 104*   ALBUMIN  --   --   --  4.3   PROTTOTAL  --   --   --  7.7   BILITOTAL  --   --   --  1.0   ALKPHOS  --   --   --  66   ALT  --   --   --  33   AST  --   --   --  32   LIPASE  --   --   --  23     No results found for this or any previous visit (from the past 24 hour(s)).

## 2022-11-25 NOTE — CONSULTS
Care Management Follow Up    Length of Stay (days): 3    Expected Discharge Date: 11/28/2022     Concerns to be Addressed: all concerns addressed in this encounter     Patient plan of care discussed at interdisciplinary rounds: Yes    Anticipated Discharge Disposition:  TCU, LTC     Anticipated Discharge Services:  PT, OT, BH  Anticipated Discharge DME: Bed (If home)    Patient/family educated on Medicare website which has current facility and service quality ratings:  (N/A)  Education Provided on the Discharge Plan:  yes  Patient/Family in Agreement with the Plan: yes    Referrals Placed by CM/SW:  TBD      Additional Information:  OT has recs in for TCU or LTC.   Wife would like to take patient home but OT does not feel that this is a safe discharge plan and that TCU or better an LTC makes the most sense and is the safety discharge disposition for this patient. RNCC went to discuss with family and at time, they had stepped out of room and patient was napping.     Weekend RNCC/SW to discuss TCU and LTC placement with family.     MARSHA Ramirez, BSN., RN  Nurse Care Coordinator  Pager: [phone] - RNCC [facility]  Social Work and Care Management Department   [address] [place] [address] [place] [id number]  [email or web address]  Employed by [facility]     SEARCHABLE in [employer] - search CARE COORDINATOR     [place] ([id number]) Saturday & Sunday; ([id number]) [facility] Recognized Holidays     Units: 4A, 4C, 4E, 5A & 5B   Pager: [phone]    Units: 6A & 6B    Pager: [phone]    Units: 6C & 6D   Pager: [phone]    Units: 7A, 7B, 7C, 7D & 5C    Pager: [phone]    Units: [facility] ED, 5 Ortho, 5 Med/Surg, 6 Med/Surg, 8A, 10 ICU, & [facility]    Pager: [phone]

## 2022-11-25 NOTE — PROGRESS NOTES
"   11/25/22 1400   Appointment Info   Signing Clinician's Name / Credentials (OT) Salma Barron, OTD, OTR/L   Rehab Comments (OT) spinal precautions, no brace at this time, act as tolerated   Living Environment   People in Home child(blanka), adult;spouse   Current Living Arrangements house   Home Accessibility stairs to enter home;stairs within home   Number of Stairs, Main Entrance 5   Stair Railings, Main Entrance railings safe and in good condition   Number of Stairs, Within Home, Primary greater than 10 stairs  (pt stays on main level, does not use these stairs.)   Stair Railings, Within Home, Primary railings safe and in good condition   Transportation Anticipated family or friend will provide;agency   Living Environment Comments Pt unable to answer questions but dtr at bedside to provide information. Pt lives in house with wife and adult son, approx 4-5 DWAIN & stairs inside that pt does not use. Pt has bedside commode, wheelchair, walker, and is in the process of getting a hospital bed. Pt has WIS with bench, gb's in shower and near toilet. Pt also has bedrails on bed.   Self-Care   Usual Activity Tolerance fair   Current Activity Tolerance poor   Regular Exercise No   Equipment Currently Used at Home wheelchair, manual;walker, standard;grab bar, toilet;grab bar, tub/shower;shower chair;commode chair  (in the process of getting hospital bed  )   Fall history within last six months yes   Number of times patient has fallen within last six months 4   Activity/Exercise/Self-Care Comment Per dtr, pt mod-total A for all ADL's at baseline \"depending on his cooperation\". Does not complete IADLs. Reports that pt is able to walk without AD prior to recent fall. Has an array of medical equipment in the home and recieves assist such as HHPT and behavioral health.   Instrumental Activities of Daily Living (IADL)   IADL Comments none   General Information   Onset of Illness/Injury or Date of Surgery 11/22/22   Referring " "Physician Vandana Dillon PA   Patient/Family Therapy Goal Statement (OT) Pt family wants return home, however dtr acknowledges this may not be a sustainable long term option   Additional Occupational Profile Info/Pertinent History of Current Problem \"Catalino Coronado is a 72-year-old man with Alzheimer dementia here for pain and weakness, found to have L4 transverse spinous process fracture and urinary retention with urinary tract infection\"   Existing Precautions/Restrictions spinal;fall   Left Upper Extremity (Weight-bearing Status)   (<10lbs)   Right Upper Extremity (Weight-bearing Status)   (<10lbs)   Left Lower Extremity (Weight-bearing Status) full weight-bearing (FWB)   Right Lower Extremity (Weight-bearing Status) full weight-bearing (FWB)   General Observations and Info Per 11/23 Neurosurg note- activity as tolerated, no bracing at this time due to delirium/agitation.   Cognitive Status Examination   Orientation Status not oriented to person, place or time   Behavioral Issues combative/physical outbursts   Affect/Mental Status (Cognitive) agitated   Follows Commands unable/difficult to assess;follows one-step commands;0-24% accuracy   Cognitive Status Comments Pt with baseline Alzheimers, currently very agitated, confused, and compative at this time. Difficulty to assess current command follow, however on this date demonstrated ability to follow about 25% of commands with repeated instructions, physcial and verbal cues.   Visual Perception   Impact of Vision Impairment on Function (Vision) unable to assess   Sensory   Sensory Comments unable to assess   Pain Assessment   Patient Currently in Pain   (no displays of pain upon completion of eval however pt demonstrating significant pain during treatment through verbal and physical outbursts associated with movements.)   Range of Motion Comprehensive   Comment, General Range of Motion difficult to assess, appears WFL   Strength Comprehensive (MMT)   Comment, " General Manual Muscle Testing (MMT) Assessment Pt has fairly strong BUE as demonstrated by agitation & physical outbursts, however no formal testing completed 2/2 dec command follow & spinal precs.   Coordination   Coordination Comments difficult to assess, appears WFL   Bed Mobility   Bed Mobility rolling left;rolling right   Rolling Left [place] (Bed Mobility) moderate assist (50% patient effort);2 person assist   Rolling Right [place] (Bed Mobility) moderate assist (50% patient effort);2 person assist   Comment (Bed Mobility) physical and verbal cues throughout   Activities of Daily Living   BADL Assessment/Intervention upper body dressing;grooming;feeding   Upper Body Dressing Assessment/Training   [place] Level (Upper Body Dressing) maximum assist (25% patient effort)  (per clinical judgement)   Grooming Assessment/Training   [place] Level (Grooming) maximum assist (25% patient effort)  (per clinical judgement)   Eating/Self Feeding   [place] Level (Feeding) maximum assist (25% patient effort)  (per clinical judgement)   Clinical Impression   Criteria for Skilled Therapeutic Interventions Met (OT) Yes, treatment indicated   OT Diagnosis agitation, dec cognition, dec IND I/ADLs   OT Problem List-Impairments impacting ADL problems related to;activity tolerance impaired;balance;pain;post-surgical precautions;communication;cognition   Assessment of Occupational Performance 3-5 Performance Deficits   Identified Performance Deficits dressing, communication, feeding, transfers, safety, bathing, g/h   Planned Therapy Interventions (OT) ADL retraining;bed mobility training;cognition;transfer training   Clinical Decision Making Complexity (OT) moderate complexity   Anticipated Equipment Needs Upon Discharge (OT)   (TBD)   Risk & Benefits of therapy have been explained evaluation/treatment results reviewed;care plan/treatment goals reviewed;risks/benefits reviewed;current/potential barriers  "reviewed;participants voiced agreement with care plan;participants included;patient   OT Total Evaluation Time   OT Eval, Low Complexity Minutes (32994) 8   OT Goals   Therapy Frequency (OT) 2 times/wk   OT Predicted Duration/Target Date for Goal Attainment 12/09/22   OT Goals Upper Body Dressing;Hygiene/Grooming;OT Goal 1;OT Goal 2   OT: Hygiene/Grooming moderate assist   OT: Upper Body Dressing Moderate assist   OT: Goal 1 Pt will complete self feeding with mod assist while upright in recliner   OT: Goal 2 Pt will demonstrate 50-75% command follow with min physical and verbal cues.   Self-Care/Home Management   Self-Care/Home Mgmt/ADL, Compensatory, Meal Prep Minutes (16533) 14   Symptoms Noted During/After Treatment (Meal Preparation/Planning Training) none   Treatment Detail/Skilled Intervention Pt with difficulty arousing but saying \"yes\" to therapy. Facilitated cotx with SPT to inc safety as pt agitated and confused. Facilitated L<>R rolling to complete pericares. Pt with BM & wet brief, completed pericares and brief change with total A. Req inc time as pt needing significant physical and verbal cues to complete rolling. No indicators of pain at this time, pt not endorsing pain nor grimacing/shouting out throughout.   Therapeutic Activities   Therapeutic Activity Minutes (06889) 23   Symptoms noted during/after treatment increased pain   Treatment Detail/Skilled Intervention Facilitated log roll for upright activity and transfer training. Pt sup>sit with max Ax2. Sat EOB with mod-max A for maintaining upright position, pt unsteady and swaying/moving body significantly. Grasping at items nearby and tearing at gown as indicators of pain. Facilitated sit<>stand with max Ax2, unable to clear buttocks before pt shouting out, thrashing body, needing to be returned to sit. Once down, pt flailing limbs at OT/SPT, yelling & cursing. Req max A to keep pt sitting upright & for safety. Once slightly more calm, OT " "providing pt with options on return to sit or try to stand, pt stating \"I wont do anything\" and OT explaining need to either get to chair or supine as EOB is unsafe at this time. Pt opting to return supine. Req max Ax2 to return supine, pt again shouting out and flailing arms 2/2 pain. Dtr at bedside in attempt to calm pt, OT holding pt's unsecured hand to prevent pulling at lines. Once calm, boosted in bed Ax2, pillows placed for comfort, left sitting in upright position to inc arousal. Left with dtr & sitter at bedside, all needs met.   OT Discharge Planning   OT Plan simple ADL tasks, monitor agitation/aggression and cognition   OT Discharge Recommendation (DC Rec) Long term care facility;Transitional Care Facility   OT Rationale for DC Rec Pt not far off of baseline from an OT perspective with ADL completion, however pt currently unable to complete any functional mobility 2/2 pain, agitation, and inability to follow commands. As pt cognition improves and pain decreases, may be appropriate for TCU setting to continue to improve functional mobility. However, pt most likely more appropriate for consideration of LTC facility, possibly with memory care, for increased sustainable cares. Per chart review, pts wife wants pt to return home however returning home, despite the DME & assist at home, is not a safe option at this time. Will continue to monitor and update recs as pt works with IP therapies   OT Brief overview of current status OH lift   Total Session Time   Timed Code Treatment Minutes 37   Total Session Time (sum of timed and untimed services) 45     "

## 2022-11-25 NOTE — PLAN OF CARE
Goal Outcome Evaluation:      Plan of Care Reviewed With: patient    Overall Patient Progress: improvingOverall Patient Progress: improving    Outcome Evaluation: Pt only alert to self, arousing to voice mainly as pt is intermittently nonverbal-but can hold eye contact and sometimes answering questions. Pt denied pain, only when getting cleaned or during repositioning did he grimace and cry out in pain (or maybe agitation?). PRN dilaudid given twice during shift, scheduled Toradol and one dose of Valium before bed. Sleeping most of the evening, sitter at bedside. Mitts remain on both hands, not tied to the bed, only for itching and line pulling. Skin checks completed. Swallowed some meds with applesauce, had 3 vanilla ice creams for snacks as pt did not eat a lot of his dinner. Incontinent of urine s/p machuca removal, d/c bladder scan management. One BM. Pt's wife at bedside during the evening, she met with Spiritual health. Continue delrium precautions, turning q2-3 for pain management and skin.

## 2022-11-25 NOTE — PROGRESS NOTES
[facility]  Palliative Care Daily Progress Note       Recommendations & Counseling     SYMPTOM MANAGEMENT    Pain: Family noticed increased pain/agitation for about 1 week after a fall. Imaging shows L4 TP fracture. NSG not recommending surgery nor brace as may potentially cause more agitation. Currently on Dilaudid 0.5 mg iv q2h prn and using every 2 hours. Family and sitter notice patient has what appears to be spasms causing pain and agitation.  ? Recommend naproxen BID x 2 days, protonix for GI ppx (as RN reports patient not always awake to take meds, will order PPI IV daily x 2 days). Ordered for you. Consider voltaren gel qid at discharge.  ? Recommend changing morphine to oxycodone conc soln 2.5 mg SL/po q6h, with additional 2.5 mg SL/po q3h prn due to concern about pruritus. Leia reports he has taken this in the past without any noted side effects. If he is able to consistently take oral medications could change this to tablet form.  ? Recommend Tizanidine 2-4 mg po q6h prn spasms and avoiding benzodiazepines if possible. If unable to take oral medications, will leave valium 2.5 mg iv q8h prn as back up.  ? Agree with scheduled tylenol and lidocaine patch.    Delirium/Agitation: Improved  ? Seroquel 12.5 mg daily prn or Haldol 2 mg iv q6h prn available.   ? Would continue to monitor for pain and treat to prevent agitation.  ? Monitor PVR for urine retention  ? Monitor for constipation and treat as neded  ? Consider scheduling melatonin 3 hours prior to bedtime    At risk for constipation:   ? Monitor for constipation  ? Senna 1 tablet BID prn and Bisacodyl suppository prn available. Given history of loose stool, did not schedule.    Thank you for the opportunity to participate in the care of this patient and family. Our team: will continue to follow.     During regular M-F work hours (7002-7047) -- if you are not sure who specifically to contact -- please  contact us in [facility] Smart Web.     After regular work hours and on weekends/holidays, you can call our answering service at [phone].       Case was reviewed with Dr. Holley.    CHARLEE Reyes [facility]  Contact information available via [facility] Paging/Directory      Attestation:  Total time on the floor involved in the patient's care: 35 minutes  Total time spent in counseling/care coordination: >50%      Assessments          Catalino Coronado is a 72 year old male with PMH Alzheimer's disease, stage IIIa kidney disease, BPH, hypertension who was brought to ED for evaluation of urinary retention and dementia with agitated behavior. Found to have L4 fracture and urine retention.    Today, the patient was seen for:  Alzheimer's dementia  L4 fracture s/p fall  Urine retention  Delirium  Pain    Prognosis, Goals, or Advance Care Planning was addressed today with: No.  Mood, coping, and/or meaning in the context of serious illness were addressed today: No.  Summary/Comments:            Interval History:     Chart review/discussion with unit or clinical team members:   Per RN, crushing medications and more difficulty admnistering this morning due to somnolence. Muhammad catheter removed and patient noted to be incontinent of urine at times. BM last night, soft.     Per patient or family/caregivers today:  Vahid is initially asleep, but rouses to voice. Does respond verbally with one word upon awakening, but does not respond when wife Leia asks him who she is. Per Leia, Vahid has been much more somnolent today so far compared to yesterday. Leia shared that last night was the first night she slept away from her , but that she was exhausted and had to leave to go home to catch up on sleep. Reassured her of importance of taking care of herself as well. Per Leia, Vahid is still clenching fists at times and appears to be in pain. Per RN, he seems comfortable  until he is moved for cares. Leia has noted that Vahid seems to be trying to scratch the tip of his nose at times. He does have psoriasis affecting eyebrows and occipital scalp area. They use ketoconazole shampoo and another topical lotion for his scalp/back of neck, and apply the other two topical agents to face for psoriasis. She has not noted any new rash or skin changes. Vahid did eat some ice cream yesterday.     Key Palliative Symptoms:  # Pain severity the last 12 hours: low    Patient is on opioids: assessed and I made recommendations about bowel care as above.           Review of Systems:     Unable to obtain as patient with minimal verbal responses in setting of dementia with expressive aphasia          Medications:     I have reviewed this patient's medication profile and medications during this hospitalization.    Noted meds:    APAP TID  Lidocaine patch  Morphine 10 mg soln SL q6h--last dose yest 1pm--total 10 mg in past 24h  Valium 2.5 mg iv q8h prn--last dose yest 11pm  toradol 15 mg iv s3q--iuiku 3AM today  Dilaudid 0.5 mg iv q2h prn--last dose 9AM today--total 1 mg yesterday           Physical Exam:   Temp: 98.4  F (36.9  C) Temp src: Axillary BP: 134/53 Pulse: 96   Resp: 14 SpO2: 97 % O2 Device: None (Room air)    Gen: Lying in bed. Appears comfortable, in NAD.  HEENT: NCAT. Conjunctiva clear. Sclera anicteric.  Resp: No increased work of breathing  Abd: soft, nt, nd  Msk: no gross deformity  Skin:  Mild erythema of eyebrows, consistent with psoriasis and unchanged since prior to admission per wife. No other facial skin changes.   Ext: warm, well perfused.   Neuro: Wakes to voice. One word response at times, but often does not answer question. Expressive aphasia at baseline.   Mental status/Psych: Arouses to voice. Calm.               Data Reviewed:       Reviewed recent labs, comments:   Na 144  K 3.5  Cr 0.95  Plt 292k

## 2022-11-26 NOTE — PROGRESS NOTES
[facility]    Medicine Progress Note - Hospitalist Service, GOLD TEAM 7    Date of Admission:  11/22/2022    Assessment & Plan    Catalino Coronado is a 72-year-old man with Alzheimer dementia here for pain and weakness, found to have L4 transverse spinous process fracture and urinary retention with urinary tract infection    Today:   - Small amount of blood in stool overnight. History of hemorrhoids. I discussed with patient's wife. Will check FOBT, monitor clinically, check CBC in the morning.   - Possible dysuria. Repeat UA if dysuria persists.   - Discussed placement options with the patient's wife. She is hopeful for improvement with rehab but very concerned about his poor oral intake. She is interested in care conference including palliative care for further discussion of NG tube. I sent a page to palliative care to see if we could have joint discussion tomorrow afternoon.      #Delirium due to pain and acute infection in the setting of dementia, improving  Acutely worsened agitation in the hospital. Minimally verbal at recent baseline at home but he is usually able to communicate with his wife by gestures about bathroom needs. He had been incontinent for a few days prior to this admission.   - Appreciate palliative care consultation  - Haloperidol, quetiapine as needed.   - Pain regimen: oral liquid oxycodone as needed. Naproxen for 2 days with PPI. Tizanidine as needed for muscle spasms. Dilaudid for breakthrough pain in the hospital.     # Acute back pain secondary to L4 transverse spinous process fracture from a fall at home  CT lumbar spine revealed an acute L4 fracture, no retropulsion.   -PT/OT consulted for mobility  - Neurosurgery consulted for fracture (updated from ortho surgery), recommending conservative treatment and no brace.   - Appreciate palliative assistance with pain and delirium control.   - Aim for care conference with myself and palliative to  discuss disposition.       #Poor oral intake  - Appreciate nutrition consultation  - Calorie counts in progress  - Plan for meeting with myself and palliative concurrently to further discuss NG tube as an option.     #Hypokalemia  Likely due to poor oral intake. RN replacement protocol ordered, will attempt oral replacement.     #Urinary retention, resolved  Urine culture negative  Initial concern for UTI but culture negative. He has been catheterized in the past but not regularly at home and does not have supplies. In the past, he does better if topical lidocaine is administered first.   - Monitor urine output. If no output >6 hours during the day or 8 hours overnight, perform bladder scan.      #Stage IIIa kidney disease  Slightly low bicarbonate  Creatinine improved with fluids.   -Continue strict I/O, dose adjusting medications  - Trend BMP  - Continue MIVF for now.      #BPH  -Continue home regimen of Flomax 0.4mg once daily     #Alzheimer's disease   #Generalized weakness with frequent falls  -Continue home regimen of Aricept 5mg once daily  -Continue Lexapro 20mg once daily  -Continue home regimen of Namenda 10mg by mouth twice daily  -Continue Seroquel 12.5mg by mouth once daily  -PT/OT consult to assess safety to return home      #Hypertension  -Continue home regimen of Atenolol 100mg once daily     # Hyperlipidemia  -Continue home regimen of Zocor 40mg by mouth once daily at bedtime       Diet: Pureed Diet (level 4) Thin Liquids (level 0)  Snacks/Supplements Adult: Other; Ensure Enlive, Magic Cup; Between Meals  Calorie Counts    DVT Prophylaxis: Heparin SQ  Muhammad Catheter: Not present  Central Lines: None  Cardiac Monitoring: None  Code Status: No CPR- Do NOT Intubate      Disposition Plan      Expected Discharge Date: 11/28/2022      Destination: home with family;home with help/services  Discharge Comments: Pending clinical course, PT, OT, SLP. Family willing to pay for additional support at home but  "very debilitated per PT. This will need more discussion regarding what is feasible.        The patient's care was discussed with the patient's family and bedside nurse. The patient was present for discussion but not able to fully engage in conversation due to dementia at baseline.     Juan Jose Holley MD  Hospitalist Service, [address]  Securely message with the Vocera Web Console (learn more here)  Text page via [facility] Paging/Directory   Please see signed in provider for up to date coverage information      Clinically Significant Risk Factors        # Hypokalemia: Lowest K = 3.2 mmol/L (Ref range: 3.4-5.3) in last 2 days, will replace as needed                 # Overweight: Estimated body mass index is 26.95 kg/m  as calculated from the following:    Height as of 11/2/22: 1.905 m (6' 3\").    Weight as of this encounter: 97.8 kg (215 lb 9.6 oz)., PRESENT ON ADMISSION         ______________________________________________________________________    Interval History   Vahid was quite agitated this morning but calmed down after Dilaudid dose. One bowel movement appeared to have a small amount of blood in it. His wife noted that he does have a history of hemorrhoids but none have been visualized recently. They have been encouraging oral intake but it remains far lower than it was before his fall. He has been refusing foods that he usually likes. He has not been drinking much either.   Reviewed that physical therapy required significant assistance from 2 people for mobilization and the patient's wife usually is the sole caregiver at home. Discussed options for rehab TCU placement and possibility of considering hospice if he does not improve as hoped. The patient's wife is interested in having a care conference with palliative care to discuss options and next steps. I sent a page to palliative care about arranging this.     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     Data reviewed today: I reviewed all medications, new labs and imaging results over the last 24 hours.     Physical Exam   Vital Signs: Temp: 98.2  F (36.8  C) Temp src: Oral BP: (!) 145/65 Pulse: 65   Resp: 16 SpO2: 97 % O2 Device: None (Room air)    Weight: 215 lbs 9.6 oz  Physical Exam  Vitals reviewed.   Constitutional:       Comments: Elderly, reclined in bed with head of bed elevated. Awake and alert, calm. Makes eye contact and smiles intermittently.    HENT:      Head: Atraumatic.      Right Ear: External ear normal.      Left Ear: External ear normal.      Nose: Nose normal.      Mouth/Throat:      Mouth: Mucous membranes are moist.   Eyes:      General: No scleral icterus.        Right eye: No discharge.         Left eye: No discharge.      Conjunctiva/sclera: Conjunctivae normal.   Cardiovascular:      Rate and Rhythm: Normal rate and regular rhythm.      Pulses: Normal pulses.      Heart sounds: No murmur heard.  Pulmonary:      Effort: Pulmonary effort is normal. No respiratory distress.      Breath sounds: No wheezing or rales.   Abdominal:      General: Bowel sounds are normal. There is no distension.      Palpations: Abdomen is soft.      Tenderness: There is no abdominal tenderness. There is no guarding.   Musculoskeletal:         General: No tenderness.      Cervical back: Neck supple.      Right lower leg: No edema.      Left lower leg: No edema.   Skin:     General: Skin is warm.      Capillary Refill: Capillary refill takes less than 2 seconds.      Findings: No rash.   Neurological:      Comments: Makes eye contact briefly, follows with eyes. He said a few words today.    Psychiatric:      Comments: With significant memory impairment but alert and calm       Data   Recent Labs   Lab 11/26/22  0657 11/25/22  0541 11/24/22  0551 11/23/22  0556 11/22/22  0942   WBC  --   --  9.1 9.2 13.4*   HGB   --  12.1* 13.1* 13.4 15.3   MCV  --   --  95 97 95   PLT  --  292 341 324 373    144 144 144 147*   POTASSIUM 3.2* 3.5 3.7 3.9 4.2   CHLORIDE 106 109* 110* 109* 108*   CO2 21* 21* 18* 22 20*   BUN 26.9* 41.1* 44.4* 43.8* 45.0*   CR 0.86 0.95 0.99 1.01 1.19*   ANIONGAP 14 14 16* 13 19*   CANDE 8.5* 8.6* 8.9 9.1 9.8   GLC 95 97 78 86 104*   ALBUMIN  --   --   --   --  4.3   PROTTOTAL  --   --   --   --  7.7   BILITOTAL  --   --   --   --  1.0   ALKPHOS  --   --   --   --  66   ALT  --   --   --   --  33   AST  --   --   --   --  32   LIPASE  --   --   --   --  23     No results found for this or any previous visit (from the past 24 hour(s)).

## 2022-11-26 NOTE — PLAN OF CARE
Goal Outcome Evaluation:      Plan of Care Reviewed With: patient          Outcome Evaluation: VSS on RA. Disoriented x4 . Pt having pain in back- PRN & scheduled meds given. Pain meds switched to PO oxy today with additional PRN medication if needed. No nausea. Purred diet w/ thin liquids, poor po intake. Plan to start calorie counts tomorrow. Meds crushed with ice cream. Incontinent  of both bowel & bladder. Small BM today. 1:1 in place for safety & line pulling. Mitts in place as well. Family at bedside today.

## 2022-11-26 NOTE — PLAN OF CARE
Goal Outcome Evaluation:      Plan of Care Reviewed With: patient    Overall Patient Progress: no changeOverall Patient Progress: no change    Outcome Evaluation: Alert, disoriented x4. VSS, except HTN on RA. PT having pain in back and pain when urinating- PRN and scheduled meds given. Poor PO intake, pt only had some bites of ice cream. Plan to start calorie counts today. Incontinent of bowel and bladder; BM in AM rust colored with blood, provider notified. 1:1 in place for safety and line pulling; mitts in place. Pt slept between cares.

## 2022-11-27 NOTE — PROGRESS NOTES
Care Management Follow Up    Length of Stay (days): 5  Expected Discharge Date: 11/28/2022  Concerns to be Addressed: all concerns addressed in this encounter     Patient plan of care discussed at interdisciplinary rounds: Yes  Anticipated Discharge Disposition: Home with 24/7 assist & community hospice  Anticipated Discharge Services: Private duty nursing, community hospice   Anticipated Discharge DME: Bed  Patient/family educated on Medicare website which has current facility and service quality ratings: no  Education Provided on the Discharge Plan: yes   Patient/Family in Agreement with the Plan: yes  Private pay costs discussed: Not applicable  Additional Information: KHOI met with patient's wife, Leia, outside of patient's room. Patient was sleeping. SW discussed therapy's recommendations for the patient. Leia indicated that she is planning to bring her  home on hospice. She has three local adult children and many friends who can provide 24/7 assist at home and she requested information on private duty nursing and community hospice agencies. A care conference will occur on 11/28 at 1500 hours and she requested KHOI's attendance.  __________________________     LISA Nunes, VASHTI  Advanced Practice Independent Clinical   [facility]   Weekend Units 5A & 5B Social Work Coverage ([id number])    Weekend 5B RNCC ([id number]) Pager: [phone]     Weekdays after hours (1630 - 0000), Saturday & Sunday after hours ([phone]), & [facility] Recognized Holidays Coverage  Pager: [phone]

## 2022-11-27 NOTE — PROGRESS NOTES
Calorie Count  Intake recorded for: 11/26  Total Kcals: 404 Total Protein: 13g  Kcals from Hospital Food: 404   Protein: 13g  Kcals from Outside Food (average):0 Protein: 0g  # Meals Ordered from Kitchen: 1  # Meals Recorded: 1 (First - 50% cream of wheat)  # Supplements Recorded: 100% 1 Magic Cup, 20% 1 Magic Cup

## 2022-11-27 NOTE — PROGRESS NOTES
[facility]    Medicine Progress Note - Hospitalist Service, GOLD TEAM 7    Date of Admission:  11/22/2022    Assessment & Plan    Catalino Coronado is a 72-year-old man with Alzheimer dementia here for pain and weakness, found to have L4 transverse spinous process fracture and urinary retention with urinary tract infection    Today:   - No further blood in stool, stool sent to lab negative for blood.   - Urinalysis not suggestive of infection.   - Oral intake remains very poor. Intermittent delirium treated with medications as ordered. Ketones in urine likely due to fasting. Because Yesica of palliative will be available tomorrow, family agreeable to 3pm family meeting tomorrow or the closest time thereafter that Yesica is able to participate. Social work and/or care management participation will also be helpful.   - D5LR to treat starvation ketosis for now pending tomorrow's care conference     #Delirium due to pain and acute infection in the setting of dementia, improving  Acutely worsened agitation in the hospital. Minimally verbal at recent baseline at home but he is usually able to communicate with his wife by gestures about bathroom needs. He had been incontinent for a few days prior to this admission.   - Appreciate palliative care consultation  - Haloperidol, quetiapine as needed.   - Pain regimen: oral liquid oxycodone as needed. Naproxen for 2 days with PPI. Tizanidine as needed for muscle spasms. Dilaudid for breakthrough pain in the hospital.     # Acute back pain secondary to L4 transverse spinous process fracture from a fall at home  CT lumbar spine revealed an acute L4 fracture, no retropulsion.   -PT/OT consulted for mobility  - Neurosurgery consulted for fracture (updated from ortho surgery), recommending conservative treatment and no brace.   - Appreciate palliative assistance with pain and delirium control.   - Aim for care conference with myself and  palliative to discuss disposition- because Yesica will be available 11/28, family agrees to have this tomorrow. They have talked with multiple family members and will bring a list of questions.      #Poor oral intake  - Appreciate nutrition consultation  - Calorie counts in progress  - Plan for meeting with myself and palliative concurrently to further discuss NG tube as an option, consider hospice care depending on clinical course.     #Hypokalemia  Likely due to poor oral intake. RN replacement protocol ordered.     #Urinary retention, resolved  Urine culture negative  Initial concern for UTI but culture negative. He has been catheterized in the past but not regularly at home and does not have supplies. In the past, he does better if topical lidocaine is administered first.   - Monitor urine output. If no output >6 hours during the day or 8 hours overnight, perform bladder scan.      #Stage IIIa kidney disease  Slightly low bicarbonate  Creatinine improved with fluids.   -Continue strict I/O, dose adjusting medications  - Trend BMP  - Continue MIVF for now.      #BPH  -Continue home regimen of Flomax 0.4mg once daily     #Alzheimer's disease   #Generalized weakness with frequent falls  -Continue home regimen of Aricept 5mg once daily  -Continue Lexapro 20mg once daily  -Continue home regimen of Namenda 10mg by mouth twice daily  -Continue Seroquel 12.5mg by mouth once daily  -PT/OT consult to assess safety to return home      #Hypertension  -Continue home regimen of Atenolol 100mg once daily     # Hyperlipidemia  -Continue home regimen of Zocor 40mg by mouth once daily at bedtime       Diet: Pureed Diet (level 4) Thin Liquids (level 0)  Snacks/Supplements Adult: Other; Ensure Enlive, Magic Cup; Between Meals  Calorie Counts    DVT Prophylaxis: Heparin SQ  Muhammad Catheter: Not present  Central Lines: None  Cardiac Monitoring: None  Code Status: No CPR- Do NOT Intubate      Disposition Plan      Expected Discharge  "Date: 11/28/2022      Destination: home with family;home with help/services  Discharge Comments: Pending care conference with palliative, tentatively family looking to pursue TCU placement with goal of regaining strength to return home.        The patient's care was discussed with the patient's family and bedside nurse. The patient was present for discussion but not able to fully engage in conversation due to dementia at baseline.     Juan Jose Holley MD  Hospitalist Service, [address]  Securely message with the Vocera Web Console (learn more here)  Text page via [facility] Paging/Directory   Please see signed in provider for up to date coverage information      Clinically Significant Risk Factors        # Hypokalemia: Lowest K = 3.2 mmol/L (Ref range: 3.4-5.3) in last 2 days, will replace as needed                 # Overweight: Estimated body mass index is 26.95 kg/m  as calculated from the following:    Height as of 11/2/22: 1.905 m (6' 3\").    Weight as of this encounter: 97.8 kg (215 lb 9.6 oz).          ______________________________________________________________________    Interval History   Vahid continues to have intermittent episodes of agitation. These have been managed with as-needed medications. Oral intake remains very poor. He was not able to take morning medications today because of being either too sleepy or too agitated. He continues to have incontinence.   His family at bedside is reasonably concerned about his lack of progress. Given the overlap of significant dementia and currently severely decreased mobility and oral intake, they agree to care conference including palliative tomorrow afternoon. We will aim for 3pm or the closest time thereafter that palliative is available.        Data reviewed today: I reviewed all medications, new labs and imaging results over the last 24 hours.     Physical Exam   Vital Signs: Temp: 97.6  F (36.4  C) " Temp src: Oral BP: (!) 159/80 Pulse: 65   Resp: 18 SpO2: 96 % O2 Device: None (Room air)    Weight: 215 lbs 9.6 oz  Physical Exam  Vitals reviewed.   Constitutional:       Comments: Elderly, reclined in bed with head of bed elevated. Resting with eyes closed but opened eyes briefly to voice and made eye contact.    HENT:      Head: Atraumatic.      Right Ear: External ear normal.      Left Ear: External ear normal.      Nose: Nose normal.      Mouth/Throat:      Mouth: Mucous membranes are moist.   Eyes:      General: No scleral icterus.        Right eye: No discharge.         Left eye: No discharge.      Conjunctiva/sclera: Conjunctivae normal.   Cardiovascular:      Rate and Rhythm: Normal rate and regular rhythm.      Pulses: Normal pulses.      Heart sounds: No murmur heard.  Pulmonary:      Effort: Pulmonary effort is normal. No respiratory distress.      Breath sounds: No wheezing or rales.   Abdominal:      General: Bowel sounds are normal. There is no distension.      Palpations: Abdomen is soft.      Tenderness: There is no abdominal tenderness. There is no guarding.   Musculoskeletal:         General: No tenderness.      Cervical back: Neck supple.      Right lower leg: No edema.      Left lower leg: No edema.   Skin:     General: Skin is warm.      Capillary Refill: Capillary refill takes less than 2 seconds.      Findings: No rash.   Neurological:      Comments: Makes eye contact briefly, follows with eyes. He did not speak to me today.    Psychiatric:      Comments: Significant memory impairment       Data   Recent Labs   Lab 11/27/22  0722 11/26/22  0657 11/25/22  0541 11/24/22  0551 11/23/22  0556 11/22/22  0942   WBC 9.2  --   --  9.1 9.2 13.4*   HGB 12.9*  --  12.1* 13.1* 13.4 15.3   MCV 91  --   --  95 97 95     --  292 341 324 373    141 144 144 144 147*   POTASSIUM 3.6 3.2* 3.5 3.7 3.9 4.2   CHLORIDE 109* 106 109* 110* 109* 108*   CO2 18* 21* 21* 18* 22 20*   BUN 23.4* 26.9* 41.1*  44.4* 43.8* 45.0*   CR 0.78 0.86 0.95 0.99 1.01 1.19*   ANIONGAP 14 14 14 16* 13 19*   CANDE 8.2* 8.5* 8.6* 8.9 9.1 9.8   GLC 98 95 97 78 86 104*   ALBUMIN  --   --   --   --   --  4.3   PROTTOTAL  --   --   --   --   --  7.7   BILITOTAL  --   --   --   --   --  1.0   ALKPHOS  --   --   --   --   --  66   ALT  --   --   --   --   --  33   AST  --   --   --   --   --  32   LIPASE  --   --   --   --   --  23     No results found for this or any previous visit (from the past 24 hour(s)).

## 2022-11-27 NOTE — PLAN OF CARE
Goal Outcome Evaluation:      Plan of Care Reviewed With: patient          Outcome Evaluation: Pt more restless today. VSS on RA. Disoriented x4 . Pt having pain in back- PRN & scheduled meds given. Purred diet w/ thin liquids, poor po intake. Calorie counts. Meds crushed with ice cream. Incontinent. Small Smear today, no blood noted. Good urine output. Need UA & stool sample. 1:1 in place for safety & line pulling. Mitts in place as well. Family at bedside today. Possible care conference on Monday.

## 2022-11-27 NOTE — PLAN OF CARE
Goal Outcome Evaluation:      Plan of Care Reviewed With: patient    Overall Patient Progress: no changeOverall Patient Progress: no change    Outcome Evaluation: Disoriented x4, VSS except HTN. Pt increasingly restless and agitated in evening, PRN haldol and oxy administered. Poor PO intake, continued calorie counts. Incontinent of bowel and bladder. 2 stools overnight, no blood noted; stool sample collected and sent. UA collection needed. 1:1 in place for safety and line pulling.

## 2022-11-28 NOTE — PROGRESS NOTES
Care Management Follow Up    Length of Stay (days): 6    Expected Discharge Date: 11/29/2022     Concerns to be Addressed: all concerns addressed in this encounter     Patient plan of care discussed at interdisciplinary rounds: Yes    Anticipated Discharge Disposition: TBD home with home care vs. Home with hospice     Anticipated Discharge Services:  TBD  Anticipated Discharge DME: hospital bed    Patient/family educated on Medicare website which has current facility and service quality ratings:  (N/A)  Education Provided on the Discharge Plan:  Yes  Patient/Family in Agreement with the Plan: yes    Referrals Placed by CM/SW:    Private pay costs discussed: Not applicable    Additional Information:  SW attended care conference with pt wife, daughter, granddaughter, primary team and palliative team. Pt wife reported primary goal is for pt to go home and stay at home for as long as possible/manageable. Pt lives with wife (Leia) and son. Leia reports having lots of family and friend support at home to assist with pt cares. Pt family would like hospital bed ordered upon discharge. Explained to family that if they decide to go home with hospice, hospice provides equipment. Provider team has signed  Pt family would like time to reflect on care conference discussion before making a decision. Anoter care conference is scheduled for Wednesday, 11/30 at 1500 to further discuss discharge plan and pt hold.     SW to continue to follow and assist with discharge plan as appropriate.     Monica Ge, KHOI  [facility]   [facility]

## 2022-11-28 NOTE — PROGRESS NOTES
Calorie Count  Intake recorded for: 11/27  Total Kcals: 0 Total Protein: 0g  Kcals from Hospital Food: 0   Protein: 0g  Kcals from Outside Food (average):0 Protein: 0g  # Meals Ordered from Kitchen: 1 small meal  # Meals Recorded: no intake recorded  # Supplements Recorded: 0

## 2022-11-28 NOTE — PROGRESS NOTES
[facility]  Palliative Care Daily Progress Note       Recommendations & Counseling     GOALS OF CARE    FCC today with wife Leia, daughters Nabila and Sophia, as well as granddaughter Tacho. Family feel strongly that patient would wish to be home, especially at end of life. They feel he would not want a feeding tube. However, given his precipitous decline, they need more time to consider a time limited trial of TF via NGT. We did discuss the natural progression of Alzheimer's, and that events such as falls and traumatic injuries can precipitate a decline. Discussed that in advanced dementia, the data does not show that feeding tubes extend life, prevent aspiration. Discussed concern that he may not be able to get back to his functional status prior to the fall.     Plan for FCC on Wednesday, 11/30 at 3pm to revisit goals pending progress.    SYMPTOM MANAGEMENT    Pain   ? Reviewed opioid use past 24h. Recommend increasing oxycodone to 2.5 mg SL q4h (ordered for you). Additional oxycodone 2.5 mg SL q4h prn available for breakthrough pain. As he has been requiring 0.5 mg iv dilaudid during cares due to increased pain, recommend giving Oxycodone 5 mg SL 20-30 minutes prior to cares or therapy (ordered BID prn for this purpose). Avoid iv dilaudid if possible.  ? Plan to ask interventional pain/anesthesia whether patient could benefit from local intervention for pain in the hopes of less reliance on opioids.   ? Continue Zanaflex 2-4 mg po q6h prn spasms. If unable to take oral medication, Valium 2.5 mg iv q8h prn available.    Delirium/Agitation:   ? Recommend scheduling Zyprexa 2.5 mg ODT at bedtime, with additional 2.5 mg daily prn agitation. Haldol 2 mg iv q6h prn only if unable to administer oral medication  ? Would continue to monitor for pain and treat to prevent agitation.  ? Monitor PVR for urine retention  ? Monitor for constipation and treat as needed  ? Delirium  precautions    At risk for constipation:   ? Monitor for constipation  ? Senna 1 tablet BID prn and Bisacodyl suppository prn available. Given history of loose stool, did not schedule    Thank you for the opportunity to participate in the care of this patient and family. Our team: will continue to follow.     During regular M-F work hours (0355-9881) -- if you are not sure who specifically to contact -- please contact us in [facility] Smart Web.     After regular work hours and on weekends/holidays, you can call our answering service at [phone].       Case was reviewed with Dr. Holley.    Yesica Pandey PA-C  [facility]  Contact information available via [facility] Paging/Directory      Attestation:  Total time on the floor involved in the patient's care: 85 minutes, including 25 minutes in direct patient care and coordination with primary team and 60 minutes non face to face time in family care conference.      Assessments          Catalino Coronado is a 72 year old male with PMH Alzheimer's disease, stage IIIa kidney disease, BPH, hypertension who was brought to ED for evaluation of urinary retention and dementia with agitated behavior. Found to have L4 fracture and urine retention.    Today, the patient was seen for:  Alzheimer's dementia  L4 fracture s/p fall  Urine retention  Delirium  Pain    Prognosis, Goals, or Advance Care Planning was addressed today with: Yes.  Mood, coping, and/or meaning in the context of serious illness were addressed today: No.              Interval History:     Chart review/discussion with unit or clinical team members:   Per RN, patient combative this morning when attempting to give oral medications. Per PT, patient less able to participate with PT today compared to Friday, due to combativeness.    Per patient or family/caregivers today:  Vahid is asleep. Rouses briefly during exam but then continues sleeping. Per sitter, not taking much  orally aside from sips of water.    [facility] today Leia, daughters Nabila and Sophia, as well as granddaughter Tacho. Dr. Holley, bedside RN and unit SW also present. Medical update provided by Dr. Holley. Discussion of hospital course and limitation of ability to do therapy given combativeness of patient. Discussion of limited po intake, although Leia reports patient was more amenable to eating ice cream she brought today. Discussion of care going forward. Leia reiterated again how in prior conversations, being at home was priority for patient and that they remodeled their home to anticipate his care needs as dementia progressed. She shared that being at home at end of life was also his wish. Leia is torn about decision regarding feeding tube. She knows that he in the past expressed he would not want one, and she also in the past would have said no. However, given 2 weeks ago he was ambulatory without assistance, eating independently, etc, she feels she is not ready to let him go, citing concern that he may not live more than days to weeks without a feeding tube. Discussed the challenge right now of managing pain while balancing needs of maintaining alertness, minimizing agitation, working on deconditioning with therapy and maintaining sufficient oral intake given his lack of interest in food and somnolence at times. Discussed that decreased interest in food is often part of natural progression of dementia. Discussion that events such as falls, hospitalizations, immobilization due to pain can accelerate dementia. Discussed concern that he would attempt to remove a feeding tube. Leia states she would not wish a PEG tube to be placed, but only an NGT. Discussed 2 possible options of home with home care, with or without feeding tube vs hospice. Discussed concern that even with medical optimization, he may not be able to get back to his prior baseline, that he may not be able to get much stronger than he is now,  even after the L4 TP fracture heals and pain improves, given amount of deconditioning. Discussion of hospice as alternative if home and staying at home and prioritizing comfort is the goal.   Leia asked if anesthesiology or pain management might be able to provide local treatment in the hopes of decreasing amount of pain medication. She and family would like more time to consider the feeding tube question. In the meantime, discussed scheduling pain medication before anticipated need to move patient for cares or for therapy. Discussed trial of bedtime Zyprexa ODT, given he is not taking oral PTA seroquel and in effort to wean him off iv medications if home is the goal. Discussed concern about safety of family at home if patient were to become agitated. Family also expressed concern about patient falling or further injury if he were to become agitated. Discussed option of facility placement if that were the case, but again family wish to try taking him home if possible. They recognize that rehab is likely not a feasible goal at this point.              Review of Systems:     Unable to obtain as patient with minimal verbal responses in setting of dementia with expressive aphasia          Medications:     I have reviewed this patient's medication profile and medications during this hospitalization.    Noted meds:    APAP TID--not taking  Lidocaine patch  Oxycodone 2.5 mg po q6h   Oxycodone 2.5 mg po q3h prn--last dose 9:15 AM yest  Dilaudid 0.5 mg iv q2h prn--last dose this morning 11:15 AM  Total dilaudid 1 mg iv in past 24h  Total oxycodone 12.5 mg in past 24h           Physical Exam:   Temp: 97.6  F (36.4  C) Temp src: Axillary BP: (!) 166/76 Pulse: 60   Resp: 15 SpO2: 98 % O2 Device: None (Room air)    Gen: Lying in bed. Appears comfortable, in NAD.  HEENT: NCAT. Conjunctiva clear. Sclera anicteric.  Resp: No increased work of breathing  Abd: soft, nt, nd  Msk: no gross deformity  Skin:  No new skin changes  Ext:  warm, well perfused.   Neuro: Asleep  Mental status/Psych: Arouses to voice. Calm.               Data Reviewed:       Reviewed recent labs, comments:   K 3.2  Cr 0.74  Plt 317k

## 2022-11-28 NOTE — PROGRESS NOTES
[facility]    Medicine Progress Note - Hospitalist Service, GOLD TEAM 7    Date of Admission:  11/22/2022    Assessment & Plan    Catalino Coronado is a 72-year-old man with Alzheimer dementia here for pain and weakness, found to have L4 transverse spinous process fracture and urinary retention with urinary tract infection    Today:   - Family care conference held today. See separate palliative notes. Hoping for improvement in oral intake and additional enjoyable time at home. Still thinking about questions regarding feeding tube and hospice vs aggressive care. He was ambulatory 2 weeks ago but now has been bed-bound for about 14 days with significant worsening in agitation with the pain.   - Per discussion at family meeting, placed consult for regional pain to ask whether a local injection for pain control might be an option.   - Plan to have follow-up family meeting at 3pm on Wednesday.   - Per discussion at family meeting, continue IV hydration for now.   - Pain and agitation medications adjusted per palliative care, appreciate assistance.      #Delirium due to pain and acute infection in the setting of dementia, persistent  Acutely worsened agitation in the hospital. Minimally verbal at recent baseline at home but he is usually able to communicate with his wife by gestures about bathroom needs. He had been incontinent for a few days prior to this admission.   - Appreciate palliative care consultation  - Olanzapine scheduled at bedside, available during the day if needed for agitation.   - Pain regimen: oral liquid oxycodone scheduled every 4 hours as he is unable to ask for pain medication when needed. Additional as-needed doses per staff or family assessment. Dilaudid remains availabe for breakthrough pain in the hospital with goal to transition to oral regimen.     # Acute back pain secondary to L4 transverse spinous process fracture from a fall at home  CT lumbar  spine revealed an acute L4 fracture, no retropulsion.   -PT/OT consulted for mobility  - Neurosurgery consulted for fracture (updated from ortho surgery), recommending conservative treatment and no brace.   - Appreciate palliative assistance with pain and delirium control.   - Per discussion at family meeting, requested interventional pain consultation to see whether a local injection for symptomatic control might be offered.      #Poor oral intake, persistent  - Appreciate nutrition consultation  - Calorie counts in progress  - Family is thinking about risks/benefits of NG tube in this situation. This will be followed at Wednesday family meeting. Per discussion, maintain IV hydration in the meantime.     #Hypokalemia  Likely due to poor oral intake. RN replacement protocol ordered.     #Urinary retention, resolved  Urine culture negative  Initial concern for UTI but culture negative. He has been catheterized in the past but not regularly at home and does not have supplies. In the past, he does better if topical lidocaine is administered first.   - Monitor urine output. If no output >6 hours during the day or 8 hours overnight, perform bladder scan.      #Stage IIIa kidney disease  Slightly low bicarbonate  Creatinine improved with fluids.   -Continue strict I/O, dose adjusting medications  - Trend BMP  - Continue MIVF for now.      #BPH  -Continue home regimen of Flomax 0.4mg once daily     #Alzheimer's disease   #Generalized weakness with frequent falls  -Continue home regimen of Aricept 5mg once daily  -Continue Lexapro 20mg once daily  -Continue home regimen of Namenda 10mg by mouth twice daily  -Continue Seroquel 12.5mg by mouth once daily  -PT/OT consult to assess safety to return home      #Hypertension  -Continue home regimen of Atenolol 100mg once daily     # Hyperlipidemia  -Continue home regimen of Zocor 40mg by mouth once daily at bedtime       Diet: Pureed Diet (level 4) Thin Liquids (level  "0)  Snacks/Supplements Adult: Other; Ensure Enlive, Magic Cup; Between Meals  Calorie Counts    DVT Prophylaxis: Heparin SQ  Muhammad Catheter: Not present  Central Lines: None  Cardiac Monitoring: None  Code Status: No CPR- Do NOT Intubate      Disposition Plan      Expected Discharge Date: 11/29/2022      Destination: home with family;home with help/services  Discharge Comments: Planning for care conference 3pm today with primary, palliative, would appreciate care management involvement. This will determine disposition.        The patient's care was discussed with the patient's family and bedside nurse. The patient was present for discussion but not able to fully engage in conversation due to dementia at baseline.     Juan Jose Holley MD  Hospitalist Service, [address]  Securely message with the Vocera Web Console (learn more here)  Text page via [facility] Paging/Directory   Please see signed in provider for up to date coverage information      Clinically Significant Risk Factors        # Hypokalemia: Lowest K = 3.2 mmol/L (Ref range: 3.4-5.3) in last 2 days, will replace as needed                 # Overweight: Estimated body mass index is 26.95 kg/m  as calculated from the following:    Height as of 11/2/22: 1.905 m (6' 3\").    Weight as of this encounter: 97.8 kg (215 lb 9.6 oz).          ______________________________________________________________________    Interval History   Vahid continues to have intermittent episodes of agitation. This seems worse at night and when family is not here. This sometimes interferes with his other cares. He did receive IV medications for this in the past 24 hours. He continues to appear very uncomfortable with turning or cares in bed. Breathing comfortably on room air. He was able to eat some ice cream that his wife brought from home.     Family meeting at 3pm including myself, palliative, social work, care management, " spiritual health, patient's wife Leia, 2 daughters, and granddaughter. Vahid has always placed a high value on aging in place and being at home and they have remodeled their home accordingly. Vahid previously filled out a POLST form with wishes against artificial nutrition, but this is hard to accept in the moment when his oral intake has acutely declined and family wishes to think about it more. Two weeks ago, he was ambulatory around the house with hand-hold assist from his wife. Since the fall, he has had significant decline in mobility, appetite, and attention. This is a sudden and difficult change. Explored the various options for care moving forward. Family will continue to discuss and follow-up family meeting has been planned.      Data reviewed today: I reviewed all medications, new labs and imaging results over the last 24 hours.     Physical Exam   Vital Signs: Temp: 97.4  F (36.3  C) Temp src: Axillary BP: (!) 144/62 Pulse: 85   Resp: 15 SpO2: 98 % O2 Device: None (Room air)    Weight: 215 lbs 9.6 oz  Physical Exam  Vitals reviewed.   Constitutional:       Comments: Elderly, reclined in bed with head of bed elevated. Mitts on hands, sitter at bedside. Resting with eyes closed but opened eyes briefly to voice and made eye contact.    HENT:      Head: Atraumatic.      Right Ear: External ear normal.      Left Ear: External ear normal.      Nose: Nose normal.      Mouth/Throat:      Mouth: Mucous membranes are moist.   Eyes:      General: No scleral icterus.        Right eye: No discharge.         Left eye: No discharge.      Conjunctiva/sclera: Conjunctivae normal.   Cardiovascular:      Rate and Rhythm: Normal rate and regular rhythm.      Pulses: Normal pulses.      Heart sounds: No murmur heard.  Pulmonary:      Effort: Pulmonary effort is normal. No respiratory distress.      Breath sounds: No wheezing or rales.   Abdominal:      General: Bowel sounds are normal. There is no distension.      Palpations:  Abdomen is soft.      Tenderness: There is no abdominal tenderness. There is no guarding.   Musculoskeletal:         General: No tenderness.      Cervical back: Neck supple.      Right lower leg: No edema.      Left lower leg: No edema.   Skin:     General: Skin is warm.      Capillary Refill: Capillary refill takes less than 2 seconds.      Findings: No rash.   Neurological:      Comments: Makes eye contact briefly, follows with eyes. He did not speak to me today.    Psychiatric:      Comments: Significant memory impairment        Data   Recent Labs   Lab 11/28/22  1420 11/28/22  0530 11/27/22  0722 11/26/22  0657 11/25/22  0541 11/24/22  0551 11/23/22  0556 11/22/22  0942   WBC  --   --  9.2  --   --  9.1 9.2 13.4*   HGB  --   --  12.9*  --  12.1* 13.1* 13.4 15.3   MCV  --   --  91  --   --  95 97 95   PLT  --  317 330  --  292 341 324 373   NA  --  141 141 141 144 144 144 147*   POTASSIUM 3.9 3.2* 3.6 3.2* 3.5 3.7 3.9 4.2   CHLORIDE  --  109* 109* 106 109* 110* 109* 108*   CO2  --  21* 18* 21* 21* 18* 22 20*   BUN  --  19.2 23.4* 26.9* 41.1* 44.4* 43.8* 45.0*   CR  --  0.74 0.78 0.86 0.95 0.99 1.01 1.19*   ANIONGAP  --  11 14 14 14 16* 13 19*   CANDE  --  8.2* 8.2* 8.5* 8.6* 8.9 9.1 9.8   GLC  --  109* 98 95 97 78 86 104*   ALBUMIN  --   --   --   --   --   --   --  4.3   PROTTOTAL  --   --   --   --   --   --   --  7.7   BILITOTAL  --   --   --   --   --   --   --  1.0   ALKPHOS  --   --   --   --   --   --   --  66   ALT  --   --   --   --   --   --   --  33   AST  --   --   --   --   --   --   --  32   LIPASE  --   --   --   --   --   --   --  23     No results found for this or any previous visit (from the past 24 hour(s)).

## 2022-11-28 NOTE — PLAN OF CARE
Goal Outcome Evaluation:      Plan of Care Reviewed With: patient    Overall Patient Progress: no changeOverall Patient Progress: no change    Outcome Evaluation: disoriented x4, unable to answer questions, increased agitation, mitts in place for combative behavior. pain plan: Scheduled oxycodone q4, PRN oxycodone, PRN IV dilaudid. refused to take AM meds, swung at writer when attempted. poor PO intake. plan for care conference at 1500

## 2022-11-28 NOTE — PLAN OF CARE
Goal Outcome Evaluation:      Plan of Care Reviewed With: patient    Overall Patient Progress: no change    Outcome Evaluation: Disoriented x4, increased aggitation this shift after family left and with clean ups, mitts in place for combative behaviors, PRN dilaudid 1x, PRN haldol 1x, PO oxy as scheduled, pt uncooperative w/ taking PO medications crushed w/ ice cream, incontinent to bowel and bladder 2x this shift, LBM 11/28 AM, IVF continue at 75 mL/hr, poor PO intake, plan for care conference today at 1500

## 2022-11-28 NOTE — PROGRESS NOTES
"PALLIATIVE CARE SOCIAL WORK Progress Note   [facility] ([place] Unit 5A    REFERRAL SOURCE: Palliative Care Team; care conference    Family members participating: pt's wife, two daughters and granddaughter.    Medical providers participating: Medicine, Palliative, bedside RN.    Medical update provided to family. Family trying to decide between moving forward with feeding tube vs returning home with hospice. Regardless of restorative vs comfort goals, wife is committed to patient returning home and resuming primary care-giving.    Family is committed to honoring what they believe to be pt's wishes. Wife able to identify that prior to current circumstances, she knows Vahid would say \"no\" to a feeding tube and she would agree but now finding herself \"not ready to say goodbye\" and is considering a trial of feeding tube.    Family requesting more time and medicine seeking opinion from anesthesia re pain management.      Plan: Plan for follow up care conference Wed 11/30 3pm    VASHTI Floyd  Palliative Care   Pager [phone]    [facility] Inpatient Team Consult pager [phone] (M-F 8-4:30)  After-hours Answering Service [phone]      "

## 2022-11-28 NOTE — PROGRESS NOTES
[facility] HEALTH SERVICES  PALLIATIVE SPIRITUAL ASSESSMENT   [facility] ([place]) 5A  Reason for Consult: Care Conference     Summary and Recommendations:     Attended care conference with patient's family, Palliative Care team, primary provider, and patient's bedside RN.    Family is deciding between potential for feeding tube vs. Home with hospice. Their ultimate goal is to have patient home, as that is what patient would want and what they want.     Family would appreciate follow up [facility] support tomorrow 11/29    Plan for follow up Care Conference on 11/30 at 3pm    Will continue to follow while Palliative Care is consulted.     [facility] Palliative Care Inpatient Team Consult pager [phone] (M-F 8-4:30)  After-hours Answering Service [phone]      Distress: Family cares deeply about patient and want to honor him in their decisions about his care. They are grappling with whether or not to place feeding tube, and if home with hospice is the right decision for them and for patient due to his challenges with agitation in the hospital.    Goals of Care: Ultimate goal is to have patient at home with family support and help.     Coping/Meaning Making: Family support system is very important to them and as they make decisions together in this process. Patient's daughter states she is spiritual and would appreciate follow up visit from [facility] tomorrow.     Intervention: Reviewed documentation. Provided emotional and spiritual support. Affirmed difficulty of their decisions and in taking time to think through what patient would want and what they want for patient in the coming days.       Dia Caballero MDiv.   Palliative Chaplain Resident  Pager [phone]

## 2022-11-28 NOTE — PLAN OF CARE
Goal Outcome Evaluation:      Plan of Care Reviewed With: patient, spouse    Overall Patient Progress: no changeOverall Patient Progress: no change    Outcome Evaluation: VSS on RA. Disoriented x 4.1:1 in place for safety & line pulling. Mitts in place as well. Pt having pain in back- PRN & scheduled meds given. Purred diet w/ thin liquids, poor po intake. Calorie counts. Meds crushed with ice cream. Pt having more difficulty today taking meds/PO intake due to refusal and being more aggitated when awake- Team notified. Incontinent. BM x3, good urine output. UA collected. IVMF switched to D5 in LR infusing @ 75 ml/hr. Family at bedside today. Plan for care conference Monday @ 1500.

## 2022-11-29 NOTE — PLAN OF CARE
Goal Outcome Evaluation:      Plan of Care Reviewed With: patient    Overall Patient Progress: no changeOverall Patient Progress: no change    Outcome Evaluation: D/o x3-4, alert to name at times/tracking visually. Incoherent words, cannot hold conversations or any sensible conversations with writer. Pt combative and agitated with cares/incontinence cares; PRN Haldol, Valium and IV dilaudid given to help pt's back pain to ease some of the pain during cares. Gave scheduled oxy sublingual, this did not seem to help his pain. Taking crushed pills in ice cream. Appetite is poor, calorie counts. Had one full ice cream overnight. Had 2 stools, urinated once. Assist x3/Lift team with cares. Sitter at bedside for safety- family involved. Mitts on both hands, free, no restrained for restessness/pulling at lines/clothes. Pt's family wants him to go home with ania had CC 11/28, waiting to see pt's progress. Continue with POC.

## 2022-11-29 NOTE — PROGRESS NOTES
"SPIRITUAL HEALTH SERVICES  PALLIATIVE SPIRITUAL ASSESSMENT   [facility] ([place]) 5A  Reason for Consult: [facility] Follow-Up    Summary and Recommendations:     AM: Met with patient's daughter Nabila, per her request after Care Conference yesterday. Discussed her processing this situation and declining health of her dad, and also discussed helpful coping strategies and her spirituality. She asked for  to follow up with her mom, patient's wife, in the afternoon.    PM: Followed up with patient's wife, Leia, to assess for support needs. Leia expressed feelings of \"overwhelm\" with patient's improvement today (he is sitting up in bed, talking more, eating more). She asked for 's presence at Care Conference tomorrow and plan to stop by prior to check in.    Plan for Care Conference on 11/30 at 3pm    Will continue to follow while Palliative Care is consulted.     [facility] Palliative Care Inpatient Team Consult pager [phone] (M-F 8-4:30)  After-hours Answering Service [phone]      Distress:  AM: Nabila expressed distress around the variety of ways that she and her family are coping with their grief. She also expressed concern for her mom as patient continues to decline. They have been  for 52 years, and Nabila is worried that her mom will \"sink into a depression\" when patient dies. Nabila also shared her struggles with feeling overwhelmed and feeling as though she is shouldering the responsibility to \"be there for everyone\" during this time.     PM: Leia expressed feelings of overwhelm and uncertainty of where to go from here because of patient's improvements today. She states he is sitting up in bed, eating more, and talking more, whereas he had been more tired and had less of an appetite in days prior. She understands that she will still take things \"day by day\", but grateful for these improvements.    Goals of Care: Leia expressed that she was looking ahead to tomorrow's care conference and checking in, " especially after these improvements.    Coping/Meaning Making:   AM: Nabila shared that she and her siblings grew up Orthodox, but Nabila has more recently (since the beginning of her dad's illness) been using Tarot cards to reflect on her spirituality and as a spiritual practice in her daily life. 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  PM: Leia shared that she has a  friend who is very supportive and helpful to her and her family in this time. Her family is a great support system for her as well, and have been showing up consistently at bedside for her and patient.     Intervention: Reviewed documentation. Provided emotional and spiritual support to family.       Dia Caballero MDiv.   Palliative Chaplain Resident  Pager [phone]

## 2022-11-29 NOTE — PROGRESS NOTES
[facility]    Medicine Progress Note - Hospitalist Service, GOLD TEAM 7    Date of Admission:  11/22/2022    Assessment & Plan      Catalino Coronado is a 72-year-old man with Alzheimer dementia here for pain and weakness, found to have L4 transverse spinous process fracture and urinary retention with urinary tract infection    Conference 11/28/22  - Family care conference 11/28. Hoping for improvement in oral intake and additional enjoyable time at home. Still thinking about questions regarding feeding tube and hospice vs aggressive care. He was ambulatory 2 weeks ago but now has been bed-bound for about 14 days with significant worsening in agitation with the pain.   - Per discussion at family meeting, placed consult for regional pain to ask whether a local injection for pain control might be an option.   - Plan to have follow-up family meeting at 3pm on Wednesday.   - Pain and agitation medications adjusted per palliative care, appreciate assistance.      Delirium likely due to pain, fall, hospitalization on top of underlying advanced dementia with Coginitive impairment   Minimally verbal at recent baseline at home but he is usually able to communicate with his wife by gestures about bathroom needs. He had been incontinent for a few days prior to this admission.   - Appreciate palliative care consultation  - Olanzapine scheduled at bedside, available during the day if needed for agitation.   - Pain regimen: oral liquid oxycodone scheduled every 4 hours as he is unable to ask for pain medication when needed. Additional as-needed doses per staff or family assessment. Dilaudid remains availabe for breakthrough pain in the hospital with goal to transition to oral regimen.        Acute back pain secondary to L4 transverse spinous process fracture from a fall at home  CT lumbar spine revealed an acute L4 fracture, no retropulsion.   -PT/OT consulted for mobility  -  Neurosurgery consulted for fracture (updated from ortho surgery), recommending conservative treatment and no brace.   - Appreciate palliative assistance with pain and delirium control.   - Per discussion at family meeting, requested interventional pain consultation to see whether a local injection for symptomatic control might be offered.      Poor oral intake  - Appreciate nutrition consultation  - Calorie counts in progress  - Family is thinking about risks/benefits of NG tube in this situation. This will be followed at Wednesday family meeting. Per discussion, maintain IV hydration in the meantime.      Hypokalemia  Replacement protocol ordered.      Urinary retention, resolved  Urine culture negative  Monitor urine output. If no output >6 hours during the day or 8 hours overnight, perform bladder scan.      Stage IIIa kidney disease  Creatinine improved with fluids.   - Continue MIVF for now.      BPH  -Continue home regimen of Flomax 0.4mg once daily     Alzheimer's disease   Generalized weakness with frequent falls  -Continue home regimen of Aricept 5mg once daily  -Continue Lexapro 20mg once daily  -Continue home regimen of Namenda 10mg by mouth twice daily  -Continue Seroquel 12.5mg by mouth once daily  -PT/OT consult to assess safety to return home      Hypertension  -Continue home regimen of Atenolol 100mg once daily     Hyperlipidemia  -Continue home regimen of Zocor 40mg by mouth once daily at bedtime           Diet: Pureed Diet (level 4) Thin Liquids (level 0)  Snacks/Supplements Adult: Other; Ensure Enlive, Magic Cup; Between Meals    DVT Prophylaxis: Heparin SQ  Muhammad Catheter: Not present  Central Lines: None  Cardiac Monitoring: None  Code Status: No CPR- Do NOT Intubate      Disposition Plan      Expected Discharge Date: 11/30/2022      Destination: home with family;home with help/services  Discharge Comments: Planning for care conference 3pm today with primary, palliative, would appreciate care  "management involvement. This will determine disposition.        The patient's care was discussed with the Patient and Patient's Family.    ARIEL MARIE MD  Hospitalist Service, [facility] TEAM [address] [place] [address]  Securely message with the Vocera Web Console (learn more here)  Text page via [facility] Paging/Directory   Please see signed in provider for up to date coverage information      Clinically Significant Risk Factors        # Hypokalemia: Lowest K = 3.2 mmol/L (Ref range: 3.4-5.3) in last 2 days, will replace as needed   # Hypocalcemia: Lowest Ca = 8.2 mg/dL (Ref range: 8.5 - 10.1 mg/dL) in last 2 days, will monitor and replace as appropriate              # Overweight: Estimated body mass index is 26.95 kg/m  as calculated from the following:    Height as of 11/2/22: 1.905 m (6' 3\").    Weight as of this encounter: 97.8 kg (215 lb 9.6 oz).          ______________________________________________________________________    Interval History   Patient was drowsy and sleeping. Woke up with shaking hands. Patient reported feeling tired, fell asleep again.   Daughter at bedside, wondered about pain management consultation.   Denied any other concerns.     Data reviewed today: I reviewed all medications, new labs and imaging results over the last 24 hours. I personally reviewed no images or EKG's today.    Physical Exam   Vital Signs: Temp: 99.3  F (37.4  C) Temp src: Axillary BP: (!) 168/80 Pulse: 74   Resp: 18 SpO2: 97 % O2 Device: None (Room air)    Weight: 215 lbs 9.6 oz  General Appearance: AO x1, drowsy  Respiratory: clear to auscultation,  Cardiovascular: S1 and S2 normal  GI: non tender, non distended  Skin: no skin rash  Other: Mittens in place     Data   Recent Labs   Lab 11/29/22  0546 11/28/22  1420 11/28/22  0530 11/27/22  0722 11/26/22  0657 11/25/22  0541 11/24/22  0551 11/23/22  0556   WBC  --   --   --  9.2  --   --  9.1 9.2   HGB  --   --   --  12.9*  --  12.1* " 13.1* 13.4   MCV  --   --   --  91  --   --  95 97   PLT  --   --  317 330  --  292 341 324     --  141 141   < > 144 144 144   POTASSIUM 3.3* 3.9 3.2* 3.6   < > 3.5 3.7 3.9   CHLORIDE 108*  --  109* 109*   < > 109* 110* 109*   CO2 19*  --  21* 18*   < > 21* 18* 22   BUN 17.1  --  19.2 23.4*   < > 41.1* 44.4* 43.8*   CR 0.74  --  0.74 0.78   < > 0.95 0.99 1.01   ANIONGAP 13  --  11 14   < > 14 16* 13   CANDE 8.6*  --  8.2* 8.2*   < > 8.6* 8.9 9.1   *  --  109* 98   < > 97 78 86   ALBUMIN 3.5  --   --   --   --   --   --   --    PROTTOTAL 6.4  --   --   --   --   --   --   --    BILITOTAL 0.6  --   --   --   --   --   --   --    ALKPHOS 90  --   --   --   --   --   --   --    ALT 41  --   --   --   --   --   --   --    AST 40  --   --   --   --   --   --   --     < > = values in this interval not displayed.     No results found for this or any previous visit (from the past 24 hour(s)).  Medications     dextrose 5% lactated ringers 75 mL/hr at 11/28/22 2331       acetaminophen  975 mg Oral TID     atenolol  100 mg Oral Daily     calcipotriene   Topical BID     donepezil  5 mg Oral Daily     escitalopram  20 mg Oral Daily     heparin ANTICOAGULANT  5,000 Units Subcutaneous Q12H     lidocaine  1 patch Transdermal Q24H     lidocaine   Transdermal Q8H [place]     melatonin  5 mg Sublingual QPM     memantine  10 mg Oral BID     multivitamin, therapeutic  1 tablet Oral Daily     OLANZapine zydis  2.5 mg Oral At Bedtime     oxyCODONE  5 mg Sublingual Q4H     potassium chloride  10 mEq Intravenous Q1H     simvastatin  40 mg Oral At Bedtime     sodium chloride (PF)  3 mL Intracatheter Q8H     tamsulosin  0.4 mg Oral Daily

## 2022-11-29 NOTE — CONSULTS
"Pain Service Consultation Note  [facility]      Patient Name: Catalino Coronado  MRN: [id number]   Age: 72 year old  Sex: male  Date: November 29, 2022                                      Reviewed: Yes    Referring Provider:  Juan Jose Holley MD  Referring Service:  Medicine team  Reason for Consultation: \"Pt with dementia with significant pain due to transverse spinous process fracture. Family wondering if local injection for symptom control might be offered\"      Assessment/Recommendations:  Catalino Coronado is a 72 year old male who has PMH of Alzheimer dementia admitted on 11/22/22 for pain and weakness, found to have L4 transverse spinous process fracture and urinary retention with urinary tract infection.  Palliative team has been following and providing pain regimen recommendations.    Pain service consulted for consideration of local injection for symptom control in hopes of minimizing opioid use if possible.    Vahid was evaluated today briefly with attendant and RN in room. Vahid is resting in bed getting , with challenging communication as he is nonverbal due to aphasia/dementia.  Family was not available at time of visit.  Discussed that the question was whether a procedure (local injection) could be done to assist with pain management of lower back potentially stemming from L4 transverse fracture.  This case was reviewed with RAPS/acute inpatient pain physician that Vahid will need further evaluation by procedure performing physician at the clinic ([facility]) to determine risks and benefits of proceeding with any procedure for Vahid.  At this time, the inpatient pain service does not perform this type of procedure in house.  This was also communicated with the medicine team and palliative team, who will speak with the family today.     Plan:   1. Please refer patient to [facility] clinic at [phone] for consideration of interventional pain " "treatment options.  2. Consider voltaren gel  (RN notes pain and agitation with movements)  3. Continue with lidocaine patches ( up to 3 patches/24 hours)  4. Pain regimen per palliative team.  5. Bowel regimen per primary team    Thank you for the opportunity to participate in the care of Catalino Coronado  Pain Service will sign off.    Discussed with attending anesthesiologist  Primary Service Contacted with Recommendations? Yes    Please Page the Pain Team Via [facility]: \"PAIN MANAGEMENT ACUTE INPATIENT/ [facility]\"    Shellie Block PA-C  2022      Chief Complaint:  pain      History of Present Illness:  Catalino Coronado is a 72 year old old male with dementia who has increased pain in mid 2022 since ground level fall.   Imaging study shows L4 transverse process fracture.  Neurosurgery has been consulted and recommend no surgical interventions but rather conservative treatment.    It appears that Vahid has pain in the lower back.  Challenging to assess at times due to aphasia/dementia.      Per [place]  review pulled from system on 22.   2022  1   2022  Oxycodone Hcl (Ir) 5 MG Tablet  8.00  2 [facility]   [id number]       Past Medical History:  Past Medical History:   Diagnosis Date     BPH (benign prostatic hyperplasia)      Cataracts, bilateral      Cerebral aneurysm, nonruptured      Dementia (H)      Hypercholesterolemia      Hypertension          Family History:    Family History   Problem Relation Age of Onset     Heart Disease Father      Dementia Mother      Cancer No family hx of         no skin cancer       Social History:  Social History     Tobacco Use     Smoking status: Former     Types: Cigarettes     Quit date: 2011     Years since quittin.9     Smokeless tobacco: Never     Tobacco comments:     quit 5 yrs ago   Substance Use Topics     Alcohol use: Yes     Comment: 2 mixed/night             Review of Systems:  Complete ROS reviewed. Unless otherwise noted, all " other systems found to be negative.        Laboratory Results:  Recent Labs   Lab Test 11/29/22  0546 11/28/22  0530 12/04/19  1159 07/06/18  0945   INR  --   --   --  1.08   PLT  --  317   < > 246   PTT  --   --   --  30   BUN 17.1 19.2   < >  --     < > = values in this interval not displayed.         Allergies:  No Known Allergies          Current Pain Related Medications:  Medications related to Pain Management (From now, onward)    Start     Dose/Rate Route Frequency Ordered Stop    11/29/22 1330  oxyCODONE (ROXICODONE INTENSOL) 20 mg/mL (HIGH CONC) solution 5 mg         5 mg Sublingual EVERY 4 HOURS 11/29/22 1022      11/29/22 1227  diclofenac (VOLTAREN) 1 % topical gel 4 g         4 g Topical 4 TIMES DAILY PRN 11/29/22 1227      11/28/22 1611  oxyCODONE (ROXICODONE INTENSOL) 20 mg/mL (HIGH CONC) solution 5 mg         5 mg Oral 2 TIMES DAILY PRN 11/28/22 1611      11/28/22 1230  oxyCODONE (ROXICODONE INTENSOL) 20 mg/mL (HIGH CONC) solution 2.5 mg         2.5 mg Oral EVERY 4 HOURS PRN 11/28/22 1201      11/25/22 1202  senna-docusate (SENOKOT-S/PERICOLACE) 8.6-50 MG per tablet 1 tablet         1 tablet Oral 2 TIMES DAILY PRN 11/25/22 1202      11/25/22 1106  tiZANidine (ZANAFLEX) tablet 2-4 mg         2-4 mg Oral EVERY 6 HOURS PRN 11/25/22 1106      11/25/22 1059  HYDROmorphone (PF) (DILAUDID) injection 0.5 mg         0.5 mg Intravenous EVERY 2 HOURS PRN 11/25/22 1100      11/24/22 1227  lidocaine (XYLOCAINE) 2 % external gel          Urethral EVERY 4 HOURS PRN 11/24/22 1227      11/23/22 1535  bisacodyl (DULCOLAX) suppository 10 mg         10 mg Rectal DAILY PRN 11/23/22 1535      11/23/22 1313  diazepam (VALIUM) injection 2.5 mg         2.5 mg  over 1-4 Minutes Intravenous EVERY 8 HOURS PRN 11/23/22 1313      11/23/22 0800  acetaminophen (TYLENOL) tablet 975 mg         975 mg Oral 3 TIMES DAILY 11/22/22 2119 11/23/22 0800  Lidocaine (LIDOCARE) 4 % Patch 1 patch         1 patch  over 12 Hours Transdermal  EVERY 24 HOURS 0800 11/22/22 2132 11/23/22 0600  lidocaine patch in PLACE          Transdermal EVERY 8 HOURS SCHEDULED 11/22/22 2132 11/22/22 2000  lidocaine 1 % 0.1-1 mL         0.1-1 mL Other EVERY 1 HOUR PRN 11/22/22 2000 11/22/22 2000  lidocaine (LMX4) cream          Topical EVERY 1 HOUR PRN 11/22/22 2000              Physical Exam:  Vitals: BP (!) 168/80 (BP Location: Right arm)   Pulse 74   Temp 99.3  F (37.4  C) (Axillary)   Resp 18   Wt 97.8 kg (215 lb 9.6 oz)   SpO2 97%   BMI 26.95 kg/m      Physical Exam:     CONSTITUTIONAL/GENERAL APPEARANCE:  resting in bed  EYES: EOMI, sclera anicteric,  ENT/NECK: atraumatic, lips and oral mucous membranes dry  RESPIRATORY: non-labored breathing. No cough, wheeze  CV:HR within normal limits  ABDOMEN: round  MUSCULOSKELETAL/BACK/SPINE/EXTREMITIES: Moves all extremities.   NEURO: Awake, alert  SKIN/VASCULAR EXAM:  No jaundice, no visible rashes or lesions

## 2022-11-29 NOTE — PROGRESS NOTES
Calorie Count  Intake recorded for: 11/28  Total Kcals: 347 Total Protein: 5g  Kcals from Hospital Food: 347   Protein: 5g  Kcals from Outside Food (average):0 Protein: 0g  # Meals Ordered from Kitchen: 1 meal  # Meals Recorded: 1 meal (100% 2 ice cream, pureed mixed berries)  # Supplements Recorded: 0

## 2022-11-29 NOTE — PROGRESS NOTES
[facility]  Palliative Care Daily Progress Note       Recommendations & Counseling     GOALS OF CARE    See notes from [facility] 11/28    Plan for [facility] on Wednesday, 11/30 at 3pm to revisit goals pending progress.    SYMPTOM MANAGEMENT    Pain   ? Reviewed opioid use past 24h. Recommend increasing oxycodone 5 mg SL q4h (ordered for you). Additional oxycodone 2.5 mg SL q4h prn available for breakthrough pain. As he has been requiring 0.5 mg iv dilaudid during cares due to increased pain, recommend giving Oxycodone 5 mg SL 20-30 minutes prior to cares or therapy (ordered BID prn for this purpose). Avoid iv dilaudid if possible.  ? Per pain team, any local treatment (most likely injection of local anesthetic +/- steroid) would be outpatient and would be pending discussion of risks/benefits. Concern about how well he would tolerate positioning and treatment vs potential of benefit which would be temporary if he were to respond. Communicated pain teams recommendations to daughter Nabila. Appreciate pain team assistance.  ? Continue Zanaflex 2-4 mg po q6h prn spasms. If unable to take oral medication, Valium 2.5 mg iv q8h prn available.  ? Continue scheduled tylenol, lidocaine patches.  ? Consider alternating topical voltaren gel during daytime (if he tolerates application) and lidocaine patches at night.    Delirium/Agitation:   ? Recommend scheduling Zyprexa 2.5 mg ODT at bedtime, with additional 2.5 mg daily prn agitation. Haldol 2 mg iv q6h prn only if unable to administer oral medication  ? Would continue to monitor for pain and treat to prevent agitation.  ? Monitor PVR for urine retention  ? Monitor for constipation and treat as needed  ? Delirium precautions    At risk for constipation:   ? Monitor for constipation  ? Senna 1 tablet BID prn and Bisacodyl suppository prn available. Given history of loose stool, did not schedule    Thank you for the opportunity to participate in  the care of this patient and family. Our team: will continue to follow.     During regular M-F work hours (6015-7590) -- if you are not sure who specifically to contact -- please contact us in [facility] Smart Web.     After regular work hours and on weekends/holidays, you can call our answering service at [phone].       Yesica Pandey PA-C  [facility]  Contact information available via [facility] Paging/Directory      Attestation:  Total time on the floor involved in the patient's care: 35 minutes  Total time spent in counseling/care coordination: >50%      Assessments          Catalino Coronado is a 72 year old male with PMH Alzheimer's disease, stage IIIa kidney disease, BPH, hypertension who was brought to ED for evaluation of urinary retention and dementia with agitated behavior. Found to have L4 fracture and urine retention.    Today, the patient was seen for:  Alzheimer's dementia  L4 fracture s/p fall  Urine retention  Delirium  Pain    Prognosis, Goals, or Advance Care Planning was addressed today with: Yes.  Mood, coping, and/or meaning in the context of serious illness were addressed today: No.              Interval History:     Chart review/discussion with unit or clinical team members:   Per bedside RN, patient appears comfortable unless moved. During cleaning up of incontinence patient became agitated and resisted cares, but unclear if his response was due to pain or not wanting to be touched in that area. Tolerated some oral intake this morning and oral medications. Returned page from pain team with above recommendations. Only additional recommendation from pain team was topical voltaren gel if tolerated.    Per patient or family/caregivers today:  Visited Vahid. He was resting comfortably, appeared to be sleeping. Per sitter, patient was agitated during cares but given IV dilaudid and now resting. Daughter Nabila at bedside. Discussed pain team recommendations  with her.               Review of Systems:     Unable to obtain as patient with minimal verbal responses in setting of dementia with expressive aphasia          Medications:     I have reviewed this patient's medication profile and medications during this hospitalization.    Noted meds:    APAP TID--took this morning  Lidocaine patch  Oxycodone 2.5 mg po q6h   Oxycodone 2.5 mg po q3h prn--last dose 9:15 AM yest  Dilaudid 0.5 mg iv q2h prn--last dose this morning 9:21 AM  Total dilaudid 0.5 mg iv in past 24h  Total oxycodone 7.5 mg in past 24h  Valium 2.5 mg iv @ 23:31  Haldol 2 mg iv q6h prn @ 23:31 yest  Zyprexa 2.5 mg ODT at bedtime--not given   zyprexa 2.5 mg ODT daily prn--not given           Physical Exam:   Temp: 97.6  F (36.4  C) Temp src: Axillary BP: (!) 157/70 Pulse: 85   Resp: 18 SpO2: 95 % O2 Device: None (Room air)    Gen: Lying in bed. Appears comfortable, in NAD.  HEENT: NCAT. Conjunctiva clear. Sclera anicteric.  Resp: No increased work of breathing  Abd: soft, nt, nd  Msk: no gross deformity  Skin:  No new skin changes  Ext: warm, well perfused.   Neuro: Asleep  Mental status/Psych:  Calm.               Data Reviewed:       Reviewed recent labs, comments:   K 3.3  Cr 0.74

## 2022-11-29 NOTE — PLAN OF CARE
Goal Outcome Evaluation:      Plan of Care Reviewed With: child        Pt remains confused and disoriented x 3-4. Agitated and combative with cares but otherwise calm. Sitter at bedside. Mitts on. Pt given oxycodone, IV dilaudid, tylenol and zanaflex for pain control. Appears comfortable at rest but seems to have pain when repositioned. Total feed. Pockets food at times. Appetite poor. Meds crushed and given with ice cream. Potassium 3.3 - replacement given. Recheck ordered. Incontinent of stool and urine. Continue to monitor and notify MD with concerns.

## 2022-11-30 NOTE — PLAN OF CARE
Goal Outcome Evaluation:      Plan of Care Reviewed With: spouse    Overall Patient Progress: no changeOverall Patient Progress: no change    Outcome Evaluation: Admitted for fall, L4 fracture, and UTI. Straight cath for 600ccs. Used lidocaine gel for catheter insertion. Administered IV dilaudid for pain. Scheduled SL oxycodone. Incontinent of bowel. Pt had cough/aspiration episode. Notified MD. CLD until SLP consult tomorrow. Aspiration precautons. Plan for care conference @3p tomorrow.

## 2022-11-30 NOTE — PROGRESS NOTES
[facility]  Palliative Care Daily Progress Note       Recommendations & Counseling     GOALS OF CARE    See notes from [facility] 11/28    [facility] to be rescheduled to Friday, 12/2.    SYMPTOM MANAGEMENT    Pain: difficult to assess as patient with expressive aphasia and underlying dementia.   ? Reviewed opioid use past 24h. Have been increasing oxycodone in attempt to ascertain if insufficient pain control is contributing to agitation/delirium. However, continues to have agitation and delirium and unclear if higher doses of opioids are contributing at this point. Recommend decreasing oxycodone 2.5-5mg SL q4h, give lower dose if he appears comfortable and to hold if somnolent. Additional oxycodone 2.5 mg SL q4h prn available for breakthrough pain. As he has been requiring 0.5 mg iv dilaudid during cares due to increased pain, recommend giving Oxycodone SL 20-30 minutes prior to cares or therapy (ordered BID prn for this purpose). Avoid iv dilaudid if possible.  ? Per pain team recommending trial of low-dose ketamine infusion and toradol iv prn. Appreciate pain team assistance.  ? Consider PPI with toradol due to concern of possible blood in stool earlier this admission  ? Continue Zanaflex 2-4 mg po q6h prn spasms. If unable to take oral medication, Valium 2.5 mg iv q8h prn available. Agree with neurology consult given frequency of noted upper extremity spasms by staff.  ? Continue scheduled tylenol, lidocaine patches.  ? Consider alternating topical voltaren gel during daytime (if he tolerates application) and lidocaine patches at night.  ? Plan for IR consult for ?steroid injection    Delirium/Agitation:   ? Recommend scheduling Zyprexa 2.5 mg ODT at bedtime, with additional 2.5 mg daily prn agitation. Haldol 2 mg iv q6h prn only if unable to administer oral medication  ? Would continue to monitor for pain and treat to prevent agitation.  ? Monitor PVR for urine  retention  ? Monitor for constipation and treat as needed  ? Delirium precautions  ? Agree with neurology consult    At risk for constipation:   ? Monitor for constipation  ? Senna 1 tablet BID prn and Bisacodyl suppository prn available. Given history of loose stool, did not schedule    Thank you for the opportunity to participate in the care of this patient and family. Our team: will continue to follow.     During regular M-F work hours (4693-2499) -- if you are not sure who specifically to contact -- please contact us in [facility] Smart Web.     After regular work hours and on weekends/holidays, you can call our answering service at [phone].       Yesica Pandey PA-C  [facility]  Contact information available via [facility] Paging/Directory      Attestation:  Total time on the floor involved in the patient's care: 35 minutes  Total time spent in counseling/care coordination: >50%      Assessments          Catalino Coronado is a 72 year old male with PMH Alzheimer's disease, stage IIIa kidney disease, BPH, hypertension who was brought to ED for evaluation of urinary retention and dementia with agitated behavior. Found to have L4 fracture and urine retention.    Today, the patient was seen for:  Alzheimer's dementia  L4 fracture s/p fall  Urine retention  Delirium  Pain    Prognosis, Goals, or Advance Care Planning was addressed today with: No.  Mood, coping, and/or meaning in the context of serious illness were addressed today: No.              Interval History:     Chart review/discussion with unit or clinical team members:   Per bedside RN, patient appears more somnolent today. Agitated last night again per report. Spoke with Dr. Collins from anesthesiology regarding pain management.    Per patient or family/caregivers today:  Visited Vahid. He was resting comfortably, appeared to be sleeping. Wife Leia at bedside. She relayed that sitter had noted timing of spasms of  Vahid's upper extremities which appear to be more frequent. She reported anesthesiology had visited and discussed ketamine infusion. Plan to also resume toradol. Plan for IR to attempt injection for pain control, although Leia understands that this may not be very effective for pain due to TP fracture. Plan for FCC again on Friday               Review of Systems:     Unable to obtain as patient with minimal verbal responses in setting of dementia with expressive aphasia          Medications:     I have reviewed this patient's medication profile and medications during this hospitalization.    Noted meds:    Oxycodone 5 mg po q4h with additional prn--total 22.5 mg in past 24h  Dilaudid total 2 mg iv in past 24h  Zanaflex x 1 in past 24h  No valium in past 24h  Zyprexa at bedtime not given (unable to take  No haldol in past 24h           Physical Exam:   Temp: 97.9  F (36.6  C) Temp src: Temporal BP: (!) 144/67 Pulse: 68   Resp: 16 SpO2: 95 %      Gen: Lying in bed. Appears comfortable, in NAD.  HEENT: NCAT. Conjunctiva clear. Sclera anicteric.  Resp: No increased work of breathing  Abd: soft, nt, nd  Msk: no gross deformity  Skin:  No new skin changes  Ext: warm, well perfused.   Neuro: Asleep  Mental status/Psych:  Calm.               Data Reviewed:       Reviewed recent labs, comments:   Cr 0.77

## 2022-11-30 NOTE — PLAN OF CARE
Goal Outcome Evaluation:      Plan of Care Reviewed With: patient    Overall Patient Progress: declining  Outcome Evaluation: Pt is alert to self, arouses to touch/voice. Pt very agitated this evening, not safe for oral meds, even sublingual for risk of aspirating. Replaced PIV, gave PRN Dilaudid to help facilitate comfort wth clean up and cares. Administered oral Oxy as this is a liquid and easy to swallow. Pt's has primafit on fo incontinence, had roughly ~200mls UOP overnight, no straight cath done. Care conference tomorrow 11/30, pt's wfe wants inpatient cortisone shot for localized pain, told to bring this up in CC. Had three loose, soft BMs overnight. Sitter at bedside. Continue with POC.

## 2022-11-30 NOTE — PROGRESS NOTES
SPIRITUAL HEALTH SERVICES  PALLIATIVE SPIRITUAL ASSESSMENT   [facility] ([place] 5A  Reason for Consult: [facility] Follow-Up    Summary and Recommendations:     Met with patient's wife Leia and daughter Nabila to check in and assess for emotional/spiritual needs.    Plan is to move Care Conference from 11/30 to 12/2, as neuro and interventional radiology consults will happen before then.     Will continue to follow while Palliative Care is consulted.     [facility] Palliative Care Inpatient Team Consult pager [phone] (M-F 8-4:30)  After-hours Answering Service [phone]      Patient/Family Understanding of Illness and Goals of Care: Plan of care at this time is to have neuro and interventional radiology consulted to help determine goals for patient. Patient has had some agitation at night, and has been seeing speech to help with swallow and nutrition.    Distress and Loss: Leia is expressing that she is focused on the lists and finding hope in the moments where patient is more oriented. She expressed overwhelm at the numerous changes and consults coming in the next few days, but also acknowledging that she wants to take one day at a time.    Strengths, Coping, and Resources: Leia finds great meaning in her extensive resources available to her, mainly in her family support system and in professional relationships with past colleagues whom she can turn to for support and guidance. Nabila has been very present in room and supporting her parents in this time.     Meaning, Beliefs, and Spirituality: Leia finds meaning in relationship with patient Vahid and that which strengthens her when she needs it. She expressed gratitude for a moment this morning when Vahid held her hand and squeezed it for a while, feeling his presence in the midst of all of these challenges.         Dia Caballero MDiv.   Palliative Chaplain Resident  Pager [phone]

## 2022-11-30 NOTE — PROGRESS NOTES
Pt very agitated this evening during cares, pt currently not safe to swallow pills, as he takes crushed pills best with ice cream. However, new diet orders do not allow ice cream. New PIV placed, requiring assistance of lift team to comfort pt during insertion. Pt appearing more comfortable after getting him repositioned, will continue to monitor for increasing agitation.

## 2022-11-30 NOTE — PROGRESS NOTES
Brief Neurology Note    I spoke with Dr. Reyes earlier today and acknowledged neurology consult order that was placed this morning. Unfortunately, due to numerous consults and limited house staff, we are unable to see patient until tomorrow morning. Primary team was OK with 1 day delay in neurology consult.     I did call patient's wife Leia this evening and explained this delay to her; she was very understanding and looks forward to meeting with our team tomorrow morning. Full consult note to follow in the AM.     Herb Hunt MD  PGY-4 Neurology Resident   P: [phone]

## 2022-11-30 NOTE — PROGRESS NOTES
[facility]    Medicine Progress Note - Hospitalist Service, GOLD TEAM 7    Date of Admission:  11/22/2022    Assessment & Plan      Catalino Coronado is a 72-year-old man with Alzheimer dementia here for pain and weakness, found to have L4 transverse spinous process fracture and urinary retention with urinary tract infection    Conference: now scheduled 12/2/22  Met with wife at bedside, decided to reschedule care conference on Friday, when we have input from Neurology on delirium and IR for pain control.         Delirium likely due to pain, fall, hospitalization on top of underlying advanced dementia with Coginitive impairment   Minimally verbal at recent baseline at home but he is usually able to communicate with his wife by gestures about bathroom needs. He had been incontinent for a few days prior to this admission.   However has had acute decline since after fall and unable to communicate at all  Plan:   So far no etiology for organic cause   Cosnulted neurology, will appreciate evaluation   B12 and TSH sent   Goal is to control pain, appreciate palliative care and pain medicine assistance   Continue Olanzapine scheduled at bedside, prn during the day for agitation   IV Haldol 2 mg q6h prn   IV Valium 2.5 q8h as needed for spasm          Acute back pain secondary to L4 transverse spinous process fracture from a fall at home  CT lumbar spine revealed an acute L4 fracture, no retropulsion.   Neurosurgery recommended conservative treatment and no brace.   - Appreciate palliative and pain medicine assistance with pain  - Pain medicine also discussed case with IR, IR could not do transverse injection and do not think pain is coming from area  Plan:   Patient is currently on oxycodone 20 mg q 4 hrs scheduled, Dilaudid 0.5 mg qhrs prn   Also started on ketamine infusion 5 mg/ hr as per pain medicine 11/30-   Also started on toradol 15 mg q6 hrs as needed   PT/OT consulted for  mobility      Poor oral intake  - Appreciate nutrition consultation  - Calorie counts in progress  - Family is thinking about risks/benefits of NG tube in this situation. This will be followed at Friday family meeting. Per discussion, maintain IV hydration in the meantime.      Alzheimer's disease   Generalized weakness with frequent falls  -Continue home regimen of Aricept 5mg once daily  -Continue Lexapro 20mg once daily  -Continue home regimen of Namenda 10mg by mouth twice daily  -Continue Seroquel 12.5mg by mouth once daily  -PT/OT consult to assess safety to return home        Hypokalemia: resolved  Urinary retention, resolved  BPH:Continue home regimen of Flomax 0.4mg once daily  Urine culture negative  Stage IIIa kidney disease    -   Hypertension  -Continue home regimen of Atenolol 100mg once daily     Hyperlipidemia  -Continue home regimen of Zocor 40mg by mouth once daily at bedtime           Diet: Pureed Diet (level 4) Thin Liquids (level 0) (1:1 supervision)    DVT Prophylaxis: Heparin SQ  Muhammad Catheter: Not present  Central Lines: None  Cardiac Monitoring: None  Code Status: No CPR- Do NOT Intubate      Disposition Plan      Expected Discharge Date: 12/02/2022      Destination: home with family;home with help/services  Discharge Comments: Planning for care conference 3pm today with primary, palliative, would appreciate care management involvement. This will determine disposition.        The patient's care was discussed with the Patient and Patient's Family.    ARIEL MARIE MD  Hospitalist Service, [facility] TEAM [address] [place] [address]  Securely message with the Vocera Web Console (learn more here)  Text page via [facility] Paging/Directory   Please see signed in provider for up to date coverage information      Clinically Significant Risk Factors        # Hypokalemia: Lowest K = 3.3 mmol/L (Ref range: 3.4-5.3) in last 2 days, will replace as needed                #  "Overweight: Estimated body mass index is 26.95 kg/m  as calculated from the following:    Height as of 11/2/22: 1.905 m (6' 3\").    Weight as of this encounter: 97.8 kg (215 lb 9.6 oz).          ______________________________________________________________________    Interval History   Saw patient twice today, he was awake and would answer yes to some question, however unable to follow commands or have any conversation.   Met with wife at bedside. Care plan discussed.   Review o system incomplete.     Data reviewed today: I reviewed all medications, new labs and imaging results over the last 24 hours. I personally reviewed no images or EKG's today.    Physical Exam   Vital Signs: Temp: 98.1  F (36.7  C) Temp src: Axillary BP: (!) 142/68 Pulse: 77   Resp: 16 SpO2: 97 % O2 Device: None (Room air)    Weight: 215 lbs 9.6 oz  General Appearance: AO x1, drowsy  Respiratory: clear to auscultation,  Cardiovascular: S1 and S2 normal  GI: non tender, non distended  Skin: no skin rash  Other: Mittens in place     Data   Recent Labs   Lab 11/30/22  0631 11/29/22  1735 11/29/22  0546 11/28/22  1420 11/28/22  0530 11/27/22  0722 11/26/22  0657 11/25/22  0541 11/24/22  0551   WBC 9.9  --   --   --   --  9.2  --   --  9.1   HGB 12.7*  --   --   --   --  12.9*  --  12.1* 13.1*   MCV 93  --   --   --   --  91  --   --  95     --   --   --  317 330  --  292 341     --  140  --  141 141   < > 144 144   POTASSIUM 3.3* 4.0 3.3*   < > 3.2* 3.6   < > 3.5 3.7   CHLORIDE 109*  --  108*  --  109* 109*   < > 109* 110*   CO2 20*  --  19*  --  21* 18*   < > 21* 18*   BUN 20.6  --  17.1  --  19.2 23.4*   < > 41.1* 44.4*   CR 0.77  --  0.74  --  0.74 0.78   < > 0.95 0.99   ANIONGAP 12  --  13  --  11 14   < > 14 16*   CANDE 8.5*  --  8.6*  --  8.2* 8.2*   < > 8.6* 8.9   *  --  115*  --  109* 98   < > 97 78   ALBUMIN  --   --  3.5  --   --   --   --   --   --    PROTTOTAL  --   --  6.4  --   --   --   --   --   --    BILITOTAL  " --   --  0.6  --   --   --   --   --   --    ALKPHOS  --   --  90  --   --   --   --   --   --    ALT  --   --  41  --   --   --   --   --   --    AST  --   --  40  --   --   --   --   --   --     < > = values in this interval not displayed.     No results found for this or any previous visit (from the past 24 hour(s)).  Medications     dextrose 5% and 0.9% NaCl       ketamine 2 mg/mL ADULT         acetaminophen  975 mg Oral TID     atenolol  100 mg Oral Daily     calcipotriene   Topical BID     donepezil  5 mg Oral Daily     escitalopram  20 mg Oral Daily     heparin ANTICOAGULANT  5,000 Units Subcutaneous Q12H     lidocaine  1 patch Transdermal Q24H     lidocaine   Transdermal Q8H [place]     melatonin  5 mg Sublingual QPM     memantine  10 mg Oral BID     multivitamin, therapeutic  1 tablet Oral Daily     OLANZapine zydis  2.5 mg Oral At Bedtime     oxyCODONE  5 mg Sublingual Q4H     simvastatin  40 mg Oral At Bedtime     sodium chloride (PF)  3 mL Intracatheter Q8H     tamsulosin  0.4 mg Oral Daily

## 2022-12-01 NOTE — PLAN OF CARE
Goal Outcome Evaluation:      Plan of Care Reviewed With: spouse    Overall Patient Progress: no changeOverall Patient Progress: no change    Outcome Evaluation: VSS on RA. Disoriented x 4.1:1 in place for safety & line pulling. Mitts in place as well. Pt having pain in back- PRN & scheduled meds given. Pt switched back to purred diet w/ thin liquids- pt able to eat bowl of cream of wheat. Meds crushed with ice cream. Incontinent. BM x3, good urine output. IVMF switched to D5NS @ 75 ml/hr. Ketamine gtt started today. Family at bedside today. Plan for care conference Friday.

## 2022-12-01 NOTE — CONSULTS
[facility]  Neurology Consultation    Patient Name:  Catalino Coronado  MRN:  [id number]    :  1950  Date of Service:  2022  Primary care provider:  Davis Aguilar      Neurology consultation service was asked to see Catalino Coronado by Dr. Reyes to evaluate encephalopathy.    Chief Complaint:  weakness    History of Present Illness:   Catalino Coronado is a 72 y.o. 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 He follows as an outpatient with Gabriel Hensley MD in Neurology. He is a former [employer] . Per Dr. Hensley's 2022 note: In brief, he developed progressive loss in short term memory starting around 2016. This has gradually worsened to the point where he has trouble following 2 step commands with significant receptive and expressive aphasia.    He initially presented to the ED 2022 after a ground-level fall without LOC after tripping on a vacuum at home. He did hit his head when he fell. Was in his normal state of health prior. CT Head negative for acute pathology. He was discharged home. He had back pain at home and became progressively agitated since being back at home. Returned to the ED 2022 and admitted with pain and weakness, found to have L4 transverse spinous process fracture (NSGY recommending conservative treatment), urinary retention (resolved), and question of UTI (urine culture negative, received 2 doses of ceftriaxone). He is minimally verbal at baseline. Has had delirium during the hospital stay.      Pain service and Palliative been involved. Has been receiving oxycodone prn and IV hydromorphone 0.5mg q2h prn (receiving around 2-3 times per day lately). Also has available haloperidol IV 2mg prn, diazepam IV 2.5mg prn, olanzapine po 2.5mg prn. Pain team recommending low-dose ketamine infusion and  Toradol IV prn.      Other pta medications that have been continued this hospital stay include:   - donepezil 5mg daily  - escitalopram 20mg daily  - memantine 10mg bid (occasionally not getting evening dose)     Notable tests include:   CT Head w/o contrast 11/22/2022  1. No acute intracranial pathology.  2. Unchanged periventricular white matter changes suggestive of  chronic small vessel ischemic disease.   3. Moderate cerebral atrophy.     CT Lumbar Spine w/o contrast 11/22/2022  1.  Subacute left L4 transverse process fracture which was seen on  11/13/2022 after retrospective second look. This could be related to  recent trauma when correlated with history.  2. Multilevel lumbar spondylosis detailed above. Most significant  finding at L3-4 with degenerative changes causing moderate severe spinal canal stenosis.     MR Brain w/o contrast 12/24/2021  Impression:  Worsening diffuse cerebral volume loss compared with MRI from  4/3/2018.     Vitamin B12 11/30/2022- 1,170     TSH 11/30/2022- 0.97     Neurology consulted to evaluate delirium/dementia/reversible causes further in preparation for a goals of care conference.     Unable to obtain history form patient. However, able to talk with patient's wife who mentions that he has had spells during this hospital stay where he puts his arms up and they shake and he is not responsive. Spells have not happened as much since he has been on ketamine. She also mentions that how he is now is not his baseline. At baseline prior to the fall he would ambulate, eat independently, laugh appropriately to funny comments, and nod.     ROS  Unable to obtain given patient minimally verbal, encephalopathy.     PMH  Past Medical History:   Diagnosis Date     BPH (benign prostatic hyperplasia)      Cataracts, bilateral      Cerebral aneurysm, nonruptured      Dementia (H)      Hypercholesterolemia      Hypertension      Past Surgical History:   Procedure Laterality Date     APPENDECTOMY    "    BIOPSY OF SKIN LESION       EXCISE MASS BACK  2/8/2013    Procedure: EXCISE MASS BACK;  Excision of Sebaceous Back Cyst ;  Surgeon: Sean Randhawa MD;  Location: [facility] OR     PHACOEMULSIFICATION CLEAR CORNEA WITH STANDARD INTRAOCULAR LENS IMPLANT Right 10/22/2018    Procedure: Right Eye Phacoemulsification with Standard Intraocular Lens Placement;  Surgeon: Elio Manzo MD;  Location: [facility] OR     PHACOEMULSIFICATION WITH STANDARD INTRAOCULAR LENS IMPLANT Left 10/8/2018    Procedure: PHACOEMULSIFICATION WITH STANDARD INTRAOCULAR LENS IMPLANT;  Left Eye Phacoemulsification with Intraocular Lens;  Surgeon: Elio Manzo MD;  Location: [facility] OR     pilonidal cyst removal          Medications   I have personally reviewed the patient's medication list.     Allergies  I have personally reviewed the patient's allergy list.     Social History  Social history could not be obtained due to mental status    Family History    Unable to obtain due to patient factors - mental status      Physical Examination   Vitals: BP (!) 152/62 (BP Location: Right arm)   Pulse 68   Temp 98  F (36.7  C) (Axillary)   Resp 15   Wt 97.8 kg (215 lb 9.6 oz)   SpO2 95%   BMI 26.95 kg/m    General: Lying in bed, NAD, eyes closed  Head: NC/AT  Eyes: no icterus, op pink and moist  Cardiac: RRR. Extremities warm, no edema.   Respiratory: non-labored on RA  GI: S/NT/ND  Skin: No rash or lesion on exposed skin  Psych: minimally responsive, occasionally answers questions with brief answers (\"ok,\" \"yes\")  Neuro:  Mental status: Awake, alert, intermittently attentive. Oriented to name (\"Vahid\") but does not answer other orientation questions.   Cranial nerves: PERRL (pupils small bilaterally), conjugate gaze, EOMI, face symmetric. Unable to follow commands to test other CNs  Motor: Normal bulk and tone. Moves all extremities spontaneously and withdraws extremities to stimuli. Not able to formally test strength secondary to patient " participation.   Reflexes: Normo-reflexic and symmetric biceps, brachioradialis, triceps, patellae, and achilles.   Sensory: Unable to assess  Coordination: Unable to assess  Gait: Unable to assess    Investigations   I have personally reviewed pertinent labs, tests, and radiological imaging. Discussion of notable findings is included under Impression.     Was patient transferred from outside hospital?   No    Impression  #Acute toxic metabolic encephalopathy  #Alzheimer's disease  Vahid Coronado is a 72 y.o. 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 Had a fall 11/12/2022 at ground level with head trauma and no LOC. Has had progressive back pain and agitation since that time up until admission 11/22/2022 where he was found to have L4 transverse spinous process fracture (NSGY recommending conservative treatment). There was a question about whether he had a UTI, but urine culture grew only urogenital tatum (received 2 doses of ceftriaxone before antibiotics discontinued). Has had continued delirium during hospital stay and has been functioning below baseline (minimally interactive, not ambulating or eating independently). The encephalopathy is most likely in the setting of traumatic delirium on a vulnerable brain with Alzheimer's disease. Additional contributing factors likely include pain and side effects from opioid pain medications and ketamine. He has been noted to have brief episodes during this hospital stay where his arms shake and he becomes less responsive. To rule out potential reversible causes of encephalopathy, would recommend EEG to evaluate for seizures (which are more common in people with Alzheimer's disease). If the encephalopathy is in fact from delirium, it is difficult to predict when it will improve (can take weeks to months), and it is difficult to  tell if he does recover, if he would ever get back to close to his baseline function.     Recommendations  - video EEG for 3 hours (ordered for you)    Thank you for involving Neurology in the care of Catalino Coronado.  Please do not hesitate to call with questions/concerns (consult pager [phone]).      Patient was seen and discussed with Dr. Flip Blair.    Allan Lara MD  Internal Medicine PGY2

## 2022-12-01 NOTE — PROGRESS NOTES
PALLIATIVE CARE SOCIAL WORK Progress Note   [facility] [address] [facility]    REFERRAL SOURCE: Palliative Care; follow up for decisional and emotional support for family    PCSW visited with pt's wife Leia at bedside this afternoon. Leia expressed relief to see symptom (pain/agitation) control with ketamine.     Leia engaged in life review conversation today. Leia shared that the summer between her freshman and sophomore year of college she needed a summer job. After browsing a job catalog at her school library, she boarded a bus to [place], [place] and worked at [employer] on [address]. 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   Vahid moved back to [place] with Leia and the couple has been  nearly 52 years. Vahid and Leia were able to return to the resort last year with their family to have a belated 50th wedding anniversary vacation; Leia expressed her gratitude during today's visit that they were able to experience that trip before Vahid declined further.     At time of visit, ketamine had been turned off due to EEG, mild restlessness observed, verbal redirection not effective. Bedside nurse administered sublingual Oxycodone.    Leia shared that she does not anticipate moving forward with a feeding tube. She noted that with symptoms better controlled, she was able to feed Vahid some Cream of Wheat; while she knows he is not eating enough to sustain him long term, she believes he'll eat enough for them to have some quality, family time at home. Goal remains to bring Vahid home with services.    Plan: PCSW will continue to follow while palliative care team involved; care conference planned for Fri 12/2 3pm.    LISA Floyd, [facility]  Palliative Care   [facility] Inpatient Team Consult pager [phone] (M-F 8-4:30)  After-hours Answering Service [phone]

## 2022-12-01 NOTE — PROGRESS NOTES
[facility]    Medicine Progress Note - Hospitalist Service, GOLD TEAM 7    Date of Admission:  11/22/2022    Assessment & Plan     Catalino Coronado is a 72-year-old man with Alzheimer dementia here for pain and weakness, found to have L4 transverse spinous process fracture and urinary retention with urinary tract infection    Conference: now scheduled 12/2/22 : 11:00 am. Neurology and Palliative notified. Confirmed the timing with wife and she agreed         Delirium likely due to pain, fall, hospitalization on top of underlying advanced dementia with Coginitive impairment   Minimally verbal at recent baseline at home but he is usually able to communicate with his wife by gestures about bathroom needs. He had been incontinent for a few days prior to this admission.   However has had acute decline since after fall and unable to communicate at all  B12 and TSH WNL  Plan:   So far no etiology for organic cause   Consulted neurology, EEG today   Goal is to control pain, appreciate palliative care and pain medicine assistance   Continue Olanzapine scheduled at bedside, prn during the day for agitation   IV Haldol 2 mg q6h prn   IV Valium 2.5 q8h as needed for spasm          Acute back pain secondary to L4 transverse spinous process fracture from a fall at home  CT lumbar spine revealed an acute L4 fracture, no retropulsion.   Neurosurgery recommended conservative treatment and no brace.   - Appreciate palliative and pain medicine assistance with pain  - Pain medicine also discussed case with IR, IR could not do transverse injection and do not think pain is coming from area  Plan:   Patient is currently on oxycodone 20 mg q 4 hrs scheduled,  Dilaudid 0.5 mg qhrs prn   Also started on ketamine infusion 5 mg/ hr as per pain medicine 11/30-   Also started on toradol 15 mg q6 hrs as needed   PT/OT consulted for mobility      Poor oral intake  - Appreciate nutrition consultation  -  Calorie counts in progress  - Family do not anticipate moving forward with feeding tube. This will be followed at Friday family meeting.   -maintain IV hydration in the meantime.      Alzheimer's disease   Generalized weakness with frequent falls  -Continue home regimen of Aricept 5mg once daily  -Continue Lexapro 20mg once daily  -Continue home regimen of Namenda 10mg by mouth twice daily  -Continue Seroquel 12.5mg by mouth once daily  -PT/OT consult to assess safety to return home        Hypokalemia: resolved  Urinary retention, resolved  BPH:Continue home regimen of Flomax 0.4mg once daily  Urine culture negative  Stage IIIa kidney disease    -   Hypertension  -Continue home regimen of Atenolol 100mg once daily     Hyperlipidemia  -Continue home regimen of Zocor 40mg by mouth once daily at bedtime           Diet: Pureed Diet (level 4) Thin Liquids (level 0) (1:1 supervision)    DVT Prophylaxis: Heparin SQ  Muhammad Catheter: Not present  Central Lines: None  Cardiac Monitoring: None  Code Status: No CPR- Do NOT Intubate      Disposition Plan      Expected Discharge Date: 12/07/2022      Destination: home with family;home with help/services  Discharge Comments: Planning for care conference 3pm today with primary, palliative, would appreciate care management involvement. This will determine disposition.        The patient's care was discussed with the Patient and Patient's Family.    ARIEL MARIE MD  Hospitalist Service, [address]  Securely message with the Vocera Web Console (learn more here)  Text page via [facility] Paging/Directory   Please see signed in provider for up to date coverage information      Clinically Significant Risk Factors        # Hypokalemia: Lowest K = 3.3 mmol/L (Ref range: 3.4-5.3) in last 2 days, will replace as needed                # Overweight: Estimated body mass index is 26.95 kg/m  as calculated from the following:    Height as  "of 11/2/22: 1.905 m (6' 3\").    Weight as of this encounter: 97.8 kg (215 lb 9.6 oz).          ______________________________________________________________________    Interval History   Wife at bedside. Neurology team had seen patient and talked to wife.   Wife will be available for meeting tomorrow.   Patient remains confused with intermittent agitation. EEG in progress  Review of system incomplete.     Data reviewed today: I reviewed all medications, new labs and imaging results over the last 24 hours. I personally reviewed no images or EKG's today.    Physical Exam   Vital Signs: Temp: 98  F (36.7  C) Temp src: Axillary BP: (!) 144/62 Pulse: 68   Resp: 15 SpO2: 95 % O2 Device: None (Room air)    Weight: 215 lbs 9.6 oz  General Appearance: AO x1, drowsy  Respiratory: clear to auscultation,  Cardiovascular: S1 and S2 normal  GI: non tender, non distended  Skin: no skin rash  Other: Mittens in place     Data   Recent Labs   Lab 12/01/22  0632 11/30/22  0631 11/29/22  1735 11/29/22  0546 11/28/22  1420 11/28/22  0530 11/27/22  0722 11/26/22  0657 11/25/22  0541   WBC  --  9.9  --   --   --   --  9.2  --   --    HGB  --  12.7*  --   --   --   --  12.9*  --  12.1*   MCV  --  93  --   --   --   --  91  --   --     343  --   --   --  317 330  --  292    141  --  140  --  141 141   < > 144   POTASSIUM 3.3* 3.3* 4.0 3.3*   < > 3.2* 3.6   < > 3.5   CHLORIDE 109* 109*  --  108*  --  109* 109*   < > 109*   CO2 19* 20*  --  19*  --  21* 18*   < > 21*   BUN 17.6 20.6  --  17.1  --  19.2 23.4*   < > 41.1*   CR 0.80 0.77  --  0.74  --  0.74 0.78   < > 0.95   ANIONGAP 14 12  --  13  --  11 14   < > 14   CANDE 8.3* 8.5*  --  8.6*  --  8.2* 8.2*   < > 8.6*   GLC 91 106*  --  115*  --  109* 98   < > 97   ALBUMIN  --   --   --  3.5  --   --   --   --   --    PROTTOTAL  --   --   --  6.4  --   --   --   --   --    BILITOTAL  --   --   --  0.6  --   --   --   --   --    ALKPHOS  --   --   --  90  --   --   --   --   --  "   ALT  --   --   --  41  --   --   --   --   --    AST  --   --   --  40  --   --   --   --   --     < > = values in this interval not displayed.     No results found for this or any previous visit (from the past 24 hour(s)).  Medications     dextrose 5% and 0.9% NaCl 75 mL/hr at 12/01/22 0948     ketamine 2 mg/mL ADULT Stopped (12/01/22 1210)       acetaminophen  975 mg Oral TID     atenolol  100 mg Oral Daily     calcipotriene   Topical BID     donepezil  5 mg Oral Daily     escitalopram  20 mg Oral Daily     heparin ANTICOAGULANT  5,000 Units Subcutaneous Q12H     lidocaine  1 patch Transdermal Q24H     lidocaine   Transdermal Q8H [place]     melatonin  5 mg Sublingual QPM     memantine  10 mg Oral BID     multivitamin, therapeutic  1 tablet Oral Daily     OLANZapine zydis  2.5 mg Oral At Bedtime     oxyCODONE  2.5-5 mg Sublingual Q4H     pantoprazole  40 mg Intravenous Daily with breakfast     simvastatin  40 mg Oral At Bedtime     sodium chloride (PF)  3 mL Intracatheter Q8H     tamsulosin  0.4 mg Oral Daily

## 2022-12-01 NOTE — PLAN OF CARE
Goal Outcome Evaluation: 1900-2330      Plan of Care Reviewed With: patient    Overall Patient Progress: no change  Outcome Evaluation: Pt calm for most of the shift, unless doing incontience cares, that is when pt is agitated most. Attempted oral meds, but pt become increasing anxious and had a large BM. Gave PRN haldol and scheduled Oxy for relief of anxiety, this did not seem to calm him. Also, repositioning is very painful for him, currently laying on left side with support from pillows. Had three loose BMs, new primafit applied for urinary incontinence. Vitals are stable, pt is alert to self/name, disoriented to place, time and situation. Ketamine drip in place, D5NS running at 75ml/hr in L PIV. Care conference scheduled for Friday with primary and consulted providers. Continue with POC. Sitter at bedside for safety, mitts on bilater hands for line pulling.

## 2022-12-01 NOTE — PLAN OF CARE
Goal Outcome Evaluation:    Plan of Care Reviewed With: patient    Overall Patient Progress: no change    Outcome Evaluation: Pt appears to be asleep and calm most of shift. While doing incontinence cares patient becomes agitated, to ease this and help with pain dilaudid given 1x. 2 loose BM overnight. Primafit in place. Repositioned after incontinence cares to minimize agitation with turning. Ketamine drip continues. D5NS at 75ml/hr. Sitter at bedside for safety, bilateral mitts on hands for line pulling. Care conference scheduled for Friday. Continue with POC.

## 2022-12-01 NOTE — PLAN OF CARE
Goal Outcome Evaluation:      Plan of Care Reviewed With: patient    Overall Patient Progress: no changeOverall Patient Progress: no change    Outcome Evaluation: A&Ox1 to self. wife Leia at bedside, calming presence. pain controlled with continuous ketamine, scheduled oxycodone, and PRN oxycodone. given PRN IV valium x1. agitation when turned for cleaning, repositioning. EEG this afternoon. able to take morning meds with help of wife in ice cream. plan for care conference tomorrow 12/2 at 1100. time of conference moved so neuro team can be present   Straining to urinate and pass bowel movements

## 2022-12-02 NOTE — PLAN OF CARE
Goal Outcome Evaluation:      Plan of Care Reviewed With: patient    Overall Patient Progress: no changeOverall Patient Progress: no change    Outcome Evaluation: Patient appeared comfortable and sleeping most of shift, only agitated when incontinence cares occur. AO to self only, responds to name. Pt on cont. ketamine Y sited with D5NS. Dilaudid given 2x to premedicate for incontinence care/repositioning. Scheduled oxy given q4. Primafit in place with good output. Incontinent of loose brown BM frequently. Turned and repositioned. Most evening meds not given due to patient not safe to swallow. Gave sublingual meds and some meds with mashed potatoes, swallowed fine. Care conference 12/2 at 11am.

## 2022-12-02 NOTE — PROGRESS NOTES
Brief SW Note:  SW informed by Therapy Team that they will not be present at pts care conference today.  They report safety concerns for staff due to pts behaviors.  They also report that they will need to hold on providing services to pt due to uncooperative behaviors and safety concerns.     @0937AM KHOI paged Dr. Garcias via [facility] informing her of Therapy's teams concerns.     will continue to follow for discharge planning, support, and resources.    LISA Brito, [facility]  [address]   Office: [phone]   Pager: [phone]  [email or web address]

## 2022-12-02 NOTE — PLAN OF CARE
Goal Outcome Evaluation:           Overall Patient Progress: no changeOverall Patient Progress: no change    Outcome Evaluation: Ongoing poor PO intakes.  Ongoing nutrition POC pending overall GOC.

## 2022-12-02 NOTE — PLAN OF CARE
Goal Outcome Evaluation:      Plan of Care Reviewed With: patient    Overall Patient Progress: no changeOverall Patient Progress: no change    Outcome Evaluation: Patient disoriented x3-4, sitter at bedside for intermittent agitation and line pulling. Assisted pt with hygeine cares, self cares, and PIP. Care conferene this AM. K replaced IV, patient strongly declined PO potassium. Abdominal XR complete to evaluate stool burden, indicated small bowel enteritis. Patient declined daily weight. Pt agreeable to some of his scheduled AM meds

## 2022-12-02 NOTE — PROGRESS NOTES
[facility]  Palliative Care Daily Progress Note       Recommendations & Counseling     GOALS OF CARE    FCC today. Family has decided against any feeding tube, and this is consistent with his expressed wishes in the past. Goal is to discharge home if we can get his pain, agitation under control with oral medications. They understand that if the goal is home, we may need to more aggressively administer medications that can cause sedation and potentially cause more risk, such as aspiration. Given that patient has a terminal disease (dementia), the priority has become to maximize time at home over life-prolongation if that time cannot be at home. As such, family is agreeable with plan to discharge home with hospice. They remain hopeful that his delirium will improve and that he may recover at least partially back to his prior baseline, but they also voiced understanding that he may continue to have episodes of agitation requiring more liberal use of medications for pain, anxiety, agitation. They understand that the philosophy of hospice is symptom management and quality of life, and not returning to the hospital unless symptoms cannot be adequately managed at home.     SYMPTOM MANAGEMENT    Pain: difficult to assess as patient with expressive aphasia and underlying dementia with delirium. EEG w/o signs of seizure, and no correlation to witnessed episodes of upper extremity spasms.  ? Continue oxycodone 2.5-5mg SL q4h, give lower dose if he appears comfortable and to hold if somnolent. Additional oxycodone 2.5 mg SL q4h prn available for breakthrough pain. As he has been requiring 0.5 mg iv dilaudid during cares due to increased pain, recommend giving Oxycodone SL 20-30 minutes prior to cares or therapy. Avoid iv dilaudid if possible.  ? Per pain team recommending trial of low-dose ketamine infusion and toradol iv prn. Appreciate pain team assistance. Recommend contacting  anesthesiology/pain team as to recommendations when to discontinue ketamine infusion.  ? As neurology feels spasms may be cortical myoclonus, discontinued Zanaflex. While neurology starting medications to treat this, will have Ativan 0.5 mg po/iv q6h prn spasms, anxiety available only if refractory to other medications  ? Continue scheduled tylenol, lidocaine patches.  ? Consider alternating topical voltaren gel during daytime (if he tolerates application) and lidocaine patches at night.    Delirium/Agitation:   ? Zyprexa 2.5 mg BID prn. Haldol 2 mg iv q6h prn only if unable to administer oral medication  ? Would continue to monitor for pain and treat to prevent agitation.  ? Monitor PVR for urine retention  ? Monitor for constipation and treat as needed  ? Delirium precautions  ? Per neurology, patient may be showing signs of cortical myoclonus and starting valproic acid 250 mg BID.    At risk for constipation:   ? Monitor for constipation  ? Agree with abdominal XR due to wife's concern that liquid stools may be due to overflow constipation.   ? Senna 1 tablet BID prn and Bisacodyl suppository prn available. Given history of loose stool, did not schedule    New POLST completed    Thank you for the opportunity to participate in the care of this patient and family. Our team: will continue to follow.     During regular M-F work hours (2846-2846) -- if you are not sure who specifically to contact -- please contact us in [facility] Smart Web.     After regular work hours and on weekends/holidays, you can call our answering service at [phone].       Yesica Pandey PA-C  [facility]  Contact information available via [facility] Paging/Directory      Attestation:  Total time on the floor involved in the patient's care: 85 minutes  Total time spent in counseling/care coordination: >50%, including 60 minutes in FCC      Assessments          Catalino Coronado is a 72 year old male  with PMH Alzheimer's disease, stage IIIa kidney disease, BPH, hypertension who was brought to ED for evaluation of urinary retention and dementia with agitated behavior. Found to have L4 fracture and urine retention.    Today, the patient was seen for:  Alzheimer's dementia  L4 fracture s/p fall  Urine retention  Delirium  Pain    Prognosis, Goals, or Advance Care Planning was addressed today with: Yes.  Mood, coping, and/or meaning in the context of serious illness were addressed today: Yes.              Interval History:     Chart review/discussion with unit or clinical team members:   EEG without signs of seizure. Per RN, patient agitated and pulled PIV.    Per patient or family/caregivers today:  Visited Vahid. He was resting comfortably after being given zyprexa ODT.     FCC today with patient's wife Leia, granddaughter Tacho by phone, and daughter Sophia by telephone. Dr. Self (hospitalist) and Dr. Hunt (neurology) also present. Leia again shared how difficult emotionally it has been to witness Vahid's rapid decline from a few weeks ago. Her hopes are to continue to try anything that can help him improve enough to return home and have some quality of life. Per wife Leia, Vahid has been much calmer, with less episodes of upper extremity spasms/fist clenching while on ketamine. As infusion had to be stopped for EEG, she noticed episodes returned. Per neurology, EEG did not show any seizure activity at times when these episodes occurred. No signs of seizure or irritability on EEG. Discussion that spasms could be cortical myoclonus and potential to try medication for this in hopes to be able to discontinue ketamine infusion. Although Vahid was alert and able to eat some ice cream today, his oral intake still remains poor. At times he is not able to take oral medications, due to agitation or concerns of aspiration. Leia has decided against a feeding tube, even a temporary NGT as family all have concerns  that he would pull it out and that his expressed wishes during a conversation a few weeks ago were no feeding tubes. Goal remains bringing him home, where he has always wished to remain. Discussion that while delirium may improve once he is home, he may still have episodes as he did prior to admission where his behavior becomes agitated and he may be a danger to himself or his family given how combative he can become. Discussed concern that even if we were able to get him off all iv medications and have his pain, agitation managed with oral medications, that he is sometimes resistant to taking oral medications or too agitated to take them. Discussed that we could be more aggressive with medications to keep him calm, but these could lead to more sedation. Discussed that this may conflict with goal of having him alert and increasing his oral intake enough to maintain his caloric needs. Discussed that those medications could also increase risk of aspiration which could lead to pneumonia and shortened life span. Discussed that there would be risk of him returning to the hospital if symptoms weren't controlled and that might mean he would need a facility with higher level of care. Discussed also that declining interest in food was part of natural progression of dementia, and that we anticipate his health may continue to decline if he is not able to eat enough, even with assistance of family feeding him to maintain his nutritional needs. Family feel that the priority is having Vahid at home and keeping him home, even if that means giving him medications that may be more sedating. Daughter asked if this was ethical. Discussed that since we know that Alzheimer's is a terminal disease, then giving medications to keep him pain free and calm with hospice care was not unethical, especially if they feel that would be consistent with what he would wish for himself. After further discussion, plan to try to adjust medications during  the next few days with the goal of managing his symptoms on oral medications and discharging home with hospice.            Review of Systems:     Unable to obtain as patient with minimal verbal responses in setting of dementia with expressive aphasia          Medications:     I have reviewed this patient's medication profile and medications during this hospitalization.    Noted meds:    Oxycodone 5 mg po q4h with additional prn--total 22.5 mg in past 24h  Dilaudid total 1 mg iv in past 24h  Zanaflex no recent use  Valium 2.5 mg iv q8h prn--last dose 16:34  Zyprexa at bedtime not given (unable to take  No haldol in past 24h           Physical Exam:   Temp: 97.7  F (36.5  C) Temp src: Axillary BP: (!) 140/66 Pulse: 65   Resp: 16 SpO2: 94 % O2 Device: None (Room air)    Gen: Lying in bed. Appears comfortable, in NAD.  HEENT: NCAT. Conjunctiva clear. Sclera anicteric.  Resp: No increased work of breathing  Abd: soft, nt, nd  Msk: no gross deformity  Skin:  No new skin changes  Ext: warm, well perfused.   Neuro: Asleep  Mental status/Psych:  Calm.               Data Reviewed:       Reviewed recent labs, comments:   Cr 0.82

## 2022-12-02 NOTE — PROVIDER NOTIFICATION
Provider notified- patient very agitated. Loss of IV access. FRANCISCO zyprexa given    Update: pt became more calm with cares, writer did not have to initiate restraints. Patient still yelling, kicking, and combative with turns but staff able to redirect and assist with cares

## 2022-12-02 NOTE — PROGRESS NOTES
[facility]  Neurology Consultation - Progress Note    Patient Name:  Catalino Coronado  Date of Service:  December 2, 2022    Subjective:    Patient sleeping peacefully. No family at bedside. Some agitation later this morning, especially around hygiene cares.     Objective:    Vitals: BP (!) 140/66 (BP Location: Right arm)   Pulse 65   Temp 97.7  F (36.5  C) (Axillary)   Resp 16   Wt 97.8 kg (215 lb 9.6 oz)   SpO2 94%   BMI 26.95 kg/m    General: Lying in bed, NAD  Head: Atraumatic, normocephalic   Cardiac: no lower extremity edema  Neurologic:  Sleeping. Withdraws to noxious stimuli x 4 extremities.     Pertinent Investigations:    I have personally reviewed most recent and pertinent labs, tests, and radiological images.     Impression  #Alzheimer's Disease  #Delirium, multifactorial  #Cortical myoclonus    Vahid Coronado is a 72 y.o. 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 Patient sustained a fall on 11/12/2022 and has had progressive back pain and agitation since that time up until admission 11/22/2022 where he was found to have L4 transverse spinous process fracture (NSGY recommending conservative treatment). Unfortunately, over the course of this hospital stay, he has been functioning below baseline and is minimally interactive, bed-bound, and not eating well.     The encephalopathy is most likely in the setting of traumatic delirium on a vulnerable brain with Alzheimer's disease. Additional contributing factors likely include pain and side effects from opioid pain medications and ketamine. He has been noted to have brief episodes during this hospital stay where his arms shake and he becomes less responsive. 3 hour  video EEG was negative for seizures or epileptiform discharges. B12, TSH were normal. Overall, low suspicion for a primary neurological cause to delirium (ex. Reversible dementia). It is hard to determine when/if patient will return back to his neurological baseline prior to 11/12, but family has indicated that their goals are largely comfort-based rather than restorative. Regarding abnormal movements, I wonder if this is cortical myoclonus. Depakote can be helpful for this and may also help to soothe some of his agitated behaviors.      Recommendations  - Begin valproic acid 250 mg BID (ordered for you)  - We will re-evaluate patient on Sunday  - Delirium precautions  - Neurology is available for any additional care conferences.       Thank you for involving Neurology in the care of Catalino Coronado.  Please do not hesitate to call with questions/concerns (consult pager [phone]).      Patient was seen and discussed with Dr. Blair.     Herb Hunt MD  PGY-4 Neurology Resident   P: [phone]

## 2022-12-02 NOTE — PROGRESS NOTES
RN attempted to administer daily PO medications at 0830, patient uncooperative. Writer will re attempt when patient agreeable.

## 2022-12-02 NOTE — PROGRESS NOTES
"SPIRITUAL HEALTH SERVICES  SPIRITUAL ASSESSMENT Progress Note (Palliative Focus)  [facility] ([place]) 5A    REFERRAL SOURCE: Care Conference    Attended care conference with patient's wife Leia and granddaughter Tacho, primary care team, Neurology, and Palliative Care.    Leia expressed that her goal is still to get patient home, and has now expressed that they will not pursue a feeding tube, as it is against patient's expressed wishes. She shared a desire to try a couple more medications to help with agitation and \"shakes\" (as she calls them). Plan is to discuss options for hospice with the unit SW.     Spoke with them after care conference to provide emotional support. They appreciate check ins.     Plan: Will continue to follow while Palliative Care is consulted.     Dia Caballero MDiv.  Palliative Chaplain Resident  Pager [phone]    [facility] Palliative Care Inpatient Team Consult pager [phone] (M-F 8-4:30)  After-hours Answering Service [phone]      "

## 2022-12-02 NOTE — PROGRESS NOTES
"CLINICAL NUTRITION SERVICES - REASSESSMENT NOTE     Nutrition Prescription    RECOMMENDATIONS FOR MDs/PROVIDERS TO ORDER:  Ongoing nutrition POC pending overall [facility]    Malnutrition Status:    Patient does not meet two of the established criteria necessary for diagnosing malnutrition but is at risk for malnutrition    Recommendations already ordered by Registered Dietitian (RD):  None at this time     EVALUATION OF THE PROGRESS TOWARD GOALS   Diet: Level 4: Pureed Dysphagia Diet     Intake:   Per palliative note 12/1, \"Leia (wife) shared that she does not anticipate moving forward with a feeding tube. She noted that with symptoms better controlled, she was able to feed Vahid some Cream of Wheat; while she knows he is not eating enough to sustain him long term, she believes he'll eat enough for them to have some quality, family time at home. Goal remains to bring Vahid home with services.    Calorie counts:  11/28     Total Kcals: 347       Total Protein: 5g  11/27     Total Kcals: 0           Total Protein: 0g  11/26     Total Kcals: 404       Total Protein: 13g  Per [employer], if consuming 100% of meals:  11/30: 185 kcal, 4g protein  11/29: 2066 kcal, 104g protein    5 day average if consuming 100% of meals on 11/30 and 11/29:  600 total kcal, 25g protein  Meeting 24.5% of kcal needs and 25.5% of protein needs the past 5 days     NEW FINDINGS   - Per nursing note today 12/2, incontinent of loose bowels frequently  - Last weight obtained on 11/26  - Per chart, plan for care conference today    MALNUTRITION  % Intake: </= 50% for >/= 5 days (severe)  % Weight Loss: None noted  Subcutaneous Fat Loss: None observed per RD note on 11/25  Muscle Loss: None observed per RD note on 11/25  Fluid Accumulation/Edema: Trace - does not meet criteria  Malnutrition Diagnosis: Patient does not meet two of the established criteria necessary for diagnosing malnutrition but is at risk for malnutrition    Previous Goals   Patient to " consume % of nutritionally adequate meal trays TID, or the equivalent with supplements/snacks.  Evaluation: Not met    Previous Nutrition Diagnosis  Inadequate protein-energy intake related to recent injury, UTI pain as evidenced by reduction in PO intake by at least 50% per family report.    Evaluation: Unresolved, updated below    CURRENT NUTRITION DIAGNOSIS  Inadequate oral intake related to decreased appetite d/t delirium and forgetfullness as evidenced by </= 50% intakes for >/= 5 days    INTERVENTIONS  Implementation  None additional at this time (pt with other cares/not appropriatefor RD visit today due to agitation)    Goals  Patient to consume % of nutritionally adequate meal trays TID, or the equivalent with supplements/snacks.    Monitoring/Evaluation  Progress toward goals will be monitored and evaluated per protocol.    Evelyne Elliott  Dietetic Intern    I have read and agree with the above nutrition note, recs, and interventions.  I did not personally see this patient today, chart review only.  Theodora Saleh, RD, , LD  Weekday Pager: [phone]  Weekday Units covered: 7C (all beds) and 5A (beds 5201 through 5211-2)  Weekend/Holiday RD Pager: [phone]

## 2022-12-03 NOTE — PLAN OF CARE
Goal Outcome Evaluation:    Pt mostly non verbal during shift aside from the occasional Yes, no, or shouting in pain. Arouses to voice and occassionally touch. Pt in a lot of pain and slightly combative during repositioning. Pain managed with continuous ketamine infusion and scheduled sublingual oxy. Potassium replaced during shift, recheck in morning. Slept well throughout the night. Encouraged fluid intake with assistance from nursing aid with little success. On 1:1 for line pulling and aggitation. Primofit in place with good urine output. Plan for potential home hospice and continue with plan of care.

## 2022-12-03 NOTE — PLAN OF CARE
Goal Outcome Evaluation:      Plan of Care Reviewed With: patient, spouse, child    Overall Patient Progress: no change    Outcome Evaluation: VSS on RA X int HTN. Ketamine gtt discontinued today, pain managed with scheduled oxycodone and tylenol and prns as indicated. Patient still becomes combative and agitated with turns. Patient did not respond to orientation assessment questions. Family present at bedside and engaged with cares. Patient tested for c diff given loose stools, abdominal pain, and abdominal XR results 12/2, resulted positive. Enteric precautions in place. PO vancomycin QID started. Pressure injury prevention measures in place and patient placed on a pulsate low air loss mattress.

## 2022-12-03 NOTE — PROGRESS NOTES
[facility]    Medicine Progress Note - Hospitalist Service, GOLD TEAM 7    Date of Admission:  11/22/2022    Assessment & Plan     Catalino Coronado is a 72-year-old man with Alzheimer dementia here for pain and weakness, found to have L4 transverse spinous process fracture and urinary retention with urinary tract infection    Conference:   Care conference held 12/2/22, wife Leia, and granddaughter attended the meeting in person and daughter Monica over the phone, in attendance of neurology resident, palliative team and . As per Leia, Vahid would not have wanted to heroic measures to prolong his life and goals is to provide comfort and quality of life. Goal is to go home. It was discussed that Vahid might need aggressive medication administer to control his agitation and restlessness, that might result in sedation, and can results in further decrease in oral intake. Family opted for proceeding to comfort care, with goal of going home with hospice eventually.      Delirium likely due to pain, fall, hospitalization, C diff on top of underlying advanced dementia with coginitive impairment   Minimally verbal at recent baseline at home but he is usually able to communicate with his wife by gestures about bathroom needs. He had been incontinent for a few days prior to this admission.   However has had acute decline since after fall and unable to communicate at all  B12 and TSH WNL  Plan:   3 hour EEG consistent with diffuse encephalopathy, without any evidence of fical seizure activity   Neurology recommended to use to start on Depakote for possible cortical myoclonus given episodic UE movements.    Stop Ketamine infusion today 12/3, given that Ketamine is not option at discharge, and utilize IV Haldol 2 mg q6h prn, Lorazepam and olanzapine for agitation. Hoping these medications along with now being started on Depakote will control his agitation. Continue sitter at  bedside, delirium precaution      C Diff enteritis with diarrhea:  This could be contributing to delirium to some extent as well  X ray abdomen showed possible enteritis but ni significant stool burden  Started on PO Vancomycin 12/3- anticipate 10 day course          Acute back pain secondary to L4 transverse spinous process fracture from a fall at home  CT lumbar spine revealed an acute L4 fracture, no retropulsion.   Neurosurgery recommended conservative treatment and no brace.   - Appreciate palliative and pain medicine assistance with pain  - Pain medicine also discussed case with IR, IR could not do transverse injection and do not think pain is coming from area  Plan:   Continue oxycodone 20 mg q 4 hrs scheduled and prn, scheduled tylenol    Continue Dilaudid 0.5 mg q 2hr prn   Continue Toradol 15 mg q6 hrs as needed   Topical Voltaren    Discontinue ketamine infusion      Moderate protein calorie malnutrition:  Poor oral intake  - Appreciate nutrition consultation  - Family decided aginst feeding tube.   - maintain IV hydration in the meantime and encourage PO intake when able     Alzheimer's disease   Generalized weakness with frequent falls  -Continue home regimen of Aricept 5mg once daily  -Continue Lexapro 20mg once daily  -Continue home regimen of Namenda 10mg by mouth twice daily  -Continue Seroquel 12.5mg by mouth once daily  -PT/OT consult to assess safety to return home        Hypokalemia: resolved  Urinary retention, resolved  BPH:Continue home regimen of Flomax 0.4mg once daily  Urine culture negative  Stage IIIa kidney disease      Hypertension  -switched from home regimen of Atenolol 100mg once daily to amlodipine 5 mg daily due to bradycardia and persistent elevated BPs              Diet: Pureed Diet (level 4) Thin Liquids (level 0) (1:1 supervision)    DVT Prophylaxis: Heparin SQ  Muhammad Catheter: Not present  Central Lines: None  Cardiac Monitoring: None  Code Status: No CPR- Do NOT Intubate       Disposition Plan     Expected Discharge Date: 12/07/2022      Destination: home with family;home with help/services  Discharge Comments: Planning for care conference 3pm today with primary, palliative, would appreciate care management involvement. This will determine disposition.        The patient's care was discussed with the Patient and Patient's Family.    ARIEL MARIE MD  Hospitalist Service, GOLD TEAM [address] [place] [address]  Securely message with the Vocera Web Console (learn more here)  Text page via [facility] Paging/Directory   Please see signed in provider for up to date coverage information      Clinically Significant Risk Factors        # Hypokalemia: Lowest K = 3.2 mmol/L (Ref range: 3.4-5.3) in last 2 days, will replace as needed                        ______________________________________________________________________    Interval History   Lying in bed comfortably, mittens in place. Sitter at bedside available.   Calm and not restless during my encounter. Somnolent, and unable to communicate  Review of system incomplete.     Data reviewed today: I reviewed all medications, new labs and imaging results over the last 24 hours. I personally reviewed no images or EKG's today.    Physical Exam   Vital Signs: Temp: 98.9  F (37.2  C) Temp src: Axillary BP: (!) 164/72 Pulse: 72   Resp: 16 SpO2: 95 % O2 Device: None (Room air)    Weight: 215 lbs 9.6 oz  General Appearance: AO x0, restless  Respiratory: clear to auscultation,  Cardiovascular: S1 and S2 normal  GI: non tender, non distended  Skin: no skin rash  Other: Mittens in place     Data   Recent Labs   Lab 12/03/22  0738 12/03/22  0153 12/02/22  0636 12/01/22  2115 12/01/22  0632 11/30/22  0631 11/29/22  1735 11/29/22  0546 11/28/22  0530 11/27/22  0722   WBC  --   --  8.2  --   --  9.9  --   --   --  9.2   HGB  --   --  12.7*  --   --  12.7*  --   --   --  12.9*   MCV  --   --  94  --   --  93  --   --   --  91    PLT  --   --  362  --  328 343  --   --    < > 330     --  140  --  142 141  --  140   < > 141   POTASSIUM 3.5 3.5 3.2*  3.2*   < > 3.3* 3.3*   < > 3.3*   < > 3.6   CHLORIDE 106  --  107  --  109* 109*  --  108*   < > 109*   CO2 20*  --  20*  --  19* 20*  --  19*   < > 18*   BUN 11.6  --  12.3  --  17.6 20.6  --  17.1   < > 23.4*   CR 0.81  --  0.82  --  0.80 0.77  --  0.74   < > 0.78   ANIONGAP 12  --  13  --  14 12  --  13   < > 14   CANDE 7.9*  --  8.2*  --  8.3* 8.5*  --  8.6*   < > 8.2*   *  --  100*  --  91 106*  --  115*   < > 98   ALBUMIN  --   --   --   --   --   --   --  3.5  --   --    PROTTOTAL  --   --   --   --   --   --   --  6.4  --   --    BILITOTAL  --   --   --   --   --   --   --  0.6  --   --    ALKPHOS  --   --   --   --   --   --   --  90  --   --    ALT  --   --   --   --   --   --   --  41  --   --    AST  --   --   --   --   --   --   --  40  --   --     < > = values in this interval not displayed.     Recent Results (from the past 24 hour(s))   XR Abdomen Port 1 View    Narrative    Portable abdomen 1 view    INDICATION: Blunting for evidence of stool burden    COMPARISON: CT abdomen pelvis 11/13/2022    FINDINGS: A few nondistended air-filled loops of small bowel are  present with large bowel visualized, indicating suggestion of minimal  enteritis. Associated retained fecal material without. There are  degenerative changes in lumbar spine.      Impression    IMPRESSION: Lumbar spondylosis. Small bowel enteritis. No evidence of  prominent retention of fecal material within the intestinal tract    LORETA CORREIA MD         SYSTEM ID:  [id number]     Medications     dextrose 5% and 0.9% NaCl 75 mL/hr at 12/03/22 1230       acetaminophen  975 mg Oral TID     amLODIPine  5 mg Oral Daily     calcipotriene   Topical BID     donepezil  5 mg Oral Daily     escitalopram  20 mg Oral Daily     heparin ANTICOAGULANT  5,000 Units Subcutaneous Q12H     lidocaine  1 patch Transdermal  Q24H     lidocaine   Transdermal Q8H [place]LLINGTON     melatonin  5 mg Sublingual QPM     memantine  10 mg Oral BID     multivitamin, therapeutic  1 tablet Oral Daily     oxyCODONE  2.5-5 mg Sublingual Q4H     sodium chloride (PF)  3 mL Intracatheter Q8H     tamsulosin  0.4 mg Oral Daily     valproic acid  250 mg Oral Q12H     vancomycin  125 mg Oral 4x Daily

## 2022-12-03 NOTE — PROGRESS NOTES
[facility]    Medicine Progress Note - Hospitalist Service, GOLD TEAM 7    Date of Admission:  11/22/2022    Assessment & Plan     Catalino Coronado is a 72-year-old man with Alzheimer dementia here for pain and weakness, found to have L4 transverse spinous process fracture and urinary retention with urinary tract infection    Conference:   Care conference held today, wife Leia, and granddaughter attended the meeting in person and daughter Monica over the phone in attendance of neurology resident, palliative team and . As per Leia, Vahid would not have wanted to heroic measures to prolong his life and goals is to provide comfort and quality of life. In view of Vahid's wishes, goal is to go home. It was discussed that Vahid might need aggressive medication administer to control his agitation and restlessness, that might result in sedation, and can results in further decrease in oral intake. Family opted for proceeding to comfort care, with goal of going home with hospice eventually.      Delirium likely due to pain, fall, hospitalization on top of underlying advanced dementia with Coginitive impairment   Minimally verbal at recent baseline at home but he is usually able to communicate with his wife by gestures about bathroom needs. He had been incontinent for a few days prior to this admission.   However has had acute decline since after fall and unable to communicate at all  B12 and TSH WNL  Plan:   No reversible etiology found   3 hour EEG consistent with diffuse encephalopathy, without any evidence of fical seizure activity   Neurology recommended to use to start on Depakote for possible cortical myoclonus given episodic UE movements.    Will discuss with Pain medicine to discontinue the Ketamine infusion    Continue Olanzapine scheduled at bedside, prn during the day for agitation   IV Haldol 2 mg q6h prn   IV Valium 2.5 q8h as needed for spasm          Acute back  pain secondary to L4 transverse spinous process fracture from a fall at home  CT lumbar spine revealed an acute L4 fracture, no retropulsion.   Neurosurgery recommended conservative treatment and no brace.   - Appreciate palliative and pain medicine assistance with pain  - Pain medicine also discussed case with IR, IR could not do transverse injection and do not think pain is coming from area  Plan:   Patient is currently on oxycodone 20 mg q 4 hrs scheduled,  Dilaudid 0.5 mg qhrs prn   On ketamine infusion 5 mg/ hr as per pain medicine 11/30-   Toradol 15 mg q6 hrs as needed        Poor oral intake  - Appreciate nutrition consultation  - Calorie counts in progress  - Family decided aginst feeding tube.   -maintain IV hydration in the meantime.      Alzheimer's disease   Generalized weakness with frequent falls  -Continue home regimen of Aricept 5mg once daily  -Continue Lexapro 20mg once daily  -Continue home regimen of Namenda 10mg by mouth twice daily  -Continue Seroquel 12.5mg by mouth once daily  -PT/OT consult to assess safety to return home        Hypokalemia: resolved  Urinary retention, resolved  BPH:Continue home regimen of Flomax 0.4mg once daily  Urine culture negative  Stage IIIa kidney disease    -   Hypertension  -Continue home regimen of Atenolol 100mg once daily     Hyperlipidemia  -Continue home regimen of Zocor 40mg by mouth once daily at bedtime           Diet: Pureed Diet (level 4) Thin Liquids (level 0) (1:1 supervision)    DVT Prophylaxis: Heparin SQ  Muhammad Catheter: Not present  Central Lines: None  Cardiac Monitoring: None  Code Status: No CPR- Do NOT Intubate      Disposition Plan      Expected Discharge Date: 12/07/2022      Destination: home with family;home with help/services  Discharge Comments: Planning for care conference 3pm today with primary, palliative, would appreciate care management involvement. This will determine disposition.        The patient's care was discussed with the  Patient and Patient's Family.    ARIEL MARIE MD  Hospitalist Service, GOLD TEAM 7  [facility]  Securely message with the Vocera Web Console (learn more here)  Text page via [facility] Paging/Directory   Please see signed in provider for up to date coverage information      Clinically Significant Risk Factors        # Hypokalemia: Lowest K = 3.2 mmol/L (Ref range: 3.4-5.3) in last 2 days, will replace as needed                        ______________________________________________________________________    Interval History   Patient has had episodes of severe agitation requiring restraints. Remains delirious.   Review of system incomplete.     Data reviewed today: I reviewed all medications, new labs and imaging results over the last 24 hours. I personally reviewed no images or EKG's today.    Physical Exam   Vital Signs: Temp: 97.8  F (36.6  C) Temp src: Axillary BP: 137/61 Pulse: 60   Resp: 16 SpO2: 96 % O2 Device: None (Room air)    Weight: 215 lbs 9.6 oz  General Appearance: AO x0, restless  Respiratory: clear to auscultation,  Cardiovascular: S1 and S2 normal  GI: non tender, non distended  Skin: no skin rash  Other: Mittens in place     Data   Recent Labs   Lab 12/02/22  0636 12/01/22  2115 12/01/22  0632 11/30/22  0631 11/29/22  1735 11/29/22  0546 11/28/22  0530 11/27/22  0722   WBC 8.2  --   --  9.9  --   --   --  9.2   HGB 12.7*  --   --  12.7*  --   --   --  12.9*   MCV 94  --   --  93  --   --   --  91     --  328 343  --   --    < > 330     --  142 141  --  140   < > 141   POTASSIUM 3.2*  3.2* 3.2* 3.3* 3.3*   < > 3.3*   < > 3.6   CHLORIDE 107  --  109* 109*  --  108*   < > 109*   CO2 20*  --  19* 20*  --  19*   < > 18*   BUN 12.3  --  17.6 20.6  --  17.1   < > 23.4*   CR 0.82  --  0.80 0.77  --  0.74   < > 0.78   ANIONGAP 13  --  14 12  --  13   < > 14   CANDE 8.2*  --  8.3* 8.5*  --  8.6*   < > 8.2*   *  --  91 106*  --  115*   < > 98    ALBUMIN  --   --   --   --   --  3.5  --   --    PROTTOTAL  --   --   --   --   --  6.4  --   --    BILITOTAL  --   --   --   --   --  0.6  --   --    ALKPHOS  --   --   --   --   --  90  --   --    ALT  --   --   --   --   --  41  --   --    AST  --   --   --   --   --  40  --   --     < > = values in this interval not displayed.     Recent Results (from the past 24 hour(s))   XR Abdomen Port 1 View    Narrative    Portable abdomen 1 view    INDICATION: Blunting for evidence of stool burden    COMPARISON: CT abdomen pelvis 11/13/2022    FINDINGS: A few nondistended air-filled loops of small bowel are  present with large bowel visualized, indicating suggestion of minimal  enteritis. Associated retained fecal material without. There are  degenerative changes in lumbar spine.      Impression    IMPRESSION: Lumbar spondylosis. Small bowel enteritis. No evidence of  prominent retention of fecal material within the intestinal tract    LORETA CORREIA MD         SYSTEM ID:  [id number]     Medications     dextrose 5% and 0.9% NaCl 75 mL/hr at 12/02/22 1239     ketamine 2 mg/mL ADULT 5 mg/hr (12/02/22 1240)       acetaminophen  975 mg Oral TID     atenolol  100 mg Oral Daily     calcipotriene   Topical BID     donepezil  5 mg Oral Daily     escitalopram  20 mg Oral Daily     heparin ANTICOAGULANT  5,000 Units Subcutaneous Q12H     lidocaine  1 patch Transdermal Q24H     lidocaine   Transdermal Q8H [place]     melatonin  5 mg Sublingual QPM     memantine  10 mg Oral BID     multivitamin, therapeutic  1 tablet Oral Daily     oxyCODONE  2.5-5 mg Sublingual Q4H     pantoprazole  40 mg Intravenous Daily with breakfast     potassium chloride  40 mEq Oral or Feeding Tube Once     potassium chloride  10 mEq Intravenous Q2H     simvastatin  40 mg Oral At Bedtime     sodium chloride (PF)  3 mL Intracatheter Q8H     tamsulosin  0.4 mg Oral Daily     valproic acid  250 mg Oral Q12H

## 2022-12-04 NOTE — PROGRESS NOTES
[facility] PALLIATIVE CARE PROGRESS NOTE        Chief Complaint: Agitated delirium appearing to resolve    Assessment/Recommendations:  1)   GOALS OF CARE per wife Leia    Given Vahid's remarkable clearing of his sensorium though he is still confused and has baseline dementia his spouse Leia is reconsidering whether they would go home with home hospice or not.  She wants to see if he continues to improve as his C. difficile enteritis is treated and resolves.  This will require ongoing conversations with her.    Code Status: DN AR/DNI    2)    ADVANCED CARE PLANNING    Patient has completed health care directive:  yes    Documented health care agent: wife Leia    Surrogate decision maker if no appointed agent: Not applicable    3)    SYMPTOM MANAGEMENT      Pain: difficult to assess as patient with expressive aphasia and underlying dementia with delirium. EEG w/o signs of seizure, and no correlation to witnessed episodes of upper extremity spasms.  Wife feels as though he is responding to her questions and he seems to be denying that he has pain right now.  I saw him with Dr. Reyes and Vahid audibly moaned when his abdomen was being palpated.  Otherwise he appeared comfortable when his not being poked or prodded.  ? Continue oxycodone 2.5-5mg SL q4h, give lower dose if he appears comfortable and to hold if somnolent. Additional oxycodone 2.5 mg SL q4h prn available for breakthrough pain. As he has been requiring 0.5 mg iv dilaudid during cares due to increased pain, recommend giving Oxycodone SL 20-30 minutes prior to cares or therapy. Avoid iv dilaudid if possible.  ? Ketamine infusion was stopped over the weekend.  ? As neurology feels spasms may be cortical myoclonus, discontinued Zanaflex. While neurology starts medications to treat this, will have Ativan 0.5 mg po/iv q6h prn spasms, anxiety available only if refractory to other medications  ? Continue scheduled tylenol, lidocaine  patches.  ? Consider alternating topical voltaren gel during daytime (if he tolerates application) and lidocaine patches at night.    Delirium/Agitation:   ? Zyprexa 2.5 mg BID prn. Haldol 2 mg iv q6h prn only if unable to administer oral medication  ? Would continue to monitor for pain and treat to prevent agitation.  ? Monitor PVR for urine retention  ? Monitor for constipation and treat as needed  ? Delirium precautions  ? Per neurology, patient may be showing signs of cortical myoclonus and starting valproic acid 250 mg BID.    At risk for constipation:   ? Monitor for constipation while on opiates though this is less of a concern with his diagnosis of C. difficile.  ? Senna 1 tablet BID prn and Bisacodyl suppository prn available. Given history of loose stool, did not schedule    New POLST completed    4)    PSYCHOSOCIAL/SPIRITUAL SUPPORT    The palliative care team will continue to provide psychosocial support for Vahid's wife, Leia    These recommendations have been discussed with Dr. Reyes at the bedside..      Thank you for the opportunity to participate in the care of this patient and family.      During regular M-F work hours -- if you are not sure who specifically to contact -- please contact us by calling [phone].      Key Palliative Symptom Data:  # Pain severity the last 12 hours: low  # Dyspnea severity the last 12 hours: none  # Nausea severity the last 12 hours: none  # Anxiety severity the last 12 hours: low  Due to Vahid's underlying dementia and superimposed delirium is been difficult to do an accurate symptom assessment although he is responsive to his wife when she queries him.    Prognosis, Goals, or Advance Care Planning was addressed today with: Yes.    Mood, coping, and/or meaning in the context of serious illness were addressed today: Yes of wife.    Chart Review/discussion with clinical unit members: I saw Vahid at the bedside with Dr. Reyes.    Palliative Encounter  Summary/Comments: I checked in on Vahid and Leia this afternoon at the request of my partner Yesica Pandey.  Vahid was in bed and Dr. Reyes was also seeing him.  Leia was very relieved that we discovered that he has a C. difficile enteritis and that he looked significantly better with his delirium clearing.  She also found he was close to his baseline in terms of interacting with her and others.  It is clear that he does a lot of word searching and has an expressive aphasia though he also appeared to be following conversation between Leia and myself and Dr. Reyes.  Vahid did events pain signs when his abdomen was being palpated.  Although he did not respond to queries from others he was responsive to Leia when she asked him.  Leia is wondering if short-term NG tube would be useful if Vahid is not willing to take the oral vancomycin for his C. difficile.  I told her I would be worried that he would pull the NG out fairly quickly or that it could lead to agitation or more encephalopathy.  She voiced understanding that.  She said she like to see how things go before resorting to that.  I asked her if with this recent change in Vahid if she was rethinking her goal to go home with hospice and she said she is and wants to see if he continues on this trajectory or not.  I reassured her my colleague Yesica will follow up with them tomorrow and continue to keep a close eye on them.    TTS: I have personally spent a total of 25 minutes  today on the unit in review of medical record, consultation with the medical providers and assessment of patient today, with more than 50% of this time spent in counseling around goals of care and possible hospice enrollment, coordination of care, and conversation with wife at the bedside re: diagnostic results, prognosis, symptom management, risks and benefits of management options, emotional support and development of plan of care.    Adal Collado MD MS FAAFP  [facility]  Office [phone]  Fax [phone]

## 2022-12-04 NOTE — PLAN OF CARE
Goal Outcome Evaluation:      Plan of Care Reviewed With: patient    Overall Patient Progress: no changeOverall Patient Progress: no change    Outcome Evaluation: Pt more verbal than previous night able to form semi complete scentences. Arouses to voice and occassionally touch. Ketamine infusion was discontinued during days. Pt in a lot of pain and slightly combative during repositioning, had one loose BM overnight. Pain managed with scheduled sublingual oxy and tylenol. Slept well throughout the night. On 1:1 for line pulling and aggitation. Primofit in place with good urine output. Plan for potential home hospice and continue with plan of care.

## 2022-12-04 NOTE — PROGRESS NOTES
[facility]  Neurology Consultation - Progress Note    Patient Name:  Catalino Coronado  Date of Service:  December 4, 2022    Subjective:    Patient sleeping peacefully. No family at bedside. Does wake up with gentle stimulation and is very startled and looks at me + follows 1 command, but then falls back asleep.     In interval updates, patient is now positive for c. Diff. Leia shares her worry that she will not be able to take him home and safely administer oral vancomycin 4 times/day for 10 days as Vahid sometimes will pocket pills/not swallow them.     Objective:    Vitals: BP (!) 149/67 (BP Location: Right arm)   Pulse 78   Temp 98.4  F (36.9  C) (Axillary)   Resp 16   Wt 97.8 kg (215 lb 9.6 oz)   SpO2 100%   BMI 26.95 kg/m    General: Lying in bed, NAD  Head: Atraumatic, normocephalic   Cardiac: no lower extremity edema  Neurologic:  Makes good eye contact when standing at right side of bed. Spontaneously moving all 4 extremities. Does have prominent startle reflex. Cortical myoclonus observed once. Wiggles toes to command but does not follow any other commands. Does not produce spontaneous speech today.     Pertinent Investigations:    I have personally reviewed most recent and pertinent labs, tests, and radiological images.     Impression  #Alzheimer's Disease  #Delirium, multifactorial  #Cortical myoclonus    Vahid Coronado is a 72 y.o. 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 Patient sustained a fall on 11/12/2022 and has had progressive back pain and agitation since that time up until admission 11/22/2022 where he was found to have L4 transverse spinous process fracture (NSGY recommending conservative treatment).  Unfortunately, over the course of this hospital stay, he has been functioning below baseline and is minimally interactive, bed-bound, and not eating well.     The encephalopathy is most likely in the setting of traumatic delirium on a vulnerable brain with Alzheimer's disease. Additional contributing factors likely include pain and side effects from opioid pain medications and ketamine. He has been noted to have brief episodes during this hospital stay where his arms shake and he becomes less responsive. 3 hour video EEG was negative for seizures or epileptiform discharges. B12, TSH were normal. Overall, low suspicion for a primary neurological cause to delirium (ex. Reversible dementia). It is hard to determine when/if patient will return back to his neurological baseline prior to 11/12, but family has indicated that their goals are largely comfort-based rather than restorative. Regarding abnormal movements, I wonder if this is cortical myoclonus. They have reduced greatly in frequency with low dose valproic acid, per my discussion with wife Leia over the phone today. This is very good news and we will increase tonight's dose to see if we can achieve even better control.      Recommendations  - Increase valproic acid to 250 mg in the AM and 500 mg in the PM  - We will re-evaluate patient's response to above tomorrow morning.   - Delirium precautions  - Neurology is available for any additional care conferences (preference is Monday - Wednesday in the afternoons).       Thank you for involving Neurology in the care of Catalino Coronado.  Please do not hesitate to call with questions/concerns (consult pager [phone]).      Patient was seen and discussed with Dr. Blair.     Herb Hunt MD  PGY-4 Neurology Resident   P: [phone]

## 2022-12-04 NOTE — PLAN OF CARE
Goal Outcome Evaluation:  Time: 1512-4518      Plan of Care Reviewed With: patient, spouse, family    Overall Patient Progress: improvingOverall Patient Progress: improving    Outcome Evaluation: Alert, difficult to assess orientation as patient has little to no verbal communication. Pain managed with scheduled oxycodone and tylenol and prns as indicated. Family present at bedside and engaged in care. 1:1 discontinued- patient rests between cares. Primofit in place with good UOP. 4 BMs this shift.  Assisted with hygiene cares and oral cares. Obtained a recliner for patient- per family request, patient not up to chair today- wife Leia stated she would prefer patient tries another day when PT is available to assist.

## 2022-12-04 NOTE — PROGRESS NOTES
Care Management Follow Up    Length of Stay (days): 12    Expected Discharge Date: 12/07/2022     Concerns to be Addressed: all concerns addressed in this encounter     Patient plan of care discussed at interdisciplinary rounds: Yes    Anticipated Discharge Disposition: Home     Anticipated Discharge Services:  Resumption of home care services  Anticipated Discharge DME: Hospital Bed.    Patient/family educated on Medicare website which has current facility and service quality ratings:  (N/A)  Education Provided on the Discharge Plan:    Patient/Family in Agreement with the Plan: yes    Referrals Placed by CM/SW:  None yet.  Private pay costs discussed: Not applicable    Additional Information:  Spoke with medical team who states pt is looking much better today and family is not interested in pursuing hospice at this time. There will be another care conference held on Wednesday to see where things are at. Pts wife, Leia, asked for hospice list just in case which SW provided. His PCP placed an order for a hospital bed so SW encouraged pts spouse to follow up with the DME company regarding this. She is also wondering about urine suction (non-invasive equipment currently being used in the hospital). She has all of the other equipment pt needs to be at home. He was previously receiving home care services through [facility] and is interested in resumption of services.    LISA Benavidez LICSW  12/4/2022       Social Work and Care Management Department       SEARCHABLE in [employer] - search SOCIAL WORK       [place] (0800 - 1630) Saturday and Sunday     Units: 4A, 4C, & 4E Pager: [phone]     Units: 5A & 5B Pager: [phone]     Units: 6A & 6B   Pager: [phone]     Units: 6C & 6D Pager: [phone]     Units: 7A &7B  Pager: [phone]     Units: 7C, 7D, & 5C Pager: [phone]     Unit: [place] ED Pager: [phone]      [facility] [place] ([id number]) Saturday and Sunday      Units: 5 Ortho, 5  Med/Surg & WB ED  Pager:[phone]     Units: 6 Med/Surg, 8A, & 10A ICU  Pager: [phone]        After hours (1630 - 0000) Saturday & Sunday; (8562-2170) Mon-Fri; (7909-3535) [facility] Recognized Holidays     Units: ALL  Pager: [phone]

## 2022-12-05 NOTE — PROGRESS NOTES
[facility]    Medicine Progress Note - Hospitalist Service, GOLD TEAM 7    Date of Admission:  11/22/2022    Assessment & Plan     Catalino Coronado is a 72-year-old man with Alzheimer dementia here for pain and weakness, found to have L4 transverse spinous process fracture and urinary retention with urinary tract infection    Conference:   Care conference held 12/2/22, wife Leia, and granddaughter attended the meeting in person and daughter Monica over the phone, in attendance of neurology resident, palliative team and . As per Leia, Vahid would not have wanted to heroic measures to prolong his life and goals is to be comfortable and quality of life and to go home. It was discussed that Vahid might need aggressive medication administer to control his agitation and restlessness, that might result in sedation, and can results in further decrease in oral intake. Family opted for proceeding to comfort care, with goal of going home with hospice eventually.    After that conference, Vahid was started on Depakote and PO Vancomycin for diagnosis of C diff enteritis. Subsequently ketamine infusion was stopped. Gil has shown significant improvement in mental status. Plan will be re discuss the plan for home with hospice pending clinical course. Meeting tentatively scheduled for Wednesday 12/07/22      Delirium likely due to pain, fall, hospitalization, C diff on top of underlying advanced dementia with coginitive impairment   Minimally verbal at recent baseline at home but he is usually able to communicate with his wife by gestures about bathroom needs. He had been incontinent for a few days prior to this admission.   However has had acute decline since after fall and unable to communicate at all  B12 and TSH WNL  Plan:   Appreciate neurology evaluation and assistance   3 hour EEG consistent with diffuse encephalopathy, without any evidence of fical seizure activity   Started  on Depakote 12/2/22 for possible cortical myoclonus given episodic UE movements.    Off of Ketamine infusion since 12/3   Continue IV Haldol 2 mg q6h prn, Lorazepam and olanzapine for agitation (Has not required any in past 24 hrs.    Discontinue mittens     C Diff enteritis with diarrhea:  This could be contributing to delirium to some extent as well  X ray abdomen showed possible enteritis but no significant stool burden  Started on PO Vancomycin 12/3- anticipate 10 day course          Acute back pain secondary to L4 transverse spinous process fracture from a fall at home  CT lumbar spine revealed an acute L4 fracture, no retropulsion.   Neurosurgery recommended conservative treatment and no brace.   - Appreciate palliative and pain medicine assistance with pain  - Pain medicine also discussed case with IR, IR could not do transverse injection and do not think pain is coming from area  Plan:   Continue oxycodone 20 mg q 4 hrs scheduled and prn, scheduled tylenol    Continue Dilaudid 0.5 mg q 2hr prn   Continue Toradol 15 mg q6 hrs as needed   Topical Voltaren    Discontinued ketamine infusion      Moderate protein calorie malnutrition:  Poor oral intake  - Appreciate nutrition consultation  - Family decided aginst feeding tube.   - maintain IV hydration in the meantime and encourage PO intake when able     Alzheimer's disease   Generalized weakness with frequent falls  -Continue home regimen of Aricept 5mg once daily  -Continue Lexapro 20mg once daily  -Continue home regimen of Namenda 10mg by mouth twice daily  -Continue Seroquel 12.5mg by mouth once daily  -PT/OT consult to assess safety to return home        Hypokalemia: resolved  Urinary retention, resolved  BPH:Continue home regimen of Flomax 0.4mg once daily  Urine culture negative  Stage IIIa kidney disease      Hypertension  -switched from home regimen of Atenolol 100mg once daily to amlodipine 10 mg daily due to bradycardia and persistent elevated  "BPs              Diet: Pureed Diet (level 4) Thin Liquids (level 0) (1:1 supervision)    DVT Prophylaxis: Heparin SQ  Muhammad Catheter: Not present  Central Lines: None  Cardiac Monitoring: None  Code Status: No CPR- Do NOT Intubate      Disposition Plan     Expected Discharge Date: 12/07/2022      Destination: home with family;home with help/services  Discharge Comments: Planning for care conference 3pm today with primary, palliative, would appreciate care management involvement. This will determine disposition.        The patient's care was discussed with the Patient and Patient's Family.    ARIEL MARIE MD  Hospitalist Service, GOLD TEAM [address] [place] [address]  Securely message with the Vocera Web Console (learn more here)  Text page via [employer] Paging/Directory   Please see signed in provider for up to date coverage information      Clinically Significant Risk Factors                               ______________________________________________________________________    Interval History   He was watching TV and wife at bedside. No agitation or restless during my encounter. Vahid tend to answer to any ROS questions if wife Leia repeated the question I asked.   He reported yes to pain, but would not answer when asked where the pain was. He moan on abdominal palpation. When I asked if he would like to take of his mittens he said \"yes\", when I asked if he would like to sit in recliner, he said \"sure\"      Data reviewed today: I reviewed all medications, new labs and imaging results over the last 24 hours. I personally reviewed no images or EKG's today.    Physical Exam   Vital Signs: Temp: 98.4  F (36.9  C) Temp src: Axillary BP: (!) 142/68 Pulse: 72   Resp: 16 SpO2: 97 % O2 Device: None (Room air)    Weight: 215 lbs 9.6 oz  General Appearance: AO x1,   Respiratory: clear to auscultation,  Cardiovascular: S1 and S2 normal  GI: non tender, non distended  Skin: no skin " rash  Other: Mittens in place     Data   Recent Labs   Lab 12/04/22  1559 12/03/22  0738 12/03/22  0153 12/02/22  0636 12/01/22  2115 12/01/22  0632 11/30/22  0631 11/29/22  1735 11/29/22  0546   WBC 9.3  --   --  8.2  --   --  9.9  --   --    HGB 12.8*  --   --  12.7*  --   --  12.7*  --   --    MCV 96  --   --  94  --   --  93  --   --      --   --  362  --  328 343  --   --     138  --  140  --  142 141  --  140   POTASSIUM 5.2 3.5 3.5 3.2*  3.2*   < > 3.3* 3.3*   < > 3.3*   CHLORIDE 108* 106  --  107  --  109* 109*  --  108*   CO2 16* 20*  --  20*  --  19* 20*  --  19*   BUN 13.1 11.6  --  12.3  --  17.6 20.6  --  17.1   CR 0.79 0.81  --  0.82  --  0.80 0.77  --  0.74   ANIONGAP 16* 12  --  13  --  14 12  --  13   CANDE 8.6* 7.9*  --  8.2*  --  8.3* 8.5*  --  8.6*   * 119*  --  100*  --  91 106*  --  115*   ALBUMIN  --   --   --   --   --   --   --   --  3.5   PROTTOTAL  --   --   --   --   --   --   --   --  6.4   BILITOTAL  --   --   --   --   --   --   --   --  0.6   ALKPHOS  --   --   --   --   --   --   --   --  90   ALT  --   --   --   --   --   --   --   --  41   AST  --   --   --   --   --   --   --   --  40    < > = values in this interval not displayed.     No results found for this or any previous visit (from the past 24 hour(s)).  Medications     dextrose 5% and 0.9% NaCl 75 mL/hr at 12/04/22 1919       acetaminophen  975 mg Oral TID     amLODIPine  10 mg Oral Daily     calcipotriene   Topical BID     donepezil  5 mg Oral Daily     escitalopram  20 mg Oral Daily     heparin ANTICOAGULANT  5,000 Units Subcutaneous Q12H     lidocaine  1 patch Transdermal Q24H     lidocaine   Transdermal Q8H [place]     melatonin  5 mg Sublingual QPM     memantine  10 mg Oral BID     multivitamin, therapeutic  1 tablet Oral Daily     oxyCODONE  2.5-5 mg Sublingual Q4H     sodium chloride (PF)  3 mL Intracatheter Q8H     tamsulosin  0.4 mg Oral Daily     valproic acid  250 mg Oral Daily at 4 pm      valproic acid  250 mg Oral Q12H     vancomycin  125 mg Oral 4x Daily

## 2022-12-05 NOTE — PROGRESS NOTES
Care Management Follow Up    Length of Stay (days): 13    Expected Discharge Date: 12/07/2022     Concerns to be Addressed: all concerns addressed in this encounter     Patient plan of care discussed at interdisciplinary rounds: Yes    Anticipated Discharge Disposition: Home  Anticipated Discharge Services:  Resumption of HC services via [facility]  Anticipated Discharge DME: Bed-previously ordered by PCP    Patient/family educated on Medicare website which has current facility and service quality ratings: N/A  Education Provided on the Discharge Plan:  Yes  Patient/Family in Agreement with the Plan: yes    Referrals Placed by CM/SW:    Pt currently open to [facility].  12/5: KHOI confirmed with [facility] liaenrique High that pt is currently open to their services for PT/OT/RN.   Services on hold to due pts current hospitalization.      Private pay costs discussed: Not applicable    Additional Information:  SW informed that pt is improving and that current plan is to go home with resumption of home care services.     @1152AM KHOI emailed [facility] to inquire if pt is still currently open to [facility].  KHOI informed by david Yun at [facility] that pt is currently open to their services for PT/OT/RN and has been on hold due to currently being inpatient.    @1210PM KHOI paged Dr. Garcias via [facility] to inquire if she would be able to put in the home care resumption orders for PT/OT/RN.      will continue to follow for discharge planning, support, and resources.    LISA Brito, [facility]  [address]   Office: [phone]   Pager: [phone]  [email or web address]

## 2022-12-05 NOTE — PROGRESS NOTES
[facility]    Medicine Progress Note - Hospitalist Service, GOLD TEAM 7    Date of Admission:  11/22/2022    Assessment & Plan     Catalino Coronado is a 72-year-old man with Alzheimer dementia here for pain and weakness, found to have L4 transverse spinous process fracture and found to have delirium.  Initial infectious work up and metabolic work up negative for any organic cause. Neurology was consulted and he was though to have metabolic encephalopathy, no seizures on EEG, possible cortical myoclonus and started on Depakote.  Subsequently found to have C diff colitis and was initiated on PO vancomycin. Since then he has shown significant improvement in delirium.       Goal of Conference:   Care conference held 12/2/22, wife Leia, and daughter and granddaughter, in attendance of neurology resident, palliative team and . As per Leia, Vahid would not have wanted to heroic measures to prolong his life and goals is to go home. Given significant delirium requiring physical and chemical restriants, worry was Vahid might not show significant improvement and at that point family would have wanted to opt for comfort care and go home with hospice.   However Vahid showed significant improvement in his delirium after C diff diagnosis and treatment started. Currently he is close to his baseline.   Anticipate he will be medically ready for discharge in next 12/7-12/8 with home health.       Delirium likely due to pain, fall, hospitalization, C diff on top of underlying advanced dementia: improving and close to baseline   Minimally verbal at recent baseline at home but he is usually able to communicate with his wife by gestures about bathroom needs.   However had acute decline since after fall, was agitated and restless during initial days of hospitalization requiring physical and chemical restriants  B12 and TSH WNL  3 hour EEG 12/1/22 consistent with diffuse encephalopathy,  without any evidence of fical seizure activity  Neurology evaluated patient and started on Depakote 12/2/22 for possible cortical myoclonus given episodic UE movements  Plan:   Mental status close to baseline   Off of Ketamine infusion since 12/3   Lorazepam discontinue   Continue olanzapine prn for agitation, has not required in > 48 hrs    Discontinue mittens       C Diff enteritis with diarrhea:  This could be main contributing to delirium   X ray abdomen showed possible enteritis but no significant stool burden  Started on PO Vancomycin 12/3- anticipate 10 day course       Acute back pain secondary to L4 transverse spinous process fracture from a fall at home  CT lumbar spine revealed an acute L4 fracture, no retropulsion.   Neurosurgery recommended conservative treatment and no brace.   - Appreciate palliative and pain medicine assistance with pain  - Pain medicine also discussed case with IR, IR could not do transverse injection  Plan:   Continue oxycodone 2.5 mg, decreased from q4hrs to q6 hrs brynn    Continue oxycodone prn   scheduled tylenol    Continue Dilaudid 0.5 mg q 2hr prn   Topical Voltaren      Moderate protein calorie malnutrition:  Poor oral intake  - Appreciate nutrition consultation  - Family decided aginst feeding tube.   - maintain IV hydration in the meantime and encourage PO intake      Urinary retention:   Wife reports noticing straining.  On home Flomax, will add finasteride  Monitor PVR    Alzheimer's disease   Generalized weakness with frequent falls  -Continue home regimen of Aricept 5mg once daily  -Continue Lexapro 20mg once daily  -Continue home regimen of Namenda 10mg by mouth twice daily  -Continue Seroquel 12.5mg by mouth once daily  -PT/OT consult to assess safety to return home         Hypokalemia: replace as needed  BPH:Continue home regimen of Flomax 0.4mg once daily  Urine culture negative  Stage IIIa kidney disease      Hypertension  -switched from home regimen of Atenolol  100mg once daily to amlodipine 10 mg daily due to bradycardia and persistent elevated BPs              Diet: Pureed Diet (level 4) Thin Liquids (level 0) (1:1 supervision)  Room Service    DVT Prophylaxis: Heparin SQ  Muhammad Catheter: Not present  Central Lines: None  Cardiac Monitoring: None  Code Status: No CPR- Do NOT Intubate      Disposition Plan      Expected Discharge Date: 12/07/2022      Destination: home with family;home with help/services  Discharge Comments: Planning for care conference 3pm today with primary, palliative, would appreciate care management involvement. This will determine disposition.        The patient's care was discussed with the Patient and Patient's Family.    ARIEL MARIE MD  Hospitalist Service, [facility] TEAM [address] [place] [address]  Securely message with the Vocera Web Console (learn more here)  Text page via [employer] Paging/Directory   Please see signed in provider for up to date coverage information      Clinically Significant Risk Factors        # Hypokalemia: Lowest K = 3.3 mmol/L (Ref range: 3.4-5.3) in last 2 days, will replace as needed                        ______________________________________________________________________    Interval History   He was in pleasant mood and answer me in yes or no to some questions. No fever, chills, nausea, vomiting.  Wife at bedside reports straining during urination.   As per wife his mental status is close to baseline.       Data reviewed today: I reviewed all medications, new labs and imaging results over the last 24 hours. I personally reviewed no images or EKG's today.    Physical Exam   Vital Signs: Temp: 97.4  F (36.3  C) Temp src: Axillary BP: (!) 140/68 Pulse: 65   Resp: 16 SpO2: 97 % O2 Device: None (Room air)    Weight: 215 lbs 9.6 oz  General Appearance: AO x2  Respiratory: clear to auscultation,  Cardiovascular: S1 and S2 normal  GI: non tender, non distended  Skin: no skin  rash  Other: Mittens in place     Data   Recent Labs   Lab 12/05/22  0937 12/04/22  1559 12/03/22  0738 12/03/22  0153 12/02/22  0636 12/01/22  2115 12/01/22  0632 11/30/22  0631 11/29/22  1735 11/29/22  0546   WBC  --  9.3  --   --  8.2  --   --  9.9  --   --    HGB  --  12.8*  --   --  12.7*  --   --  12.7*  --   --    MCV  --  96  --   --  94  --   --  93  --   --    PLT  --  375  --   --  362  --  328 343   < >  --    NA  --  140 138  --  140  --  142 141  --  140   POTASSIUM 3.3* 5.2 3.5   < > 3.2*  3.2*   < > 3.3* 3.3*   < > 3.3*   CHLORIDE  --  108* 106  --  107  --  109* 109*  --  108*   CO2  --  16* 20*  --  20*  --  19* 20*  --  19*   BUN  --  13.1 11.6  --  12.3  --  17.6 20.6  --  17.1   CR  --  0.79 0.81  --  0.82  --  0.80 0.77  --  0.74   ANIONGAP  --  16* 12  --  13  --  14 12  --  13   CANDE  --  8.6* 7.9*  --  8.2*  --  8.3* 8.5*  --  8.6*   GLC  --  106* 119*  --  100*  --  91 106*  --  115*   ALBUMIN  --   --   --   --   --   --   --   --   --  3.5   PROTTOTAL  --   --   --   --   --   --   --   --   --  6.4   BILITOTAL  --   --   --   --   --   --   --   --   --  0.6   ALKPHOS  --   --   --   --   --   --   --   --   --  90   ALT  --   --   --   --   --   --   --   --   --  41   AST  --   --   --   --   --   --   --   --   --  40    < > = values in this interval not displayed.     No results found for this or any previous visit (from the past 24 hour(s)).  Medications     dextrose 5% and 0.45% NaCl 75 mL/hr at 12/05/22 0937       acetaminophen  975 mg Oral TID     amLODIPine  10 mg Oral Daily     calcipotriene   Topical BID     donepezil  5 mg Oral Daily     escitalopram  20 mg Oral Daily     finasteride  5 mg Oral Daily     heparin ANTICOAGULANT  5,000 Units Subcutaneous Q12H     lidocaine  1 patch Transdermal Q24H     lidocaine   Transdermal Q8H [place]     melatonin  5 mg Sublingual QPM     memantine  10 mg Oral BID     multivitamin, therapeutic  1 tablet Oral Daily     oxyCODONE  2.5 mg  Sublingual Q6H     sodium chloride (PF)  3 mL Intracatheter Q8H     tamsulosin  0.4 mg Oral Daily     valproic acid  250 mg Oral Daily     valproic acid  250 mg Oral Q12H     vancomycin  125 mg Oral 4x Daily

## 2022-12-05 NOTE — PROGRESS NOTES
[facility]  Patient is currently open to home care services with [facility]. The patient is currently receiving  RN,PT,OT services.  [facility]  and team have been notified of patient admission.  [facility] liaison will continue to follow patient during stay.  If appropriate provide orders to resume home care at time of discharge.

## 2022-12-05 NOTE — PROGRESS NOTES
[facility]  Palliative Care Daily Progress Note       Recommendations & Counseling     GOALS OF CARE    Given clinical improvement since last care conference, wife is leaning toward discharge home with home care. So far no issues with adherence to oral vancomycin or other medications.    SYMPTOM MANAGEMENT    Pain: difficult to assess as patient with expressive aphasia and underlying dementia with delirium. Delirium improving. Per wife, he has been alert during day and appears comfortable.  ? Recommend decreasing scheduled to oxycodone 2.5 SL q4h. Consider decreasing to 2.5 mg SL q6h tomorrow. Additional oxycodone 2.5 mg SL q4h prn available for breakthrough pain.   ? Per pain team recommending trial of low-dose ketamine infusion and toradol iv prn. Appreciate pain team assistance. Recommend contacting anesthesiology/pain team as to recommendations when to discontinue ketamine infusion.  ? As not needing lorazepam, will discontinue as benzodiazepines can increase delirium  ? Continue scheduled tylenol, lidocaine patches.  ? Consider alternating topical voltaren gel during daytime (if he tolerates application) and lidocaine patches at night.    Delirium/Agitation:   ? Zyprexa 2.5 mg BID prn. Haldol 2 mg iv q6h prn only if unable to administer oral medication  ? Would continue to monitor for pain and treat to prevent agitation.  ? Monitor PVR for urine retention. Wife asked for urology consult, as he appears to be in distress while attempting to urinate, despite flomax. De-escalating opioids as these can contribute to urine retention  ? Delirium precautions    Thank you for the opportunity to participate in the care of this patient and family. Our team: will continue to follow.     During regular M-F work hours (2747-2158) -- if you are not sure who specifically to contact -- please contact us in [facility] Smart Web.     After regular work hours and on weekends/holidays, you  can call our answering service at [phone].       Yesica Pandey PA-C  [facility]  Contact information available via [facility] Paging/Directory      Attestation:  Total time on the floor involved in the patient's care: 15 minutes  Total time spent in counseling/care coordination: >50%,       Assessments          Catalino Coronado is a 72 year old male with PMH Alzheimer's disease, stage IIIa kidney disease, BPH, hypertension who was brought to ED for evaluation of urinary retention and dementia with agitated behavior. Found to have L4 fracture and urine retention.    Today, the patient was seen for:  Alzheimer's dementia  L4 fracture s/p fall  Urine retention  C diff diarrhea  Delirium  Pain    Prognosis, Goals, or Advance Care Planning was addressed today with: No.  Mood, coping, and/or meaning in the context of serious illness were addressed today: No.              Interval History:     Chart review/discussion with unit or clinical team members:   Discussed with therapy--Plan for PT to work with patient today. Discussed wife's request for hospital bed at discharge with unit SW.    Per patient or family/caregivers today:  Visited Vahid. Wife Leia at bedside giving him a shave. Vahid is awake, alert. He does not verbally respond to questions. Leia shared that she is very pleased that Vahid's condition has improved so much, as are we. She has noticed that when he attempts to urinate, he appears to strain to do so and appears uncomfortable despite flomax. She is wondering if his prostate is enlarged. He does follow with a urologist outpatient and is wondering if urology could evaluate him before he is discharged home as she worries he will continue to have difficulty. Discussed that medications such as opioids could be contributing. She feels otherwise he does not appear to be in pain, and appears to be tolerating scheduled oxycodone without sedation. In fact he has been  awake and alert during the day and his oral intake is improving.            Review of Systems:     Unable to obtain as patient with minimal verbal responses in setting of dementia with expressive aphasia          Medications:     I have reviewed this patient's medication profile and medications during this hospitalization.    Noted meds:    APAP TID  Lidocaine patch  Oxycodone 2.5 mg po q4h with additional prn--total 15 mg in past 24h  Dilaudid 0.5 mg iv prn--no use since 12/2  Ativan--not using  Depakene 250 mg TID  Melatonin 5 mg ODT qhs  Vancomycin 125 mg po qid--taking  Zyprexa 2.5 mg BID prn--not using  No recent haldol           Physical Exam:   Temp: 97.4  F (36.3  C) Temp src: Axillary BP: (!) 140/68 Pulse: 65   Resp: 16 SpO2: 97 % O2 Device: None (Room air)    Gen: Lying in bed. Appears comfortable, in NAD.  HEENT: NCAT. Conjunctiva clear. Sclera anicteric.  Resp: No increased work of breathing  Abd: soft, no facial grimacing or signs of pain during palpation  Msk: no gross deformity  Skin:  No new skin changes  Ext: warm, well perfused.   Neuro: Awake, alert. Non verbal  Mental status/Psych:  Calm.               Data Reviewed:       Reviewed recent labs, comments:   K 3.3  Cr 0.79 on 12/4

## 2022-12-05 NOTE — PROGRESS NOTES
"[facility]  Neurology Consultation - Progress Note    Patient Name:  Catalino Coronado  Date of Service:  December 4, 2022    Subjective:    Per nursing notes, patient slept well overnight.     Objective:    Vitals: BP (!) 140/68 (BP Location: Right arm)   Pulse 65   Temp 97.4  F (36.3  C) (Axillary)   Resp 16   Wt 97.8 kg (215 lb 9.6 oz)   SpO2 97%   BMI 26.95 kg/m    General: Lying in bed, NAD  Head: Atraumatic, normocephalic   Cardiac: no lower extremity edema  Neurologic:  Makes good eye contact. Says \"yeah\" and \"okay\". Nods head intermittently. Spontaneously moving all 4 extremities. No abnormal movements noted. Wiggles toes to command.    Pertinent Investigations:    I have personally reviewed most recent and pertinent labs, tests, and radiological images.     Impression  #Alzheimer's Disease  #Delirium, multifactorial - improving  #Agitation/tremulousness with hygiene cares.   #Cortical myoclonus, improved with Depakote     Vahid Coronado is a 72 y.o. 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 Patient sustained a fall on 11/12/2022 and has had progressive back pain and agitation since that time up until admission 11/22/2022 where he was found to have L4 transverse spinous process fracture (NSGY recommending conservative treatment). Throughout most of admission, Vahid was functioning below baseline and was minimally interactive, bed-bound, and not eating well. Encephalopathy appears to have cleared in the past 2 days and patient is approaching neurological baseline.     He has been noted to have brief episodes during this hospital stay where his arms shake and he becomes less responsive. 3 hour video EEG was negative for " seizures or epileptiform discharges. Movements are likely cortical myoclonus and were responsive to escalating doses of Depakote. B12, TSH were normal. Overall, low suspicion for a primary neurological cause to delirium (ex. Reversible dementia).      Recommendations  - Continue valproic acid 250 mg in the AM and 500 mg in the PM  - Delirium precautions  - Neurology is available for any additional care conferences (preference is Monday - Wednesday in the afternoons).     Neurology will sign off at this time but is available care conference this week - please page us.     Thank you for involving Neurology in the care of Catalino Coronado.  Please do not hesitate to call with questions/concerns (consult pager [phone]).      Patient was seen and discussed with Dr. Blair.     Herb Hunt MD  PGY-4 Neurology Resident   P: [phone]

## 2022-12-05 NOTE — PLAN OF CARE
Goal Outcome Evaluation:      Plan of Care Reviewed With: patient    Overall Patient Progress: no change    Outcome Evaluation: Pt confused. Agitated and combative w/ cares. A2-3 w/ repositioning. Scheduled oxy given for pain. Lido patch applied on lower back. K 3.3, replaced w/ one time dose. Meds crushed given w/ ice cream. Multiple loose BMs. Primofit in place for urinary incontinence. UA sent. PVR 5 cc. Poor PO intake. Total feed. On pureed diet w/ thin liquids. Speech following. Family at bedside helping w/ cares.

## 2022-12-05 NOTE — PLAN OF CARE
Goal Outcome Evaluation:      Plan of Care Reviewed With: patient    Overall Patient Progress: no changeOverall Patient Progress: no change    Pt Alert and arouses to voice and occasionally touch. AVSS on room air. Pt still in a lot of pain and slightly combative during repositioning, had one loose BM overnight. Tremors in upper extremities present when pt seems to be axious or aggitated typically during repositioning. Pain managed with scheduled sublingual oxy and tylenol. Slept well throughout the night. 1:1 discontinued. Primofit in place with good urine output. Try to have PT consult to get into chair if possible during the day. Possible change in care plan moving forward to be discussed with family.

## 2022-12-06 NOTE — PROGRESS NOTES
[facility]    Medicine Progress Note - Hospitalist Service, GOLD TEAM 7    Date of Admission:  11/22/2022    Assessment & Plan   Catalino Coronado is a 72-year-old man with Alzheimer dementia here for pain and weakness, found to have L4 transverse spinous process fracture and found to have delirium.Initial infectious work up and metabolic work up negative for any organic cause. 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 Now with waxing and waning mentation. Awaiting on placement.      Today's plan   -Spoke at length with PT and social work, plan to discharge patient to home with aggressive support   -Patient to have care conference 12/7  -Stopped IV fluids and ordered calorie counts in anticipation of home discharge        Goal of Conference:   Care conference held 12/2/22, wife Leia, and daughter and granddaughter, in attendance of neurology resident, palliative team and . As per Leia, Vahid would not have wanted to heroic measures to prolong his life and goals is to go home. Given significant delirium requiring physical and chemical restriants, worry was Vahid might not show significant improvement and at that point family would have wanted to opt for comfort care and go home with hospice.   However Vahid's family would like possible home discharge         Delirium likely due to pain, fall, hospitalization, C diff on top of underlying advanced dementia: improving and close to baseline   Minimally verbal at recent baseline at home but he is usually able to communicate with his wife by gestures about bathroom needs.   However had acute decline since after fall, was agitated and restless during initial days of hospitalization requiring physical and chemical restriants  B12 and TSH WNL  3  hour EEG 12/1/22 consistent with diffuse encephalopathy, without any evidence of fical seizure activity  Neurology evaluated patient and started on Depakote 12/2/22 for possible cortical myoclonus given episodic UE movements  Plan:              Mental status close to baseline              Off of Ketamine infusion since 12/3              Lorazepam discontinue              Continue olanzapine prn for agitation,               Discontinue mittens         C Diff enteritis with diarrhea:  This could be main contributing to delirium   X ray abdomen showed possible enteritis but no significant stool burden  Started on PO Vancomycin 12/3- anticipate 10 day course                   Acute back pain secondary to L4 transverse spinous process fracture from a fall at home  CT lumbar spine revealed an acute L4 fracture, no retropulsion.   Neurosurgery recommended conservative treatment and no brace.   - Appreciate palliative and pain medicine assistance with pain  - Pain medicine also discussed case with IR, IR could not do transverse injection  Plan:              Continue oxycodone 2.5 mg, decreased from q4hrs to q6 hrs brynn               Continue oxycodone prn              scheduled tylenol               Continue Dilaudid 0.5 mg q 2hr prn              Topical Voltaren                 Moderate protein calorie malnutrition:  Poor oral intake  - Appreciate nutrition consultation  - Family decided aginst feeding tube.        Urinary retention:   Wife reports noticing straining.  On home Flomax, will add finasteride  Monitor PVR     Alzheimer's disease   Generalized weakness with frequent falls  -Continue home regimen of Aricept 5mg once daily  -Continue Lexapro 20mg once daily  -Continue home regimen of Namenda 10mg by mouth twice daily  -Continue Seroquel 12.5mg by mouth once daily  -PT/OT consult to assess safety to return home            Hypokalemia: replace as needed  BPH:Continue home regimen of Flomax 0.4mg once daily  Urine  culture negative  Stage IIIa kidney disease        Hypertension  -switched from home regimen of Atenolol 100mg once daily to amlodipine 10 mg daily due to bradycardia and persistent elevated BPs          Diet: Pureed Diet (level 4) Thin Liquids (level 0) (1:1 supervision)  Room Service    DVT Prophylaxis: Heparin SQ  Muhammad Catheter: Not present  Central Lines: None  Cardiac Monitoring: None  Code Status: No CPR- Do NOT Intubate      Disposition Plan      Expected Discharge Date: 12/08/2022      Destination: home with family;home with help/services  Discharge Comments: Planning for care conference 3pm today with primary, palliative, would appreciate care management involvement. This will determine disposition.        The patient's care was discussed with the Bedside Nurse and Patient.    Devyn Castillo MD  Hospitalist Service, [address]  Securely message with the Vocera Web Console (learn more here)  Text page via [facility] Paging/Directory   Please see signed in provider for up to date coverage information      Clinically Significant Risk Factors        # Hypokalemia: Lowest K = 3.3 mmol/L (Ref range: 3.4-5.3) in last 2 days, will replace as needed                        ______________________________________________________________________    Interval History   Patient has no new symptoms, He was drowsy, No new abdominal pain, difficulty urinating or fevers.     Data reviewed today: I reviewed all medications, new labs and imaging results over the last 24 hours. I personally reviewed no images or EKG's today.    Physical Exam   Vital Signs: Temp: 97.5  F (36.4  C) Temp src: Axillary BP: (!) 142/55 Pulse: 72   Resp: 16 SpO2: 98 % O2 Device: None (Room air)    Weight: 215 lbs 9.6 oz  Constitutional: awake, alert, cooperative, no apparent distress, and appears stated age  Eyes: Lids and lashes normal, pupils equal, round and reactive to light, extra ocular muscles  intact, sclera clear, conjunctiva normal  ENT: Normocephalic, without obvious abnormality, atraumatic, sinuses nontender on palpation, external ears without lesions, oral pharynx with moist mucous membranes, tonsils without erythema or exudates, gums normal and good dentition.  Hematologic / Lymphatic: no cervical lymphadenopathy  Respiratory: No increased work of breathing, good air exchange, clear to auscultation bilaterally, no crackles or wheezing  Cardiovascular: Normal apical impulse, regular rate and rhythm, normal S1 and S2, no S3 or S4, and no murmur noted  GI: No scars, normal bowel sounds, soft, non-distended, non-tender, no masses palpated, no hepatosplenomegally  Genitounirinary:   Skin: no bruising or bleeding  Musculoskeletal: There is no redness, warmth, or swelling of the joints.  Full range of motion noted.  Motor strength is 5 out of 5 all extremities bilaterally.  Tone is normal.  Neurologic: Awake, alert, oriented to name, place and time.  Cranial nerves II-XII are grossly intact.  Motor is 5 out of 5 bilaterally.  Cerebellar finger to nose, heel to shin intact.  Sensory is intact.  Babinski down going, Romberg negative, and gait is normal.  Neuropsychiatric: General: normal, calm and normal eye contact    Data   Recent Labs   Lab 12/05/22  0937 12/04/22  1559 12/03/22  0738 12/03/22  0153 12/02/22  0636 12/01/22  2115 12/01/22  0632 11/30/22  0631   WBC  --  9.3  --   --  8.2  --   --  9.9   HGB  --  12.8*  --   --  12.7*  --   --  12.7*   MCV  --  96  --   --  94  --   --  93   PLT  --  375  --   --  362  --  328 343   NA  --  140 138  --  140  --  142 141   POTASSIUM 3.3* 5.2 3.5   < > 3.2*  3.2*   < > 3.3* 3.3*   CHLORIDE  --  108* 106  --  107  --  109* 109*   CO2  --  16* 20*  --  20*  --  19* 20*   BUN  --  13.1 11.6  --  12.3  --  17.6 20.6   CR  --  0.79 0.81  --  0.82  --  0.80 0.77   ANIONGAP  --  16* 12  --  13  --  14 12   CANDE  --  8.6* 7.9*  --  8.2*  --  8.3* 8.5*   GLC  --  106*  119*  --  100*  --  91 106*    < > = values in this interval not displayed.     No results found for this or any previous visit (from the past 24 hour(s)).  Medications     dextrose 5% and 0.45% NaCl 75 mL/hr at 12/06/22 1053       acetaminophen  975 mg Oral TID     amLODIPine  10 mg Oral Daily     calcipotriene   Topical BID     donepezil  5 mg Oral Daily     escitalopram  20 mg Oral Daily     finasteride  5 mg Oral Daily     heparin ANTICOAGULANT  5,000 Units Subcutaneous Q12H     lidocaine  1 patch Transdermal Q24H     lidocaine   Transdermal Q8H [place]     melatonin  5 mg Sublingual QPM     memantine  10 mg Oral BID     multivitamin, therapeutic  1 tablet Oral Daily     oxyCODONE  2.5 mg Sublingual Q6H     sodium chloride (PF)  3 mL Intracatheter Q8H     tamsulosin  0.4 mg Oral Daily     valproic acid  250 mg Oral Daily     valproic acid  250 mg Oral Q12H     vancomycin  125 mg Oral 4x Daily

## 2022-12-06 NOTE — PLAN OF CARE
Goal Outcome Evaluation:      Plan of Care Reviewed With: patient    Overall Patient Progress: no changeOverall Patient Progress: no change    Outcome Evaluation: Pt confused and combative with cares. AX3 repositioning and cleanup. scheduled oxy given for pain. Tremors upon movements. Slept well overnight. Lidocaine patch removed. Awaiting K draw this am. Needs weight this AM. Refused vanco and 1/2 depakene dose MD notifed, no change of care advised. IV infusing D5W .45NS 75ml/hr. Primafit on and changed NOC working well. One loose BM overnight. Mitts on for safety. Chair alarm on bed.

## 2022-12-06 NOTE — PLAN OF CARE
Goal Outcome Evaluation:      Plan of Care Reviewed With: patient    Overall Patient Progress: no change    Outcome Evaluation: Pt confused. Agitated and combative w/ cares. VSS on RA. A2-3 w/ repositioning. Oxy given for pain. Meds crushed given w/ ice cream. IVF discontinued. Primofit in place. 3 loose BMs. On pureed diet w/ thin liquids, poor appetite, encouraging PO intake. Speech will see pt tomorrow. Plan for care conference tomorrow at 2 pm.

## 2022-12-06 NOTE — PROGRESS NOTES
[facility]  Palliative Care Daily Progress Note       Recommendations & Counseling     GOALS OF CARE    Plan for [facility] tomorrow at 2pm to discuss care plan.     SYMPTOM MANAGEMENT    Pain: difficult to assess as patient with expressive aphasia and underlying dementia with delirium. Delirium improving. Per wife, he has been alert during day and appears comfortable.  ? Continue scheduled to oxycodone 2.5 SL q4h. Additional oxycodone 2.5 mg SL q4h prn available for breakthrough pain.   ? Continue scheduled tylenol, lidocaine patches.  ? Consider alternating topical voltaren gel during daytime (if he tolerates application) and lidocaine patches at night.    Delirium/Agitation: Considerable agitation while attempting routine cares today. Discussed with wife Leia. She does not feel this was triggered by pain.  ? Trial of ativan 0.5 mg soln SL prn prior to cares due to combativeness during routine cares that in the past has not responded very well to haldol alone. Did discuss that this could put him at risk of delirium however. Given the goal to be able to care for him safely at home, will try prior to discharge and discontinue if not helpful.  ? Zyprexa 2.5 mg BID prn agitation. Haldol 2 mg iv q6h prn only if unable to administer oral medication  ? Melatonin scheduled qhs  ? Would continue to monitor for pain and treat to prevent agitation.  ? Monitor PVR for urine retention.  ? Delirium precautions    Thank you for the opportunity to participate in the care of this patient and family. Our team: will continue to follow.     During regular M-F work hours (0688-6037) -- if you are not sure who specifically to contact -- please contact us in [employer] Web.     After regular work hours and on weekends/holidays, you can call our answering service at [phone].       Yesica Pandey PA-C  [facility]  Contact information  available via [facility] Paging/Directory      Attestation:  Total time on the floor involved in the patient's care: 25 minutes  Total time spent in counseling/care coordination: >50%,       Assessments          Catalino Coronado is a 72 year old male with PMH Alzheimer's disease, stage IIIa kidney disease, BPH, hypertension who was brought to ED for evaluation of urinary retention and dementia with agitated behavior. Found to have L4 fracture and urine retention.    Today, the patient was seen for:  Alzheimer's dementia  L4 fracture s/p fall  Agitation  Pain    Prognosis, Goals, or Advance Care Planning was addressed today with: No.  Mood, coping, and/or meaning in the context of serious illness were addressed today: No.              Interval History:     Chart review/discussion with unit or clinical team members:   Discussed plan for FCC tomorrow with unit SW. Discussed plan of care with Dr. Castillo    Per patient or family/caregivers today:  Visited Vahid. Wife Leia at bedside. Vahid became very combative today during cares while being turned onto his side and required assistance of others to hold him during cares. Leia states that in the past Vahid has been very sensitive to any touch to his legs. Vahid is alert and smiles at times during our conversation, but does not directly answer questions about pain when I touch his legs and feet. He does not appear to become agitated or show signs of discomfort however. Leia is concerned about how to deal with his combativeness when he gets home. She asked about medications for this. On further questioning, he has never been diagnosed with peripheral neuropathy. We discussed possibility of trial of gabapentin, but after further discussion Leia wished to give ativan a try.            Review of Systems:     Unable to obtain as patient with minimal verbal responses in setting of dementia with expressive aphasia          Medications:     I have reviewed this patient's medication  profile and medications during this hospitalization.    Noted meds:    APAP TID  Lidocaine patch  Oxycodone 2.5 mg po q4h with additional prn--total 12.5 mg in past 24h  Dilaudid 0.5 mg iv prn--no use since 12/2  Depakene 250 mg TID--missed one dose  Melatonin 5 mg ODT qhs  Vancomycin 125 mg po qid--missed one dose  Zyprexa 2.5 mg BID prn--not using  No recent haldol           Physical Exam:   Temp: 98  F (36.7  C) Temp src: Axillary BP: 130/66 Pulse: 77   Resp: 20 SpO2: 96 % O2 Device: None (Room air)    Gen: Lying in bed. Appears comfortable, in NAD.  HEENT: NCAT. Conjunctiva clear. Sclera anicteric.  Resp: No increased work of breathing  Abd: soft, no facial grimacing or signs of pain during palpation  Msk: no gross deformity  Skin:  No new skin changes  Ext: warm, well perfused.   Neuro: Awake, alert. Non verbal  Mental status/Psych:  Calm.               Data Reviewed:       Reviewed recent labs, comments:   K 3.3  Cr 0.79 on 12/4

## 2022-12-06 NOTE — PROGRESS NOTES
Care Management Follow Up    Length of Stay (days): 14    Expected Discharge Date: 12/08/2022     Concerns to be Addressed: all concerns addressed in this encounter     Patient plan of care discussed at interdisciplinary rounds: Yes    Anticipated Discharge Disposition: Home  Anticipated Discharge Services:  Transportation  Anticipated Discharge DME: Pts PCP has ordered a hospital bed and lift device for pt.  [employer] to provide equipment.  Pt spouse has purchased a wheelchair and commode.      Patient/family educated on Medicare website which has current facility and service quality ratings:  (N/A)  Education Provided on the Discharge Plan: Yes    Patient/Family in Agreement with the Plan: yes    Referrals Placed by CM/SW: N/A at this time.  Private pay costs discussed: Not applicable    Additional Information:  @1000AM SW called pts spouse Leia to discuss discharge planning.  SW informed Leia of Therapy's concern providing therapy to pt due to pts behaviors and agitation.  SW informed Leia that when staff engages in any type of mobilty actions with pt-pt becomes agitated and there are safety concerns for staff.  SW informed Leia that similar patterns of behavior was exhibited this morning when Therapy attempted to provide services for pt.      Per discussion-Leia's goal is for pt to transition home with [facility] PT/OT/RN.  Leia reports that she has been in communication with her PCP office and [employer].  She reports that a hospital bed has been ordered and that she is currently awaiting an order to be sent to [employer] for the lift device.  She reports that she has purchased a wheelchair and a commode.  She reports that she is trying to get everything prepared and ready for pt to return home.      Leia reports that she hopes that Therapy team can assist with working with pt to transfer to a wheelchair.  She reports that her daughter is a professor and can assist with transitioning pt  home for about 1-2 months.  She reports supportive family to help with the transition. Leia reports that her family has already had multiple discussions regarding LTC-memory care and Skilled Nursing facilities.  She reports that their preference is for pt to transition back home and trial that option first before considering a SNF/LTC facility. Leia reports that she would like a care conference scheduled for tomorrow afternoon.  She reports that she would like Neurology Resident Dr Herb Hunt, Palliative SHARAN Robert, Palliative SW, Palliative , Therapy, and medicine team to be present.      @1010AM KHOI paged Dr. Estevez and SW received a call back.  She reports that she will be available for care conference after 1300PM.    @1031AM KHOI paged Palliative CHARLEE Pandey via [facility] to inquire if she, Palliative SW, and Palliative  would be available for a care conference tomorrow afternoon at 1400PM.  KHOI received a call back from SHARAN Robert that Palliative team will be present at care conference at 1400PM in the [address] conference room.    KHOI informed and updated Dr. Castillo of conversation with pts spouse Leia and her goals of care.  Dr. Castillo reports that he will try to be at care conference tomorrow.  KHOI also updated Therapy Team of goals of care reported by pts spouse and informed them of care conference tomorrow afternoon.  Someone from therapy team will try to be present but due to schedule may not be able to be present.    @1024AM KHOI reserved Unit [address] Conference Room for care conference.    @1039AM KHOI paged Neurology Resident Dr. Estevez and informed her that care conference will be at 1400PM in the Unit 5B Conference Room.    @1040AM KHOI called pt spouse Leia and informed her of the time and location of the care conference tomorrow.  She reports that she will be physically present for the care conference tomorrow.  KHOI informed Leia of who will be present at the care conference and the  possibility that Therapy and the attending physician may not be available-pending schedules.    KHOI received a message from Palliative CHARLEE Robert inquiring if care conference time can be changed to 1500PM. KHOI notified Therapy Team that time will be changed to 1500PM. KHOI messaged Dr. Castillo via [employer] that care conference time has been changed to 1500PM.  KHOI paged Dr. Estevez to inquire if a time change for care conference to 1500PM tomorrow would be agreeable.  KHOI paged Dr. Estevez again @1500PM. @1437PM KHOI called pts spouse Leia to inform her of new care conference time. KHOI LVM.      @1514PM KHOI informed that care conference will need to be switched back to 1400PM tomorrow. KHOI informed Dr. Castillo via [employer], Palliative team aware, Therapy team aware, KHOI sent paged to Dr. Estevez, and KHOI also informed pts spouse Leia.       will continue to follow for discharge planning, support, and resources.    LISA Brito, [facility]  [address]   Office: [phone]   Pager: [phone]  [email or web address]

## 2022-12-06 NOTE — PROGRESS NOTES
SPIRITUAL HEALTH SERVICES  SPIRITUAL ASSESSMENT Progress Note (Palliative Focus)  [facility] ([place] 5A    REFERRAL SOURCE: Length of Stay Follow Up    Met with patient's daughter Nabila to check in and provide emotional and spiritual support. Patient is more alert and awake now, Nabila stating that his pain seems to be well controlled.     Plan: Will continue to follow while Palliative Care is consulted.     Dia Caballero MDiv.  Palliative Chaplain Resident  Pager [phone]    [facility] Palliative Care Inpatient Team Consult pager [phone] (M-F 8-4:30)  After-hours Answering Service [phone]

## 2022-12-07 NOTE — PROGRESS NOTES
[facility]    Medicine Progress Note - Hospitalist Service, GOLD TEAM 7    Date of Admission:  11/22/2022    Assessment & Plan   Catalino Coronado is a 72-year-old man with Alzheimer dementia here for pain and weakness, found to have L4 transverse spinous process fracture and found to have delirium.Initial infectious work up and metabolic work up negative for any organic cause. 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 Now with waxing and waning mentation. Awaiting on placement.      Today's plan   -12/7 Had detailed discharge work conversation, Patients wife leia mentioned she would like to have palliative care assess patients candidacy for hospice  And simultaneously work with psychiatry to determine capacity assesment and further assist with medication adjustment   -Labs do indicate mild anion gap? Would redraw labs tomorrow and reassess, patient did similar thing couple of days ago        Goal of Conference:   Care conference held 12/2/22, wife Leia, and daughter and granddaughter, in attendance of neurology resident, palliative team and . As per Leia, Vahid would not have wanted to heroic measures to prolong his life and goals is to go home. Given significant delirium requiring physical and chemical restriants, worry was Vahid might not show significant improvement and at that point family would have wanted to opt for comfort care and go home with hospice.   However Vahid's family would like possible home discharge         Delirium likely due to pain, fall, hospitalization, C diff on top of underlying advanced dementia: marginal improvement in mentation   Minimally verbal at recent baseline at home but he is usually able to communicate with his wife by gestures about bathroom  needs.   However had acute decline since after fall, was agitated and restless during initial days of hospitalization requiring physical and chemical restriants  B12 and TSH WNL  3 hour EEG 12/1/22 consistent with diffuse encephalopathy, without any evidence of focal seizure activity  Neurology evaluated patient and started on Depakote 12/2/22 for possible cortical myoclonus given episodic UE movements  Plan:              Mental status close to baseline              Off of Ketamine infusion since 12/3              Lorazepam resumed at lower dose 0.25 Q6 prn               Continue olanzapine prn for agitation                mittens as needed         C Diff enteritis with diarrhea:  This could be main contributing to delirium   X ray abdomen showed possible enteritis but no significant stool burden  Started on PO Vancomycin 12/3-12/12 anticipate 10 day course                   Acute back pain secondary to L4 transverse spinous process fracture from a fall at home  CT lumbar spine revealed an acute L4 fracture, no retropulsion.   Neurosurgery recommended conservative treatment and no brace.   - Appreciate palliative and pain medicine assistance with pain  - Pain medicine also discussed case with IR, IR could not do transverse injection  Plan:              Continue oxycodone 2.5 mg, decreased from q4hrs to q6 hrs brynn               Continue oxycodone prn              scheduled tylenol               Continue Dilaudid 0.5 mg q 2hr prn              Topical Voltaren                 Moderate protein calorie malnutrition:  Poor oral intake  - Appreciate nutrition consultation  - Family decided aginst feeding tube.        Urinary retention:   Wife reports noticing straining.  On home Flomax, will add finasteride  Monitor PVR     Alzheimer's disease   Generalized weakness with frequent falls  -Continue home regimen of Aricept 5mg once daily  -Continue Lexapro 20mg once daily  -Continue home regimen of Namenda 10mg by mouth twice  daily  -Continue Seroquel 12.5mg by mouth once daily  -PT/OT consult to assess safety to return home            Hypokalemia: replace as needed  BPH:Continue home regimen of Flomax 0.4mg once daily  Urine culture negative  Stage IIIa kidney disease        Hypertension  -switched from home regimen of Atenolol 100mg once daily to amlodipine 10 mg daily due to bradycardia and persistent elevated BPs            Diet: Pureed Diet (level 4) Thin Liquids (level 0) (1:1 supervision)  Room Service  Calorie Counts    DVT Prophylaxis: Heparin SQ  Muhammad Catheter: Not present  Central Lines: None  Cardiac Monitoring: None  Code Status: No CPR- Do NOT Intubate      Disposition Plan      Expected Discharge Date: 12/09/2022      Destination: home with family;home with help/services  Discharge Comments: Planning for care conference 3pm today with primary, palliative, would appreciate care management involvement. This will determine disposition.        The patient's care was discussed with the Bedside Nurse and Patient.    Devyn Castillo MD  Hospitalist Service, [facility] TEAM [address] [place] [address]  Securely message with the Vocera Web Console (learn more here)  Text page via [facility] Paging/Directory   Please see signed in provider for up to date coverage information      Clinically Significant Risk Factors          # Hypocalcemia: Lowest Ca = 8.2 mg/dL (Ref range: 8.5 - 10.1 mg/dL) in last 2 days, will monitor and replace as appropriate     # Hypoalbuminemia: Lowest albumin = 3.3 g/dL (Ref range: 3.5-5.2) at 12/6/2022  1:16 PM, will monitor as appropriate                  ______________________________________________________________________    Interval History   Patient has no new symptoms, no abdominal pain, difficulty urinating, fevers or malaise     Data reviewed today: I reviewed all medications, new labs and imaging results over the last 24 hours. I personally reviewed no images or EKG's  today.    Physical Exam   Vital Signs: Temp: 97  F (36.1  C) Temp src: Axillary BP: 105/57 Pulse: 63   Resp: 18 SpO2: 97 % O2 Device: None (Room air)    Weight: 202 lbs 8 oz  Constitutional: awake, alert, cooperative, no apparent distress, and appears stated age  Eyes: Lids and lashes normal, pupils equal, round and reactive to light, extra ocular muscles intact, sclera clear, conjunctiva normal  ENT: Normocephalic, without obvious abnormality, atraumatic, sinuses nontender on palpation, external ears without lesions, oral pharynx with moist mucous membranes, tonsils without erythema or exudates, gums normal and good dentition.  Hematologic / Lymphatic: no cervical lymphadenopathy  Respiratory: No increased work of breathing, good air exchange, clear to auscultation bilaterally, no crackles or wheezing  Cardiovascular: Normal apical impulse, regular rate and rhythm, normal S1 and S2, no S3 or S4, and no murmur noted  GI: No scars, normal bowel sounds, soft, non-distended, non-tender, no masses palpated, no hepatosplenomegally  Genitounirinary:   Skin: no bruising or bleeding  Musculoskeletal: There is no redness, warmth, or swelling of the joints.  Full range of motion noted.  Motor strength is 5 out of 5 all extremities bilaterally.  Tone is normal.  Neurologic: Awake, alert, oriented to name, place and time.  Cranial nerves II-XII are grossly intact.  Motor is 5 out of 5 bilaterally.  Cerebellar finger to nose, heel to shin intact.  Sensory is intact.  Babinski down going, Romberg negative, and gait is normal.  Neuropsychiatric: General: normal, calm and normal eye contact    Data   Recent Labs   Lab 12/07/22  0636 12/06/22  1316 12/05/22  0937 12/04/22  1559 12/03/22  0153 12/02/22  0636   WBC  --  7.0  --  9.3  --  8.2   HGB  --  13.8  --  12.8*  --  12.7*   MCV  --  93  --  96  --  94    333  --  375  --  362    142  --  140   < > 140   POTASSIUM 3.7 3.5 3.3* 5.2   < > 3.2*  3.2*   CHLORIDE 107  108*  --  108*   < > 107   CO2 19* 23  --  16*   < > 20*   BUN 8.4 7.1*  --  13.1   < > 12.3   CR 0.77 0.71  --  0.79   < > 0.82   ANIONGAP 18* 11  --  16*   < > 13   CANDE 8.8 8.2*  --  8.6*   < > 8.2*   GLC 98 127*  --  106*   < > 100*   ALBUMIN  --  3.3*  --   --   --   --    PROTTOTAL  --  6.0*  --   --   --   --    BILITOTAL  --  0.4  --   --   --   --    ALKPHOS  --  85  --   --   --   --    ALT  --  20  --   --   --   --    AST  --  26  --   --   --   --     < > = values in this interval not displayed.     No results found for this or any previous visit (from the past 24 hour(s)).  Medications       acetaminophen  975 mg Oral TID     amLODIPine  10 mg Oral Daily     calcipotriene   Topical BID     donepezil  5 mg Oral Daily     escitalopram  20 mg Oral Daily     finasteride  5 mg Oral Daily     heparin ANTICOAGULANT  5,000 Units Subcutaneous Q12H     lidocaine  1 patch Transdermal Q24H     lidocaine   Transdermal Q8H [place]     melatonin  5 mg Sublingual QPM     memantine  10 mg Oral BID     multivitamin, therapeutic  1 tablet Oral Daily     oxyCODONE  2.5 mg Sublingual Q6H     sodium chloride (PF)  3 mL Intracatheter Q8H     tamsulosin  0.4 mg Oral Daily     valproic acid  250 mg Oral Daily     valproic acid  250 mg Oral Q12H     vancomycin  125 mg Oral 4x Daily

## 2022-12-07 NOTE — PROGRESS NOTES
[facility]  Palliative Care Daily Progress Note       Recommendations & Counseling     Encounter for palliative care  Psychosocial support    Psychosocial support was provided to patient and/or family.    Prognosis: poor (in setting of suspected moderate dementia with behavioral disturbance which seems to have acutely and significantly progressed to the point where he would need assistance with iADLs and will likely continue to progress)    Palliative performance scale (PPS): 30% (seemingly around 80% prior to fall 1 week from admission)    CODE status: AND/DNI  o Per POLSTs from 11/23/2022 and 12/2/2022  o Note that 11/23/2022 states that Leia believes Vahid would be okay with measures up to BiPAP and coming back to the hospital but changed this on the POLST to comfort measures on 12/2/2022  o The POLST on 11/23/2022 states that Vahid would not want artificial nutrition but this was not addressed on the POLST from 12/2/2022    Goals of Care: Undecided, seemingly comfort-oriented but still okay   o Care conference held with primary, palliative, and neuro teams, bedside RN, and wife Leia/daughter Monica  o Leia started the meeting by summarizing that Vahid has unfortunately been combative and seems like a completely different person than before as he was very kind and gentle prior to the events from the past few weeks.   o She states that he has not been eating much, was trying to hit staff and family while cursing, and though his mentation has improved by treating the C diff, it is still not back to his previous baseline.  o She dearly wants to take him home and has remodeled that home to help accommodate this wish and even has a hospital bed ordered and ready to ship.  o All teams noted to her that we will support her however we can. It was stated that Vahid can technically be discharged. Leia could try and take him home, it would likely be very difficult to care for  him and he may have fall or suffer from another issue as he did PTA. It was recommended that for a safe disposition plan, we could continue treating and monitoring Vahid while we look into the possibility of hospice (though I am unsure if he would qualify... he has had an acute significant decline but it does not seem like he is at FAST 7c stage yet), look into memory care units nearby Vahid and Leia's home, and control his pain better hoping he will improve.  o Leia was amenable to this plan and daughter Monica over the phone was also on board with this.  o It was also noted that Vahid has a brain aneurysm and Leia inquired whether she ought to pursue a repeat MRA for this and neurology stated that it is small and insignificant and especially since he would likely not tolerate the procedure current state to not pursue any repeat imagine.   o In the end, it was decided to help get Vahid's pain under better control, to pursue a hospice evaluation, have Leia do research on memory care unit facilities nearby, and monitor calories and nutritional intake while consulting psych to hopefully expedite a future referral to memory care units.  o Note: Leia seemed to be extremely concerned about Vahid's ability to eat during the meeting. She notes that Vahid has went from 215 to 202 pounds currently since admission and is currently not receiving IVF or eating much. Previous POLST from 11/23/2022 states he would not want artificial nutrition. Would recommend against PEG in patients with dementia. Will continue to address the idea of nutrition in dementia patients in the days to  come  o Medical Surrogate: wife Leia     Disposition: Inpatient per primary. Pending hospice informational/evaluation and psych eval for potential memory care unit and Leia's decision to okay for a facility take Vahid home.    Symptoms    Delirium/Agitation in setting of suspected moderate dementia with behavioral disturbance    Muscle  spasms/clonus    BPH    C diff colitis - addressed  o Seems to be 6d-e dementia as he is speaking more than 5-6 words at times and curiously wife Leia reports that when he is delirious, he is more eloquent and speaks and curses more than when he is not delirious  o Recommend to continue non-pharm interventions  o External catheter okay so long as there are not patient safety concerns (seems to be tolerating it)  o Mike mitts okay but would trial off intermittently so long as his behavior is less aggressive  o Depakene 250 mg q12h and 250 mg daily per neurology   o PTA Namenda and Aricept  o PTA Lexapro  o Bladder scan from PM showed ~117 ml only   o PTA Finasteride and Flomax  o Vanc po for C diff  o Zyprexa 2.5 mg po BID prn - consider scheduling 5 mg ODT at bedtime to help him rest at night  o Avoid other potentially sedating medications like benzos, hypnotics, antihistamines, anticholinergics, etc  o Would welcome any further psych recommendations for dementia/delirium    Pain, likely related to L4 TP fracture    Moderate to severe spinal canal stenosis at L3-4 with multi level spondylosis  o Gets agitate when people touch his arms and during routine care  o Agree with APAP 975 mg po TID  o Agree with oxycodone 2.5 mg SL q6h with 2.5 mg SL prn for now  o Agree with Lidoderm  o Can continue Dilaudid 0.5 mg IV q2h prn if not taking orals  o Pharmacy liaison consult placed for Butrans patch. After prior auth was done as his insurance does not cover the patches, test claim copay would be $61.23 for Butrans 5mcg/hr patch. Will speak with Leia to see if this is acceptable or not.    At risk for constipation  o LBM 12/7  o Senna-docusate changed to just senna 2 tabs at bedtime prn (done for you) (likely has some component of OIC, docusate is ineffective)  o Agree with Dulcolax prn    Total time spent was >35 minutes, >50% of time was spent counseling and/or coordination of care regarding symptoms and goals of  care.    Garfield Sloan DO / Palliative Medicine / Text me via [facility].     Team Consult Pager [phone] (answered 8am-430pm M-F) - ok to text page via [employer] / After-Hours Answering Service [phone] / Palliative Clinic in the [facility] [place] [phone] (scheduling); [phone] (triage).        Assessments          Catalino Coronado is a 72-year-old male with a medical history significant for Alzheimer dementia, CKD IIIa, BPH, and HTN who presented to the ED on 11/22/2022 for here for pain s/p fall 1 week prior, weakness, and reported urinary retention per wife caregiver with increasing agitation. Found to have L4 transverse spinous process fracture and suffering from delirium. Agitation work-up negative for seizures (suspect metabolic encephalopathy) with possible cortical myoclonus and started on Depakote.  Initial infectious work up was negative and later found to have C Diff colitis.Has had improvement in delirium. Palliative medicine was consulted goals of care.    Today, the patient was seen for:  Pain  Delirium  Agitation  Support  Goals of care  Encounter for palliative care            Interval History:     Chart review/discussion with unit or clinical team members:   Received oxycodone 2.5 mg x2 from 0700 to 0700    Per patient or family/caregivers today:  Seen and examined at bedside. Care conference conversation detailed above. No unmet palliative need.    Key Palliative Symptoms:  # Pain severity in the last 12 hours - unable to fully assess, with known L4 fracture at least moderate as he looks uncomfortable during turns?    Patient is on opioids: assessed and I made recommendations about bowel care as above.           Review of Systems:     Besides above, >4  ROS was reviewed and is unremarkable          Medications:     I have reviewed this patient's medication profile and medications during this hospitalization.    Noted meds:    APAP 975 mg po TID  Oxycodone 2.5 mg SL q6h with  2.5 mg SL prn for now  Lidoderm  Depakene 250 mg q12h and 250 mg daily   PTA Namenda   PTA Aricept  PTA Lexapro  PTA Finasteride   PTA Flomax  Vanc po   Zyprexa 2.5 mg po BID prn   Dilaudid 0.5 mg IV q2h prn if not taking orals  Dulcolax daily prn  Senna 2 tabs at bedtime prn           Physical Exam:   Vitals were reviewed  Temp: 97  F (36.1  C) Temp src: Axillary BP: (!) 154/78 Pulse: 65   Resp: 18 SpO2: 99 % O2 Device: None (Room air)    General: Not in acute distress.  Head: Atraumatic. Normocephalic.   Eyes: Eyes closed  Ear, Nose, and Throat: Mouth somewhat dry and half open. Neck without overt masses.  Pulmonary: Unlabored.   Cardiovascular:  No jugular venous distension. Non-edematous.  Abdomen: Non-distended.  Skin: Warm. Dry.  Musculoskeletal: Joints of hand normal. Muscle bulk and tone decreased in UE and LE.  Neuro: Unable to assess  Psych: Unable to assess             Data Reviewed:     Reviewed recent pertinent imaging, comments:   No new recent imaging    Reviewed recent labs, comments:   ROUTINE ICU LABS (Last four results)  CMPRecent Labs   Lab 12/07/22  0636 12/06/22  1316 12/05/22  0937 12/04/22  1559 12/03/22  0738 12/01/22  2115 12/01/22  0632    142  --  140 138   < > 142   POTASSIUM 3.7 3.5 3.3* 5.2 3.5   < > 3.3*   CHLORIDE 107 108*  --  108* 106   < > 109*   CO2 19* 23  --  16* 20*   < > 19*   ANIONGAP 18* 11  --  16* 12   < > 14   GLC 98 127*  --  106* 119*   < > 91   BUN 8.4 7.1*  --  13.1 11.6   < > 17.6   CR 0.77 0.71  --  0.79 0.81   < > 0.80   GFRESTIMATED >90 >90  --  >90 >90   < > >90   CANDE 8.8 8.2*  --  8.6* 7.9*   < > 8.3*   MAG  --   --   --  2.0  --   --  1.9   PROTTOTAL  --  6.0*  --   --   --   --   --    ALBUMIN  --  3.3*  --   --   --   --   --    BILITOTAL  --  0.4  --   --   --   --   --    ALKPHOS  --  85  --   --   --   --   --    AST  --  26  --   --   --   --   --    ALT  --  20  --   --   --   --   --     < > = values in this interval not displayed.      CBC  Recent Labs   Lab 12/07/22  0636 12/06/22  1316 12/04/22  1559 12/02/22  0636   WBC  --  7.0 9.3 8.2   RBC  --  4.28* 3.89* 3.90*   HGB  --  13.8 12.8* 12.7*   HCT  --  39.7* 37.2* 36.5*   MCV  --  93 96 94   MCH  --  32.2 32.9 32.6   MCHC  --  34.8 34.4 34.8   RDW  --  12.8 12.8 12.8    333 375 362     INRNo lab results found in last 7 days.  Arterial Blood GasNo lab results found in last 7 days.

## 2022-12-07 NOTE — PROGRESS NOTES
PALLIATIVE CARE SOCIAL WORK Progress Note   [facility] Unit [facility]    REFERRAL SOURCE: Palliative Care; care conference    Family present: Wife Leia in person, dtr Monica via phone.    Medical providers present: Medicine, Neurology, Palliative MD,  and SW, bedside RN     Care conference today with above mentioned family and team members. Medical update provided along with update from wife Leia of observations she/family have made over course of hospitalization. Leia noted that over the weekend, she was hopeful that Vahid was cognitively stabilizing however notes that this week he has again become more combative. Little to no improvement in oral intake and despite initial improvement in delirium with treatment of C diff, Vahid has not returned to pre-hospitalization baseline.    Leia continues to verbalize her strong desire to take Vahid home but appeared more realistic in today's meeting as to the safety challenges, both for Vahid and herself, that discharge plan could present. Plan discussed and agreed upon today included facilitating a hospice referral to assess appropriateness and family will begin exploring memory care facilities close to home in attempt to have a back up plan if/when Vahid being home is no longer safe.    Emotional support provided throughout conference to Leia. Offered active listening and validated grief, concerns and fears related to finalizing a plan of care and Vahid's rapid decline.    Plan: PCSW will continue to follow while palliative care involved; continue to assess emotional support needs for wife Leia and family.    ILSA Floyd, [facility]  Palliative Care   [facility] Inpatient Team Consult pager [phone] (M-F 8-4:30)  After-hours Answering Service [phone]

## 2022-12-07 NOTE — PLAN OF CARE
Goal Outcome Evaluation:      Plan of Care Reviewed With: patient    Overall Patient Progress: no changeOverall Patient Progress: no change    Outcome Evaluation: Pt arouses to voice and occasionally touch. Pt confused, and difficult to get to comply with cares. VSS on RA. Very difficult getting Pt to cooperate with taking pills and liquid solutions overnight. Primofit in place and no BM overnight. Pt combative and aggitated when being repositioned. Care conference tomorrow with team and family, continue with plan of care.

## 2022-12-07 NOTE — CONSULTS
Discharge Pharmacy Test Claim    Buprenorphine patches are not covered by patient's MedicareBlue Part D plan. Pharmacy liaison submitted prior authorization. Pending insurance determination.    Test Claim Copay   buprenorphine patches 61.23     Addendum 12/8:  prior authorization approved with an expected copay of $61.23.    Cynthia Navarro  [facility] Pharmacy Liaison  Ph: [phone] Pager: [phone]   Securely message with the Vocera Web Console (learn more here)

## 2022-12-07 NOTE — PLAN OF CARE
Goal Outcome Evaluation:      Plan of Care Reviewed With: patient    Overall Patient Progress: no change    Outcome Evaluation: Patient alert, not answering orientation questions. VSS with HTN on RA. I/O monitored, very poor PO intake despite nutrition inteventions, calorie counts monitored. Administered AM daily medications, but patient spit out 1/4-1/2 of his crushed pills in ice cream, team informed. Care conference at 2pm with family. Blanchable redness to sacrum, rash to face, neck and ears. Assisted with hygeine cares, feeding (aspiration precautions in place) incontinence cares, and pressure injury prevention measures. Tremors still occuring. Patient combative when repositioned, ativan improved behaviors but made patient somnolent, team informed and prn ativan dose decreased from 0.5 to 0.25. Pain managed with scheduled pain medicines and prns as indicated, utilizing PAINAD score for pain assessments.

## 2022-12-07 NOTE — PROGRESS NOTES
SPIRITUAL HEALTH SERVICES  SPIRITUAL ASSESSMENT Progress Note (Palliative Focus)  [facility] ([place]) 5A    REFERRAL SOURCE: Care Conference with Primary Team, Neurology, Bedside RN, Palliative Care and Patient's Wife Leia    Care Conference held to determine next steps and check in on patient's status. Patient continues to be combative and have issues with pain. Discussed disposition plans and next steps:    Next steps: MD will place Psychiatry consult to determine capacity that may help with potential need for memory care placement, MD will also place hospice consult. Palliative MD will work on pain management and look into options. Leia will continue to discern with family her options for discharge, home vs. Memory care facility. She will look at home for a list of hospice agencies for referral if pt is appropriate and schedule tours for memory care facilities.    Stayed after care conference to provide emotional and spiritual support for Leia as she was grieving the loss of who Vahid was to her and who he is now. Prayed with her.     Plan: Will continue to follow while Palliative Care is consulted. Will continue to follow for duration of stay and provide emotional and spiritual support.     Dia Caballero MDiv.  Palliative Chaplain Resident  Pager [phone]    [facility] Palliative Care Inpatient Team Consult pager [phone] (M-F 8-4:30)  After-hours Answering Service [phone]

## 2022-12-07 NOTE — PROGRESS NOTES
"[facility]  Neurology Consultation - Progress Note    Patient Name:  Catalino Coronado  Date of Service:  December 7, 2022    Subjective:    Patient unable to provide subjective history due to aphasia.     Wife reports patient is more awake and interactive this week compared to last week, but agitation with cares continues to be a problem.     Objective:    Vitals: BP (!) 154/78 (BP Location: Right arm)   Pulse 65   Temp 97  F (36.1  C) (Axillary)   Resp 18   Wt 97.8 kg (215 lb 9.6 oz)   SpO2 99%   BMI 26.95 kg/m    General: Sitting up in bed, NAD.   Head: Atraumatic, normocephalic   Cardiac: no lower extremity edema  Neurologic:  Makes good eye contact. Says \"yeah\" and \"okay\". Nods head intermittently. Spontaneously moving all 4 extremities. No abnormal movements noted. Wiggles toes to command.    Pertinent Investigations:    I have personally reviewed most recent and pertinent labs, tests, and radiological images.     Impression  #Alzheimer's Disease  #Delirium, multifactorial - improving  #Agitation/tremulousness with hygiene cares.   #Cortical myoclonus, improved with Depakote     Vahid Coronado is a 72 y.o. 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 Patient sustained a fall on 11/12/2022 and has had progressive back pain and agitation since that time up until admission 11/22/2022 where he was found to have L4 transverse spinous process fracture (NSGY recommending conservative treatment). Throughout most of admission, Vahid was functioning below baseline and was minimally interactive, bed-bound, and not eating well. Encephalopathy has improved dramatically and patient is approaching neurological baseline. He continues to be " intermittently combative/agitated with cares. Cortical myoclonus has improved with Depakote although he does have more pronounced smaller physiological tremor (known side effect of Depakote). He is not overly-sedated at current dose so reasonable to keep this on. Per care conference today, family is considering options of home with 24/7 supervision versus memory care unit.      Recommendations  - Continue valproic acid 250 mg in the AM and 500 mg in the PM  - Delirium precautions  - Neurology is available for any additional care conferences.     Thank you for involving Neurology in the care of Catalino Coronado.  Please do not hesitate to call with questions/concerns (consult pager [phone]).      Patient was seen and discussed with Dr. Copeland.     Herb Hunt MD  PGY-4 Neurology Resident   P: [phone]

## 2022-12-08 NOTE — PROGRESS NOTES
"CLINICAL NUTRITION SERVICES - BRIEF NOTE  *See RD note on 12/2 for last nutrition reassessment note details/recs     Nutrition Prescription    Recommendations already ordered by Registered Dietitian (RD):  Discontinue calorie counts and Room Service with Assist (ordered Room Service Appropriate)       Diet: Pureed Diet (level 4) Thin Liquids (level 0) (1:1 supervision)  - Calorie Counts    NEW FINDINGS  Per provider note today, \"Awaiting hospice evaluation with potential discharge to home hospice \".  Note also says pt's family decided against FT      INTERVENTIONS  Collaboration with other nutrition professionals: per Nutrition Services Associate, pt is on room service with assist list but might not be appropriate anymore (the last few days pt's family says pt not eating much).    Collaboration with other providers: discussed above with RN, RN says appropriate to not be on room service with assist anymore; pt's family and RN can order meals    Monitoring/Evaluation  Progress toward goals will be monitored and evaluated per protocol.     Theodora Saleh RD, , LD  Weekday Pager: [phone]  Weekday Units covered: 7C (all beds) and 5A (beds 5201 through 5211-2)  Weekend/Holiday RD Pager: [phone]    "

## 2022-12-08 NOTE — PROGRESS NOTES
Care Management Follow Up    Length of Stay (days): 16    Expected Discharge Date: 12/09/2022     Concerns to be Addressed:discharge planning  Patient plan of care discussed at interdisciplinary rounds: Yes    Anticipated Discharge Disposition:   Home with HHC vs Hospice vs Memory Care Facility     Anticipated Discharge Services: Transportation   Anticipated Discharge DME:  Pts PCP has ordered a hospital bed and lift device for pt.  [employer] to provide equipment. Pt spouse has purchased a wheelchair and commode.      Patient/family educated on Medicare website which has current facility and service quality ratings: N/A at this time.  Education Provided on the Discharge Plan: Yes   Patient/Family in Agreement with the Plan: Yes    Referrals Placed by CM/SW:  Coordination is still pending.    Private pay costs discussed: Not applicable    Additional Information:  KHOI called and spoke with pts spouse Leia to discuss discharge planning.  Leia reports that she has looked into 2 memory care facilities ([facility] and [facility]).  Leia also reported that she is also awaiting recommendations as to whether pt will qualify for hospice.  Leia agreeable for KHOI to send via email a list of some memory care facilities to assist her in search for facilities.  KHOI emailed Leia at ([email or web address]) a list of the following facilities:    [facility] ([phone])  [facility] ([phone])  [facility]([phone])  [facility] ([phone])  [facility] ([phone])  [facility] ([phone])  [facility] ([phone])  [facility] ([phone])  [facility] ([phone])  [facility] ([phone])  [facility] ([phone])  [facility] ([phone])  [facility] ([phone])  [facility] ([phone])    KHOI  informed by Dr. Castillo and Dr. Billy Sloan(Palliative) about tentative discharge plans for pt.  KHOI informed that RAJAN Maxwell from [facility] plans to assess pt today to determine if pt would meet criteria for hospice. Pending current discharge plans include possible hospice and memory care placement.  Home with home care is not primary discharge plan at this time.     @1600PM KHOI met with pts spouse Leia at bedside and was advised to call KHOI Valerio(Senior Care Advisor at [facility]) at [phone]. KHOI advised by Leia to contact [facility] for assistance with memory care placement. KHOI called an LVM.     will continue to follow for discharge planning, support, and resources.    LISA Brito, [facility]  Unit 5A   Office: [phone]   Pager: [phone]  [email or web address]

## 2022-12-08 NOTE — PROGRESS NOTES
PALLIATIVE CARE SOCIAL WORK Progress Note   [facility] ([place]) Unit 5A    REFERRAL SOURCE: Palliative Care; follow up to 12/7 care conference     Visit with Leia this afternoon at pt's bedside.     Leia verbalized confidence in the plan for Vahid's care moving forward. Leia is hopeful that Vahid will qualify for hospice but understands he may not yet meet criteria, and has initiated calls to a couple of memory care facilities near couple's home.     Leia requesting help from unit sw in contacted a liaison through [employer]; PCSW passed along liaison's name and number to unit maryan ECHAVARRIA    Plan: PCSW will continue to follow while palliative care involved.    LISA Floyd, [facility]  Palliative Care   [facility] Inpatient Team Consult pager [phone] (M-F 8-4:30)  After-hours Answering Service [phone]

## 2022-12-08 NOTE — PROGRESS NOTES
Prior Authorization Approval    Buprenorphine 5MCG/HR weekly patches  Date Initiated: 12/7/2022  Date Completed: 12/7/2022  Prior Auth Type: Non-Formulary                Status: Approved    Effective Date: 09/08/2022 - 12/07/2023  Copay: 61.23     Filling Pharmacy: [facility] - [place], [place] - [address] [place] [address]    Insurance: Medicare Blue - Phone [phone] Fax [phone]  ID: [id number]  [facility] Key/Case Number: [id number] / [id number]   Submitted Via: Sonido Navarro  [facility] Pharm[facility] Liaison  Ph: [phone] Pager: [phone]

## 2022-12-08 NOTE — PLAN OF CARE
Goal Outcome Evaluation:      Plan of Care Reviewed With: patient    Overall Patient Progress: no changeOverall Patient Progress: no change    Outcome Evaluation: VSS ex HTN on room air. Sleeping between cares. Increasingly agitated and verbal (incoherent speech/yelling) with repo and changes. Plan to have palliative care assess patient for hospice candidacy.

## 2022-12-08 NOTE — PROGRESS NOTES
Calorie Count  Intake recorded for: 12/7  Total Kcals: 106 Total Protein: 2g  Kcals from Hospital Food: 20   Protein: 0g  Kcals from Outside Food (average):86 Protein: 2g  # Meals Ordered from Kitchen: 1 meal + food from home  # Meals Recorded: less than 25% cream of wheat; 1/4 cup eggnog from home  # Supplements Recorded: 0

## 2022-12-08 NOTE — PLAN OF CARE
Speech Language Therapy Discharge Summary    Reason for therapy discharge:    No further expectations of functional progress.  Patient/family request discontinuation of services.    Progress towards therapy goal(s). See goals on Care Plan in [facility] electronic health record for goal details.  Goals met    Therapy recommendation(s):    Recommend pt's diet be liberalized to Easy to Chew diet (IDDSI 7 ETC) and thin liquids (IDDSI 0) with 1:1 assistance, per the request of pt's spouse. Ensure the pt is alert and upright for all PO. No additional IP SLP services indicated.

## 2022-12-08 NOTE — PROGRESS NOTES
[facility]  Palliative Care Daily Progress Note       Recommendations & Counseling     Encounter for palliative care  Psychosocial support    Psychosocial support was provided to patient and/or family.    Prognosis: poor (in setting of suspected moderate dementia with behavioral disturbance which seems to have acutely and significantly progressed to the point where he would need assistance with iADLs and will likely continue to progress)    Palliative performance scale (PPS): 30% (seemingly around 80% prior to fall 1 week from admission)    CODE status: AND/DNI  ? Per POLSTs from 11/23/2022 and 12/2/2022  ? Note that 11/23/2022 states that Leia believes Vahid would be okay with measures up to BiPAP and coming back to the hospital but changed this on the POLST to comfort measures on 12/2/2022  ? The POLST on 11/23/2022 states that Vahid would not want artificial nutrition but this was not addressed on the POLST from 12/2/2022    Goals of Care: Undecided, seemingly comfort-oriented but still okay with restorative treatment plan for now  ? Pending hospice evaluation/informational  ? Pending Psych consult for capacity and help with dispo to memory care unit  ? Note: Leia seemed to be extremely concerned about Vahid's ability to eat during the meeting. She notes that Vahid has went from 215 to 202 pounds currently since admission and is currently not receiving IVF or eating much. Previous POLST from 11/23/2022 states he would not want artificial nutrition. Would recommend against PEG in patients with dementia. Will continue to address the idea of nutrition in dementia patients in the days to  come  ? Medical Surrogate: wife Leia     Disposition: Inpatient per primary. Pending hospice informational/evaluation and psych eval for potential memory care unit and Leia's decision to okay for a facility take Vahid home.     Symptoms    Delirium/Agitation in setting of suspected  moderate dementia with behavioral disturbance    Cortical myoclonus    BPH    C diff colitis - addressed  ? Seems to be 6d-e dementia as he is speaking more than 5-6 words at times and curiously wife Leia reports that when he is delirious, he is more eloquent and speaks and curses more than when he is not delirious  ? Recommend to continue non-pharm interventions  ? External catheter okay so long as there are not patient safety concerns (seems to be tolerating it)  ? Mike mitts okay but would trial off intermittently so long as his behavior is less aggressive  ? Depakene 250 mg q12h and 250 mg daily per neurology   ? PTA Namenda and Aricept  ? PTA Lexapro  ? Bladder scan from PM showed ~117 ml only   ? PTA Finasteride and Flomax  ? Vanc po for C diff  ? Zyprexa 2.5 mg po BID prn - consider scheduling 5 mg ODT at bedtime to help him rest at night if he becomes agitated at night while continuing PRNs  ? Avoid other potentially sedating medications like benzos, hypnotics, antihistamines, anticholinergics, etc  ? Would welcome any further psych recommendations for dementia/delirium    Pain, likely related to L4 TP fracture    Moderate to severe spinal canal stenosis at L3-4 with multi level spondylosis  ? Gets agitated when people touch his arms and during routine care  ? Pharmacy liaison consult placed for Butrans patch. Appreciate help. After prior auth was done as his insurance does not cover the patches, test claim copay would be $61.23 for Butrans 5mcg/hr patch. Family okay with cost.   ? Start Butrans 5 mcg/hr qWeekly patch   ? Continue APAP 975 mg po TID  ? Continue scheduled oxycodone 2.5 mg SL q6h with 2.5 mg SL prn for now (timed scheduled oxycodone to stop tomorrow after PM dose as Butrans ought to start reaching some therapeutic levels after 24 hours (takes 72 hours to reach peak effect) (done for you))  ? Continue Lidoderm  ? Continue Dilaudid 0.5 mg IV q2h prn if not taking orals prn opioid    At risk  for constipation  ? LBM 12/7  ? Senna-docusate changed to just senna 2 tabs at bedtime prn (done for you) (likely has some component of OIC, docusate is ineffective)  ? Agree with Dulcolax prn     Total time spent was >25 minutes, >50% of time was spent counseling and/or coordination of care regarding symptoms and goals of care.     Garfield Sloan DO / Palliative Medicine / Text me via [employer].     Team Consult Pager [phone] (answered 8am-430pm M-F) - ok to text page via [employer] / After-Hours Answering Service [phone] / Palliative Clinic in the [facility] - [phone] (scheduling); [phone] (triage).      Assessments          Catalino Coronado is a 72-year-old male with a medical history significant for Alzheimer dementia, CKD IIIa, BPH, and HTN who presented to the ED on 11/22/2022 for here for pain s/p fall 1 week prior, weakness, and reported urinary retention per wife caregiver with increasing agitation. Found to have L4 transverse spinous process fracture and suffering from delirium. Agitation work-up negative for seizures (suspect metabolic encephalopathy) with possible cortical myoclonus and started on Depakote.  Initial infectious work up was negative and later found to have C Diff colitis.Has had improvement in delirium. Palliative medicine was consulted goals of care.     Today, the patient was seen for:  Pain  Delirium  Agitation  Support  Goals of care  Encounter for palliative care            Interval History:     Chart review/discussion with unit or clinical team members:   Received oxycodone 2.5 mg q6h brynn and 2.5 x1 , melatonin x1, and voltaren x1 from 0700 to 0700. Bedside RN reports Vahid had a good night last night and aside from being agitated with turns and routine care, slept well.    Per patient or family/caregivers today:  Seen and examined at bedside. Daughter Nabila present and met with wife Leia after visit outside of room as she was just arriving. Noted rash on  his face which was spreading up from his neck. Nabila trying to give some water via spoon but Vahid would turn away at times. Vahid sometimes would speak though is not always coherent and seemingly disoriented.     Key Palliative Symptoms:  # Pain severity in the last 12 hours - unable to fully assess, with known L4 fracture at least moderate as he looks uncomfortable during turns     Patient is on opioids: assessed and bowels ok/no needed changes to plan of care today.           Review of Systems:     Could not obtain ROS due to mental status          Medications:     I have reviewed this patient's medication profile and medications during this hospitalization.    Noted meds:    APAP 975 mg po TID  Butrans 5 mcg/hr qweekly patch > started 12/8  Oxycodone 2.5 mg SL q6h with 2.5 mg SL prn for now  Lidoderm  Depakene 250 mg q12h and 250 mg daily   PTA Namenda   PTA Aricept  PTA Lexapro  PTA Finasteride   PTA Flomax  Vanc po   Zyprexa 2.5 mg po BID prn   Dilaudid 0.5 mg IV q2h prn if not taking orals  Dulcolax daily prn  Senna 2 tabs at bedtime prn           Physical Exam:   Vitals were reviewed  Temp: 97.7  F (36.5  C) Temp src: Oral BP: (!) 144/74 Pulse: 88   Resp: 18 SpO2: 98 % O2 Device: None (Room air)    General: Not in acute distress. Agitated when being moved around  Head: Atraumatic. Normocephalic.   Eyes: Eyes closed  Ear, Nose, and Throat: Mouth somewhat dry and half open. Neck without overt masses.  Pulmonary: Unlabored.   Cardiovascular:  No jugular venous distension. Non-edematous.  Abdomen: Non-distended.  Skin: Warm. Dry. Dry/slightly erythematous skin (somewhat scaly?) from neck up to nasolabial areas   Musculoskeletal: Joints of hand normal. Muscle bulk and tone decreased in UE and LE.  Neuro: Arouseable and sometimes speaks.   Psych: Unable to assess             Data Reviewed:     Reviewed recent pertinent imaging, comments:   No new imaging    Reviewed recent labs, comments:   ROUTINE ICU LABS (Last four  results)  CMPRecent Labs   Lab 12/07/22  0636 12/06/22  1316 12/05/22  0937 12/04/22  1559 12/03/22  0738    142  --  140 138   POTASSIUM 3.7 3.5 3.3* 5.2 3.5   CHLORIDE 107 108*  --  108* 106   CO2 19* 23  --  16* 20*   ANIONGAP 18* 11  --  16* 12   GLC 98 127*  --  106* 119*   BUN 8.4 7.1*  --  13.1 11.6   CR 0.77 0.71  --  0.79 0.81   GFRESTIMATED >90 >90  --  >90 >90   CANDE 8.8 8.2*  --  8.6* 7.9*   MAG  --   --   --  2.0  --    PROTTOTAL  --  6.0*  --   --   --    ALBUMIN  --  3.3*  --   --   --    BILITOTAL  --  0.4  --   --   --    ALKPHOS  --  85  --   --   --    AST  --  26  --   --   --    ALT  --  20  --   --   --      CBC  Recent Labs   Lab 12/07/22  1645 12/07/22  0636 12/06/22  1316 12/04/22  1559 12/02/22  0636   WBC 6.4  --  7.0 9.3 8.2   RBC 4.39*  --  4.28* 3.89* 3.90*   HGB 14.1  --  13.8 12.8* 12.7*   HCT 40.8  --  39.7* 37.2* 36.5*   MCV 93  --  93 96 94   MCH 32.1  --  32.2 32.9 32.6   MCHC 34.6  --  34.8 34.4 34.8   RDW 13.1  --  12.8 12.8 12.8    332 333 375 362     INRNo lab results found in last 7 days.  Arterial Blood GasNo lab results found in last 7 days.

## 2022-12-08 NOTE — CONSULTS
"          Initial Psychiatric Consult   Consult date: December 8, 2022         Reason for Consult, requesting source:    Capacity   Requesting source: Devyn Castillo    Labs and imaging reviewed. Discussed with nursing and . His wife Leia was present to provide collateral information.        HPI:   From H and P 11/22:   \"History of Present Illness   Catalino Coronado is a 72 year old male admitted on 11/22/2022. He has a past medical history including Alzheimer's disease, dementia, stage IIIa kidney disease, BPH, hypertension whose wife is his primary caregiver and reports that he fell at home one week ago, causing back pain that caused him to get progressively agitated at home this past week and then today, he had not urinated for 16hr and she was worried his urinary retention was making his pain/agitation worse and called 911 at home. EMS evaluated him and was concern for a rigid abdomen.\"    According to nursing he is not sundowning, but will become somewhat agitated with cares (it hurts him to move due to fracture in lumbar area.   I couldn't get much information out him, and his wife mentions he is introverted and doesn't talk much at baseline. He could not tell me where he was, what brought him to the hospital. Doesn't know day of the week or year.    He denies being depressed, says that he is sleeping ok, admits to some excessive worry.   A MOCA was 13/30 6/19/18, per his wife he was diagnosed with Alzheimer's about this time, but had memory difficulties for quite a long time prior to this.    I see that he has minimal food intake.  His wife's preference is for home hospice care. She is a nurse and she has a lot of help available who have experience with health care..   He is not taking an antipsychotic, hydroxyzine just started for agitation.              Past Psychiatric History:   He was started on Lexapro within the past year when he screened positive for depression (I assume by PHQ-9-9).        "  Substance Use and History:   Used have some cocktails in the evening. No drug use, quit smoking in 2011.         Past Medical History:   PAST MEDICAL HISTORY:   Past Medical History:   Diagnosis Date     BPH (benign prostatic hyperplasia)      Cataracts, bilateral      Cerebral aneurysm, nonruptured      Dementia (H)      Hypercholesterolemia      Hypertension        PAST SURGICAL HISTORY:   Past Surgical History:   Procedure Laterality Date     APPENDECTOMY       BIOPSY OF SKIN LESION       EXCISE MASS BACK  2/8/2013    Procedure: EXCISE MASS BACK;  Excision of Sebaceous Back Cyst ;  Surgeon: Sean Randhawa MD;  Location: [facility] OR     PHACOEMULSIFICATION CLEAR CORNEA WITH STANDARD INTRAOCULAR LENS IMPLANT Right 10/22/2018    Procedure: Right Eye Phacoemulsification with Standard Intraocular Lens Placement;  Surgeon: Elio Manzo MD;  Location: [facility] OR     PHACOEMULSIFICATION WITH STANDARD INTRAOCULAR LENS IMPLANT Left 10/8/2018    Procedure: PHACOEMULSIFICATION WITH STANDARD INTRAOCULAR LENS IMPLANT;  Left Eye Phacoemulsification with Intraocular Lens;  Surgeon: Elio Manzo MD;  Location: [facility] OR     pilonidal cyst removal                Family History:   Mother with dementia. No psychiatric or substance use problems.           Social History:   He used to be an  for [employer], and his wife is a RN. They have 3 children who live close to them and she mentions that they are a very tight family.          Physical ROS:   The 10 point Review of Systems is negative other than noted in the HPI or here.           Medications:       acetaminophen  975 mg Oral Q8H     amLODIPine  10 mg Oral Daily     buprenorphine  1 patch Transdermal Weekly     buprenorphine   Transdermal Q8H     calcipotriene   Topical BID     donepezil  5 mg Oral Daily     escitalopram  20 mg Oral Daily     finasteride  5 mg Oral Daily     heparin ANTICOAGULANT  5,000 Units Subcutaneous Q12H     lidocaine  1 patch Transdermal Q24H      lidocaine   Transdermal Q8H [place]     melatonin  5 mg Sublingual QPM     memantine  10 mg Oral BID     multivitamin, therapeutic  1 tablet Oral Daily     oxyCODONE  2.5 mg Sublingual Q6H     sodium chloride (PF)  3 mL Intracatheter Q8H     tamsulosin  0.4 mg Oral Daily     valproic acid  250 mg Oral Daily     valproic acid  250 mg Oral Q12H     vancomycin  125 mg Oral 4x Daily     PRN hydroxyzine 25-50 mg just ordered for agitation          Allergies:   No Known Allergies       Labs:     Recent Results (from the past 48 hour(s))   Comprehensive metabolic panel    Collection Time: 12/06/22  1:16 PM   Result Value Ref Range    Sodium 142 136 - 145 mmol/L    Potassium 3.5 3.4 - 5.3 mmol/L    Chloride 108 (H) 98 - 107 mmol/L    Carbon Dioxide (CO2) 23 22 - 29 mmol/L    Anion Gap 11 7 - 15 mmol/L    Urea Nitrogen 7.1 (L) 8.0 - 23.0 mg/dL    Creatinine 0.71 0.67 - 1.17 mg/dL    Calcium 8.2 (L) 8.8 - 10.2 mg/dL    Glucose 127 (H) 70 - 99 mg/dL    Alkaline Phosphatase 85 40 - 129 U/L    AST 26 10 - 50 U/L    ALT 20 10 - 50 U/L    Protein Total 6.0 (L) 6.4 - 8.3 g/dL    Albumin 3.3 (L) 3.5 - 5.2 g/dL    Bilirubin Total 0.4 <=1.2 mg/dL    GFR Estimate >90 >60 mL/min/1.73m2   CBC with platelets and differential    Collection Time: 12/06/22  1:16 PM   Result Value Ref Range    WBC Count 7.0 4.0 - 11.0 10e3/uL    RBC Count 4.28 (L) 4.40 - 5.90 10e6/uL    Hemoglobin 13.8 13.3 - 17.7 g/dL    Hematocrit 39.7 (L) 40.0 - 53.0 %    MCV 93 78 - 100 fL    MCH 32.2 26.5 - 33.0 pg    MCHC 34.8 31.5 - 36.5 g/dL    RDW 12.8 10.0 - 15.0 %    Platelet Count 333 150 - 450 10e3/uL    % Neutrophils 68 %    % Lymphocytes 18 %    % Monocytes 8 %    % Eosinophils 5 %    % Basophils 0 %    % Immature Granulocytes 1 %    NRBCs per 100 WBC 0 <1 /100    Absolute Neutrophils 4.8 1.6 - 8.3 10e3/uL    Absolute Lymphocytes 1.3 0.8 - 5.3 10e3/uL    Absolute Monocytes 0.6 0.0 - 1.3 10e3/uL    Absolute Eosinophils 0.3 0.0 - 0.7 10e3/uL    Absolute Basophils 0.0  0.0 - 0.2 10e3/uL    Absolute Immature Granulocytes 0.1 <=0.4 10e3/uL    Absolute NRBCs 0.0 10e3/uL   Platelet count    Collection Time: 12/07/22  6:36 AM   Result Value Ref Range    Platelet Count 332 150 - 450 10e3/uL   Basic metabolic panel    Collection Time: 12/07/22  6:36 AM   Result Value Ref Range    Sodium 144 136 - 145 mmol/L    Potassium 3.7 3.4 - 5.3 mmol/L    Chloride 107 98 - 107 mmol/L    Carbon Dioxide (CO2) 19 (L) 22 - 29 mmol/L    Anion Gap 18 (H) 7 - 15 mmol/L    Urea Nitrogen 8.4 8.0 - 23.0 mg/dL    Creatinine 0.77 0.67 - 1.17 mg/dL    Calcium 8.8 8.8 - 10.2 mg/dL    Glucose 98 70 - 99 mg/dL    GFR Estimate >90 >60 mL/min/1.73m2   CBC with platelets and differential    Collection Time: 12/07/22  4:45 PM   Result Value Ref Range    WBC Count 6.4 4.0 - 11.0 10e3/uL    RBC Count 4.39 (L) 4.40 - 5.90 10e6/uL    Hemoglobin 14.1 13.3 - 17.7 g/dL    Hematocrit 40.8 40.0 - 53.0 %    MCV 93 78 - 100 fL    MCH 32.1 26.5 - 33.0 pg    MCHC 34.6 31.5 - 36.5 g/dL    RDW 13.1 10.0 - 15.0 %    Platelet Count 308 150 - 450 10e3/uL    % Neutrophils 60 %    % Lymphocytes 24 %    % Monocytes 9 %    % Eosinophils 5 %    % Basophils 1 %    % Immature Granulocytes 1 %    NRBCs per 100 WBC 0 <1 /100    Absolute Neutrophils 3.9 1.6 - 8.3 10e3/uL    Absolute Lymphocytes 1.6 0.8 - 5.3 10e3/uL    Absolute Monocytes 0.6 0.0 - 1.3 10e3/uL    Absolute Eosinophils 0.3 0.0 - 0.7 10e3/uL    Absolute Basophils 0.0 0.0 - 0.2 10e3/uL    Absolute Immature Granulocytes 0.0 <=0.4 10e3/uL    Absolute NRBCs 0.0 10e3/uL          Physical and Psychiatric Examination:     BP (!) 144/74 (BP Location: Right arm)   Pulse 88   Temp 97.7  F (36.5  C) (Oral)   Resp 18   Wt 91.9 kg (202 lb 8 oz)   SpO2 98%   BMI 25.31 kg/m    Weight is 202 lbs 8 oz  Body mass index is 25.31 kg/m .    Physical Exam:  I have reviewed the physical exam as documented by by the medical team and agree with findings and assessment and have no additional findings to  "add at this time.         MSE:   Appearance: fatigued  Attitude:  somewhat cooperative  Eye Contact:  poor   Mood:  \"OK\"  Affect:  : slightly restricted  Speech:  dysarthria  Psychomotor Behavior:  no evidence of tardive dyskinesia, dystonia, or tics  Muscle strength and tone: intact in arms   Thought Process:  disorganized  Associations:  no loose associations  Thought Content:  no evidence of suicidal ideation or homicidal ideation and no evidence of psychotic thought  Insight:  limited  Judgement:  limited  Oriented to:  to person only   Attention Span and Concentration:  poor  Recent and Remote Memory:  poor             DSM-5 Diagnosis:     Alzheimer's dementia           Assessment:   Due to his poor memory and lack of orientation, lack of awareness why he is in the hospital he is clearly not decisional.   In my opinion home hospice care appears to be the best option.   QTc 459 ms 11/12; antipsychotics should be safe.           Summary of Recommendations:   To memory care or hospice.   Due to anticholinergic burden I would avoid hydroxyzine: Seroquel 12.5-25 mg q 6 hours and HS would be preferred for agitation.   OK to continue Lexapro 20 mg per day.     Page me or re-consult psychiatry as needed (psychiatry is signing off).     Justyn Merlos M.D.   Consult liaison psychiatry   [facility]   Contact information available via [facility] Paging/Directory.  If I am not available, then [facility] intake ([phone]) should know who   Is on call        \"This dictation was performed with voice recognition software and may contain errors,  omissions and inadvertent word substitution.\"           "

## 2022-12-08 NOTE — PLAN OF CARE
"Goal Outcome Evaluation:      Plan of Care Reviewed With: patient, spouse    Overall Patient Progress: no change    Outcome Evaluation: VSS ex HTN on RA. Assist of 3-4 with repo and incontinence cares. Pain managed with scheduled pain medicine and prns as indicated. Patient started on buprenorphine pain patch today, applied to R shoulder. Neuro status stable, continues to have intermittent tremors and poor verbal communication. Very poor PO intake, only bites and sips of water throughout the day despite encouragement and nutrition interventions. Lungs clear to all bases. HTN. Patient urinates infrequently and strains to void, bladder scanned to assess for urinary retention this shift- 102 mL. LBM yesterday. Skin intact with blanchable redness to sacrum, generalized dry skin and rash to face- team informed.     Administered prn ativan at 0.25 mg before cares per MAR orders. Following, pt became sleepy but still arousable to voice and light to moderate touch and more alert than yesterday when he had 0.5 mg. Wife stated she would prefer that he be more alert during the day and asked writer to investigate if prn dose could be reduced to less than 0.25 mg. Writer spoke to team and pharmacist about decreasing the prn ativan dose. Pharmacist informed writer that 0.25 mg is the lowest dose pharmacy can supply given the concentration that they stock. Despite her hopes that patient be more alert during the day, wife did state after prn ativan dose, wife stated \"well, if he could be like this I think I could take care of him at home.\" Patient was not combative following prn ativan dose and allowed staff to complete cares and for lab to draw blood.     Potassium replacement initiated for K 3.2, infusion ran over 8 hours instead of 4 as patient has previously had phlebitis from potassium infusions and is unwilling to take PO replacement doses. Patient trialled off posey mitts today with wife present at bedside. There was one " instance that patient reached to pull on PIV site but he was redirectable. Plan to have posey mitts off when family present at bedside. Sleeping between cares, awaiting hospice evaluation with potential discharge to home hospice

## 2022-12-08 NOTE — PLAN OF CARE
Goal Outcome Evaluation:      Plan of Care Reviewed With: spouse, patient    Overall Patient Progress: no changeOverall Patient Progress: no change    Outcome Evaluation: Pt did well with just am dose of ativan and 1x (scheduled) oxy. Some yelling w/repositioning. A&O x2/3, hard to assess. Lotion applied to rash on face and neck. Tolerating primafit well. No incontinence.

## 2022-12-08 NOTE — PROGRESS NOTES
[facility]    Medicine Progress Note - Hospitalist Service, GOLD TEAM 7    Date of Admission:  11/22/2022    Assessment & Plan   Catalino Coronado is a 72-year-old man with Alzheimer dementia here for pain and weakness, found to have L4 transverse spinous process fracture and found to have delirium.Initial infectious work up and metabolic work up negative for any organic cause. 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 Now with waxing and waning mentation. Awaiting on placement.      Today's plan   -Awaiting hospice evaluation with potential discharge to home hospice   -started butrans 5 mcg/hr q weekly patch  -senna-docusate changed to senna 2 tabs   -noted facial rash likely seborrheic dermatitis        Goal of Conference: 12/2 and 12/7  Care conference held 12/2/22, wife Leia, and daughter and granddaughter, in attendance of neurology resident, palliative team and . As per Leia, strong desire to bring the patient home, working to see if home hospice possible      seborrhoic dermatitis   Started 2% ketoconazole cream BID     Delirium likely due to pain, fall, hospitalization, C diff on top of underlying advanced dementia: marginal improvement in mentation   Minimally verbal at recent baseline at home but he is usually able to communicate with his wife by gestures about bathroom needs.   However had acute decline since after fall, was agitated and restless during initial days of hospitalization requiring physical and chemical restriants  B12 and TSH WNL  3 hour EEG 12/1/22 consistent with diffuse encephalopathy, without any evidence of focal seizure activity  Neurology evaluated patient and started on Depakote 12/2/22 for possible cortical myoclonus given episodic UE  movements  Plan:              Mental status close to baseline              Off of Ketamine infusion since 12/3              Lorazepam resumed at lower dose 0.25 Q6 prn               Continue olanzapine prn for agitation                mittens as needed         C Diff enteritis with diarrhea:  This could be main contributing to delirium   X ray abdomen showed possible enteritis but no significant stool burden  Started on PO Vancomycin 12/3-12/12 anticipate 10 day course                   Acute back pain secondary to L4 transverse spinous process fracture from a fall at home  CT lumbar spine revealed an acute L4 fracture, no retropulsion.   Neurosurgery recommended conservative treatment and no brace.   - Appreciate palliative and pain medicine assistance with pain  - Pain medicine also discussed case with IR, IR could not do transverse injection  Plan:              Continue oxycodone 2.5 mg  q4hrs               Continue oxycodone prn              scheduled tylenol               Continue Dilaudid 0.5 mg q 2hr prn              Topical Voltaren               buprenorphine 5mcg/hour      Moderate protein calorie malnutrition:  Poor oral intake  - Appreciate nutrition consultation  - Family decided aginst feeding tube.     Urinary retention:   Wife reports noticing straining.  On home Flomax, will add finasteride  Monitor PVR     Alzheimer's disease   Generalized weakness with frequent falls  -Continue home regimen of Aricept 5mg once daily  -Continue Lexapro 20mg once daily  -Continue home regimen of Namenda 10mg by mouth twice daily  -Continue Seroquel 12.5mg by mouth once daily  -PT/OT consult to assess safety to return home      Hypokalemia: replace as needed  BPH:Continue home regimen of Flomax 0.4mg once daily  Urine culture negative  Stage IIIa kidney disease     Hypertension  -switched from home regimen of Atenolol 100mg once daily to amlodipine 10 mg daily due to bradycardia and persistent elevated BPs               Diet: Pureed Diet (level 4) Thin Liquids (level 0) (1:1 supervision)  Room Service  Calorie Counts    DVT Prophylaxis: Heparin SQ  Muhammad Catheter: Not present  Central Lines: None  Cardiac Monitoring: None  Code Status: No CPR- Do NOT Intubate      Disposition Plan      Expected Discharge Date: 12/09/2022      Destination: home with family;home with help/services  Discharge Comments: Planning for care conference 3pm today with primary, palliative, would appreciate care management involvement. This will determine disposition.        The patient's care was discussed with the Bedside Nurse and Patient.    Devyn Castillo MD  Hospitalist Service, [address]  Securely message with the Vocera Web Console (learn more here)  Text page via [employer] Paging/Directory   Please see signed in provider for up to date coverage information      Clinically Significant Risk Factors          # Hypocalcemia: Lowest Ca = 8.2 mg/dL in last 2 days, will monitor and replace as appropriate     # Hypoalbuminemia: Lowest albumin = 3.3 g/dL at 12/6/2022  1:16 PM, will monitor as appropriate                   ______________________________________________________________________    Interval History   Patient has no new symptoms, He does endorse a mild facial rash, no new abdominal pain, difficulty urinating, fevers or malaise,     Data reviewed today: I reviewed all medications, new labs and imaging results over the last 24 hours. I personally reviewed no images or EKG's today.    Physical Exam   Vital Signs: Temp: 97.7  F (36.5  C) Temp src: Oral BP: (!) 144/74 Pulse: 88   Resp: 18 SpO2: 98 % O2 Device: None (Room air)    Weight: 202 lbs 8 oz  Constitutional: awake, alert, cooperative, no apparent distress, and appears stated age  Eyes: Lids and lashes normal, pupils equal, round and reactive to light, extra ocular muscles intact, sclera clear, conjunctiva normal  ENT: Normocephalic,  without obvious abnormality, atraumatic, sinuses nontender on palpation, external ears without lesions, oral pharynx with moist mucous membranes, tonsils without erythema or exudates, gums normal and good dentition.  Hematologic / Lymphatic: no cervical lymphadenopathy  Respiratory: No increased work of breathing, good air exchange, clear to auscultation bilaterally, no crackles or wheezing  Cardiovascular: Normal apical impulse, regular rate and rhythm, normal S1 and S2, no S3 or S4, and no murmur noted  GI: No scars, normal bowel sounds, soft, non-distended, non-tender, no masses palpated, no hepatosplenomegally  Genitounirinary:   Skin: no bruising or bleeding          Musculoskeletal: There is no redness, warmth, or swelling of the joints.  Full range of motion noted.  Motor strength is 5 out of 5 all extremities bilaterally.  Tone is normal.  Neurologic: Awake, alert, oriented to name, place and time.  Cranial nerves II-XII are grossly intact.  Motor is 5 out of 5 bilaterally.  Cerebellar finger to nose, heel to shin intact.  Sensory is intact.  Babinski down going, Romberg negative, and gait is normal.  Neuropsychiatric: General: normal, calm and normal eye contact    Data   Recent Labs   Lab 12/07/22  1645 12/07/22  0636 12/06/22  1316 12/05/22  0937 12/04/22  1559   WBC 6.4  --  7.0  --  9.3   HGB 14.1  --  13.8  --  12.8*   MCV 93  --  93  --  96    332 333  --  375   NA  --  144 142  --  140   POTASSIUM  --  3.7 3.5 3.3* 5.2   CHLORIDE  --  107 108*  --  108*   CO2  --  19* 23  --  16*   BUN  --  8.4 7.1*  --  13.1   CR  --  0.77 0.71  --  0.79   ANIONGAP  --  18* 11  --  16*   CANDE  --  8.8 8.2*  --  8.6*   GLC  --  98 127*  --  106*   ALBUMIN  --   --  3.3*  --   --    PROTTOTAL  --   --  6.0*  --   --    BILITOTAL  --   --  0.4  --   --    ALKPHOS  --   --  85  --   --    ALT  --   --  20  --   --    AST  --   --  26  --   --      No results found for this or any previous visit (from the past 24  hour(s)).  Medications       acetaminophen  975 mg Oral Q8H     amLODIPine  10 mg Oral Daily     buprenorphine  1 patch Transdermal Weekly     buprenorphine   Transdermal Q8H     calcipotriene   Topical BID     donepezil  5 mg Oral Daily     escitalopram  20 mg Oral Daily     finasteride  5 mg Oral Daily     heparin ANTICOAGULANT  5,000 Units Subcutaneous Q12H     lidocaine  1 patch Transdermal Q24H     lidocaine   Transdermal Q8H [place]     melatonin  5 mg Sublingual QPM     memantine  10 mg Oral BID     multivitamin, therapeutic  1 tablet Oral Daily     oxyCODONE  2.5 mg Sublingual Q6H     sodium chloride (PF)  3 mL Intracatheter Q8H     tamsulosin  0.4 mg Oral Daily     valproic acid  250 mg Oral Daily     valproic acid  250 mg Oral Q12H     vancomycin  125 mg Oral 4x Daily

## 2022-12-08 NOTE — PROGRESS NOTES
Admission/Transfer from: ED  2 RN skin assessment completed. yes, 2nd nurse Mere PIMENTEL   Significant findings include: none  WOC Nurse Consult Ordered?  no

## 2022-12-09 NOTE — PROVIDER NOTIFICATION
Pt had episode where he had full body tremors & eyes rolled behind his head. Episode lasted about 2 sec. Wife stated his eyes have never rolled behind his head prior. MD notified.

## 2022-12-09 NOTE — PROGRESS NOTES
CLINICAL NUTRITION SERVICES - REASSESSMENT NOTE     Nutrition Prescription    Malnutrition Status:    Severe malnutrition in the context of acute illness    Recommendations already ordered by Registered Dietitian (RD):  None at this time. No further interventions planned at this time given pt's GOC (no FT, family looking into if home hospice possible per MD note). RD can be consulted if needed.    RD signing off on 12/9/2022.      EVALUATION OF THE PROGRESS TOWARD GOALS   Diet: Level 7: Easy to Chew Dysphagia Diet     Intake: Diet advanced from pureed to current diet yesterday.  Eating minimally, 0-25% per flowsheets the past week. Ordering varying amounts throughout the week per [employer].     NEW FINDINGS   Dispo: per provider note today, pt being assessed for home hospice.  Pt's family has declined FT.  Palliative following.    Weight: Down 13 lb since admission (215 lb 11/26 --> 202 lb 12/7), 6% wt loss    MALNUTRITION  % Intake: </= 50% for >/= 5 days (severe)  % Weight Loss: > 2% in 1 week (severe)  Weight Loss: None noted  Subcutaneous Fat Loss: None observed per RD note on 11/25  Muscle Loss: None observed per RD note on 11/25  Fluid Accumulation/Edema: Trace per flowsheets  Malnutrition Diagnosis: Severe malnutrition in the context of acute illness    Previous Goals   Patient to consume % of nutritionally adequate meal trays TID, or the equivalent with supplements/snacks.  Evaluation: Not met    Previous Nutrition Diagnosis  Inadequate oral intake related to decreased appetite d/t delirium and forgetfullness as evidenced by </= 50% intakes for >/= 5 days  Evaluation: No change    CURRENT NUTRITION DIAGNOSIS  Inadequate oral intake related to decreased appetite and dementia as evidenced by eating 0-25% of meals    INTERVENTIONS  Implementation  Chart review, pt with other cares and not appropriate for RD visit    Goals  None at this time (RD signing off)    Monitoring/Evaluation  Progress toward  Regular Diet   goals will be monitored and evaluated per protocol.     Theodora Saleh, RD, , LD  Weekday Pager: [phone]  Weekday Units covered: 7C (all beds) and 5A (beds 5201 through 5211-2)  Weekend/Holiday RD Pager: [phone]

## 2022-12-09 NOTE — PROGRESS NOTES
Care Management Follow Up    Length of Stay (days): 17    Expected Discharge Date: 12/12/2022     Concerns to be Addressed: discharge planning  Patient plan of care discussed at interdisciplinary rounds: Yes    Anticipated Discharge Disposition:   Home with Hospice vs Memory Care Placement     Anticipated Discharge Services: Transportation  Anticipated Discharge DME:   Pts PCP has ordered a hospital bed and lift device for pt.  [employer] to provide equipment. Pt spouse has purchased a wheelchair and commode.      Patient/family educated on Medicare website which has current facility and service quality ratings: N/A  Education Provided on the Discharge Plan: Yes   Patient/Family in Agreement with the Plan: yes    Referrals Placed by CM/SW:   [facility]:  Main P: [phone]  Hospice RAJAN Gay: [phone]    Private pay costs discussed: Not applicable    Additional Information:  @0900AM KHOI received a call back from Tara Holder (Senior Care Advisor at [facility]-P:[phone] F:[phone]).  She reports that her role is to assist pts and families with finding appropriate facilities.  Her service is free of charge to pts/families.  She advises  to fax a referral to a secure fax so that she can review case and send referrals to appropriate facilities indicated by pt spouse/family.    @0919AM KHOI faxed initial referral to Tara Holder (F: [phone]).    @1115AM KHOI called [facility] and spoke with RAJAN Gay.  RAJAN Gay reports that she will contact RAJAN Bergeron and inquire about her following up with assessment of pt for hospice today.  Per chart review-home hospice has been determined as another goal for discharge plans.      KHOI received a call from Tara and was informed that she has found 2 openings for memory care facilities but after talking to pts spouse that they would like to pursue home with hospice as their primary goal.    @1456PM KHOI called Hospice RAJ[facility]  Deidra([phone]) to inquire about hospice assessment for pt.  Deidra reports that the  Hospice nurse on call is currently occupied with various consults.  RAJAN Gay reports that she will attempt to arrange for another staff member to see pt today to assess for hospice.     @1515PM KHOI spoke with an [facility] staff member and was informed that they will try to assess pt today and if not today tomorrow.  KHOI then met with pt and pts spouse Leia at bedside to inform them of the plan for hospice to see pt either this evening or tomorrow.  Leia reports that she will plan to leave after feeding pt.      KHOI will defer to weekend  staff to follow up with possible home with hospice discharge coordination.    @1539PM KHOI informed by RAJAN Gay([phone]) that pt is eligible to be a hospice candidate.  Deidra reports that she will take a look at intake's schedule tomorrow to determine when they will be able to process initial intake process.  KHOI met with pt/pts spouse at bedside and informed Leia that pt is eligible for hospice and that an [facility] staff will contact her today to coordinate pt to go home on hospice.    KHOI messaged Dr. Castillo via [employer] to inform him that pt has been deemed eligible for hospice. Dr. Castillo acknowledged and aware of possible discharge home with hospice over the weekend.      will continue to follow for discharge planning, support, and resources.    LISA Brito, [facility]  [address]   Office: [phone]   Pager: [phone]  [email or web address]

## 2022-12-09 NOTE — PROGRESS NOTES
[facility]    Medicine Progress Note - Hospitalist Service, GOLD TEAM 7    Date of Admission:  11/22/2022    Assessment & Plan   Catalino Coronado is a 72-year-old man with Alzheimer dementia here for pain and weakness, found to have L4 transverse spinous process fracture and found to have delirium.Initial infectious work up and metabolic work up negative for any organic cause. 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 Now with waxing and waning mentation. Awaiting on placement.      Today's plan   -Awaiting discharge planning         Goal of Conference: 12/2 and 12/7  Care conference held 12/2/22, wife Leia, and daughter and granddaughter, in attendance of neurology resident, palliative team and . As per Leia, strong desire to bring the patient home, working to see if home hospice possible      seborrhoic dermatitis   Started 2% ketoconazole cream BID     Delirium likely due to pain, fall, hospitalization, C diff on top of underlying advanced dementia: marginal improvement in mentation   Minimally verbal at recent baseline at home but he is usually able to communicate with his wife by gestures about bathroom needs.   However had acute decline since after fall, was agitated and restless during initial days of hospitalization requiring physical and chemical restriants  B12 and TSH WNL  3 hour EEG 12/1/22 consistent with diffuse encephalopathy, without any evidence of focal seizure activity  Neurology evaluated patient and started on Depakote 12/2/22 for possible cortical myoclonus given episodic UE movements  Plan:              Mental status close to baseline              Off of Ketamine infusion since 12/3              Lorazepam resumed at lower dose 0.25 Q6 prn                Continue olanzapine prn for agitation                mittens as needed         C Diff enteritis with diarrhea:  This could be main contributing to delirium   X ray abdomen showed possible enteritis but no significant stool burden  Started on PO Vancomycin 12/3-12/12 anticipate 10 day course                   Acute back pain secondary to L4 transverse spinous process fracture from a fall at home  CT lumbar spine revealed an acute L4 fracture, no retropulsion.   Neurosurgery recommended conservative treatment and no brace.   - Appreciate palliative and pain medicine assistance with pain  - Pain medicine also discussed case with IR, IR could not do transverse injection  Plan:              Continue oxycodone 2.5 mg  q4hrs               Continue oxycodone prn              scheduled tylenol               Continue Dilaudid 0.5 mg q 2hr prn              Topical Voltaren               buprenorphine 5mcg/hour  q weekly     Moderate protein calorie malnutrition:  Poor oral intake  - Appreciate nutrition consultation  - Family decided aginst feeding tube.     Urinary retention:   Wife reports noticing straining.  On home Flomax, will add finasteride  Monitor PVR     Alzheimer's disease   Generalized weakness with frequent falls  -Continue home regimen of Aricept 5mg once daily  -Continue Lexapro 20mg once daily  -Continue home regimen of Namenda 10mg by mouth twice daily  -Continue Seroquel 12.5mg by mouth once daily  -PT/OT consult to assess safety to return home      Hypokalemia: replace as needed  BPH:Continue home regimen of Flomax 0.4mg once daily  Urine culture negative  Stage IIIa kidney disease     Hypertension  -switched from home regimen of Atenolol 100mg once daily to amlodipine 10 mg daily due to bradycardia and persistent elevated BPs                Diet:   dysphagia diet with thin liquids   DVT Prophylaxis: Heparin SQ  Muhammad Catheter: Not present  Central Lines: None  Cardiac Monitoring: None  Code Status: No CPR-  Do NOT Intubate      Disposition Plan      Expected Discharge Date: 12/12/2022      Destination: home with family;home with help/services  Discharge Comments: Planning for care conference 3pm today with primary, palliative, would appreciate care management involvement. This will determine disposition.        The patient's care was discussed with the Bedside Nurse and Patient.    Devyn Castillo MD  Hospitalist Service, GOLD TEAM [address] [place] [address]  Securely message with the Vocera Web Console (learn more here)  Text page via [facility] Paging/Directory   Please see signed in provider for up to date coverage information      Clinically Significant Risk Factors        # Hypokalemia: Lowest K = 3.2 mmol/L in last 2 days, will replace as needed       # Hypoalbuminemia: Lowest albumin = 3.3 g/dL at 12/6/2022  1:16 PM, will monitor as appropriate                   ______________________________________________________________________    Interval History   Patient has no new symptoms, no abdominal pain, difficulty urinating,fevers or malaise     Data reviewed today: I reviewed all medications, new labs and imaging results over the last 24 hours. I personally reviewed no images or EKG's today.    Physical Exam   Vital Signs: Temp: (!) 96.7  F (35.9  C) Temp src: Axillary BP: 118/56 Pulse: 63   Resp: 16 SpO2: 98 % O2 Device: None (Room air)    Weight: 202 lbs 8 oz  Constitutional: awake, alert, cooperative, no apparent distress, and appears stated age  Eyes: Lids and lashes normal, pupils equal, round and reactive to light, extra ocular muscles intact, sclera clear, conjunctiva normal  ENT: Normocephalic, without obvious abnormality, atraumatic, sinuses nontender on palpation, external ears without lesions, oral pharynx with moist mucous membranes, tonsils without erythema or exudates, gums normal and good dentition.  Hematologic / Lymphatic: no cervical lymphadenopathy  Respiratory: No  increased work of breathing, good air exchange, clear to auscultation bilaterally, no crackles or wheezing  Cardiovascular: Normal apical impulse, regular rate and rhythm, normal S1 and S2, no S3 or S4, and no murmur noted  GI: No scars, normal bowel sounds, soft, non-distended, non-tender, no masses palpated, no hepatosplenomegally  Genitounirinary:   Skin: no bruising or bleeding  Musculoskeletal: There is no redness, warmth, or swelling of the joints.  Full range of motion noted.  Motor strength is 5 out of 5 all extremities bilaterally.  Tone is normal.  Neurologic: Awake, alert, oriented to name, place and time.  Cranial nerves II-XII are grossly intact.  Motor is 5 out of 5 bilaterally.  Cerebellar finger to nose, heel to shin intact.  Sensory is intact.  Babinski down going, Romberg negative, and gait is normal.  Neuropsychiatric: General: normal, calm and normal eye contact    Data   Recent Labs   Lab 12/09/22  0154 12/08/22  1254 12/07/22  1645 12/07/22  0636 12/06/22  1316   WBC  --  6.7 6.4  --  7.0   HGB  --  13.6 14.1  --  13.8   MCV  --  92 93  --  93   PLT  --  301 308 332 333   NA  --  141  --  144 142   POTASSIUM 3.5 3.2*  --  3.7 3.5   CHLORIDE  --  106  --  107 108*   CO2  --  23  --  19* 23   BUN  --  12.3  --  8.4 7.1*   CR  --  0.75  --  0.77 0.71   ANIONGAP  --  12  --  18* 11   CANDE  --  8.7*  --  8.8 8.2*   GLC  --  111*  --  98 127*   ALBUMIN  --   --   --   --  3.3*   PROTTOTAL  --   --   --   --  6.0*   BILITOTAL  --   --   --   --  0.4   ALKPHOS  --   --   --   --  85   ALT  --   --   --   --  20   AST  --   --   --   --  26     No results found for this or any previous visit (from the past 24 hour(s)).  Medications       acetaminophen  975 mg Oral Q8H     amLODIPine  10 mg Oral Daily     buprenorphine  1 patch Transdermal Weekly     buprenorphine   Transdermal Q8H     calcipotriene   Topical BID     donepezil  5 mg Oral Daily     escitalopram  20 mg Oral Daily     finasteride  5 mg Oral  Daily     heparin ANTICOAGULANT  5,000 Units Subcutaneous Q12H     ketoconazole   Topical Daily     lidocaine  1 patch Transdermal Q24H     lidocaine   Transdermal Q8H [place]     melatonin  5 mg Sublingual QPM     memantine  10 mg Oral BID     multivitamin, therapeutic  1 tablet Oral Daily     oxyCODONE  2.5 mg Sublingual Q6H     sodium chloride (PF)  3 mL Intracatheter Q8H     tamsulosin  0.4 mg Oral Daily     valproic acid  250 mg Oral Daily     valproic acid  500 mg Oral Daily     vancomycin  125 mg Oral 4x Daily

## 2022-12-09 NOTE — PROGRESS NOTES
SPIRITUAL HEALTH SERVICES  SPIRITUAL ASSESSMENT Progress Note (Palliative Focus)  [facility] ([place]) 5A    REFERRAL SOURCE: [facility] Follow-Up    Met with patient Vahid and his wife Leia to check in and assess for spiritual/emotional needs. Facilitated reflective conversation with Leia. Leia asked for prayer, prayed at bedside.     Plan: Will continue to follow while Palliative Care is consulted.     Dia Caballero MDiv.  Palliative Chaplain Resident  Pager [phone]    [facility] Palliative Care Inpatient Team Consult pager [phone] (M-F 8-4:30)  After-hours Answering Service [phone]

## 2022-12-09 NOTE — PROGRESS NOTES
[facility]  Palliative Care Daily Progress Note       Recommendations & Counseling     Encounter for palliative care  Psychosocial support    Psychosocial support was provided to patient and/or family.    Prognosis: poor (in setting of suspected moderate dementia with behavioral disturbance which seems to have acutely and significantly progressed to the point where he would need assistance with iADLs and will likely continue to progress)    Palliative performance scale (PPS): 30% (seemingly around 80% prior to fall 1 week from admission)    CODE status: AND/DNI  ? Per POLSTs from 11/23/2022 and 12/2/2022  ? Note that 11/23/2022 states that Leia believes Vahid would be okay with measures up to BiPAP and coming back to the hospital but changed this on the POLST to comfort measures on 12/2/2022  ? The POLST on 11/23/2022 states that Vahid would not want artificial nutrition but this was not addressed on the POLST from 12/2/2022    Goals of Care: Undecided, seemingly comfort-oriented but still okay with restorative treatment plan for now  ? Pending hospice evaluation/informational  ? Psych has deemed Vahid non-decisional as of 12/9.  ? Note: Leia seemed to be extremely concerned about Vahid's ability to eat during the meeting. She notes that Vahid has went from 215 to 202 pounds currently since admission and is currently not receiving IVF or eating much. Previous POLST from 11/23/2022 states he would not want artificial nutrition. Would recommend against PEG in patients with dementia. Will continue to address the idea of nutrition in dementia patients in the days to  come  ? Medical Surrogate: wife Leia     Disposition: Inpatient per primary. Pending hospice informational/evaluation and psych eval for potential memory care unit and Leia's decision to okay for a facility take Vahid home.     Symptoms    Delirium/Agitation in setting of suspected moderate dementia with  behavioral disturbance - improving?    Cortical myoclonus    BPH    C diff colitis - addressed  ? Seems to be 6d-e dementia as he is speaking more than 5-6 words at times and curiously wife Leia reports that when he is delirious, he is more eloquent and speaks and curses more than when he is not delirious  ? Recommend to continue non-pharm interventions  ? Pulled external catheter off. Using incontinent diapers.   ? Trial of mitts.   ? Mental status somewhat improving?  ? Depakene 250 mg q12h and 250 mg daily per neurology   ? PTA Namenda and Aricept  ? PTA Lexapro  ? PTA Finasteride and Flomax  ? Vanc po for C diff  ? Zyprexa 2.5 mg po BID prn - consider scheduling 5 mg ODT at bedtime to help him rest at night if he becomes agitated at night while continuing PRNs  ? Stopped hydroxyzine per psych recs. Recommended Seroquel but has Zyprexa prn as stated above.  ? Avoid other potentially sedating medications like benzos, hypnotics, antihistamines, anticholinergics, etc    Pain, likely related to L4 TP fracture    Moderate to severe spinal canal stenosis at L3-4 with multi level spondylosis  ? Gets agitated when people touch his arms and during routine care  ? Pharmacy liaison consult placed for Butrans patch. Appreciate help. After prior auth was done as his insurance does not cover the patches, test claim copay would be $61.23 for Butrans 5mcg/hr patch. Family okay with cost.   ? Butrans 5 mcg/hr qWeekly patch placed 12/8  ? Continue APAP 975 mg po TID  ? Scheduled oxycodone 2.5 mg SL to time out today 12/9. Continue oxycodone 2.5 mg SL prn  ? Continue Lidoderm  ? Continue Dilaudid 0.5 mg IV q2h prn if not taking orals prn opioid    At risk for constipation  ? LBM 12/8  ? Senna 2 tabs at bedtime prn (done for you) (likely has some component of OIC, docusate is ineffective)  ? Agree with Dulcolax prn     Palliative medicine will follow peripherally over there weekend and perform occasional check-ins next week. Please  page if needed sooner.     Total time spent was >25 minutes, >50% of time was spent counseling and/or coordination of care regarding symptoms and goals of care.     Garfield Sloan DO / Palliative Medicine / Text me via [facility]     Team Consult Pager [phone] (answered 8am-430pm M-F) - ok to text page via [employer] / After-Hours Answering Service [phone] / Palliative Clinic in the [facility] at the [facility] [place] - [phone] (scheduling); [phone] (triage).      Assessments          Catalino Coronado is a 72-year-old male with a medical history significant for Alzheimer dementia, CKD IIIa, BPH, and HTN who presented to the ED on 11/22/2022 for here for pain s/p fall 1 week prior, weakness, and reported urinary retention per wife caregiver with increasing agitation. Found to have L4 transverse spinous process fracture and suffering from delirium. Agitation work-up negative for seizures (suspect metabolic encephalopathy) with possible cortical myoclonus and started on Depakote.  Initial infectious work up was negative and later found to have C Diff colitis.Has had improvement in delirium. Palliative medicine was consulted goals of care.     Today, the patient was seen for:  Pain  Delirium  Agitation  Support  Goals of care  Encounter for palliative care            Interval History:     Chart review/discussion with unit or clinical team members:   Received oxycodone 2.5 mg q6h brynn and 2.5 x1 , melatonin x1, and Ativan 0.25 mg x1 from 0700 to 0700.     Per patient or family/caregivers today:  Seen and examined at bedside. Resting in bed. Did not try and arouse due to concern for agitation. Bedside RN notes he pulled off his external catheter. Per RN report, seems to be a bit more sensical and wife able to coax patient to take medications.    Key Palliative Symptoms:  # Pain severity in the last 12 hours - unable to fully assess, with known L4 fracture at least moderate as he looks uncomfortable during  turns     Patient is on opioids: assessed and bowels ok/no needed changes to plan of care today.           Review of Systems:     Could not obtain ROS due to mental status          Medications:     I have reviewed this patient's medication profile and medications during this hospitalization.    Noted meds:    APAP 975 mg po TID  Butrans 5 mcg/hr qweekly patch > started 12/8  Oxycodone 2.5 mg SL q6h with 2.5 mg SL prn for now  Lidoderm  Depakene 250 mg q12h and 250 mg daily   PTA Namenda   PTA Aricept  PTA Lexapro  PTA Finasteride   PTA Flomax  Vanc po   Zyprexa 2.5 mg po BID prn   Dilaudid 0.5 mg IV q2h prn if not taking orals  Dulcolax daily prn  Senna 2 tabs at bedtime prn           Physical Exam:   Vitals were reviewed  Temp: (!) 96.7  F (35.9  C) Temp src: Axillary BP: 118/56 Pulse: 63   Resp: 16 SpO2: 98 % O2 Device: None (Room air)    General: Not in acute distress. Agitated when being moved around  Head: Atraumatic. Normocephalic.   Eyes: Eyes closed  Ear, Nose, and Throat: Mouth somewhat dry and half open. Neck without overt masses.  Pulmonary: Unlabored.   Cardiovascular:  No jugular venous distension. Non-edematous.  Abdomen: Non-distended.  Skin: Warm. Dry. Dry/slightly erythematous skin (somewhat scaly?) from neck up to nasolabial areas   Musculoskeletal: Joints of hand normal. Muscle bulk and tone decreased in UE and LE.  Neuro: Arouseable and sometimes speaks per report  Psych: Unable to assess             Data Reviewed:     Reviewed recent pertinent imaging, comments:   No new imaging    Reviewed recent labs, comments:   ROUTINE ICU LABS (Last four results)  CMP  Recent Labs   Lab 12/09/22  1308 12/09/22  0154 12/08/22  1254 12/07/22  0636 12/06/22  1316 12/05/22  0937 12/04/22  1559     --  141 144 142  --  140   POTASSIUM 3.6 3.5 3.2* 3.7 3.5   < > 5.2   CHLORIDE 106  --  106 107 108*  --  108*   CO2  --   --  23 19* 23  --  16*   ANIONGAP  --   --  12 18* 11  --  16*   GLC  --   --  111* 98  127*  --  106*   BUN  --   --  12.3 8.4 7.1*  --  13.1   CR  --   --  0.75 0.77 0.71  --  0.79   GFRESTIMATED  --   --  >90 >90 >90  --  >90   CANDE  --   --  8.7* 8.8 8.2*  --  8.6*   MAG  --   --   --   --   --   --  2.0   PROTTOTAL  --   --   --   --  6.0*  --   --    ALBUMIN  --   --   --   --  3.3*  --   --    BILITOTAL  --   --   --   --  0.4  --   --    ALKPHOS  --   --   --   --  85  --   --    AST  --   --   --   --  26  --   --    ALT  --   --   --   --  20  --   --     < > = values in this interval not displayed.     CBC  Recent Labs   Lab 12/09/22  1308 12/08/22  1254 12/07/22  1645 12/07/22  0636 12/06/22  1316   WBC 6.7 6.7 6.4  --  7.0   RBC 4.35* 4.19* 4.39*  --  4.28*   HGB 13.9 13.6 14.1  --  13.8   HCT 40.4 38.7* 40.8  --  39.7*   MCV 93 92 93  --  93   MCH 32.0 32.5 32.1  --  32.2   MCHC 34.4 35.1 34.6  --  34.8   RDW 13.0 12.7 13.1  --  12.8    301 308 332 333     INRNo lab results found in last 7 days.  Arterial Blood GasNo lab results found in last 7 days.

## 2022-12-09 NOTE — PLAN OF CARE
Goal Outcome Evaluation:      Plan of Care Reviewed With: patient    Overall Patient Progress: no changeOverall Patient Progress: no change    Outcome Evaluation: Arouses to voice and sometimes needs touch. Incontinent of bladder and bowel. Primofit working well. Took pills well during the evening with ice cream. buprenorphine patch in place for pain, plan to switch from scheduled oxy to PRN. Aggitated, anxious, and combative with repositioning and incontinence cares. Potassium replaced in jagruti and overnight. Recheck 3.5. Continue with plan of care.

## 2022-12-09 NOTE — CONSULTS
Received consult for community hospice referral. Hospice CNL assessed patient at the bedside, spoke w/ nursing staff, and CC. Patient is eligible for community hospice after discharge. Hospice dx: alzheimer's dementia. Diagnosis supported by poor oral intake w/ documented weight loss, decline in functional mobility, FAST 7A. Please discuss w/ family regarding which hospice agency they would like to choose. If [facility] is selected, hospice CNL can order DME supplies, and coordinate hospice admission on the date of discharge.     Thank you,    Deidra Creda RN   [facility] Team Director   [phone]

## 2022-12-09 NOTE — CONSULTS
Spoke w/ pt spouse Leia regarding discharging home on hospice services. Patient medically ready for discharge and can be discharged on 12/10 in the afternoon to allow for arrival of DME supplies. Hospital bed w/ rails, over-bed table, low air loss mattress, ordered via [facility] and to be delivered in the AM of 12/10. Please send along 3-day supply of scheduled meds from discharge pharmacy. Please include PRN Oxycodone w/ script d/t patient utilizing this appr x1 daily while in hospital. [facility] hospice admission nurse scheduled to be in the home on 12/11 at 0900 and will order meds and hospice comfortPak then.      Please schedule transport tomorrow.     Thank you for the consult,    Deidra Cerda RN   [facility] hospice [facility] Team Director  [phone]

## 2022-12-10 NOTE — PLAN OF CARE
Goal Outcome Evaluation:      Plan of Care Reviewed With: patient, spouse    Overall Patient Progress: no changeOverall Patient Progress: no change    Outcome Evaluation: Arouses to voice. uses short, one word phrases (yes, no, etc). wife Leia at bedside, greatly helps mentation/agitation. able to take meds crushed in ice cream. ate chicken noodle soup for lunch. buprehorphine patch in place for pain. PRN oxycodone for pain. increased agitation with turns, incontinence cares. able to be redirected. bed alarm on.  Potential discharge to home hospice Saturday

## 2022-12-10 NOTE — DISCHARGE SUMMARY
Care Management Discharge Note    Discharge Date: 12/12/2022  Discharge Disposition: Home  Discharge Services:  [facility]  Main Phone: [phone], Deidra: [phone]  Discharge DME: Bed - Being delivered prior to 1pm today  Discharge Transportation: Stretcher ride, after 2pm  Private pay costs discussed: transportation costs  PAS Confirmation Code:  NA  Patient/family educated on Medicare website which has current facility and service quality ratings:  (N/A)  Education Provided on the Discharge Plan:  yes  Persons Notified of Discharge Plans: family, patient  Patient/Family in Agreement with the Plan: yes  Handoff Referral Completed: Yes    Additional Information:    @0910- [facility] De[facility], and confirmed that Home Hospice met with patient yesterday and that plan was for patient to discharge today.  Deidra confirmed that all DME would be delivered prior to 1pm today to the home and that hospice nursing will begin tomorrow morning.  Deidra reported that she has already communicated this with patient's wife.  [facility] will arrange a stretcher ride for this afternoon and will follow up with family.   [facility] and they will watch the chart.    @0915- SW arranged stretcher ride for 2pm with [facility].    @0920- SW attempted to reach patient's spouse via phone, however, there was no answer and no VM option.    @1030- SW spoke with patient's wife Leia and informed her of the stretcher ride today at 2pm.     @1420- SW ensured successful discharge for patient with EMS, nursing staff, and patient's wife, Leia.         Myrna Gray, LISA, [facility]  [address]   [email or web address]  Office: [phone]   Pager: [phone]

## 2022-12-10 NOTE — PLAN OF CARE
Goal Outcome Evaluation:      Plan of Care Reviewed With: patient, spouse    Overall Patient Progress: no changeOverall Patient Progress: no change    Outcome Evaluation: Alert to self, arouses to name. speaks in one word answers, longer answers the words become jumbled. attempted to give AM meds crushed in ice cream, refused by patient. buprenorphine patch in place. primofit in place for incontinence. plan to discharge to home on home hospice this afternoon    Discharged to home with wife via  [facility] at 1445

## 2022-12-10 NOTE — PLAN OF CARE
Goal Outcome Evaluation: [id number]      Plan of Care Reviewed With: patient    Overall Patient Progress: no change    Outcome Evaluation: Pt is alert to self, arouses to name. Pt is unable to answer questions fluently, speech is somewhat incomprehensible. Taking meds crushed, in ice cream. Repositioning patient as tolerated, no PRNs given overnight. Buprehorpine patch in place on R chest. Primafit on for urinary incontinence, having george, clear UOP. Plan for D/C to home with home Hospice care. Continue with POC.

## 2022-12-10 NOTE — DISCHARGE SUMMARY
[facility]  Hospitalist Discharge Summary      Date of Admission:  11/22/2022  Date of Discharge:  No discharge date for patient encounter.  Discharging Provider: Devyn Castillo MD  Discharge Service: Hospitalist Service, GOLD TEAM 7    Discharge Diagnoses   Clostridium difficile infection   Transverse process fracture L4  Cognitive decline 2/2 Dementia     Follow-ups Needed After Discharge   Follow-up Appointments     Adult [facility] Follow-up and recommended labs and tests      Appointments on [place] and/or [facility] (with [facility] or [facility]   provider or service). Call [phone] if you haven't heard regarding   these appointments within 7 days of discharge.         {Additional follow-up instructions/to-do's for PCP    :    Unresulted Labs Ordered in the Past 30 Days of this Admission     No orders found from 10/23/2022 to 11/23/2022.      These results will be followed up by hospice     Discharge Disposition   Discharged to home  Condition at discharge: Stable      Hospital Course   Catalino Coronado is a 72-year-old man with Alzheimer dementia here for pain and weakness, found to have L4 transverse spinous process fracture and found to have delirium.Initial infectious work up and metabolic work up negative for any organic cause. 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 Now with waxing and waning mentation. Transitioned to outpatient hospice on discharge.      Hospital course   Pt presented on 11/22 with acute back pain and altered mental status , head ct 11/22 unremarkable, ct lumbar spine on 11/22 showed subacute fracture of the transverse process at left L4, admission team suspected UTI based on urine from 11/22, pt was started on IV ceftriaxone, had detailed family meeting on 11/28  regarding feeding tube placement and need for aggressive cares that was deferred back then, neurology consulted 11/30 for further input, EEG recording started 11/30 that showed cortical myoclonus, on neurology recommendations patient was started on valproic acid 12/2, on 12/3 pt developed diarrhea, stool tested was positive for Clostridium difficile, oral vancomycin was started for 10 day course.     Given rapid advancement of the patients dementia despite correcting  factors that could have caused acute delirium a hospice evaluation was performed due to lack of improvement in mentation, Patient eventually  qualified for home with hospice care and is been discharged in stable condition.        Goal of Conference: 12/2 and 12/7  Transitioned to home with hospice 12/9  Care conference held 12/2/22, wife Leia, and daughter and granddaughter, in attendance of neurology resident, palliative team and . As per Leia, strong desire to bring the patient home, working to see if home hospice possible      seborrhoic dermatitis   Started 2% ketoconazole cream BID     Delirium likely due to pain, fall, hospitalization, C diff on top of underlying advanced dementia: marginal improvement in mentation   Minimally verbal at recent baseline at home but he is usually able to communicate with his wife by gestures about bathroom needs.   -Lorazepam resumed at lower dose 0.25 Q6 prn    -olanzapine 25 mg prnhs for agitation                   Clostridium Difficle enteritis with diarrhea  This could be main contributing to delirium   X ray abdomen showed possible enteritis but no significant stool burden  Started on PO Vancomycin 12/3-12/12 anticipate 10 day course                   Acute back pain secondary to L4 transverse spinous process fracture from a fall at home  Conservative management at home   Continue oxycodone 5 mg  Q6 as needed   scheduled tylenol   Topical Voltaren   buprenorphine 5mcg/hour  q weekly     Moderate  protein calorie malnutrition:  Poor oral intake  - Appreciate nutrition consultation  - Family decided aginst feeding tube.     Urinary retention:   Wife reports noticing straining.  On home Flomax, added finasteride this admit        Alzheimer's disease   Generalized weakness with frequent falls  -Continue home regimen of Aricept 5mg once daily  -Continue Lexapro 20mg once daily  -Continue home regimen of Namenda 10mg by mouth twice daily  -Continue Seroquel 25 mg  by mouth once daily  -PT/OT consult to assess safety to return home      Hypokalemia: replace as needed  BPH:Continue home regimen of Flomax 0.4mg once daily  Urine culture negative  Stage IIIa kidney disease     Hypertension  -switched from home regimen of Atenolol 100mg once daily to amlodipine 10 mg daily due to bradycardia and persistent elevated BPs               Consultations This Hospital Stay   CARE MANAGEMENT / SOCIAL WORK IP CONSULT  CARE MANAGEMENT / SOCIAL WORK IP CONSULT  ORTHOPAEDIC SURGERY ADULT/PEDS IP CONSULT  PHYSICAL THERAPY ADULT IP CONSULT  OCCUPATIONAL THERAPY ADULT IP CONSULT  NEUROSURGERY ADULT IP CONSULT  PALLIATIVE CARE ADULT IP CONSULT  SPEECH LANGUAGE PATH ADULT IP CONSULT  NURSING TO CONSULT FOR VASCULAR ACCESS CARE IP CONSULT  CARE MANAGEMENT / SOCIAL WORK IP CONSULT  NURSING TO CONSULT FOR VASCULAR ACCESS CARE IP CONSULT  REGIONAL ANESTHESIA PAIN SERVICE ADULT IP CONSULT  SPEECH LANGUAGE PATH ADULT IP CONSULT  NURSING TO CONSULT FOR VASCULAR ACCESS CARE IP CONSULT  NEUROLOGY GENERAL ADULT IP CONSULT  NURSING TO CONSULT FOR VASCULAR ACCESS CARE IP CONSULT  INTERVENTIONAL RADIOLOGY ADULT/PEDS IP CONSULT  NURSING TO CONSULT FOR VASCULAR ACCESS CARE IP CONSULT  NURSING TO CONSULT FOR VASCULAR ACCESS CARE IP CONSULT  SPEECH LANGUAGE PATH ADULT IP CONSULT  SOCIAL WORK IP CONSULT  NURSING TO CONSULT FOR VASCULAR ACCESS CARE IP CONSULT  PHYSICAL THERAPY ADULT IP CONSULT  OCCUPATIONAL THERAPY ADULT IP CONSULT  SPEECH LANGUAGE PATH  ADULT IP CONSULT  PHARMACY LIAISON FOR MEDICATION COVERAGE CONSULT  CARE MANAGEMENT / SOCIAL WORK IP CONSULT  PSYCHIATRY IP CONSULT    Code Status   No CPR- Do NOT Intubate    Time Spent on this Encounter   I, Devyn Castillo MD, personally saw the patient today and spent greater than 30 minutes discharging this patient.       Devyn Castillo MD  [facility] [address]  [address] [place] [address]  Phone: [phone]  ______________________________________________________________________    Physical Exam   Vital Signs: Temp: 97.5  F (36.4  C) Temp src: Axillary BP: 132/70 Pulse: 98   Resp: 15 SpO2: 98 % O2 Device: None (Room air)    Weight: 202 lbs 8 oz  Constitutional: awake, alert, cooperative, no apparent distress, and appears stated age  Eyes: Lids and lashes normal, pupils equal, round and reactive to light, extra ocular muscles intact, sclera clear, conjunctiva normal  ENT: Normocephalic, without obvious abnormality, atraumatic, sinuses nontender on palpation, external ears without lesions, oral pharynx with moist mucous membranes, tonsils without erythema or exudates, gums normal and good dentition.  Hematologic / Lymphatic: no cervical lymphadenopathy  Respiratory: No increased work of breathing, good air exchange, clear to auscultation bilaterally, no crackles or wheezing  Cardiovascular: Normal apical impulse, regular rate and rhythm, normal S1 and S2, no S3 or S4, and no murmur noted  GI: No scars, normal bowel sounds, soft, non-distended, non-tender, no masses palpated, no hepatosplenomegally  Genitounirinary:   Skin: no bruising or bleeding  Musculoskeletal: There is no redness, warmth, or swelling of the joints.  Full range of motion noted.  Motor strength is 5 out of 5 all extremities bilaterally.  Tone is normal.  Neurologic: Awake, alert, oriented to name, place and time.  Cranial nerves II-XII are grossly intact.  Motor is 5 out of 5 bilaterally.  Cerebellar finger to nose, heel to  shin intact.  Sensory is intact.  Babinski down going, Romberg negative, and gait is normal.  Neuropsychiatric: General: normal, calm and normal eye contact       Primary Care Physician   Davis Aguilar    Discharge Orders      Home Care Referral      Resume Home Care Services     Reason for your hospital stay    Dear Catalino Coronado,    Your were hospitalized at [facility] with altered mental status and treated with antibiotics.  Over your hospitalization your condition improved and today you are ready to be discharged.  You should continue to improve but if you develop fever, shortness of breath, nausea, vomiting or abdominal pain please seek medical attention.    We are suggesting the following medication changes:  Take oral vancomycin 125 mg Q4 till 12/12    Please get the following tests done:  NA    Please set up an appointment with:      It was a pleasure meeting with you today. Thank you for allowing me and my team the privilege of caring for you during your hospitalization. You are the reason we are here, and I truly hope we provided you with the excellent service you deserve. Please let us know if there is anything else we can do for you so that we can be sure you are leaving completely satisfied with your care experience.    Your hospital unit at the time of discharge is 5A so if you have any questions please call the hospital at [phone] and ask to talk to a nurse on 5A.     Sincerely,    Devyn Castillo MD  Internal Medicine Hospitalist  [facility]     Activity    Your activity upon discharge: activity as tolerated     Adult [facility] Follow-up and recommended labs and tests    Appointments on [place] and/or [place] [facility] (with [facility] or [facility] provider or service). Call [phone] if you haven't heard regarding these appointments within 7 days of discharge.     Diet    Follow this diet upon discharge: Orders Placed This Encounter      Calorie Counts      Room  Service      Easy to Chew Diet (level 7) Thin Liquids (level 0)       Significant Results and Procedures   Most Recent 3 CBC's:Recent Labs   Lab Test 12/10/22  0703 12/09/22  1308 12/08/22  1254   WBC 7.0 6.7 6.7   HGB 14.1 13.9 13.6   MCV 93 93 92    284 301     Most Recent 3 BMP's:Recent Labs   Lab Test 12/10/22  1054 12/09/22  1308 12/09/22  0154 12/08/22  1254    141  --  141   POTASSIUM 4.5 3.6 3.5 3.2*   CHLORIDE 107 106  --  106   CO2 20* 25  --  23   BUN 19.0 16.5  --  12.3   CR 0.66* 0.73  --  0.75   ANIONGAP 10 10  --  12   CANDE 8.7* 8.6*  --  8.7*   * 112*  --  111*     Most Recent 2 LFT's:Recent Labs   Lab Test 12/10/22  1054 12/09/22  1308   AST 53* 30   ALT 30 23   ALKPHOS 92 93   BILITOTAL 0.5 0.5   ,   Results for orders placed or performed during the hospital encounter of 11/22/22   CT Head w/o Contrast    Narrative    CT HEAD W/O CONTRAST 11/22/2022 3:20 PM    History: Mental status change after fall   Comparison: 11/12/2022    Technique: Using multidetector thin collimation helical acquisition  technique, axial, coronal and sagittal CT images from the skull base  to the vertex were obtained without intravenous contrast.   (topogram) image(s) also obtained and reviewed.    Findings: There is no intracranial hemorrhage, mass effect, or midline  shift. Moderate generalized cerebral volume loss. Mild confluent and  scattered periventricular hypodensity similar to prior most suggestive  of chronic small vessel ischemic disease. No acute loss of gray-white  differentiation. Ventricles are proportionate to the cerebral sulci.  The basal cisterns are clear. Incidental arachnoid cyst versus  subdural hygroma in the right posterior fossa, unchanged.    No acute calvarial fracture. Paranasal sinuses and mastoid air cells  are relatively clear where visualized. Bilateral pseudophakia.      Impression    Impression:    1. No acute intracranial pathology.  2. Unchanged periventricular  white matter changes suggestive of  chronic small vessel ischemic disease.   3. Moderate cerebral atrophy.    I have personally reviewed the examination and initial interpretation  and I agree with the findings.    LAMONT VILLAFANA MD         SYSTEM ID:  [id number]   CT Lumbar Spine w/o Contrast    Narrative    CT LUMBAR SPINE W/O CONTRAST 11/22/2022 3:58 PM    History: pain after fall one week ago.  Comparison: CT abdomen 11/13/2022.    Technique: Using multidetector thin collimation helical acquisition  technique, axial, coronal and sagittal CT images through the lumbar  spine were obtained without intravenous contrast.     Findings: There are 5 lumbar-type vertebra. Straightening of the  lordotic curvature with retrolisthesis L4. Moderate disc height  narrowing at L4-5. Subacute fracture of the left L4 transverse process  which was seen on prior. Multifocal anterior vertebral body  ossification. Findings on a level by level basis are as follows:    T12-L1: Bilateral facet arthropathy with ligamentum flavum  hypertrophy. Posterior disc osteophyte complex. Mild to moderate  spinal canal stenosis. Mild bilateral neural foraminal stenosis..    L1-L2: Bilateral facet arthropathy with ligamentum flavum hypertrophy.  Mild bilateral neural foraminal stenosis. Mild to moderate spinal  canal stenosis.    L2-L3: Bilateral facet arthropathy right greater than left. Narrowing  of the right lateral recess and ligamentum flavum hypertrophy moderate  right and mild left neural foraminal stenosis. Moderate spinal canal  stenosis.    L3-L4: Bilateral facet arthropathy with calcified ligamentum flavum  hypertrophy. Moderate right and mild left neural foraminal stenosis.  Moderate severe spinal canal stenosis.    L4-L5: Bilateral facet arthropathy and ligamentum flavum hypertrophy.  Mild to moderate right and moderate left neural foraminal stenosis.  Mild spinal canal stenosis.    L5-S1: Bilateral facet arthropathy and ligamentum flavum  hypertrophy.  Mild bilateral neural foraminal stenosis. No spinal canal stenosis.    The visualized adjacent paraspinous tissues are grossly within normal  limits. Calcification of the thoracic aorta and splenic artery.      Impression    Impression:    1.  Subacute left L4 transverse process fracture which was seen on  11/13/2022 after retrospective second look. This could be related to  recent trauma when correlated with history.  2. Multilevel lumbar spondylosis detailed above. Most significant  finding at L3-4 with degenerative changes causing moderate severe  spinal canal stenosis.    I have personally reviewed the examination and initial interpretation  and I agree with the findings.    SIDNEY STEWART MD         SYSTEM ID:  [id number]   XR Chest Port 1 View    Narrative    Exam: XR CHEST PORT 1 VIEW, 11/29/2022 9:59 AM    Indication: AMS    Comparison: None    Findings:   Cardiomediastinal silhouette appears within normal limits. Calcified  granuloma in the right midlung zone. Mild bibasilar atelectasis.  Otherwise no focal infectious consolidation suspected. No pneumothorax  or effusions.      Impression    Impression: Right basilar atelectasis. No focal infectious  consolidations.    I have personally reviewed the examination and initial interpretation  and I agree with the findings.    TRAVIS AWAD MD         SYSTEM ID:  [id number]   XR Abdomen Port 1 View    Narrative    Portable abdomen 1 view    INDICATION: Blunting for evidence of stool burden    COMPARISON: CT abdomen pelvis 11/13/2022    FINDINGS: A few nondistended air-filled loops of small bowel are  present with large bowel visualized, indicating suggestion of minimal  enteritis. Associated retained fecal material without. There are  degenerative changes in lumbar spine.      Impression    IMPRESSION: Lumbar spondylosis. Small bowel enteritis. No evidence of  prominent retention of fecal material within the intestinal tract    LORETA CORREIA MD          SYSTEM ID:  [id number]       Discharge Medications   Current Discharge Medication List      START taking these medications    Details   amLODIPine (NORVASC) 10 MG tablet Take 1 tablet (10 mg) by mouth daily  Qty: 30 tablet, Refills: 0    Associated Diagnoses: Alzheimer's disease (H)      buprenorphine (BUTRANS) 5 MCG/HR WK patch Place 1 patch onto the skin once a week  Qty: 12 patch, Refills: 0    Associated Diagnoses: Alzheimer's disease (H)      calcium carbonate (TUMS) 500 MG chewable tablet Take 1 tablet (500 mg) by mouth 4 times daily as needed for heartburn  Qty: 120 tablet, Refills: 0    Associated Diagnoses: Alzheimer's disease (H)      diclofenac (VOLTAREN) 1 % topical gel Apply 4 g topically 4 times daily as needed for moderate pain (4-6)  Qty: 100 g, Refills: 0    Associated Diagnoses: Alzheimer's disease (H)      finasteride (PROSCAR) 5 MG tablet Take 1 tablet (5 mg) by mouth daily  Qty: 30 tablet, Refills: 0    Associated Diagnoses: Alzheimer's disease (H)      LORazepam (ATIVAN) 2 MG/ML (HIGH CONC) oral solution Take 0.125 mLs (0.25 mg) by mouth every 8 hours as needed for anxiety  Qty: 30 mL, Refills: 0    Associated Diagnoses: Alzheimer's disease (H)      melatonin 5 MG sublingual tablet Place 1 tablet (5 mg) under the tongue every evening  Qty: 30 tablet, Refills: 0    Associated Diagnoses: Alzheimer's disease (H)      multivitamin, therapeutic (THERA-VIT) TABS tablet Take 1 tablet by mouth daily  Qty: 30 tablet, Refills: 0    Associated Diagnoses: Alzheimer's disease (H)      valproic acid (DEPAKENE) 250 MG/5ML syrup Take 10 mLs (500 mg) by mouth daily  Qty: 473 mL, Refills: 0    Associated Diagnoses: Alzheimer's disease (H)      vancomycin (FIRVANQ) 50 MG/ML oral solution Take 2.5 mLs (125 mg) by mouth 4 times daily for 3 days  Qty: 30 mL, Refills: 0    Associated Diagnoses: Alzheimer's disease (H)         CONTINUE these medications which have CHANGED    Details   oxyCODONE (ROXICODONE) 5 MG  tablet Take 1 tablet (5 mg) by mouth every 6 hours as needed for moderate pain (4-6) or moderate to severe pain  Qty: 8 tablet, Refills: 0    Associated Diagnoses: Arthralgia, unspecified joint         CONTINUE these medications which have NOT CHANGED    Details   calcipotriene (DOVONOX) 0.005 % external cream Apply once daily to the ears and face  Qty: 60 g, Refills: 11    Comments: Sebopsoriasis [L40.8] ICD Code  Associated Diagnoses: Sebopsoriasis; Dermatitis, seborrheic; Psoriasis      clobetasol (TEMOVATE) 0.05 % external solution Apply topically 2 times daily To scaly and itchy areas on scalp until no rash nor itch. Hold until patient requests.  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 Hold until patient requests.  Qty: 240 mL, Refills: 11    Associated Diagnoses: Sebopsoriasis      magnesium 250 MG tablet Take 1 tablet by mouth At Bedtime       MELATONIN PO Take by mouth At Bedtime      memantine (NAMENDA) 10 MG tablet Take 1 tablet (10 mg) by mouth 2 times daily  Qty: 180 tablet, Refills: 3    Associated Diagnoses: Late onset Alzheimer's disease without behavioral disturbance (H)      QUEtiapine (SEROQUEL) 25 MG tablet Take 0.5 tablets (12.5 mg) by mouth daily as needed (Agitation that is non-redirectable)  Qty: 30 tablet, Refills: 4    Associated Diagnoses: Alzheimer's disease (H)      simvastatin (ZOCOR) 40 MG tablet Take 1  tablet (40 mg) by mouth At Bedtime  Qty: 90 tablet, Refills: 0    Associated Diagnoses: Pure hypercholesterolemia      tamsulosin (FLOMAX) 0.4 MG capsule Take 1 capsule (0.4 mg) by mouth daily  Qty: 90 capsule, Refills: 3    Associated Diagnoses: Benign prostatic hyperplasia with lower urinary tract symptoms, symptom details unspecified      desonide (DESOWEN) 0.05 % external ointment Apply twice daily as needed for rash on the face  Qty: 60 g, Refills: 3    Associated Diagnoses: Dermatitis, seborrheic      tacrolimus (PROTOPIC) 0.1 % external ointment          STOP taking these medications       atenolol (TENORMIN) 100 MG tablet Comments:   Reason for Stopping:         calcipotriene (DOVONOX) 0.005 % external solution Comments:   Reason for Stopping:             Allergies   No Known Allergies

## 2022-12-10 NOTE — PROGRESS NOTES
Initially refused all morning meds from RN. Will try again when wife is at bedside, pt is more directable when she is here. Writer offered chocolate and vanilla ice cream, and chicken noodle soup, refused.

## 2022-12-12 NOTE — PLAN OF CARE
Occupational Therapy Discharge Summary    Reason for therapy discharge:    Discharged to home with hospice    Progress towards therapy goal(s). See goals on Care Plan in [facility] electronic health record for goal details.  Goals partially met.  Barriers to achieving goals:   discharge from facility.    Therapy recommendation(s):    No further therapy is recommended.

## 2022-12-12 NOTE — PROGRESS NOTES
Prior Authorization Approval    Firvanq 50mg/ml solution  Date Initiated: 12/12/2022  Date Completed: 12/12/2022  Prior Auth Type: Non-Formulary                Status: Approved    Effective Date: 09/13/2022 -- 12/12/2023  Copay: 39.81     Filling Pharmacy: [facility] - [place], [place] - [address] [place] [address]    Insurance: Medicare Blue - Phone [phone] Fax [phone]  ID: [id number]  [facility] Key/Case Number: [id number] / [id number]   Submitted Via: Sonido Navarro  [facility] Liaison  Ph: [phone] Pager: [phone]

## 2022-12-12 NOTE — PLAN OF CARE
Physical Therapy Discharge Summary    Reason for therapy discharge:    Discharged to home with hospice    Progress towards therapy goal(s). See goals on Care Plan in [facility] electronic health record for goal details.  Goals partially met.  Barriers to achieving goals:   discharge from facility.    Therapy recommendation(s):    No further therapy is recommended.

## 2022-12-12 NOTE — PROGRESS NOTES
[facility] Care Coordination Contact [facility]    Follow Up Progress Note      Assessment:  Pt was hospitalized at  [facility] from 11-22-22 to 12-10-22 as the result of pain and weakness.  Discharge diagnoses included:    Clostridium difficile infection   Transverse process fracture L4  Cognitive decline 2/2 Dementia    SW/CC talked with pt's wife, Leia, today.  Pt also has a diagnosis of Alzheimer's disease.  During pt's hospitalization there was rapid advancement of pt's dementia despite correcting factors that could have caused acute delirium.  Pt was evaluated for home hospice while hospitalized and qualified.  Per pt's spouse's request, pt was discharged home with [facility].  Pt's spouse is a nurse and she has the support of other family members in caring for pt at home.       Care Gaps:  Did not address due to pt entering home hospice    Health Maintenance Due   Topic Date Due     MICROALBUMIN  Never done     DEPRESSION ACTION PLAN  Never done     HEPATITIS C SCREENING  Never done     LUNG CANCER SCREENING  Never done     DTAP/TDAP/TD IMMUNIZATION (4 - Td or Tdap) 03/18/2021     MEDICARE ANNUAL WELLNESS VISIT  09/01/2021     LIPID  09/01/2021     COVID-19 Vaccine (5 - Booster for Moderna series) 06/16/2022           Care Plans:  None due to pt entering home hospice.  Pt will be closed to Clinic Care Coordination due to having home SW coverage through hospice agency.    Intervention/Education provided during outreach:  SW/CC provided active listening, reassurance and support to pt's spouse during contact.  Explained that Clinic SW/CC will no longer follow pt at pt has home hospice SW.  Wife in agreement with this and indicated hospice care has been very good thus far.     Plan:   Pt being followed by home hospice so pt closed to Clinic Care Coordination.  No further outreaches will be made.    Satinder Hamilton, SW/CC for Marquita Cavazos, SW/CC  [facility] Care Coordination  [phone]

## 2022-12-21 NOTE — TELEPHONE ENCOUNTER
"See 12/6/2022 mychart encounter for more information. Below is providers message to patient:  \"Roverto Chen MD  to Proxy for Vahid Coronado (Leia Coronado)    MM    10:45 AM  Meme Burr-     Sorry things aren't going well and hope Vahid is feeling comfortable.      The rash does look consistent with seborrheic dermatitis (dandruff on the face). I think if bothersome to him, you could switch the protopic 0.1% ointment for the face to desonide 0.05% ointment (topical steroid), although the rash is not dangerous so no need to treat if it isn't bothering him. I sent a new prescription for this to your pharmacy. \"  "

## 2023-01-01 ENCOUNTER — TELEPHONE (OUTPATIENT)
Dept: FAMILY MEDICINE | Facility: CLINIC | Age: 73
End: 2023-01-01

## 2023-01-01 DIAGNOSIS — E78.00 PURE HYPERCHOLESTEROLEMIA: ICD-10-CM

## 2023-01-01 RX ORDER — SIMVASTATIN 40 MG
TABLET ORAL
Qty: 90 TABLET | Refills: 0 | Status: SHIPPED | OUTPATIENT
Start: 2023-01-01

## 2023-01-01 RX ORDER — QUETIAPINE FUMARATE 25 MG/1
12.5 TABLET, FILM COATED ORAL DAILY PRN
Qty: 30 TABLET | Refills: 4 | OUTPATIENT
Start: 2023-01-01

## 2023-01-02 NOTE — TELEPHONE ENCOUNTER
"Routing refill request to provider for review/approval because:  Labs not current:  LDL    Last Written Prescription Date:  8/30/22  Last Fill Quantity: 90,  # refills: 0   Last office visit provider:  11/15/22    Requested Prescriptions   Pending Prescriptions Disp Refills     simvastatin (ZOCOR) 40 MG tablet [Pharmacy Med Name: SIMVASTATIN 40 MG TABLET] 90 tablet 0     Sig: TAKE 1 TABLET BY MOUTH AT BEDTIME       Statins Protocol Failed - 1/1/2023  5:39 PM        Failed - LDL on file in past 12 months     Recent Labs   Lab Test 12/31/20  1641                Passed - No abnormal creatine kinase in past 12 months     Recent Labs   Lab Test 01/04/18  1115   *                Passed - Recent (12 mo) or future (30 days) visit within the authorizing provider's specialty     Patient has had an office visit with the authorizing provider or a provider within the authorizing providers department within the previous 12 mos or has a future within next 30 days. See \"Patient Info\" tab in inbasket, or \"Choose Columns\" in Meds & Orders section of the refill encounter.              Passed - Medication is active on med list        Passed - Patient is age 18 or older             Brianna Cuellar RN 01/01/23 6:58 PM  "

## 2023-01-04 NOTE — TELEPHONE ENCOUNTER
FYI - Status Update    Who is Calling: family member, wife  Leia    Update: a recent issue came up as pt is in Hospice care now.     Pt becomes panic stricken when he swallows.  Pt has advanced alzheimers.  Wife would like a feeding tube placed in pt nose - not surgically placed, but hospice will not allow that.      Leia is wanting PCP opinion and recommendations.  Please call Leia on her cell phone [phone] today if possible.  Thanks.    Does caller want a call/response back: Yes     Could we send this information to you in [employer] or would you prefer to receive a phone call?:   Patient would prefer a phone call     Okay to leave a detailed message?: Yes at Cell number on file:    Telephone Information:   Mobile [phone]       Call taken on 1/4/23 at 305 pm by MAGNUS Tadeo

## 2023-01-04 NOTE — TELEPHONE ENCOUNTER
I spoke with Leia  Quite uncomfortable when trying to eat.  Options would be not offering food    She will discuss with hospice nurse tomorrow  After discussion with her or hospice nurse going forward

## 2023-11-21 NOTE — TELEPHONE ENCOUNTER
"Routing refill request to provider for review/approval because:  Labs not current:  LDL    Last Written Prescription Date:  1/19/2022  Last Fill Quantity: 90,  # refills: 0   Last office visit provider:  12/15/2021     Requested Prescriptions   Pending Prescriptions Disp Refills     simvastatin (ZOCOR) 40 MG tablet 90 tablet 0     Sig: Take 1 tablet (40 mg) by mouth At Bedtime       Statins Protocol Failed - 4/26/2022 10:48 AM        Failed - LDL on file in past 12 months     Recent Labs   Lab Test 12/31/20  1641                Passed - No abnormal creatine kinase in past 12 months     Recent Labs   Lab Test 01/04/18  1115   *                Passed - Recent (12 mo) or future (30 days) visit within the authorizing provider's specialty     Patient has had an office visit with the authorizing provider or a provider within the authorizing providers department within the previous 12 mos or has a future within next 30 days. See \"Patient Info\" tab in inbasket, or \"Choose Columns\" in Meds & Orders section of the refill encounter.              Passed - Medication is active on med list        Passed - Patient is age 18 or older             Lacy Echeverria RN 04/26/22 10:48 AM  " Wound Care: [place]

## (undated) DEVICE — EYE PACK CUSTOM ANTERIOR 30DEG TIP CENTURION PPK6682-04

## (undated) DEVICE — EYE TIP IRRIGATION & ASPIRATION POLYMER CVD 0.3MM 8065751512

## (undated) DEVICE — EYE SOL BSS 500ML BAG 0065-1795-04

## (undated) DEVICE — EYE KNIFE STILETTO VISITEC 1.1MM ANG 45DEG SIDEPORT 376620

## (undated) DEVICE — EYE CANN IRR 25GA CYSTOTOME 581610

## (undated) DEVICE — SU ETHILON 10-0 CS160-6 12" 9000G

## (undated) DEVICE — LINEN TOWEL PACK X5 5464

## (undated) DEVICE — EYE SHIELD PLASTIC

## (undated) DEVICE — EYE CANN IRR 27GA ANTERIOR CHAMBER 581280

## (undated) DEVICE — DRSG STERI STRIP 1/2X4" R1547

## (undated) DEVICE — EYE KNIFE SLIT XSTAR VISITEC 2.6MM 45DEG 373726

## (undated) DEVICE — GLOVE PROTEXIS MICRO 7.5  2D73PM75

## (undated) DEVICE — PACK CATARACT CUSTOM ASC SEY15CPUMC

## (undated) RX ORDER — FENTANYL CITRATE 50 UG/ML
INJECTION, SOLUTION INTRAMUSCULAR; INTRAVENOUS
Status: DISPENSED
Start: 2018-10-22

## (undated) RX ORDER — LIDOCAINE HYDROCHLORIDE 10 MG/ML
INJECTION, SOLUTION EPIDURAL; INFILTRATION; INTRACAUDAL; PERINEURAL
Status: DISPENSED
Start: 2018-07-06

## (undated) RX ORDER — ACETAMINOPHEN 325 MG/1
TABLET ORAL
Status: DISPENSED
Start: 2018-10-22

## (undated) RX ORDER — PHENYLEPHRINE HYDROCHLORIDE 100 MG/ML
SOLUTION/ DROPS OPHTHALMIC
Status: DISPENSED
Start: 2018-10-22

## (undated) RX ORDER — FENTANYL CITRATE 50 UG/ML
INJECTION, SOLUTION INTRAMUSCULAR; INTRAVENOUS
Status: DISPENSED
Start: 2018-10-08

## (undated) RX ORDER — SODIUM CHLORIDE 9 MG/ML
INJECTION, SOLUTION INTRAVENOUS
Status: DISPENSED
Start: 2018-07-06

## (undated) RX ORDER — FENTANYL CITRATE 50 UG/ML
INJECTION, SOLUTION INTRAMUSCULAR; INTRAVENOUS
Status: DISPENSED
Start: 2018-07-06